# Patient Record
Sex: FEMALE | Race: WHITE | Employment: OTHER | URBAN - METROPOLITAN AREA
[De-identification: names, ages, dates, MRNs, and addresses within clinical notes are randomized per-mention and may not be internally consistent; named-entity substitution may affect disease eponyms.]

---

## 2017-02-27 ENCOUNTER — HOSPITAL ENCOUNTER (OUTPATIENT)
Dept: MAMMOGRAPHY | Age: 77
Discharge: HOME | End: 2017-02-27
Attending: INTERNAL MEDICINE | Admitting: INTERNAL MEDICINE
Payer: MEDICARE

## 2017-02-27 DIAGNOSIS — C50.912 MALIGNANT NEOPLASM OF LEFT FEMALE BREAST, UNSPECIFIED SITE OF BREAST: ICD-10-CM

## 2017-02-27 DIAGNOSIS — Z12.31 ENCOUNTER FOR SCREENING MAMMOGRAM FOR MALIGNANT NEOPLASM OF BREAST: ICD-10-CM

## 2017-02-27 PROCEDURE — 7025F PT INFOSYS ALARM 4 NXT MAMMO: CPT | Performed by: RADIOLOGY

## 2017-02-27 PROCEDURE — G0202 SCR MAMMO BI INCL CAD: HCPCS

## 2017-02-27 PROCEDURE — G0202 SCR MAMMO BI INCL CAD: HCPCS | Performed by: RADIOLOGY

## 2017-03-14 ENCOUNTER — HOSPITAL ENCOUNTER (OUTPATIENT)
Dept: MRI IMAGING | Age: 77
Discharge: HOME | End: 2017-03-14
Payer: MEDICARE

## 2017-03-14 DIAGNOSIS — G89.29 OTHER CHRONIC PAIN: ICD-10-CM

## 2017-03-14 LAB
BUN BLDV-MCNC: 27 MG/DL (ref 8–23)
CREAT SERPL-MCNC: 1.46 MG/DL (ref 0.5–0.9)
GFR AFRICAN AMERICAN: 42 ML/MIN
GFR NON-AFRICAN AMERICAN: 35 ML/MIN
GFR SERPL CREATININE-BSD FRML MDRD: ABNORMAL ML/MIN/{1.73_M2}
GFR SERPL CREATININE-BSD FRML MDRD: ABNORMAL ML/MIN/{1.73_M2}
HEPATITIS C ANTIBODY: NONREACTIVE

## 2017-03-14 PROCEDURE — 82565 ASSAY OF CREATININE: CPT

## 2017-03-14 PROCEDURE — 84520 ASSAY OF UREA NITROGEN: CPT

## 2017-03-14 PROCEDURE — 36415 COLL VENOUS BLD VENIPUNCTURE: CPT

## 2017-03-14 PROCEDURE — 72148 MRI LUMBAR SPINE W/O DYE: CPT

## 2017-03-14 PROCEDURE — 86803 HEPATITIS C AB TEST: CPT

## 2017-03-29 ENCOUNTER — HOSPITAL ENCOUNTER (OUTPATIENT)
Dept: RADIATION ONCOLOGY | Age: 77
Discharge: HOME | End: 2017-03-29
Payer: MEDICARE

## 2017-03-29 PROCEDURE — 99212 OFFICE O/P EST SF 10 MIN: CPT | Performed by: RADIOLOGY

## 2017-06-12 ENCOUNTER — HOSPITAL ENCOUNTER (OUTPATIENT)
Age: 77
Discharge: HOME | End: 2017-06-12
Payer: MEDICARE

## 2017-06-12 ENCOUNTER — OFFICE VISIT (OUTPATIENT)
Dept: ONCOLOGY | Age: 77
End: 2017-06-12
Payer: MEDICARE

## 2017-06-12 VITALS
HEART RATE: 56 BPM | BODY MASS INDEX: 29.15 KG/M2 | DIASTOLIC BLOOD PRESSURE: 73 MMHG | TEMPERATURE: 98.3 F | SYSTOLIC BLOOD PRESSURE: 154 MMHG | WEIGHT: 175.2 LBS

## 2017-06-12 DIAGNOSIS — C50.912 MALIGNANT NEOPLASM OF LEFT FEMALE BREAST, UNSPECIFIED SITE OF BREAST: Primary | ICD-10-CM

## 2017-06-12 DIAGNOSIS — Z12.31 ENCOUNTER FOR SCREENING MAMMOGRAM FOR MALIGNANT NEOPLASM OF BREAST: ICD-10-CM

## 2017-06-12 DIAGNOSIS — M81.0 AGE-RELATED OSTEOPOROSIS WITHOUT CURRENT PATHOLOGICAL FRACTURE: ICD-10-CM

## 2017-06-12 LAB
ABSOLUTE EOS #: 0.7 K/UL (ref 0–0.4)
ABSOLUTE LYMPH #: 1.3 K/UL (ref 1–4.8)
ABSOLUTE MONO #: 0.6 K/UL (ref 0.1–1.2)
ALBUMIN SERPL-MCNC: 3.5 G/DL (ref 3.5–5.2)
ALBUMIN/GLOBULIN RATIO: 1.2 (ref 1–2.5)
ALP BLD-CCNC: 75 U/L (ref 35–104)
ALT SERPL-CCNC: 7 U/L (ref 5–33)
ANION GAP SERPL CALCULATED.3IONS-SCNC: 13 MMOL/L (ref 9–17)
AST SERPL-CCNC: 16 U/L
BASOPHILS # BLD: 1 %
BASOPHILS ABSOLUTE: 0.1 K/UL (ref 0–0.2)
BILIRUB SERPL-MCNC: 0.25 MG/DL (ref 0.3–1.2)
BUN BLDV-MCNC: 25 MG/DL (ref 8–23)
BUN/CREAT BLD: ABNORMAL (ref 9–20)
CALCIUM SERPL-MCNC: 10.2 MG/DL (ref 8.6–10.4)
CHLORIDE BLD-SCNC: 101 MMOL/L (ref 98–107)
CO2: 26 MMOL/L (ref 20–31)
CREAT SERPL-MCNC: 1.61 MG/DL (ref 0.5–0.9)
DIFFERENTIAL TYPE: ABNORMAL
EOSINOPHILS RELATIVE PERCENT: 8 %
GFR AFRICAN AMERICAN: 38 ML/MIN
GFR NON-AFRICAN AMERICAN: 31 ML/MIN
GFR SERPL CREATININE-BSD FRML MDRD: ABNORMAL ML/MIN/{1.73_M2}
GFR SERPL CREATININE-BSD FRML MDRD: ABNORMAL ML/MIN/{1.73_M2}
GLUCOSE BLD-MCNC: 104 MG/DL (ref 70–99)
HCT VFR BLD CALC: 35.2 % (ref 36–46)
HEMOGLOBIN: 11.6 G/DL (ref 12–16)
LYMPHOCYTES # BLD: 15 %
MCH RBC QN AUTO: 29.6 PG (ref 26–34)
MCHC RBC AUTO-ENTMCNC: 33.1 G/DL (ref 31–37)
MCV RBC AUTO: 89.5 FL (ref 80–100)
MONOCYTES # BLD: 7 %
PDW BLD-RTO: 14.5 % (ref 12.5–15.4)
PLATELET # BLD: 221 K/UL (ref 140–450)
PLATELET ESTIMATE: ABNORMAL
PMV BLD AUTO: 8.3 FL (ref 6–12)
POTASSIUM SERPL-SCNC: 4.5 MMOL/L (ref 3.7–5.3)
RBC # BLD: 3.93 M/UL (ref 4–5.2)
RBC # BLD: ABNORMAL 10*6/UL
SEG NEUTROPHILS: 69 %
SEGMENTED NEUTROPHILS ABSOLUTE COUNT: 6.2 K/UL (ref 1.8–7.7)
SODIUM BLD-SCNC: 140 MMOL/L (ref 135–144)
TOTAL PROTEIN: 6.5 G/DL (ref 6.4–8.3)
WBC # BLD: 8.9 K/UL (ref 3.5–11)
WBC # BLD: ABNORMAL 10*3/UL

## 2017-06-12 PROCEDURE — 4040F PNEUMOC VAC/ADMIN/RCVD: CPT | Performed by: INTERNAL MEDICINE

## 2017-06-12 PROCEDURE — G8399 PT W/DXA RESULTS DOCUMENT: HCPCS | Performed by: INTERNAL MEDICINE

## 2017-06-12 PROCEDURE — 4005F PHARM THX FOR OP RXD: CPT | Performed by: INTERNAL MEDICINE

## 2017-06-12 PROCEDURE — 1123F ACP DISCUSS/DSCN MKR DOCD: CPT | Performed by: INTERNAL MEDICINE

## 2017-06-12 PROCEDURE — 85025 COMPLETE CBC W/AUTO DIFF WBC: CPT

## 2017-06-12 PROCEDURE — 1090F PRES/ABSN URINE INCON ASSESS: CPT | Performed by: INTERNAL MEDICINE

## 2017-06-12 PROCEDURE — 80053 COMPREHEN METABOLIC PANEL: CPT

## 2017-06-12 PROCEDURE — 99214 OFFICE O/P EST MOD 30 MIN: CPT | Performed by: INTERNAL MEDICINE

## 2017-06-12 PROCEDURE — 36415 COLL VENOUS BLD VENIPUNCTURE: CPT

## 2017-06-12 PROCEDURE — G8427 DOCREV CUR MEDS BY ELIG CLIN: HCPCS | Performed by: INTERNAL MEDICINE

## 2017-06-12 PROCEDURE — 1036F TOBACCO NON-USER: CPT | Performed by: INTERNAL MEDICINE

## 2017-06-12 PROCEDURE — G8419 CALC BMI OUT NRM PARAM NOF/U: HCPCS | Performed by: INTERNAL MEDICINE

## 2017-06-12 RX ORDER — OMEPRAZOLE 20 MG/1
20 CAPSULE, DELAYED RELEASE ORAL 2 TIMES DAILY
Qty: 60 CAPSULE | Refills: 3 | Status: SHIPPED | OUTPATIENT
Start: 2017-06-12 | End: 2017-10-25 | Stop reason: SDUPTHER

## 2017-10-25 DIAGNOSIS — C50.912 MALIGNANT NEOPLASM OF LEFT FEMALE BREAST, UNSPECIFIED ESTROGEN RECEPTOR STATUS, UNSPECIFIED SITE OF BREAST (HCC): Primary | ICD-10-CM

## 2017-10-25 RX ORDER — OMEPRAZOLE 20 MG/1
20 CAPSULE, DELAYED RELEASE ORAL 2 TIMES DAILY
Qty: 60 CAPSULE | Refills: 3 | Status: SHIPPED | OUTPATIENT
Start: 2017-10-25 | End: 2017-12-11 | Stop reason: SDUPTHER

## 2017-11-07 ENCOUNTER — HOSPITAL ENCOUNTER (OUTPATIENT)
Age: 77
Discharge: HOME | End: 2017-11-07
Payer: MEDICARE

## 2017-11-07 ENCOUNTER — HOSPITAL ENCOUNTER (OUTPATIENT)
Dept: GENERAL RADIOLOGY | Age: 77
Discharge: HOME | End: 2017-11-07
Payer: MEDICARE

## 2017-11-07 DIAGNOSIS — J40 BRONCHITIS: ICD-10-CM

## 2017-11-07 DIAGNOSIS — R06.02 SOB (SHORTNESS OF BREATH): ICD-10-CM

## 2017-11-07 PROCEDURE — 71020 XR CHEST STANDARD TWO VW: CPT

## 2017-11-27 ENCOUNTER — APPOINTMENT (OUTPATIENT)
Dept: CT IMAGING | Age: 77
End: 2017-11-27
Payer: OTHER MISCELLANEOUS

## 2017-11-27 ENCOUNTER — HOSPITAL ENCOUNTER (EMERGENCY)
Age: 77
Discharge: HOME | End: 2017-11-27
Attending: EMERGENCY MEDICINE
Payer: OTHER MISCELLANEOUS

## 2017-11-27 VITALS
SYSTOLIC BLOOD PRESSURE: 179 MMHG | HEIGHT: 65 IN | WEIGHT: 179 LBS | DIASTOLIC BLOOD PRESSURE: 53 MMHG | OXYGEN SATURATION: 95 % | HEART RATE: 70 BPM | BODY MASS INDEX: 29.82 KG/M2 | RESPIRATION RATE: 20 BRPM | TEMPERATURE: 97.5 F

## 2017-11-27 DIAGNOSIS — S16.1XXA ACUTE STRAIN OF NECK MUSCLE, INITIAL ENCOUNTER: ICD-10-CM
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
## 2017-11-27 PROCEDURE — 72125 CT NECK SPINE W/O DYE: CPT

## 2017-11-27 PROCEDURE — 99284 EMERGENCY DEPT VISIT MOD MDM: CPT

## 2017-11-27 PROCEDURE — 70450 CT HEAD/BRAIN W/O DYE: CPT

## 2017-11-27 ASSESSMENT — PAIN SCALES - GENERAL: PAINLEVEL_OUTOF10: 7

## 2017-11-27 ASSESSMENT — PAIN DESCRIPTION - PAIN TYPE: TYPE: ACUTE PAIN

## 2017-11-27 ASSESSMENT — PAIN DESCRIPTION - LOCATION: LOCATION: NECK;BACK
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
## 2017-11-28 NOTE — ED NOTES
Pt ambulated to the exit without assistance or difficulty; gait even and steady. Pt denied the need for a wheelchair. Pt educated on discharge instructions, pt verbalized understanding. Pt is eupneic, A&OX4, and PWD.        Keke Moran RN  11/27/17 5408

## 2017-12-01 ENCOUNTER — TELEPHONE (OUTPATIENT)
Dept: ONCOLOGY | Age: 77
End: 2017-12-01

## 2017-12-01 NOTE — TELEPHONE ENCOUNTER
Ofe Hill from [employer] called office and requested a verbal order for breast prosthesis and mastectomy bra. Ofe Hill stated patient is currently there to pick above items up. Verbal order provided and Ofe Hill stated that she will fax a form to our office for Dr Nora Cornell to sign.  Adri Irizarry

## 2017-12-11 DIAGNOSIS — C50.912 MALIGNANT NEOPLASM OF LEFT FEMALE BREAST, UNSPECIFIED ESTROGEN RECEPTOR STATUS, UNSPECIFIED SITE OF BREAST (HCC): ICD-10-CM

## 2017-12-11 RX ORDER — OMEPRAZOLE 20 MG/1
20 CAPSULE, DELAYED RELEASE ORAL 2 TIMES DAILY
Qty: 60 CAPSULE | Refills: 3 | Status: SHIPPED | OUTPATIENT
Start: 2017-12-11 | End: 2018-02-23 | Stop reason: SDUPTHER

## 2018-02-09 ENCOUNTER — HOSPITAL ENCOUNTER (OUTPATIENT)
Dept: MRI IMAGING | Age: 78
Discharge: HOME | End: 2018-02-11
Payer: MEDICARE

## 2018-02-09 DIAGNOSIS — M54.50 LUMBAR SPINE PAIN: ICD-10-CM

## 2018-02-09 PROCEDURE — 72148 MRI LUMBAR SPINE W/O DYE: CPT

## 2018-02-23 DIAGNOSIS — C50.912 MALIGNANT NEOPLASM OF LEFT FEMALE BREAST, UNSPECIFIED ESTROGEN RECEPTOR STATUS, UNSPECIFIED SITE OF BREAST (HCC): ICD-10-CM

## 2018-02-23 RX ORDER — OMEPRAZOLE 20 MG/1
20 CAPSULE, DELAYED RELEASE ORAL DAILY
Qty: 30 CAPSULE | Refills: 3 | Status: SHIPPED | OUTPATIENT
Start: 2018-02-23 | End: 2018-07-04 | Stop reason: SDUPTHER

## 2018-02-23 NOTE — TELEPHONE ENCOUNTER
Insurance will not cover Omeprazole 20mg PO BID and will only cover 1 PO daily. Dr Katia Trevino stated he does not want a PA performed and to submit rx for Omeprazole 20mg PO daily. I called and notified patient of above. Rx submitted.  Harini Lyons

## 2018-03-08 ENCOUNTER — HOSPITAL ENCOUNTER (OUTPATIENT)
Dept: WOMENS IMAGING | Age: 78
Discharge: HOME | End: 2018-03-10
Payer: MEDICARE

## 2018-03-08 DIAGNOSIS — C50.912 MALIGNANT NEOPLASM OF LEFT FEMALE BREAST (HCC): ICD-10-CM

## 2018-03-08 DIAGNOSIS — Z12.31 ENCOUNTER FOR SCREENING MAMMOGRAM FOR MALIGNANT NEOPLASM OF BREAST: ICD-10-CM

## 2018-03-08 DIAGNOSIS — M81.0 AGE-RELATED OSTEOPOROSIS WITHOUT CURRENT PATHOLOGICAL FRACTURE: ICD-10-CM

## 2018-03-08 PROCEDURE — 77080 DXA BONE DENSITY AXIAL: CPT

## 2018-03-08 PROCEDURE — 77067 SCR MAMMO BI INCL CAD: CPT

## 2018-04-27 ENCOUNTER — HOSPITAL ENCOUNTER (OUTPATIENT)
Age: 78
Setting detail: OBSERVATION
Discharge: HOME | End: 2018-04-28
Attending: EMERGENCY MEDICINE | Admitting: FAMILY MEDICINE
Payer: MEDICARE

## 2018-04-27 ENCOUNTER — APPOINTMENT (OUTPATIENT)
Dept: CT IMAGING | Age: 78
End: 2018-04-27
Payer: MEDICARE

## 2018-04-27 ENCOUNTER — APPOINTMENT (OUTPATIENT)
Dept: MRI IMAGING | Age: 78
End: 2018-04-27
Payer: MEDICARE

## 2018-04-27 DIAGNOSIS — I10 UNCONTROLLED HYPERTENSION: ICD-10-CM

## 2018-04-27 DIAGNOSIS — G45.9 TRANSIENT CEREBRAL ISCHEMIA, UNSPECIFIED TYPE: Primary | ICD-10-CM

## 2018-04-27 LAB
ABSOLUTE EOS #: 0 K/UL (ref 0–0.4)
ABSOLUTE IMMATURE GRANULOCYTE: ABNORMAL K/UL (ref 0–0.3)
ABSOLUTE LYMPH #: 0.7 K/UL (ref 1–4.8)
ABSOLUTE MONO #: 0.5 K/UL (ref 0.1–1.3)
ANION GAP SERPL CALCULATED.3IONS-SCNC: 13 MMOL/L (ref 9–17)
ANION GAP SERPL CALCULATED.3IONS-SCNC: 14 MMOL/L (ref 9–17)
BASOPHILS # BLD: 1 % (ref 0–2)
BASOPHILS ABSOLUTE: 0 K/UL (ref 0–0.2)
BUN BLDV-MCNC: 18 MG/DL (ref 8–23)
BUN BLDV-MCNC: 20 MG/DL (ref 8–23)
BUN/CREAT BLD: ABNORMAL (ref 9–20)
BUN/CREAT BLD: ABNORMAL (ref 9–20)
CALCIUM SERPL-MCNC: 8.9 MG/DL (ref 8.6–10.4)
CALCIUM SERPL-MCNC: 9.1 MG/DL (ref 8.6–10.4)
CHLORIDE BLD-SCNC: 100 MMOL/L (ref 98–107)
CHLORIDE BLD-SCNC: 104 MMOL/L (ref 98–107)
CO2: 23 MMOL/L (ref 20–31)
CO2: 23 MMOL/L (ref 20–31)
CREAT SERPL-MCNC: 1.23 MG/DL (ref 0.5–0.9)
CREAT SERPL-MCNC: 1.33 MG/DL (ref 0.5–0.9)
DIFFERENTIAL TYPE: ABNORMAL
EKG ATRIAL RATE: 78 BPM
EKG P AXIS: 73 DEGREES
EKG P-R INTERVAL: 172 MS
EKG Q-T INTERVAL: 400 MS
EKG QRS DURATION: 90 MS
EKG QTC CALCULATION (BAZETT): 456 MS
EKG R AXIS: -15 DEGREES
EKG T AXIS: 93 DEGREES
EKG VENTRICULAR RATE: 78 BPM
EOSINOPHILS RELATIVE PERCENT: 0 % (ref 0–4)
GFR AFRICAN AMERICAN: 47 ML/MIN
GFR AFRICAN AMERICAN: 51 ML/MIN
GFR NON-AFRICAN AMERICAN: 39 ML/MIN
GFR NON-AFRICAN AMERICAN: 42 ML/MIN
GFR SERPL CREATININE-BSD FRML MDRD: ABNORMAL ML/MIN/{1.73_M2}
GLUCOSE BLD-MCNC: 102 MG/DL (ref 70–99)
GLUCOSE BLD-MCNC: 117 MG/DL (ref 70–99)
HCT VFR BLD CALC: 35.7 % (ref 36–46)
HEMOGLOBIN: 11.7 G/DL (ref 12–16)
IMMATURE GRANULOCYTES: ABNORMAL %
INR BLD: 1
LV EF: 73 %
LVEF MODALITY: NORMAL
LYMPHOCYTES # BLD: 8 % (ref 24–44)
MCH RBC QN AUTO: 29 PG (ref 26–34)
MCHC RBC AUTO-ENTMCNC: 32.8 G/DL (ref 31–37)
MCV RBC AUTO: 88.4 FL (ref 80–100)
MONOCYTES # BLD: 6 % (ref 1–7)
NRBC AUTOMATED: ABNORMAL PER 100 WBC
PARTIAL THROMBOPLASTIN TIME: 35.5 SEC (ref 23–31)
PDW BLD-RTO: 15 % (ref 11.5–14.9)
PLATELET # BLD: 202 K/UL (ref 150–450)
PLATELET ESTIMATE: ABNORMAL
PMV BLD AUTO: 8.6 FL (ref 6–12)
POTASSIUM SERPL-SCNC: 3.5 MMOL/L (ref 3.7–5.3)
POTASSIUM SERPL-SCNC: 4 MMOL/L (ref 3.7–5.3)
PROTHROMBIN TIME: 10.8 SEC (ref 9.7–12)
RBC # BLD: 4.04 M/UL (ref 4–5.2)
RBC # BLD: ABNORMAL 10*6/UL
SEG NEUTROPHILS: 85 % (ref 36–66)
SEGMENTED NEUTROPHILS ABSOLUTE COUNT: 7.6 K/UL (ref 1.3–9.1)
SODIUM BLD-SCNC: 137 MMOL/L (ref 135–144)
SODIUM BLD-SCNC: 140 MMOL/L (ref 135–144)
WBC # BLD: 8.9 K/UL (ref 3.5–11)
WBC # BLD: ABNORMAL 10*3/UL

## 2018-04-27 PROCEDURE — 80048 BASIC METABOLIC PNL TOTAL CA: CPT

## 2018-04-27 PROCEDURE — 93306 TTE W/DOPPLER COMPLETE: CPT

## 2018-04-27 PROCEDURE — 96372 THER/PROPH/DIAG INJ SC/IM: CPT

## 2018-04-27 PROCEDURE — 70547 MR ANGIOGRAPHY NECK W/O DYE: CPT

## 2018-04-27 PROCEDURE — 6370000000 HC RX 637 (ALT 250 FOR IP): Performed by: FAMILY MEDICINE

## 2018-04-27 PROCEDURE — 82947 ASSAY GLUCOSE BLOOD QUANT: CPT

## 2018-04-27 PROCEDURE — 85025 COMPLETE CBC W/AUTO DIFF WBC: CPT

## 2018-04-27 PROCEDURE — 6360000002 HC RX W HCPCS: Performed by: FAMILY MEDICINE

## 2018-04-27 PROCEDURE — 36415 COLL VENOUS BLD VENIPUNCTURE: CPT

## 2018-04-27 PROCEDURE — 85610 PROTHROMBIN TIME: CPT

## 2018-04-27 PROCEDURE — 2580000003 HC RX 258: Performed by: FAMILY MEDICINE

## 2018-04-27 PROCEDURE — 70450 CT HEAD/BRAIN W/O DYE: CPT

## 2018-04-27 PROCEDURE — 85730 THROMBOPLASTIN TIME PARTIAL: CPT

## 2018-04-27 PROCEDURE — 70544 MR ANGIOGRAPHY HEAD W/O DYE: CPT

## 2018-04-27 PROCEDURE — G0378 HOSPITAL OBSERVATION PER HR: HCPCS

## 2018-04-27 PROCEDURE — 70551 MRI BRAIN STEM W/O DYE: CPT

## 2018-04-27 PROCEDURE — 93880 EXTRACRANIAL BILAT STUDY: CPT

## 2018-04-27 PROCEDURE — 93005 ELECTROCARDIOGRAM TRACING: CPT

## 2018-04-27 PROCEDURE — 6370000000 HC RX 637 (ALT 250 FOR IP): Performed by: EMERGENCY MEDICINE

## 2018-04-27 PROCEDURE — 99285 EMERGENCY DEPT VISIT HI MDM: CPT

## 2018-04-27 PROCEDURE — 78428 CARDIAC SHUNT IMAGING: CPT

## 2018-04-27 RX ORDER — ONDANSETRON 2 MG/ML
4 INJECTION INTRAMUSCULAR; INTRAVENOUS EVERY 6 HOURS PRN
Status: DISCONTINUED | OUTPATIENT
Start: 2018-04-27 | End: 2018-04-28 | Stop reason: HOSPADM

## 2018-04-27 RX ORDER — LOSARTAN POTASSIUM 100 MG/1
100 TABLET ORAL DAILY
Status: DISCONTINUED | OUTPATIENT
Start: 2018-04-27 | End: 2018-04-27

## 2018-04-27 RX ORDER — GABAPENTIN 300 MG/1
300 CAPSULE ORAL 2 TIMES DAILY
Status: DISCONTINUED | OUTPATIENT
Start: 2018-04-27 | End: 2018-04-27

## 2018-04-27 RX ORDER — NAPROXEN 500 MG/1
500 TABLET ORAL 2 TIMES DAILY WITH MEALS
Status: DISCONTINUED | OUTPATIENT
Start: 2018-04-27 | End: 2018-04-28 | Stop reason: HOSPADM

## 2018-04-27 RX ORDER — PANTOPRAZOLE SODIUM 40 MG/1
40 TABLET, DELAYED RELEASE ORAL
Status: DISCONTINUED | OUTPATIENT
Start: 2018-04-28 | End: 2018-04-27 | Stop reason: RX

## 2018-04-27 RX ORDER — POTASSIUM CHLORIDE 20 MEQ/1
20 TABLET, EXTENDED RELEASE ORAL ONCE
Status: COMPLETED | OUTPATIENT
Start: 2018-04-27 | End: 2018-04-27

## 2018-04-27 RX ORDER — SIMVASTATIN 40 MG
40 TABLET ORAL NIGHTLY
Status: DISCONTINUED | OUTPATIENT
Start: 2018-04-27 | End: 2018-04-27

## 2018-04-27 RX ORDER — ASPIRIN 81 MG/1
324 TABLET, CHEWABLE ORAL ONCE
Status: COMPLETED | OUTPATIENT
Start: 2018-04-27 | End: 2018-04-27

## 2018-04-27 RX ORDER — METOPROLOL TARTRATE 50 MG/1
25 TABLET, FILM COATED ORAL ONCE
Status: COMPLETED | OUTPATIENT
Start: 2018-04-27 | End: 2018-04-27

## 2018-04-27 RX ORDER — ASPIRIN 81 MG/1
81 TABLET ORAL DAILY
Status: DISCONTINUED | OUTPATIENT
Start: 2018-04-27 | End: 2018-04-28 | Stop reason: HOSPADM

## 2018-04-27 RX ORDER — SODIUM CHLORIDE 0.9 % (FLUSH) 0.9 %
10 SYRINGE (ML) INJECTION EVERY 12 HOURS SCHEDULED
Status: DISCONTINUED | OUTPATIENT
Start: 2018-04-27 | End: 2018-04-28 | Stop reason: HOSPADM

## 2018-04-27 RX ORDER — HYDRALAZINE HYDROCHLORIDE 25 MG/1
25 TABLET, FILM COATED ORAL EVERY 6 HOURS PRN
Status: DISCONTINUED | OUTPATIENT
Start: 2018-04-27 | End: 2018-04-28

## 2018-04-27 RX ORDER — ASPIRIN 81 MG/1
81 TABLET ORAL DAILY
Status: DISCONTINUED | OUTPATIENT
Start: 2018-04-27 | End: 2018-04-27

## 2018-04-27 RX ORDER — ACETAMINOPHEN 325 MG/1
650 TABLET ORAL EVERY 4 HOURS PRN
Status: DISCONTINUED | OUTPATIENT
Start: 2018-04-27 | End: 2018-04-28 | Stop reason: HOSPADM

## 2018-04-27 RX ORDER — NAPROXEN 500 MG/1
500 TABLET ORAL 2 TIMES DAILY WITH MEALS
Status: DISCONTINUED | OUTPATIENT
Start: 2018-04-27 | End: 2018-04-27

## 2018-04-27 RX ORDER — GABAPENTIN 300 MG/1
300 CAPSULE ORAL 2 TIMES DAILY
Status: DISCONTINUED | OUTPATIENT
Start: 2018-04-27 | End: 2018-04-28 | Stop reason: HOSPADM

## 2018-04-27 RX ORDER — HYDROCODONE BITARTRATE AND ACETAMINOPHEN 5; 325 MG/1; MG/1
1 TABLET ORAL EVERY 6 HOURS PRN
Status: DISCONTINUED | OUTPATIENT
Start: 2018-04-27 | End: 2018-04-28 | Stop reason: HOSPADM

## 2018-04-27 RX ORDER — SODIUM CHLORIDE 0.9 % (FLUSH) 0.9 %
10 SYRINGE (ML) INJECTION PRN
Status: DISCONTINUED | OUTPATIENT
Start: 2018-04-27 | End: 2018-04-28 | Stop reason: HOSPADM

## 2018-04-27 RX ORDER — EZETIMIBE AND SIMVASTATIN 10; 80 MG/1; MG/1
1 TABLET ORAL NIGHTLY
Status: DISCONTINUED | OUTPATIENT
Start: 2018-04-27 | End: 2018-04-28 | Stop reason: HOSPADM

## 2018-04-27 RX ORDER — CARVEDILOL 25 MG/1
25 TABLET ORAL 2 TIMES DAILY
Status: DISCONTINUED | OUTPATIENT
Start: 2018-04-27 | End: 2018-04-28 | Stop reason: HOSPADM

## 2018-04-27 RX ORDER — SIMVASTATIN 40 MG
80 TABLET ORAL NIGHTLY
Status: DISCONTINUED | OUTPATIENT
Start: 2018-04-27 | End: 2018-04-27 | Stop reason: SDUPTHER

## 2018-04-27 RX ORDER — OMEPRAZOLE 20 MG/1
20 CAPSULE, DELAYED RELEASE ORAL
Status: DISCONTINUED | OUTPATIENT
Start: 2018-04-27 | End: 2018-04-28 | Stop reason: HOSPADM

## 2018-04-27 RX ORDER — METOPROLOL SUCCINATE 25 MG/1
25 TABLET, EXTENDED RELEASE ORAL DAILY
Status: DISCONTINUED | OUTPATIENT
Start: 2018-04-27 | End: 2018-04-27

## 2018-04-27 RX ORDER — LOSARTAN POTASSIUM 100 MG/1
100 TABLET ORAL DAILY
Status: DISCONTINUED | OUTPATIENT
Start: 2018-04-27 | End: 2018-04-28 | Stop reason: HOSPADM

## 2018-04-27 RX ORDER — ALLOPURINOL 100 MG/1
100 TABLET ORAL 2 TIMES DAILY
Status: DISCONTINUED | OUTPATIENT
Start: 2018-04-27 | End: 2018-04-28 | Stop reason: HOSPADM

## 2018-04-27 RX ADMIN — ASPIRIN 81 MG 324 MG: 81 TABLET ORAL at 11:21

## 2018-04-27 RX ADMIN — NAPROXEN 500 MG: 500 TABLET ORAL at 16:08

## 2018-04-27 RX ADMIN — Medication 10 ML: at 21:19

## 2018-04-27 RX ADMIN — ALLOPURINOL 100 MG: 100 TABLET ORAL at 21:14

## 2018-04-27 RX ADMIN — POTASSIUM CHLORIDE 20 MEQ: 20 TABLET, EXTENDED RELEASE ORAL at 19:59

## 2018-04-27 RX ADMIN — METOPROLOL TARTRATE 25 MG: 50 TABLET ORAL at 11:52

## 2018-04-27 RX ADMIN — OMEPRAZOLE 20 MG: 20 CAPSULE, DELAYED RELEASE ORAL at 16:09

## 2018-04-27 RX ADMIN — ENOXAPARIN SODIUM 40 MG: 40 INJECTION SUBCUTANEOUS at 14:46

## 2018-04-27 RX ADMIN — CARVEDILOL 25 MG: 25 TABLET, FILM COATED ORAL at 21:14

## 2018-04-27 RX ADMIN — HYDRALAZINE HYDROCHLORIDE 25 MG: 25 TABLET, FILM COATED ORAL at 19:59

## 2018-04-27 RX ADMIN — CARVEDILOL 25 MG: 25 TABLET, FILM COATED ORAL at 14:46

## 2018-04-27 RX ADMIN — GABAPENTIN 300 MG: 300 CAPSULE ORAL at 21:14

## 2018-04-27 RX ADMIN — EZETIMIBE AND SIMVASTATIN 1 TABLET: 10; 80 TABLET ORAL at 22:11

## 2018-04-27 ASSESSMENT — ENCOUNTER SYMPTOMS
RHINORRHEA: 0
BACK PAIN: 0
COUGH: 0
EYE REDNESS: 0
EYE PAIN: 0
ABDOMINAL PAIN: 0
SHORTNESS OF BREATH: 0
VOMITING: 0
NAUSEA: 0

## 2018-04-27 ASSESSMENT — PAIN SCALES - GENERAL
PAINLEVEL_OUTOF10: 1
PAINLEVEL_OUTOF10: 0
PAINLEVEL_OUTOF10: 0

## 2018-04-28 VITALS
OXYGEN SATURATION: 97 % | WEIGHT: 173.5 LBS | RESPIRATION RATE: 16 BRPM | TEMPERATURE: 97.2 F | SYSTOLIC BLOOD PRESSURE: 132 MMHG | BODY MASS INDEX: 28.91 KG/M2 | HEIGHT: 65 IN | HEART RATE: 66 BPM | DIASTOLIC BLOOD PRESSURE: 58 MMHG

## 2018-04-28 LAB
ABSOLUTE EOS #: 0 K/UL (ref 0–0.4)
ABSOLUTE IMMATURE GRANULOCYTE: ABNORMAL K/UL (ref 0–0.3)
ABSOLUTE LYMPH #: 1 K/UL (ref 1–4.8)
ABSOLUTE MONO #: 0.4 K/UL (ref 0.1–1.3)
ANION GAP SERPL CALCULATED.3IONS-SCNC: 12 MMOL/L (ref 9–17)
BASOPHILS # BLD: 1 % (ref 0–2)
BASOPHILS ABSOLUTE: 0 K/UL (ref 0–0.2)
BUN BLDV-MCNC: 21 MG/DL (ref 8–23)
BUN/CREAT BLD: ABNORMAL (ref 9–20)
CALCIUM SERPL-MCNC: 9 MG/DL (ref 8.6–10.4)
CHLORIDE BLD-SCNC: 105 MMOL/L (ref 98–107)
CHOLESTEROL/HDL RATIO: 2.6
CHOLESTEROL: 123 MG/DL
CO2: 25 MMOL/L (ref 20–31)
CREAT SERPL-MCNC: 1.23 MG/DL (ref 0.5–0.9)
DIFFERENTIAL TYPE: ABNORMAL
EOSINOPHILS RELATIVE PERCENT: 1 % (ref 0–4)
GFR AFRICAN AMERICAN: 51 ML/MIN
GFR NON-AFRICAN AMERICAN: 42 ML/MIN
GFR SERPL CREATININE-BSD FRML MDRD: ABNORMAL ML/MIN/{1.73_M2}
GFR SERPL CREATININE-BSD FRML MDRD: ABNORMAL ML/MIN/{1.73_M2}
GLUCOSE BLD-MCNC: 109 MG/DL (ref 70–99)
HCT VFR BLD CALC: 33.5 % (ref 36–46)
HDLC SERPL-MCNC: 47 MG/DL
HEMOGLOBIN: 10.8 G/DL (ref 12–16)
IMMATURE GRANULOCYTES: ABNORMAL %
LDL CHOLESTEROL: 53 MG/DL (ref 0–130)
LYMPHOCYTES # BLD: 16 % (ref 24–44)
MCH RBC QN AUTO: 28.7 PG (ref 26–34)
MCHC RBC AUTO-ENTMCNC: 32.2 G/DL (ref 31–37)
MCV RBC AUTO: 89.1 FL (ref 80–100)
MONOCYTES # BLD: 6 % (ref 1–7)
NRBC AUTOMATED: ABNORMAL PER 100 WBC
PDW BLD-RTO: 15 % (ref 11.5–14.9)
PLATELET # BLD: 176 K/UL (ref 150–450)
PLATELET ESTIMATE: ABNORMAL
PMV BLD AUTO: 9 FL (ref 6–12)
POTASSIUM SERPL-SCNC: 4.7 MMOL/L (ref 3.7–5.3)
RBC # BLD: 3.76 M/UL (ref 4–5.2)
RBC # BLD: ABNORMAL 10*6/UL
SEG NEUTROPHILS: 76 % (ref 36–66)
SEGMENTED NEUTROPHILS ABSOLUTE COUNT: 4.9 K/UL (ref 1.3–9.1)
SODIUM BLD-SCNC: 142 MMOL/L (ref 135–144)
TRIGL SERPL-MCNC: 117 MG/DL
VLDLC SERPL CALC-MCNC: NORMAL MG/DL (ref 1–30)
WBC # BLD: 6.4 K/UL (ref 3.5–11)
WBC # BLD: ABNORMAL 10*3/UL

## 2018-04-28 PROCEDURE — 36415 COLL VENOUS BLD VENIPUNCTURE: CPT

## 2018-04-28 PROCEDURE — 6360000002 HC RX W HCPCS: Performed by: FAMILY MEDICINE

## 2018-04-28 PROCEDURE — 90670 PCV13 VACCINE IM: CPT | Performed by: FAMILY MEDICINE

## 2018-04-28 PROCEDURE — G0378 HOSPITAL OBSERVATION PER HR: HCPCS

## 2018-04-28 PROCEDURE — 80061 LIPID PANEL: CPT

## 2018-04-28 PROCEDURE — G0009 ADMIN PNEUMOCOCCAL VACCINE: HCPCS | Performed by: FAMILY MEDICINE

## 2018-04-28 PROCEDURE — 2580000003 HC RX 258: Performed by: FAMILY MEDICINE

## 2018-04-28 PROCEDURE — 6370000000 HC RX 637 (ALT 250 FOR IP): Performed by: FAMILY MEDICINE

## 2018-04-28 PROCEDURE — 85025 COMPLETE CBC W/AUTO DIFF WBC: CPT

## 2018-04-28 PROCEDURE — 80048 BASIC METABOLIC PNL TOTAL CA: CPT

## 2018-04-28 PROCEDURE — 96372 THER/PROPH/DIAG INJ SC/IM: CPT

## 2018-04-28 RX ORDER — HYDRALAZINE HYDROCHLORIDE 25 MG/1
25 TABLET, FILM COATED ORAL 3 TIMES DAILY
Qty: 90 TABLET | Refills: 1
Start: 2018-04-28 | End: 2018-07-11 | Stop reason: ALTCHOICE

## 2018-04-28 RX ORDER — HYDRALAZINE HYDROCHLORIDE 50 MG/1
50 TABLET, FILM COATED ORAL EVERY 6 HOURS PRN
Status: DISCONTINUED | OUTPATIENT
Start: 2018-04-28 | End: 2018-04-28 | Stop reason: HOSPADM

## 2018-04-28 RX ORDER — CLOPIDOGREL BISULFATE 75 MG/1
75 TABLET ORAL DAILY
Qty: 30 TABLET | Refills: 4 | Status: ON HOLD | OUTPATIENT
Start: 2018-04-28 | End: 2020-07-27 | Stop reason: SDUPTHER

## 2018-04-28 RX ADMIN — OMEPRAZOLE 20 MG: 20 CAPSULE, DELAYED RELEASE ORAL at 06:10

## 2018-04-28 RX ADMIN — NAPROXEN 500 MG: 500 TABLET ORAL at 09:25

## 2018-04-28 RX ADMIN — Medication 10 ML: at 09:28

## 2018-04-28 RX ADMIN — ENOXAPARIN SODIUM 40 MG: 40 INJECTION SUBCUTANEOUS at 09:26

## 2018-04-28 RX ADMIN — HYDROCODONE BITARTRATE AND ACETAMINOPHEN 1 TABLET: 5; 325 TABLET ORAL at 09:26

## 2018-04-28 RX ADMIN — GABAPENTIN 300 MG: 300 CAPSULE ORAL at 09:25

## 2018-04-28 RX ADMIN — ASPIRIN 81 MG: 81 TABLET ORAL at 09:24

## 2018-04-28 RX ADMIN — PNEUMOCOCCAL 13-VALENT CONJUGATE VACCINE 0.5 ML: 2.2; 2.2; 2.2; 2.2; 2.2; 4.4; 2.2; 2.2; 2.2; 2.2; 2.2; 2.2; 2.2 INJECTION, SUSPENSION INTRAMUSCULAR at 09:19

## 2018-04-28 RX ADMIN — ALLOPURINOL 100 MG: 100 TABLET ORAL at 09:24

## 2018-04-28 RX ADMIN — HYDRALAZINE HYDROCHLORIDE 50 MG: 50 TABLET, FILM COATED ORAL at 06:16

## 2018-04-28 RX ADMIN — CARVEDILOL 25 MG: 25 TABLET, FILM COATED ORAL at 09:26

## 2018-04-28 RX ADMIN — LOSARTAN POTASSIUM 100 MG: 100 TABLET ORAL at 09:24

## 2018-04-28 ASSESSMENT — PAIN SCALES - GENERAL
PAINLEVEL_OUTOF10: 0
PAINLEVEL_OUTOF10: 6
PAINLEVEL_OUTOF10: 0
PAINLEVEL_OUTOF10: 0
PAINLEVEL_OUTOF10: 4

## 2018-04-28 ASSESSMENT — PAIN DESCRIPTION - DESCRIPTORS: DESCRIPTORS: ACHING

## 2018-04-28 ASSESSMENT — PAIN DESCRIPTION - PAIN TYPE: TYPE: CHRONIC PAIN

## 2018-04-28 ASSESSMENT — PAIN DESCRIPTION - FREQUENCY: FREQUENCY: INTERMITTENT

## 2018-04-28 ASSESSMENT — PAIN DESCRIPTION - ORIENTATION: ORIENTATION: LOWER

## 2018-04-28 ASSESSMENT — PAIN DESCRIPTION - LOCATION: LOCATION: BACK

## 2018-04-29 LAB — GLUCOSE BLD-MCNC: 126 MG/DL (ref 65–105)

## 2018-05-25 ENCOUNTER — TELEPHONE (OUTPATIENT)
Dept: INFUSION THERAPY | Age: 78
End: 2018-05-25

## 2018-06-07 ENCOUNTER — APPOINTMENT (OUTPATIENT)
Dept: CT IMAGING | Age: 78
DRG: 812 | End: 2018-06-07
Payer: MEDICARE

## 2018-06-07 ENCOUNTER — HOSPITAL ENCOUNTER (INPATIENT)
Age: 78
LOS: 1 days | Discharge: HOME | DRG: 812 | End: 2018-06-08
Attending: EMERGENCY MEDICINE | Admitting: FAMILY MEDICINE
Payer: MEDICARE

## 2018-06-07 ENCOUNTER — APPOINTMENT (OUTPATIENT)
Dept: GENERAL RADIOLOGY | Age: 78
DRG: 812 | End: 2018-06-07
Payer: MEDICARE

## 2018-06-07 DIAGNOSIS — N39.0 URINARY TRACT INFECTION WITHOUT HEMATURIA, SITE UNSPECIFIED: ICD-10-CM

## 2018-06-07 DIAGNOSIS — D64.9 ANEMIA, UNSPECIFIED TYPE: Primary | ICD-10-CM

## 2018-06-07 LAB
-: ABNORMAL
ABSOLUTE EOS #: 0.1 K/UL (ref 0–0.4)
ABSOLUTE IMMATURE GRANULOCYTE: ABNORMAL K/UL (ref 0–0.3)
ABSOLUTE LYMPH #: 1.1 K/UL (ref 1–4.8)
ABSOLUTE MONO #: 0.2 K/UL (ref 0.1–1.3)
AMORPHOUS: ABNORMAL
ANION GAP SERPL CALCULATED.3IONS-SCNC: 11 MMOL/L (ref 9–17)
BACTERIA: ABNORMAL
BASOPHILS # BLD: 1 % (ref 0–2)
BASOPHILS ABSOLUTE: 0 K/UL (ref 0–0.2)
BILIRUBIN URINE: NEGATIVE
BUN BLDV-MCNC: 20 MG/DL (ref 8–23)
BUN/CREAT BLD: ABNORMAL (ref 9–20)
CALCIUM SERPL-MCNC: 9.2 MG/DL (ref 8.6–10.4)
CASTS UA: ABNORMAL /LPF
CHLORIDE BLD-SCNC: 104 MMOL/L (ref 98–107)
CO2: 26 MMOL/L (ref 20–31)
COLOR: YELLOW
COMMENT UA: ABNORMAL
CREAT SERPL-MCNC: 1.34 MG/DL (ref 0.5–0.9)
CRYSTALS, UA: ABNORMAL /HPF
DIFFERENTIAL TYPE: ABNORMAL
EKG ATRIAL RATE: 70 BPM
EKG P AXIS: 65 DEGREES
EKG P-R INTERVAL: 160 MS
EKG Q-T INTERVAL: 414 MS
EKG QRS DURATION: 86 MS
EKG QTC CALCULATION (BAZETT): 447 MS
EKG R AXIS: -17 DEGREES
EKG T AXIS: 98 DEGREES
EKG VENTRICULAR RATE: 70 BPM
EOSINOPHILS RELATIVE PERCENT: 1 % (ref 0–4)
EPITHELIAL CELLS UA: ABNORMAL /HPF
GFR AFRICAN AMERICAN: 46 ML/MIN
GFR NON-AFRICAN AMERICAN: 38 ML/MIN
GFR SERPL CREATININE-BSD FRML MDRD: ABNORMAL ML/MIN/{1.73_M2}
GFR SERPL CREATININE-BSD FRML MDRD: ABNORMAL ML/MIN/{1.73_M2}
GLUCOSE BLD-MCNC: 101 MG/DL (ref 70–99)
GLUCOSE URINE: NEGATIVE
HCT VFR BLD CALC: 21.7 % (ref 36–46)
HEMOGLOBIN: 7.3 G/DL (ref 12–16)
IMMATURE GRANULOCYTES: ABNORMAL %
KETONES, URINE: NEGATIVE
LEUKOCYTE ESTERASE, URINE: ABNORMAL
LYMPHOCYTES # BLD: 18 % (ref 24–44)
MAGNESIUM: 1.9 MG/DL (ref 1.6–2.6)
MCH RBC QN AUTO: 30.2 PG (ref 26–34)
MCHC RBC AUTO-ENTMCNC: 33.7 G/DL (ref 31–37)
MCV RBC AUTO: 89.5 FL (ref 80–100)
MONOCYTES # BLD: 4 % (ref 1–7)
MUCUS: ABNORMAL
MYOGLOBIN: 108 NG/ML (ref 25–58)
NITRITE, URINE: POSITIVE
NRBC AUTOMATED: ABNORMAL PER 100 WBC
OTHER OBSERVATIONS UA: ABNORMAL
PDW BLD-RTO: 15.8 % (ref 11.5–14.9)
PH UA: 7 (ref 5–8)
PHOSPHORUS: 4 MG/DL (ref 2.6–4.5)
PLATELET # BLD: 275 K/UL (ref 150–450)
PLATELET ESTIMATE: ABNORMAL
PMV BLD AUTO: 7.5 FL (ref 6–12)
POTASSIUM SERPL-SCNC: 4.2 MMOL/L (ref 3.7–5.3)
PROTEIN UA: NEGATIVE
RBC # BLD: 2.42 M/UL (ref 4–5.2)
RBC # BLD: ABNORMAL 10*6/UL
RBC UA: ABNORMAL /HPF
RENAL EPITHELIAL, UA: ABNORMAL /HPF
SEG NEUTROPHILS: 76 % (ref 36–66)
SEGMENTED NEUTROPHILS ABSOLUTE COUNT: 4.7 K/UL (ref 1.3–9.1)
SODIUM BLD-SCNC: 141 MMOL/L (ref 135–144)
SPECIFIC GRAVITY UA: 1.01 (ref 1–1.03)
TOTAL CK: 60 U/L (ref 26–192)
TRICHOMONAS: ABNORMAL
TROPONIN INTERP: NORMAL
TROPONIN T: <0.03 NG/ML
TURBIDITY: CLEAR
URINE HGB: NEGATIVE
UROBILINOGEN, URINE: NORMAL
WBC # BLD: 6.1 K/UL (ref 3.5–11)
WBC # BLD: ABNORMAL 10*3/UL
WBC UA: ABNORMAL /HPF
YEAST: ABNORMAL

## 2018-06-07 PROCEDURE — 87186 SC STD MICRODIL/AGAR DIL: CPT

## 2018-06-07 PROCEDURE — 86901 BLOOD TYPING SEROLOGIC RH(D): CPT

## 2018-06-07 PROCEDURE — 86850 RBC ANTIBODY SCREEN: CPT

## 2018-06-07 PROCEDURE — 72125 CT NECK SPINE W/O DYE: CPT

## 2018-06-07 PROCEDURE — 70450 CT HEAD/BRAIN W/O DYE: CPT

## 2018-06-07 PROCEDURE — 36415 COLL VENOUS BLD VENIPUNCTURE: CPT

## 2018-06-07 PROCEDURE — 86920 COMPATIBILITY TEST SPIN: CPT

## 2018-06-07 PROCEDURE — P9016 RBC LEUKOCYTES REDUCED: HCPCS

## 2018-06-07 PROCEDURE — 71045 X-RAY EXAM CHEST 1 VIEW: CPT

## 2018-06-07 PROCEDURE — 85025 COMPLETE CBC W/AUTO DIFF WBC: CPT

## 2018-06-07 PROCEDURE — 84484 ASSAY OF TROPONIN QUANT: CPT

## 2018-06-07 PROCEDURE — 84100 ASSAY OF PHOSPHORUS: CPT

## 2018-06-07 PROCEDURE — 96374 THER/PROPH/DIAG INJ IV PUSH: CPT

## 2018-06-07 PROCEDURE — 87086 URINE CULTURE/COLONY COUNT: CPT

## 2018-06-07 PROCEDURE — 2580000003 HC RX 258: Performed by: EMERGENCY MEDICINE

## 2018-06-07 PROCEDURE — 80048 BASIC METABOLIC PNL TOTAL CA: CPT

## 2018-06-07 PROCEDURE — 82550 ASSAY OF CK (CPK): CPT

## 2018-06-07 PROCEDURE — 87088 URINE BACTERIA CULTURE: CPT

## 2018-06-07 PROCEDURE — 2060000000 HC ICU INTERMEDIATE R&B

## 2018-06-07 PROCEDURE — 83874 ASSAY OF MYOGLOBIN: CPT

## 2018-06-07 PROCEDURE — 83735 ASSAY OF MAGNESIUM: CPT

## 2018-06-07 PROCEDURE — 81001 URINALYSIS AUTO W/SCOPE: CPT

## 2018-06-07 PROCEDURE — 72131 CT LUMBAR SPINE W/O DYE: CPT

## 2018-06-07 PROCEDURE — 36430 TRANSFUSION BLD/BLD COMPNT: CPT

## 2018-06-07 PROCEDURE — 99285 EMERGENCY DEPT VISIT HI MDM: CPT

## 2018-06-07 PROCEDURE — 86900 BLOOD TYPING SEROLOGIC ABO: CPT

## 2018-06-07 PROCEDURE — 6370000000 HC RX 637 (ALT 250 FOR IP): Performed by: FAMILY MEDICINE

## 2018-06-07 PROCEDURE — 74176 CT ABD & PELVIS W/O CONTRAST: CPT

## 2018-06-07 PROCEDURE — 93005 ELECTROCARDIOGRAM TRACING: CPT

## 2018-06-07 PROCEDURE — 6360000002 HC RX W HCPCS: Performed by: EMERGENCY MEDICINE

## 2018-06-07 RX ORDER — METOPROLOL SUCCINATE 25 MG/1
25 TABLET, EXTENDED RELEASE ORAL DAILY
Status: DISCONTINUED | OUTPATIENT
Start: 2018-06-07 | End: 2018-06-08 | Stop reason: HOSPADM

## 2018-06-07 RX ORDER — PANTOPRAZOLE SODIUM 40 MG/1
40 TABLET, DELAYED RELEASE ORAL
Status: DISCONTINUED | OUTPATIENT
Start: 2018-06-08 | End: 2018-06-08 | Stop reason: HOSPADM

## 2018-06-07 RX ORDER — SODIUM CHLORIDE 0.9 % (FLUSH) 0.9 %
10 SYRINGE (ML) INJECTION EVERY 12 HOURS SCHEDULED
Status: DISCONTINUED | OUTPATIENT
Start: 2018-06-07 | End: 2018-06-08 | Stop reason: HOSPADM

## 2018-06-07 RX ORDER — ORPHENADRINE CITRATE 30 MG/ML
60 INJECTION INTRAMUSCULAR; INTRAVENOUS ONCE
Status: DISCONTINUED | OUTPATIENT
Start: 2018-06-07 | End: 2018-06-08 | Stop reason: HOSPADM

## 2018-06-07 RX ORDER — CLOPIDOGREL BISULFATE 75 MG/1
75 TABLET ORAL DAILY
Status: DISCONTINUED | OUTPATIENT
Start: 2018-06-07 | End: 2018-06-08 | Stop reason: HOSPADM

## 2018-06-07 RX ORDER — SIMVASTATIN 40 MG
40 TABLET ORAL NIGHTLY
Status: DISCONTINUED | OUTPATIENT
Start: 2018-06-07 | End: 2018-06-08 | Stop reason: HOSPADM

## 2018-06-07 RX ORDER — SODIUM CHLORIDE 0.9 % (FLUSH) 0.9 %
10 SYRINGE (ML) INJECTION PRN
Status: DISCONTINUED | OUTPATIENT
Start: 2018-06-07 | End: 2018-06-08 | Stop reason: HOSPADM

## 2018-06-07 RX ORDER — LOSARTAN POTASSIUM 100 MG/1
100 TABLET ORAL DAILY
Status: DISCONTINUED | OUTPATIENT
Start: 2018-06-07 | End: 2018-06-08 | Stop reason: HOSPADM

## 2018-06-07 RX ORDER — HYDRALAZINE HYDROCHLORIDE 25 MG/1
25 TABLET, FILM COATED ORAL 3 TIMES DAILY
Status: DISCONTINUED | OUTPATIENT
Start: 2018-06-07 | End: 2018-06-08 | Stop reason: HOSPADM

## 2018-06-07 RX ORDER — 0.9 % SODIUM CHLORIDE 0.9 %
1000 INTRAVENOUS SOLUTION INTRAVENOUS ONCE
Status: COMPLETED | OUTPATIENT
Start: 2018-06-07 | End: 2018-06-07

## 2018-06-07 RX ORDER — GABAPENTIN 300 MG/1
300 CAPSULE ORAL 2 TIMES DAILY
Status: DISCONTINUED | OUTPATIENT
Start: 2018-06-07 | End: 2018-06-08 | Stop reason: HOSPADM

## 2018-06-07 RX ORDER — SODIUM CHLORIDE 0.9 % (FLUSH) 0.9 %
10 SYRINGE (ML) INJECTION EVERY 12 HOURS
Status: DISCONTINUED | OUTPATIENT
Start: 2018-06-07 | End: 2018-06-08 | Stop reason: HOSPADM

## 2018-06-07 RX ORDER — ACETAMINOPHEN 325 MG/1
650 TABLET ORAL EVERY 4 HOURS PRN
Status: DISCONTINUED | OUTPATIENT
Start: 2018-06-07 | End: 2018-06-08 | Stop reason: HOSPADM

## 2018-06-07 RX ORDER — ONDANSETRON 2 MG/ML
4 INJECTION INTRAMUSCULAR; INTRAVENOUS EVERY 6 HOURS PRN
Status: DISCONTINUED | OUTPATIENT
Start: 2018-06-07 | End: 2018-06-08 | Stop reason: HOSPADM

## 2018-06-07 RX ORDER — OXYCODONE HYDROCHLORIDE AND ACETAMINOPHEN 5; 325 MG/1; MG/1
1 TABLET ORAL EVERY 6 HOURS PRN
Status: DISCONTINUED | OUTPATIENT
Start: 2018-06-07 | End: 2018-06-08 | Stop reason: HOSPADM

## 2018-06-07 RX ORDER — 0.9 % SODIUM CHLORIDE 0.9 %
250 INTRAVENOUS SOLUTION INTRAVENOUS ONCE
Status: COMPLETED | OUTPATIENT
Start: 2018-06-07 | End: 2018-06-08

## 2018-06-07 RX ORDER — FENTANYL CITRATE 50 UG/ML
50 INJECTION, SOLUTION INTRAMUSCULAR; INTRAVENOUS ONCE
Status: COMPLETED | OUTPATIENT
Start: 2018-06-07 | End: 2018-06-07

## 2018-06-07 RX ORDER — ALLOPURINOL 100 MG/1
100 TABLET ORAL 2 TIMES DAILY
Status: DISCONTINUED | OUTPATIENT
Start: 2018-06-07 | End: 2018-06-08 | Stop reason: HOSPADM

## 2018-06-07 RX ADMIN — SIMVASTATIN 40 MG: 40 TABLET, FILM COATED ORAL at 22:09

## 2018-06-07 RX ADMIN — HYDRALAZINE HYDROCHLORIDE 25 MG: 25 TABLET, FILM COATED ORAL at 22:09

## 2018-06-07 RX ADMIN — ALLOPURINOL 100 MG: 100 TABLET ORAL at 22:08

## 2018-06-07 RX ADMIN — SODIUM CHLORIDE 250 ML: 9 INJECTION, SOLUTION INTRAVENOUS at 22:11

## 2018-06-07 RX ADMIN — SODIUM CHLORIDE 1000 ML: 9 INJECTION, SOLUTION INTRAVENOUS at 16:19

## 2018-06-07 RX ADMIN — FENTANYL CITRATE 50 MCG: 50 INJECTION INTRAMUSCULAR; INTRAVENOUS at 16:26

## 2018-06-07 RX ADMIN — CEFTRIAXONE SODIUM 1 G: 1 INJECTION, POWDER, FOR SOLUTION INTRAMUSCULAR; INTRAVENOUS at 18:39

## 2018-06-07 ASSESSMENT — ENCOUNTER SYMPTOMS
RHINORRHEA: 0
SHORTNESS OF BREATH: 1
VOMITING: 0
CHEST TIGHTNESS: 0
BACK PAIN: 1
BLOOD IN STOOL: 1
CONSTIPATION: 0
SORE THROAT: 0
ABDOMINAL PAIN: 0
NAUSEA: 0
DIARRHEA: 0

## 2018-06-07 ASSESSMENT — PAIN DESCRIPTION - PAIN TYPE
TYPE: CHRONIC PAIN
TYPE: CHRONIC PAIN
TYPE: ACUTE PAIN

## 2018-06-07 ASSESSMENT — PAIN DESCRIPTION - LOCATION
LOCATION: NECK
LOCATION: BACK;NECK
LOCATION: NECK;BACK

## 2018-06-07 ASSESSMENT — PAIN DESCRIPTION - FREQUENCY
FREQUENCY: CONTINUOUS

## 2018-06-07 ASSESSMENT — PAIN SCALES - GENERAL
PAINLEVEL_OUTOF10: 2
PAINLEVEL_OUTOF10: 3
PAINLEVEL_OUTOF10: 6
PAINLEVEL_OUTOF10: 6

## 2018-06-07 ASSESSMENT — PAIN DESCRIPTION - DESCRIPTORS
DESCRIPTORS: CONSTANT
DESCRIPTORS: CONSTANT

## 2018-06-07 ASSESSMENT — PAIN DESCRIPTION - PROGRESSION
CLINICAL_PROGRESSION: GRADUALLY IMPROVING
CLINICAL_PROGRESSION: GRADUALLY IMPROVING

## 2018-06-08 VITALS
TEMPERATURE: 97.3 F | OXYGEN SATURATION: 94 % | RESPIRATION RATE: 17 BRPM | SYSTOLIC BLOOD PRESSURE: 147 MMHG | HEART RATE: 83 BPM | BODY MASS INDEX: 28.35 KG/M2 | DIASTOLIC BLOOD PRESSURE: 68 MMHG | HEIGHT: 63 IN | WEIGHT: 160 LBS

## 2018-06-08 LAB
ABO/RH: NORMAL
ABSOLUTE RETIC #: 0.08 M/UL (ref 0.02–0.1)
ANION GAP SERPL CALCULATED.3IONS-SCNC: 8 MMOL/L (ref 9–17)
ANTIBODY SCREEN: NEGATIVE
ARM BAND NUMBER: NORMAL
BLD PROD TYP BPU: NORMAL
BUN BLDV-MCNC: 19 MG/DL (ref 8–23)
BUN/CREAT BLD: ABNORMAL (ref 9–20)
CALCIUM SERPL-MCNC: 8.9 MG/DL (ref 8.6–10.4)
CHLORIDE BLD-SCNC: 104 MMOL/L (ref 98–107)
CO2: 27 MMOL/L (ref 20–31)
CREAT SERPL-MCNC: 1.2 MG/DL (ref 0.5–0.9)
CROSSMATCH RESULT: NORMAL
DISPENSE STATUS BLOOD BANK: NORMAL
EXPIRATION DATE: NORMAL
FERRITIN: 19 UG/L (ref 13–150)
FOLATE: 7.8 NG/ML
GFR AFRICAN AMERICAN: 53 ML/MIN
GFR NON-AFRICAN AMERICAN: 44 ML/MIN
GFR SERPL CREATININE-BSD FRML MDRD: ABNORMAL ML/MIN/{1.73_M2}
GFR SERPL CREATININE-BSD FRML MDRD: ABNORMAL ML/MIN/{1.73_M2}
GLUCOSE BLD-MCNC: 104 MG/DL (ref 70–99)
HCT VFR BLD CALC: 24.9 % (ref 36–46)
HCT VFR BLD CALC: 26.9 % (ref 36–46)
HEMOGLOBIN: 8.3 G/DL (ref 12–16)
HEMOGLOBIN: 8.8 G/DL (ref 12–16)
IMMATURE RETIC FRACT: ABNORMAL %
IRON SATURATION: 11 % (ref 20–55)
IRON: 32 UG/DL (ref 37–145)
POTASSIUM SERPL-SCNC: 4.4 MMOL/L (ref 3.7–5.3)
RETIC %: 2.8 % (ref 0.5–2)
RETIC HEMOGLOBIN: ABNORMAL PG (ref 28.2–35.7)
SODIUM BLD-SCNC: 139 MMOL/L (ref 135–144)
TOTAL IRON BINDING CAPACITY: 296 UG/DL (ref 250–450)
TRANSFUSION STATUS: NORMAL
UNIT DIVISION: 0
UNIT NUMBER: NORMAL
UNSATURATED IRON BINDING CAPACITY: 264 UG/DL (ref 112–347)
VITAMIN B-12: 383 PG/ML (ref 232–1245)

## 2018-06-08 PROCEDURE — 82607 VITAMIN B-12: CPT

## 2018-06-08 PROCEDURE — 83550 IRON BINDING TEST: CPT

## 2018-06-08 PROCEDURE — 36415 COLL VENOUS BLD VENIPUNCTURE: CPT

## 2018-06-08 PROCEDURE — 82728 ASSAY OF FERRITIN: CPT

## 2018-06-08 PROCEDURE — 36430 TRANSFUSION BLD/BLD COMPNT: CPT

## 2018-06-08 PROCEDURE — 80048 BASIC METABOLIC PNL TOTAL CA: CPT

## 2018-06-08 PROCEDURE — 83540 ASSAY OF IRON: CPT

## 2018-06-08 PROCEDURE — 85018 HEMOGLOBIN: CPT

## 2018-06-08 PROCEDURE — 85045 AUTOMATED RETICULOCYTE COUNT: CPT

## 2018-06-08 PROCEDURE — 82746 ASSAY OF FOLIC ACID SERUM: CPT

## 2018-06-08 PROCEDURE — 85014 HEMATOCRIT: CPT

## 2018-06-09 LAB
CULTURE: ABNORMAL
CULTURE: ABNORMAL
Lab: ABNORMAL
ORGANISM: ABNORMAL
SPECIMEN DESCRIPTION: ABNORMAL
SPECIMEN DESCRIPTION: ABNORMAL
STATUS: ABNORMAL

## 2018-07-04 DIAGNOSIS — C50.912 MALIGNANT NEOPLASM OF LEFT FEMALE BREAST, UNSPECIFIED ESTROGEN RECEPTOR STATUS, UNSPECIFIED SITE OF BREAST (HCC): ICD-10-CM

## 2018-07-05 RX ORDER — OMEPRAZOLE 20 MG/1
CAPSULE, DELAYED RELEASE ORAL
Qty: 30 CAPSULE | Refills: 3 | Status: SHIPPED | OUTPATIENT
Start: 2018-07-05 | End: 2019-01-04 | Stop reason: SDUPTHER

## 2018-07-11 ENCOUNTER — OFFICE VISIT (OUTPATIENT)
Dept: ONCOLOGY | Age: 78
End: 2018-07-11
Payer: MEDICARE

## 2018-07-11 VITALS
HEART RATE: 65 BPM | WEIGHT: 153.5 LBS | DIASTOLIC BLOOD PRESSURE: 66 MMHG | SYSTOLIC BLOOD PRESSURE: 152 MMHG | BODY MASS INDEX: 27.19 KG/M2 | TEMPERATURE: 97.7 F

## 2018-07-11 DIAGNOSIS — C50.912 MALIGNANT NEOPLASM OF LEFT FEMALE BREAST, UNSPECIFIED ESTROGEN RECEPTOR STATUS, UNSPECIFIED SITE OF BREAST (HCC): Primary | ICD-10-CM

## 2018-07-11 PROCEDURE — G8399 PT W/DXA RESULTS DOCUMENT: HCPCS | Performed by: INTERNAL MEDICINE

## 2018-07-11 PROCEDURE — 1123F ACP DISCUSS/DSCN MKR DOCD: CPT | Performed by: INTERNAL MEDICINE

## 2018-07-11 PROCEDURE — 1090F PRES/ABSN URINE INCON ASSESS: CPT | Performed by: INTERNAL MEDICINE

## 2018-07-11 PROCEDURE — 1101F PT FALLS ASSESS-DOCD LE1/YR: CPT | Performed by: INTERNAL MEDICINE

## 2018-07-11 PROCEDURE — 99211 OFF/OP EST MAY X REQ PHY/QHP: CPT | Performed by: INTERNAL MEDICINE

## 2018-07-11 PROCEDURE — 4040F PNEUMOC VAC/ADMIN/RCVD: CPT | Performed by: INTERNAL MEDICINE

## 2018-07-11 PROCEDURE — 99214 OFFICE O/P EST MOD 30 MIN: CPT | Performed by: INTERNAL MEDICINE

## 2018-07-11 PROCEDURE — G8419 CALC BMI OUT NRM PARAM NOF/U: HCPCS | Performed by: INTERNAL MEDICINE

## 2018-07-11 PROCEDURE — G8427 DOCREV CUR MEDS BY ELIG CLIN: HCPCS | Performed by: INTERNAL MEDICINE

## 2018-07-11 PROCEDURE — 4004F PT TOBACCO SCREEN RCVD TLK: CPT | Performed by: INTERNAL MEDICINE

## 2018-07-11 RX ORDER — FERROUS SULFATE 325(65) MG
325 TABLET ORAL 2 TIMES DAILY
COMMUNITY
End: 2019-12-19

## 2018-07-11 NOTE — PROGRESS NOTES
_    Chief Complaint   Patient presents with    Follow-up     review status of disease         DIAGNOSIS:  Stage [id number] left breast cancer. CURRENT THERAPY:    1. Arimidex started July 2010. Was discontinued July 2015.   2. Status post radiation therapy of the left breast finished 7/10/2010.  3. Status post lumpectomy followed by re-excision. INTERIM HISTORY: The patient is Seen for follow-up breast cancer. Patient was hospitalized in early June because of TIA. Workup was negative. Blood work at that time showed anemia. She is maintained on aspirin. She was switched to Plavix. The patient is complaining of increasing weakness and fatigue. She feels tired. She also describes dark blackish stool. She has shortness of breath on exertion. No hematemesis. Patient denies feeling any breast lumps. No other complaints. REVIEW OF SYSTEMS:   General: She had occasional hot flashes. Eyes: No blurred vision, eye pain or double vision. Ears: No hearing problems or drainage. No tinnitus. Throat: No sore throat, problems with swallowing or dysphagia. Respiratory: No cough, sputum or hemoptysis. No shortness of breath. Cardiovascular: No chest pain, orthopnea or PND. No lower extremity edema. No palpitation. Gastrointestinal: No problems with swallowing. No abdominal pain or bloating. No nausea or vomiting. No diarrhea or constipation. No GI bleeding. Genitourinary: No dysuria, hematuria, frequency or urgency. Musculoskeletal: She has generalized body aches. Dermatologic: No skin rashes or pruritus. No skin lesions or discolorations. Psychiatric: No depression, anxiety, or stress or signs of schizophrenia. Hematologic: No history of bleeding tendency. No bruises or ecchymosis. No history of clotting problems. Infectious disease: No fever, chills or frequent infections. Endocrine: No problems with opacity. No polydipsia or polyuria. Neurologic: No headaches or dizziness.  No weakness or numbness of the will see her again in one year  for breast cancer monitoring and will continue annual mammogram.    I am concerned about the development of severe anemia with the patient's symptoms of stool. Likely she is having GI blood loss. I recommended repeating blood tests today and to interfere as needed. Patient may need a transfusion or iron infusion and she will need GI evaluation soon. She will be seen by her PCP today at 11:00. I am sure this will be done there and appropriate action would be made. Dexa scan we will be repeated every 2 years  Patient's questions were answered to the best of her satisfaction and she verbalized full understanding and agreement. cc: Kaitlynn Agrawal D.O. Jasvir Flores M.D. Kirsten Garcia M.D. Likely she is having GI blood loss.

## 2018-07-17 ENCOUNTER — HOSPITAL ENCOUNTER (OUTPATIENT)
Age: 78
Setting detail: OUTPATIENT SURGERY
Discharge: HOME | End: 2018-07-17
Attending: SURGERY | Admitting: SURGERY
Payer: MEDICARE

## 2018-07-17 ENCOUNTER — ANESTHESIA (OUTPATIENT)
Dept: OPERATING ROOM | Age: 78
End: 2018-07-17
Payer: MEDICARE

## 2018-07-17 ENCOUNTER — ANESTHESIA EVENT (OUTPATIENT)
Dept: OPERATING ROOM | Age: 78
End: 2018-07-17
Payer: MEDICARE

## 2018-07-17 VITALS
RESPIRATION RATE: 2 BRPM | SYSTOLIC BLOOD PRESSURE: 87 MMHG | OXYGEN SATURATION: 98 % | DIASTOLIC BLOOD PRESSURE: 39 MMHG

## 2018-07-17 VITALS
OXYGEN SATURATION: 94 % | SYSTOLIC BLOOD PRESSURE: 180 MMHG | TEMPERATURE: 97.9 F | WEIGHT: 152 LBS | BODY MASS INDEX: 26.93 KG/M2 | RESPIRATION RATE: 16 BRPM | HEART RATE: 72 BPM | DIASTOLIC BLOOD PRESSURE: 86 MMHG | HEIGHT: 63 IN

## 2018-07-17 PROCEDURE — 7100000001 HC PACU RECOVERY - ADDTL 15 MIN: Performed by: SURGERY

## 2018-07-17 PROCEDURE — 6360000002 HC RX W HCPCS: Performed by: NURSE ANESTHETIST, CERTIFIED REGISTERED

## 2018-07-17 PROCEDURE — 2580000003 HC RX 258: Performed by: ANESTHESIOLOGY

## 2018-07-17 PROCEDURE — 7100000011 HC PHASE II RECOVERY - ADDTL 15 MIN: Performed by: SURGERY

## 2018-07-17 PROCEDURE — 7100000030 HC ASPR PHASE II RECOVERY - FIRST 15 MIN: Performed by: SURGERY

## 2018-07-17 PROCEDURE — 6370000000 HC RX 637 (ALT 250 FOR IP): Performed by: SURGERY

## 2018-07-17 PROCEDURE — 2500000003 HC RX 250 WO HCPCS: Performed by: ANESTHESIOLOGY

## 2018-07-17 PROCEDURE — 7100000000 HC PACU RECOVERY - FIRST 15 MIN: Performed by: SURGERY

## 2018-07-17 PROCEDURE — 3700000001 HC ADD 15 MINUTES (ANESTHESIA): Performed by: SURGERY

## 2018-07-17 PROCEDURE — 3700000000 HC ANESTHESIA ATTENDED CARE: Performed by: SURGERY

## 2018-07-17 PROCEDURE — 88305 TISSUE EXAM BY PATHOLOGIST: CPT

## 2018-07-17 PROCEDURE — 2709999900 HC NON-CHARGEABLE SUPPLY: Performed by: SURGERY

## 2018-07-17 PROCEDURE — 3609010600 HC COLONOSCOPY POLYPECTOMY SNARE/COLD BIOPSY: Performed by: SURGERY

## 2018-07-17 PROCEDURE — 3609012400 HC EGD TRANSORAL BIOPSY SINGLE/MULTIPLE: Performed by: SURGERY

## 2018-07-17 PROCEDURE — 2500000003 HC RX 250 WO HCPCS: Performed by: NURSE ANESTHETIST, CERTIFIED REGISTERED

## 2018-07-17 PROCEDURE — 7100000031 HC ASPR PHASE II RECOVERY - ADDTL 15 MIN: Performed by: SURGERY

## 2018-07-17 PROCEDURE — 7100000010 HC PHASE II RECOVERY - FIRST 15 MIN: Performed by: SURGERY

## 2018-07-17 RX ORDER — METOPROLOL TARTRATE 5 MG/5ML
INJECTION INTRAVENOUS PRN
Status: DISCONTINUED | OUTPATIENT
Start: 2018-07-17 | End: 2018-07-17 | Stop reason: SDUPTHER

## 2018-07-17 RX ORDER — PROPOFOL 10 MG/ML
INJECTION, EMULSION INTRAVENOUS CONTINUOUS PRN
Status: DISCONTINUED | OUTPATIENT
Start: 2018-07-17 | End: 2018-07-17 | Stop reason: SDUPTHER

## 2018-07-17 RX ORDER — LABETALOL HYDROCHLORIDE 5 MG/ML
5 INJECTION, SOLUTION INTRAVENOUS EVERY 10 MIN PRN
Status: DISCONTINUED | OUTPATIENT
Start: 2018-07-17 | End: 2018-07-17 | Stop reason: HOSPADM

## 2018-07-17 RX ORDER — MEPERIDINE HYDROCHLORIDE 50 MG/ML
12.5 INJECTION INTRAMUSCULAR; INTRAVENOUS; SUBCUTANEOUS EVERY 5 MIN PRN
Status: DISCONTINUED | OUTPATIENT
Start: 2018-07-17 | End: 2018-07-17 | Stop reason: HOSPADM

## 2018-07-17 RX ORDER — PROPOFOL 10 MG/ML
INJECTION, EMULSION INTRAVENOUS PRN
Status: DISCONTINUED | OUTPATIENT
Start: 2018-07-17 | End: 2018-07-17 | Stop reason: SDUPTHER

## 2018-07-17 RX ORDER — MORPHINE SULFATE 2 MG/ML
2 INJECTION, SOLUTION INTRAMUSCULAR; INTRAVENOUS EVERY 5 MIN PRN
Status: DISCONTINUED | OUTPATIENT
Start: 2018-07-17 | End: 2018-07-17 | Stop reason: HOSPADM

## 2018-07-17 RX ORDER — DIPHENHYDRAMINE HYDROCHLORIDE 50 MG/ML
12.5 INJECTION INTRAMUSCULAR; INTRAVENOUS
Status: DISCONTINUED | OUTPATIENT
Start: 2018-07-17 | End: 2018-07-17 | Stop reason: HOSPADM

## 2018-07-17 RX ORDER — SODIUM CHLORIDE, SODIUM LACTATE, POTASSIUM CHLORIDE, CALCIUM CHLORIDE 600; 310; 30; 20 MG/100ML; MG/100ML; MG/100ML; MG/100ML
INJECTION, SOLUTION INTRAVENOUS CONTINUOUS
Status: DISCONTINUED | OUTPATIENT
Start: 2018-07-17 | End: 2018-07-17 | Stop reason: HOSPADM

## 2018-07-17 RX ORDER — MIDAZOLAM HYDROCHLORIDE 1 MG/ML
INJECTION INTRAMUSCULAR; INTRAVENOUS PRN
Status: DISCONTINUED | OUTPATIENT
Start: 2018-07-17 | End: 2018-07-17 | Stop reason: SDUPTHER

## 2018-07-17 RX ORDER — ONDANSETRON 2 MG/ML
4 INJECTION INTRAMUSCULAR; INTRAVENOUS
Status: DISCONTINUED | OUTPATIENT
Start: 2018-07-17 | End: 2018-07-17 | Stop reason: HOSPADM

## 2018-07-17 RX ORDER — KETAMINE HYDROCHLORIDE 50 MG/ML
INJECTION, SOLUTION, CONCENTRATE INTRAMUSCULAR; INTRAVENOUS PRN
Status: DISCONTINUED | OUTPATIENT
Start: 2018-07-17 | End: 2018-07-17 | Stop reason: SDUPTHER

## 2018-07-17 RX ADMIN — SODIUM CHLORIDE, POTASSIUM CHLORIDE, SODIUM LACTATE AND CALCIUM CHLORIDE: 600; 310; 30; 20 INJECTION, SOLUTION INTRAVENOUS at 09:41

## 2018-07-17 RX ADMIN — PROPOFOL 1000 MG: 10 INJECTION, EMULSION INTRAVENOUS at 09:58

## 2018-07-17 RX ADMIN — SODIUM CHLORIDE, POTASSIUM CHLORIDE, SODIUM LACTATE AND CALCIUM CHLORIDE: 600; 310; 30; 20 INJECTION, SOLUTION INTRAVENOUS at 07:42

## 2018-07-17 RX ADMIN — PROPOFOL 50 MCG/KG/MIN: 10 INJECTION, EMULSION INTRAVENOUS at 09:43

## 2018-07-17 RX ADMIN — LABETALOL HYDROCHLORIDE 5 MG: 5 INJECTION, SOLUTION INTRAVENOUS at 11:22

## 2018-07-17 RX ADMIN — MIDAZOLAM 1 MG: 1 INJECTION INTRAMUSCULAR; INTRAVENOUS at 09:41

## 2018-07-17 RX ADMIN — SODIUM CHLORIDE, POTASSIUM CHLORIDE, SODIUM LACTATE AND CALCIUM CHLORIDE: 600; 310; 30; 20 INJECTION, SOLUTION INTRAVENOUS at 10:50

## 2018-07-17 RX ADMIN — METOROPROLOL TARTRATE 2.5 MG: 5 INJECTION, SOLUTION INTRAVENOUS at 09:52

## 2018-07-17 RX ADMIN — LIDOCAINE HYDROCHLORIDE 15 ML: 20 SOLUTION ORAL; TOPICAL at 08:12

## 2018-07-17 RX ADMIN — LABETALOL HYDROCHLORIDE 5 MG: 5 INJECTION, SOLUTION INTRAVENOUS at 11:33

## 2018-07-17 RX ADMIN — KETAMINE HYDROCHLORIDE 20 MG: 50 INJECTION, SOLUTION INTRAMUSCULAR; INTRAVENOUS at 09:43

## 2018-07-17 ASSESSMENT — PULMONARY FUNCTION TESTS
PIF_VALUE: 2
PIF_VALUE: 10
PIF_VALUE: 0
PIF_VALUE: 3
PIF_VALUE: 1
PIF_VALUE: 1
PIF_VALUE: 0
PIF_VALUE: 1
PIF_VALUE: 10
PIF_VALUE: 2
PIF_VALUE: 1
PIF_VALUE: 2
PIF_VALUE: 2
PIF_VALUE: 10
PIF_VALUE: 1
PIF_VALUE: 2
PIF_VALUE: 1
PIF_VALUE: 2
PIF_VALUE: 1
PIF_VALUE: 1
PIF_VALUE: 10
PIF_VALUE: 0
PIF_VALUE: 2
PIF_VALUE: 10
PIF_VALUE: 10
PIF_VALUE: 1
PIF_VALUE: 0
PIF_VALUE: 1
PIF_VALUE: 2
PIF_VALUE: 3
PIF_VALUE: 2
PIF_VALUE: 10
PIF_VALUE: 1
PIF_VALUE: 2
PIF_VALUE: 1
PIF_VALUE: 10
PIF_VALUE: 10
PIF_VALUE: 1
PIF_VALUE: 0
PIF_VALUE: 10
PIF_VALUE: 0
PIF_VALUE: 1

## 2018-07-17 ASSESSMENT — ENCOUNTER SYMPTOMS
STRIDOR: 0
SHORTNESS OF BREATH: 0

## 2018-07-17 ASSESSMENT — PAIN SCALES - GENERAL
PAINLEVEL_OUTOF10: 0
PAINLEVEL_OUTOF10: 0

## 2018-07-17 ASSESSMENT — COPD QUESTIONNAIRES: CAT_SEVERITY: NO INTERVAL CHANGE

## 2018-07-17 ASSESSMENT — LIFESTYLE VARIABLES: SMOKING_STATUS: 0

## 2018-07-17 ASSESSMENT — PAIN DESCRIPTION - DESCRIPTORS: DESCRIPTORS: ACHING

## 2018-07-17 ASSESSMENT — PAIN - FUNCTIONAL ASSESSMENT: PAIN_FUNCTIONAL_ASSESSMENT: 0-10

## 2018-07-17 NOTE — FLOWSHEET NOTE
07/17/18 0754   Encounter Summary   Services provided to: Patient and family together   Referral/Consult From: [address] [place] [address] Drive; Family members   Continue Visiting (7/17/18)   Complexity of Encounter Moderate   Length of Encounter 15 minutes   Spiritual Assessment Completed Yes   Routine   Type Pre-procedure   Spiritual/Yazidi   Type Spiritual support   Assessment Coping; Anxious; Hopeful; Approachable   Intervention Sustaining presence/ Ministry of presence;Nurtured hope;Prayer   Outcome Engaged in conversation; Shared reminiscences; Expressed feelings/needs/concerns;Expressed gratitude;Comfort

## 2018-07-17 NOTE — OP NOTE
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 SPECIMENS:  Was Obtained: GE junction biopsies and antral biopsy    HISTORY: The patient is a 69y.o. year old female with history of above preop diagnosis. I recommended esophagogastroduodenoscopy with possible biopsy and I explained the risk, benefits, expected outcome, and alternatives to the procedure. Risks included but are not limited to bleeding, infection, respiratory distress, hypotension, and perforation of the esophagus, stomach, or duodenum. Patient understands and is in agreement. PROCEDURE: The patient was given IV conscious sedation. The patient's SPO2 remained above 90% throughout the procedure. Cetacaine spray given. Patient placed in left lateral position. Olympus  videogastroscope was inserted orally under vision into the esophagus without difficulty and advanced into the stomach then through the pylorus up to the second part of duodenum. Findings:    Retropharyngeal area was grossly normal appearing    Esophagus: Small sliding hiatal hernia possible Gray's esophagus biopsies obtained from GE junction    Stomach:    Fundus and Cardia Examined in Retroflexed View: normal    Body: normal    Antrum: abnormal: Antral gastritis biopsies obtained    Duodenum:     Descending: normal    Bulb: normal    While withdrawing the scope the above findings were verified and the scope was removed. The patient tolerated the procedure and conscious sedation without unusual events. In the recovery room patient was examined and remains hemodynamically stable.

## 2018-07-17 NOTE — ANESTHESIA PRE PROCEDURE
Department of Anesthesiology  Preprocedure Note       Name:  Adrián Dobbins   Age:  69 y.o.  :  1940                                          MRN:  [id number]         Date:  2018      Surgeon: Angely Bettencourt):  Sugey Callejas MD    Procedure: Procedure(s):  EGD ESOPHAGOGASTRODUODENOSCOPY  COLONOSCOPY    Medications prior to admission:   Prior to Admission medications    Medication Sig Start Date End Date Taking? Authorizing Provider   ferrous sulfate 325 (65 Fe) MG tablet Take 325 mg by mouth daily (with breakfast)   Yes Historical Provider, MD   omeprazole (PRILOSEC) 20 MG delayed release capsule TAKE 1 CAPSULE DAILY 18  Yes Donnell Castellanos MD   losartan (COZAAR) 100 MG tablet  5/21/15  Yes Historical Provider, MD   metoprolol (TOPROL-XL) 25 MG XL tablet  3/30/15  Yes Historical Provider, MD   ezetimibe-simvastatin (VYTORIN) 10-80 MG per tablet Take 1 tablet by mouth nightly   Yes Historical Provider, MD   gabapentin (NEURONTIN) 300 MG capsule Take 300 mg by mouth 2 times daily   Yes Historical Provider, MD   calcium-vitamin D (OSCAL-500) 500-200 MG-UNIT per tablet Take 1 tablet by mouth daily. Yes Historical Provider, MD   allopurinol (ZYLOPRIM) 100 MG tablet Take 100 mg by mouth 2 times daily. Yes Historical Provider, MD   aspirin 81 MG tablet Take 81 mg by mouth daily    Historical Provider, MD   clopidogrel (PLAVIX) 75 MG tablet Take 1 tablet by mouth daily 18   [facility]       Current medications:    Current Facility-Administered Medications   Medication Dose Route Frequency Provider Last Rate Last Dose    lactated ringers infusion   Intravenous Continuous Pierre Healy  mL/hr at 18 0742      lidocaine viscous (XYLOCAINE) 2 % solution 15 mL  15 mL Mouth/Throat PRN Sugey Callejas MD           Allergies:     Allergies   Allergen Reactions    Diclofenac-Misoprostol      celebrex       Problem List:    Patient Active Problem List   Diagnosis Code    Breast cancer good (>4 METS),   (+) hypertension: no interval change,     (-) pacemaker, valvular problems/murmurs, past MI, CAD, CABG/stent, dysrhythmias,  angina,  CHF, orthopnea, PND,  SANDOVAL, murmur, weak pulses,  friction rub, systolic click, carotid bruit,  JVD and peripheral edema    ECG reviewed  Rhythm: regular  Rate: normal  Echocardiogram reviewed         Beta Blocker:  Dose within 24 Hrs         Neuro/Psych:   (+) CVA: no interval change, TIA,    (-) seizures, neuromuscular disease, headaches, psychiatric history and depression/anxiety            GI/Hepatic/Renal: Neg GI/Hepatic/Renal ROS       (-) hiatal hernia, GERD, PUD, hepatitis, liver disease, no renal disease, bowel prep and no morbid obesity       Endo/Other:    (+) : arthritis: OA., no malignancy/cancer. (-) diabetes mellitus, hypothyroidism, hyperthyroidism, blood dyscrasia, no electrolyte abnormalities, no malignancy/cancer               Abdominal:           Vascular: negative vascular ROS. - PVD and DVT. Anesthesia Plan      MAC and general     ASA 3       Induction: intravenous. MIPS: Postoperative opioids intended and Prophylactic antiemetics administered. Anesthetic plan and risks discussed with patient. Plan discussed with CRNA.                   Benny Bailey MD   7/17/2018

## 2018-07-17 NOTE — ANESTHESIA POSTPROCEDURE EVALUATION
POST- ANESTHESIA EVALUATION       Pt Name: Lou Dupree  MRN: [id number]  [place]: 1940  Date of evaluation: 7/17/2018  Time:  2:38 PM      BP (!) 180/86 Comment: Manual reading Jarred President RN  Pulse 72   Temp 97.9 °F (36.6 °C) (Temporal)   Resp 16   Ht 5' 3\" (1.6 m)   Wt 152 lb (68.9 kg)   SpO2 94%   BMI 26.93 kg/m²      Consciousness Level  Awake  Cardiopulmonary Status  Stable  Pain Adequately Treated YES  Nausea / Vomiting  NO  Adequate Hydration  YES  Anesthesia Related Complications NONE      Electronically signed by Miranda Rose MD on 7/17/2018 at 2:38 PM       Department of Anesthesiology  Postprocedure Note    Patient: Lou Dupree  MRN: [id number]  [place]: 1940  Date of evaluation: 7/17/2018  Time:  2:37 PM     Procedure Summary     Date:  07/17/18 Room / Location:  [address] [place] [address] Marcus Collazo 09 / [address]    Anesthesia Start:  9052 Anesthesia Stop:  1031    Procedures:       EGD BIOPSY (N/A Esophagus)      COLONOSCOPY POLYPECTOMY (N/A ) Diagnosis:  (BLACK STOOLS  (PAT ON ADMIT OFFICE TO Erlin Mora))    Surgeon:  Teresa Wheatley MD Responsible Provider:  Miranda Rose MD    Anesthesia Type:  MAC, general ASA Status:  3          Anesthesia Type: MAC, general    Bakari Phase I: Bakari Score: 9    Bakari Phase II: Bakari Score: 10    Last vitals: Reviewed and per EMR flowsheets.        Anesthesia Post Evaluation

## 2018-07-18 LAB — SURGICAL PATHOLOGY REPORT: NORMAL

## 2018-08-28 ENCOUNTER — HOSPITAL ENCOUNTER (OUTPATIENT)
Age: 78
Setting detail: SPECIMEN
Discharge: HOME | End: 2018-08-28
Payer: MEDICARE

## 2018-08-28 LAB
HCT VFR BLD CALC: 36.8 % (ref 36.3–47.1)
HEMOGLOBIN: 11.5 G/DL (ref 11.9–15.1)

## 2019-01-04 DIAGNOSIS — C50.912 MALIGNANT NEOPLASM OF LEFT FEMALE BREAST, UNSPECIFIED ESTROGEN RECEPTOR STATUS, UNSPECIFIED SITE OF BREAST (HCC): ICD-10-CM

## 2019-01-07 RX ORDER — OMEPRAZOLE 20 MG/1
CAPSULE, DELAYED RELEASE ORAL
Qty: 30 CAPSULE | Refills: 3 | Status: SHIPPED | OUTPATIENT
Start: 2019-01-07 | End: 2019-07-17 | Stop reason: SDUPTHER

## 2019-02-25 ENCOUNTER — TELEPHONE (OUTPATIENT)
Dept: ONCOLOGY | Age: 79
End: 2019-02-25

## 2019-03-16 ENCOUNTER — APPOINTMENT (OUTPATIENT)
Dept: GENERAL RADIOLOGY | Age: 79
DRG: 291 | End: 2019-03-16
Payer: MEDICARE

## 2019-03-16 ENCOUNTER — HOSPITAL ENCOUNTER (INPATIENT)
Age: 79
LOS: 2 days | Discharge: HOME | DRG: 291 | End: 2019-03-18
Attending: EMERGENCY MEDICINE | Admitting: FAMILY MEDICINE
Payer: MEDICARE

## 2019-03-16 DIAGNOSIS — J81.0 ACUTE PULMONARY EDEMA (HCC): Primary | ICD-10-CM

## 2019-03-16 DIAGNOSIS — J44.1 COPD WITH ACUTE EXACERBATION (HCC): ICD-10-CM

## 2019-03-16 DIAGNOSIS — J44.1 COPD EXACERBATION (HCC): ICD-10-CM

## 2019-03-16 PROBLEM — R06.00 DYSPNEA: Status: ACTIVE | Noted: 2019-03-16

## 2019-03-16 LAB
ABSOLUTE EOS #: 0.2 K/UL (ref 0–0.4)
ABSOLUTE IMMATURE GRANULOCYTE: ABNORMAL K/UL (ref 0–0.3)
ABSOLUTE LYMPH #: 0.8 K/UL (ref 1–4.8)
ABSOLUTE MONO #: 0.5 K/UL (ref 0.1–1.3)
ALBUMIN SERPL-MCNC: 3.8 G/DL (ref 3.5–5.2)
ALBUMIN/GLOBULIN RATIO: ABNORMAL (ref 1–2.5)
ALP BLD-CCNC: 89 U/L (ref 35–104)
ALT SERPL-CCNC: 12 U/L (ref 5–33)
ANION GAP SERPL CALCULATED.3IONS-SCNC: 10 MMOL/L (ref 9–17)
AST SERPL-CCNC: 19 U/L
BASOPHILS # BLD: 1 % (ref 0–2)
BASOPHILS ABSOLUTE: 0.1 K/UL (ref 0–0.2)
BILIRUB SERPL-MCNC: 0.21 MG/DL (ref 0.3–1.2)
BNP INTERPRETATION: ABNORMAL
BUN BLDV-MCNC: 21 MG/DL (ref 8–23)
BUN/CREAT BLD: ABNORMAL (ref 9–20)
CALCIUM SERPL-MCNC: 9 MG/DL (ref 8.6–10.4)
CHLORIDE BLD-SCNC: 105 MMOL/L (ref 98–107)
CO2: 25 MMOL/L (ref 20–31)
CREAT SERPL-MCNC: 1.52 MG/DL (ref 0.5–0.9)
DIFFERENTIAL TYPE: ABNORMAL
DIRECT EXAM: NORMAL
EOSINOPHILS RELATIVE PERCENT: 3 % (ref 0–4)
GFR AFRICAN AMERICAN: 40 ML/MIN
GFR NON-AFRICAN AMERICAN: 33 ML/MIN
GFR SERPL CREATININE-BSD FRML MDRD: ABNORMAL ML/MIN/{1.73_M2}
GFR SERPL CREATININE-BSD FRML MDRD: ABNORMAL ML/MIN/{1.73_M2}
GLUCOSE BLD-MCNC: 98 MG/DL (ref 70–99)
HCT VFR BLD CALC: 34.6 % (ref 36–46)
HEMOGLOBIN: 11.9 G/DL (ref 12–16)
IMMATURE GRANULOCYTES: ABNORMAL %
LV EF: 65 %
LVEF MODALITY: NORMAL
LYMPHOCYTES # BLD: 11 % (ref 24–44)
Lab: NORMAL
MCH RBC QN AUTO: 31.2 PG (ref 26–34)
MCHC RBC AUTO-ENTMCNC: 34.4 G/DL (ref 31–37)
MCV RBC AUTO: 90.9 FL (ref 80–100)
MONOCYTES # BLD: 7 % (ref 1–7)
NRBC AUTOMATED: ABNORMAL PER 100 WBC
PDW BLD-RTO: 14.1 % (ref 11.5–14.9)
PLATELET # BLD: 127 K/UL (ref 150–450)
PLATELET ESTIMATE: ABNORMAL
PMV BLD AUTO: 8.8 FL (ref 6–12)
POTASSIUM SERPL-SCNC: 4.6 MMOL/L (ref 3.7–5.3)
PRO-BNP: 6468 PG/ML
RBC # BLD: 3.8 M/UL (ref 4–5.2)
RBC # BLD: ABNORMAL 10*6/UL
SEG NEUTROPHILS: 78 % (ref 36–66)
SEGMENTED NEUTROPHILS ABSOLUTE COUNT: 5.7 K/UL (ref 1.3–9.1)
SODIUM BLD-SCNC: 140 MMOL/L (ref 135–144)
SPECIMEN DESCRIPTION: NORMAL
TOTAL PROTEIN: 6.5 G/DL (ref 6.4–8.3)
TROPONIN INTERP: ABNORMAL
TROPONIN INTERP: ABNORMAL
TROPONIN T: ABNORMAL NG/ML
TROPONIN T: ABNORMAL NG/ML
TROPONIN, HIGH SENSITIVITY: 37 NG/L (ref 0–14)
TROPONIN, HIGH SENSITIVITY: 40 NG/L (ref 0–14)
TSH SERPL DL<=0.05 MIU/L-ACNC: 2.65 MIU/L (ref 0.3–5)
WBC # BLD: 7.3 K/UL (ref 3.5–11)
WBC # BLD: ABNORMAL 10*3/UL

## 2019-03-16 PROCEDURE — 80053 COMPREHEN METABOLIC PANEL: CPT

## 2019-03-16 PROCEDURE — 6360000002 HC RX W HCPCS: Performed by: EMERGENCY MEDICINE

## 2019-03-16 PROCEDURE — 6360000002 HC RX W HCPCS: Performed by: FAMILY MEDICINE

## 2019-03-16 PROCEDURE — 6370000000 HC RX 637 (ALT 250 FOR IP): Performed by: EMERGENCY MEDICINE

## 2019-03-16 PROCEDURE — 96374 THER/PROPH/DIAG INJ IV PUSH: CPT

## 2019-03-16 PROCEDURE — 6360000002 HC RX W HCPCS: Performed by: INTERNAL MEDICINE

## 2019-03-16 PROCEDURE — 85025 COMPLETE CBC W/AUTO DIFF WBC: CPT

## 2019-03-16 PROCEDURE — 6370000000 HC RX 637 (ALT 250 FOR IP): Performed by: FAMILY MEDICINE

## 2019-03-16 PROCEDURE — 94640 AIRWAY INHALATION TREATMENT: CPT

## 2019-03-16 PROCEDURE — 96375 TX/PRO/DX INJ NEW DRUG ADDON: CPT

## 2019-03-16 PROCEDURE — 87804 INFLUENZA ASSAY W/OPTIC: CPT

## 2019-03-16 PROCEDURE — 83880 ASSAY OF NATRIURETIC PEPTIDE: CPT

## 2019-03-16 PROCEDURE — 84484 ASSAY OF TROPONIN QUANT: CPT

## 2019-03-16 PROCEDURE — 93005 ELECTROCARDIOGRAM TRACING: CPT

## 2019-03-16 PROCEDURE — 36415 COLL VENOUS BLD VENIPUNCTURE: CPT

## 2019-03-16 PROCEDURE — 2580000003 HC RX 258: Performed by: FAMILY MEDICINE

## 2019-03-16 PROCEDURE — 84443 ASSAY THYROID STIM HORMONE: CPT

## 2019-03-16 PROCEDURE — 99285 EMERGENCY DEPT VISIT HI MDM: CPT

## 2019-03-16 PROCEDURE — 2060000000 HC ICU INTERMEDIATE R&B

## 2019-03-16 PROCEDURE — 71045 X-RAY EXAM CHEST 1 VIEW: CPT

## 2019-03-16 PROCEDURE — 6370000000 HC RX 637 (ALT 250 FOR IP): Performed by: INTERNAL MEDICINE

## 2019-03-16 PROCEDURE — 93306 TTE W/DOPPLER COMPLETE: CPT

## 2019-03-16 RX ORDER — ASPIRIN 81 MG/1
324 TABLET, CHEWABLE ORAL ONCE
Status: COMPLETED | OUTPATIENT
Start: 2019-03-16 | End: 2019-03-16

## 2019-03-16 RX ORDER — ACETAMINOPHEN 325 MG/1
650 TABLET ORAL EVERY 4 HOURS PRN
Status: DISCONTINUED | OUTPATIENT
Start: 2019-03-16 | End: 2019-03-18 | Stop reason: HOSPADM

## 2019-03-16 RX ORDER — POTASSIUM CHLORIDE 20 MEQ/1
10 TABLET, EXTENDED RELEASE ORAL DAILY
Status: DISCONTINUED | OUTPATIENT
Start: 2019-03-16 | End: 2019-03-17

## 2019-03-16 RX ORDER — FUROSEMIDE 10 MG/ML
40 INJECTION INTRAMUSCULAR; INTRAVENOUS DAILY
Status: DISCONTINUED | OUTPATIENT
Start: 2019-03-16 | End: 2019-03-18

## 2019-03-16 RX ORDER — ALBUTEROL SULFATE 90 UG/1
2 AEROSOL, METERED RESPIRATORY (INHALATION)
Status: DISCONTINUED | OUTPATIENT
Start: 2019-03-16 | End: 2019-03-16

## 2019-03-16 RX ORDER — ALBUTEROL SULFATE 2.5 MG/3ML
5 SOLUTION RESPIRATORY (INHALATION)
Status: DISCONTINUED | OUTPATIENT
Start: 2019-03-16 | End: 2019-03-16

## 2019-03-16 RX ORDER — SODIUM CHLORIDE 0.9 % (FLUSH) 0.9 %
10 SYRINGE (ML) INJECTION EVERY 12 HOURS SCHEDULED
Status: DISCONTINUED | OUTPATIENT
Start: 2019-03-16 | End: 2019-03-18 | Stop reason: HOSPADM

## 2019-03-16 RX ORDER — METOPROLOL SUCCINATE 25 MG/1
25 TABLET, EXTENDED RELEASE ORAL 2 TIMES DAILY
Status: DISCONTINUED | OUTPATIENT
Start: 2019-03-16 | End: 2019-03-18 | Stop reason: HOSPADM

## 2019-03-16 RX ORDER — ALPRAZOLAM 0.5 MG/1
0.5 TABLET ORAL 2 TIMES DAILY PRN
Status: DISCONTINUED | OUTPATIENT
Start: 2019-03-16 | End: 2019-03-18 | Stop reason: HOSPADM

## 2019-03-16 RX ORDER — ALLOPURINOL 100 MG/1
100 TABLET ORAL 2 TIMES DAILY
Status: DISCONTINUED | OUTPATIENT
Start: 2019-03-16 | End: 2019-03-18 | Stop reason: HOSPADM

## 2019-03-16 RX ORDER — NAPROXEN 500 MG/1
500 TABLET ORAL EVERY 12 HOURS PRN
Status: ON HOLD | COMMUNITY
End: 2019-04-21 | Stop reason: HOSPADM

## 2019-03-16 RX ORDER — IPRATROPIUM BROMIDE AND ALBUTEROL SULFATE 2.5; .5 MG/3ML; MG/3ML
1 SOLUTION RESPIRATORY (INHALATION) EVERY 4 HOURS PRN
Status: DISCONTINUED | OUTPATIENT
Start: 2019-03-16 | End: 2019-03-18 | Stop reason: HOSPADM

## 2019-03-16 RX ORDER — GABAPENTIN 300 MG/1
300 CAPSULE ORAL 2 TIMES DAILY
Status: DISCONTINUED | OUTPATIENT
Start: 2019-03-16 | End: 2019-03-17

## 2019-03-16 RX ORDER — METHYLPREDNISOLONE SODIUM SUCCINATE 125 MG/2ML
125 INJECTION, POWDER, LYOPHILIZED, FOR SOLUTION INTRAMUSCULAR; INTRAVENOUS ONCE
Status: COMPLETED | OUTPATIENT
Start: 2019-03-16 | End: 2019-03-16

## 2019-03-16 RX ORDER — SODIUM CHLORIDE 0.9 % (FLUSH) 0.9 %
10 SYRINGE (ML) INJECTION PRN
Status: DISCONTINUED | OUTPATIENT
Start: 2019-03-16 | End: 2019-03-18 | Stop reason: HOSPADM

## 2019-03-16 RX ORDER — PHENOL 1.4 %
1 AEROSOL, SPRAY (ML) MUCOUS MEMBRANE 2 TIMES DAILY
COMMUNITY
End: 2019-04-19

## 2019-03-16 RX ORDER — METHYLPREDNISOLONE SODIUM SUCCINATE 125 MG/2ML
80 INJECTION, POWDER, LYOPHILIZED, FOR SOLUTION INTRAMUSCULAR; INTRAVENOUS EVERY 8 HOURS
Status: DISCONTINUED | OUTPATIENT
Start: 2019-03-16 | End: 2019-03-18 | Stop reason: HOSPADM

## 2019-03-16 RX ORDER — CLONIDINE HYDROCHLORIDE 0.1 MG/1
0.2 TABLET ORAL ONCE
Status: COMPLETED | OUTPATIENT
Start: 2019-03-16 | End: 2019-03-16

## 2019-03-16 RX ORDER — SODIUM CHLORIDE 450 MG/100ML
INJECTION, SOLUTION INTRAVENOUS CONTINUOUS
Status: DISCONTINUED | OUTPATIENT
Start: 2019-03-16 | End: 2019-03-16

## 2019-03-16 RX ORDER — ASPIRIN 81 MG/1
81 TABLET ORAL DAILY
Status: DISCONTINUED | OUTPATIENT
Start: 2019-03-16 | End: 2019-03-18 | Stop reason: HOSPADM

## 2019-03-16 RX ORDER — VARENICLINE TARTRATE 0.5 MG/1
0.5 TABLET, FILM COATED ORAL 2 TIMES DAILY
Status: DISCONTINUED | OUTPATIENT
Start: 2019-03-16 | End: 2019-03-18 | Stop reason: HOSPADM

## 2019-03-16 RX ORDER — ONDANSETRON 2 MG/ML
4 INJECTION INTRAMUSCULAR; INTRAVENOUS EVERY 6 HOURS PRN
Status: DISCONTINUED | OUTPATIENT
Start: 2019-03-16 | End: 2019-03-18 | Stop reason: HOSPADM

## 2019-03-16 RX ORDER — IPRATROPIUM BROMIDE AND ALBUTEROL SULFATE 2.5; .5 MG/3ML; MG/3ML
1 SOLUTION RESPIRATORY (INHALATION)
Status: DISCONTINUED | OUTPATIENT
Start: 2019-03-16 | End: 2019-03-16

## 2019-03-16 RX ORDER — ATORVASTATIN CALCIUM 40 MG/1
40 TABLET, FILM COATED ORAL NIGHTLY
Status: DISCONTINUED | OUTPATIENT
Start: 2019-03-16 | End: 2019-03-18 | Stop reason: HOSPADM

## 2019-03-16 RX ORDER — PANTOPRAZOLE SODIUM 40 MG/1
40 TABLET, DELAYED RELEASE ORAL
Status: DISCONTINUED | OUTPATIENT
Start: 2019-03-16 | End: 2019-03-18 | Stop reason: HOSPADM

## 2019-03-16 RX ORDER — FUROSEMIDE 10 MG/ML
40 INJECTION INTRAMUSCULAR; INTRAVENOUS ONCE
Status: COMPLETED | OUTPATIENT
Start: 2019-03-16 | End: 2019-03-16

## 2019-03-16 RX ORDER — ALPRAZOLAM 0.5 MG/1
0.5 TABLET ORAL DAILY
Status: ON HOLD | COMMUNITY
End: 2019-04-19

## 2019-03-16 RX ORDER — CLOPIDOGREL BISULFATE 75 MG/1
75 TABLET ORAL DAILY
Status: DISCONTINUED | OUTPATIENT
Start: 2019-03-16 | End: 2019-03-18 | Stop reason: HOSPADM

## 2019-03-16 RX ORDER — LOSARTAN POTASSIUM 100 MG/1
100 TABLET ORAL DAILY
Status: DISCONTINUED | OUTPATIENT
Start: 2019-03-16 | End: 2019-03-18 | Stop reason: HOSPADM

## 2019-03-16 RX ADMIN — METOPROLOL SUCCINATE 25 MG: 25 TABLET, EXTENDED RELEASE ORAL at 20:27

## 2019-03-16 RX ADMIN — GABAPENTIN 300 MG: 300 CAPSULE ORAL at 20:28

## 2019-03-16 RX ADMIN — METHYLPREDNISOLONE SODIUM SUCCINATE 125 MG: 125 INJECTION, POWDER, FOR SOLUTION INTRAMUSCULAR; INTRAVENOUS at 08:11

## 2019-03-16 RX ADMIN — SODIUM CHLORIDE: 4.5 INJECTION, SOLUTION INTRAVENOUS at 13:11

## 2019-03-16 RX ADMIN — CEFTRIAXONE SODIUM 1 G: 1 INJECTION, POWDER, FOR SOLUTION INTRAMUSCULAR; INTRAVENOUS at 14:39

## 2019-03-16 RX ADMIN — CLONIDINE HYDROCHLORIDE 0.2 MG: 0.1 TABLET ORAL at 08:09

## 2019-03-16 RX ADMIN — FUROSEMIDE 40 MG: 10 INJECTION, SOLUTION INTRAMUSCULAR; INTRAVENOUS at 14:20

## 2019-03-16 RX ADMIN — ALLOPURINOL 100 MG: 100 TABLET ORAL at 14:20

## 2019-03-16 RX ADMIN — LOSARTAN POTASSIUM 100 MG: 100 TABLET ORAL at 14:21

## 2019-03-16 RX ADMIN — ALLOPURINOL 100 MG: 100 TABLET ORAL at 20:27

## 2019-03-16 RX ADMIN — METHYLPREDNISOLONE SODIUM SUCCINATE 80 MG: 125 INJECTION, POWDER, FOR SOLUTION INTRAMUSCULAR; INTRAVENOUS at 16:16

## 2019-03-16 RX ADMIN — ENOXAPARIN SODIUM 40 MG: 40 INJECTION SUBCUTANEOUS at 13:10

## 2019-03-16 RX ADMIN — CLOPIDOGREL BISULFATE 75 MG: 75 TABLET ORAL at 14:19

## 2019-03-16 RX ADMIN — VARENICLINE TARTRATE 0.5 MG: 0.5 TABLET, FILM COATED ORAL at 13:10

## 2019-03-16 RX ADMIN — GABAPENTIN 300 MG: 300 CAPSULE ORAL at 14:19

## 2019-03-16 RX ADMIN — IPRATROPIUM BROMIDE AND ALBUTEROL SULFATE 1 AMPULE: .5; 3 SOLUTION RESPIRATORY (INHALATION) at 07:40

## 2019-03-16 RX ADMIN — ASPIRIN 81 MG 324 MG: 81 TABLET ORAL at 09:04

## 2019-03-16 RX ADMIN — METHYLPREDNISOLONE SODIUM SUCCINATE 80 MG: 125 INJECTION, POWDER, FOR SOLUTION INTRAMUSCULAR; INTRAVENOUS at 23:47

## 2019-03-16 RX ADMIN — FUROSEMIDE 40 MG: 10 INJECTION, SOLUTION INTRAMUSCULAR; INTRAVENOUS at 08:03

## 2019-03-16 RX ADMIN — ATORVASTATIN CALCIUM 40 MG: 40 TABLET, FILM COATED ORAL at 20:28

## 2019-03-16 RX ADMIN — VARENICLINE TARTRATE 0.5 MG: 0.5 TABLET, FILM COATED ORAL at 20:27

## 2019-03-16 RX ADMIN — Medication 10 ML: at 20:28

## 2019-03-16 RX ADMIN — ASPIRIN 81 MG: 81 TABLET, COATED ORAL at 14:20

## 2019-03-16 RX ADMIN — PANTOPRAZOLE SODIUM 40 MG: 40 TABLET, DELAYED RELEASE ORAL at 14:20

## 2019-03-16 RX ADMIN — METOPROLOL SUCCINATE 25 MG: 25 TABLET, EXTENDED RELEASE ORAL at 14:19

## 2019-03-16 RX ADMIN — POTASSIUM CHLORIDE 10 MEQ: 20 TABLET, EXTENDED RELEASE ORAL at 14:20

## 2019-03-16 RX ADMIN — AZITHROMYCIN MONOHYDRATE 500 MG: 500 INJECTION, POWDER, LYOPHILIZED, FOR SOLUTION INTRAVENOUS at 16:15

## 2019-03-16 ASSESSMENT — PAIN DESCRIPTION - DESCRIPTORS: DESCRIPTORS: ACHING

## 2019-03-16 ASSESSMENT — ENCOUNTER SYMPTOMS
ABDOMINAL PAIN: 0
WHEEZING: 1
SHORTNESS OF BREATH: 1
EYES NEGATIVE: 1
BACK PAIN: 0
GASTROINTESTINAL NEGATIVE: 1
COUGH: 1

## 2019-03-16 ASSESSMENT — PAIN SCALES - GENERAL
PAINLEVEL_OUTOF10: 7
PAINLEVEL_OUTOF10: 0

## 2019-03-16 ASSESSMENT — PAIN DESCRIPTION - FREQUENCY: FREQUENCY: CONTINUOUS

## 2019-03-16 ASSESSMENT — PAIN DESCRIPTION - LOCATION: LOCATION: BACK;HIP

## 2019-03-16 ASSESSMENT — PAIN DESCRIPTION - ORIENTATION: ORIENTATION: RIGHT

## 2019-03-16 NOTE — ED NOTES
Report given to Erick Bunn RN from U. Report method by phone   The following was reviewed with receiving RN:   Current vital signs:  BP (!) 143/52   Pulse 71   Temp 98.2 °F (36.8 °C) (Oral)   Resp 22   Ht 5' 3\" (1.6 m)   Wt 170 lb (77.1 kg)   SpO2 99%   BMI 30.11 kg/m²                MEWS Score: 2     Any medication or safety alerts were reviewed. Any pending diagnostics and notifications were also reviewed, as well as any safety concerns or issues, abnormal labs, abnormal imaging, and abnormal assessment findings. Questions were answered.             Blanche Schumacher RN  03/16/19 3299

## 2019-03-16 NOTE — PROGRESS NOTES
(Oral)   Resp 18   Ht 5' 5\" (1.651 m)   Wt 175 lb 0.7 oz (79.4 kg)   SpO2 95%   BMI 29.13 kg/m²   Height and weight:    Wt Readings from Last 3 Encounters:   03/16/19 175 lb 0.7 oz (79.4 kg)   07/17/18 152 lb (68.9 kg)   07/11/18 153 lb 8 oz (69.6 kg)      [unfilled]    Physical Examination:   General appearance - alert, well appearing, and in no distress  Mental status - alert, oriented to person, place, and time  Chest - wheezing noted , decreased air entry noted , rhonchi noted   Heart - normal rate, regular rhythm, normal S1, S2, no murmurs, rubs, clicks or gallops  Abdomen - soft, nontender, nondistended, no masses or organomegaly  Neurological - alert, oriented, normal speech, no focal findings or movement disorder noted}  Extremities - peripheral pulses normal, no pedal edema, no clubbing or cyanosis  Skin - normal coloration and turgor, no rashes, no suspicious skin lesions noted       Labs:-    CBC:   Recent Labs     03/16/19  0725   WBC 7.3   HGB 11.9*   *     BMP:    Recent Labs     03/16/19  0725      K 4.6      CO2 25   BUN 21   CREATININE 1.52*   GLUCOSE 98     Glucose:No results for input(s): POCGLU in the last 72 hours. HgbA1C: No results for input(s): LABA1C in the last 72 hours. INR: No results for input(s): INR in the last 72 hours. CARDIAC ENZYMES:No results for input(s): CKTOTAL, CKMB, CKMBINDEX, TROPONINI in the last 72 hours. BNP: No results for input(s): BNP in the last 72 hours. Lipids: No results for input(s): CHOL, TRIG, HDL, LDLCALC in the last 72 hours.     Invalid input(s): LDL  ABGs: No results found for: PH, PCO2, PO2, HCO3, O2SAT  Thyroid: No results found for: TSH   Urinalysis:   Color, UA   Date Value Ref Range Status   06/07/2018 YELLOW YEL Final     pH, UA   Date Value Ref Range Status   06/07/2018 7.0 5.0 - 8.0 Final     Specific Gravity, UA   Date Value Ref Range Status   06/07/2018 1.009 1.000 - 1.030 Final     Protein, UA   Date Value Ref Range Status   06/07/2018 NEGATIVE NEG Final     RBC, UA   Date Value Ref Range Status   06/07/2018 0 TO 2 /HPF Final     Bacteria, UA   Date Value Ref Range Status   06/07/2018 MANY (A) NONE Final     Comment:     Performed at [address] [place] [address] [address] [place] [address] [address], [address] [facility]   [phone]       Nitrite, Urine   Date Value Ref Range Status   06/07/2018 POSITIVE (A) NEG Final     WBC, UA   Date Value Ref Range Status   06/07/2018 10 TO 20 /HPF Final     Leukocyte Esterase, Urine   Date Value Ref Range Status   06/07/2018 TRACE (A) NEG Final     Yeast, UA   Date Value Ref Range Status   06/07/2018 NOT REPORTED NONE Final     Glucose, Ur   Date Value Ref Range Status   06/07/2018 NEGATIVE NEG Final     Bilirubin Urine   Date Value Ref Range Status   06/07/2018 NEGATIVE NEG Final       CULTURES:    Current Rehabilitation Assessments:  PHYSICAL THERAPY:     OCCUPATIONAL THERAPY:     SPEECH:      Assessment:  Active Problems:    Dyspnea    COPD with acute exacerbation (HCC)  Resolved Problems:    * No resolved hospital problems. *      Plan:  1. ER eval, inc trops and BNP, cardiol consulted  2. Very dry orally, IV  0.45 NS at 75  3. home meds,   4. pulm toilet, solumed, roceph and zith, O2  5. Home aerosol unit recc  6.  Pt will start bill Stevens MD             3/16/2019, 11:04 AM

## 2019-03-16 NOTE — CONSULTS
[facility] PHYSICIANS CARDIOLOGY CONSULT NOTE      Date of Admission:  3/16/2019    Date of Consultation:  3/16/2019    PCP:  Gini Hurtado DO    REASON FOR CONSULT:  Elevated troponin. History of Present Illness:  Branden Meza is a 69 y.o. female with history of COPD, prior TIA, carotid artery disease, status post bilateral carotid endarterectomy, who presents with shortness of breath, cough, dizziness, forgetfulness, balance problems and afraid of falling and cannot walk for the past 1 year. Still smokes 1 pack per day. Patient was found to have mild pulmonary edema on chest x-ray with elevated proBNP and elevated troponin and we are asked to see in consultation. Patient denies any anginal chest pain or palpitation or lightheadedness or dizziness or pedal edema or syncope. She has history of ESBL  In 2016 and in contact isolation. PMH:   has a past medical history of Arthritis, Cancer ([facility] [address] COPD (chronic obstructive pulmonary disease) ([facility] [address] CVA (cerebral vascular accident) ([facility] [address] Gout, Hyperlipidemia, Hypertension, and Other abnormality of urination(788.69). PSH:   has a past surgical history that includes Tonsillectomy; Varicose vein surgery; Hammer toe surgery; Hysterectomy; Carotid endarterectomy; Appendectomy; joint replacement; bladder suspension; joint replacement; Breast surgery (Left); Colonoscopy; back surgery; Upper gastrointestinal endoscopy (N/A, 7/17/2018); and Colonoscopy (N/A, 7/17/2018). Allergies: Allergies   Allergen Reactions    Diclofenac-Misoprostol      celebrex        Home Meds:    Prior to Admission medications    Medication Sig Start Date End Date Taking? Authorizing Provider   ALPRAZobrooklynn Wong) 0.5 MG tablet Take 0.5 mg by mouth daily.    Yes Historical Provider, MD   calcium carbonate 600 MG TABS tablet Take 1 tablet by mouth 2 times daily   Yes Historical Provider, MD   naproxen (NAPROSYN) 500 MG tablet Take 500 mg by mouth every 12 hours as needed for Pain   Yes Historical Provider, MD   omeprazole (PRILOSEC) 20 MG delayed release capsule TAKE 1 CAPSULE DAILY 1/7/19  Yes Laura Singer MD   ferrous sulfate 325 (65 Fe) MG tablet Take 325 mg by mouth daily (with breakfast)   Yes Historical Provider, MD   clopidogrel (PLAVIX) 75 MG tablet Take 1 tablet by mouth daily 4/28/18  Yes Toño Lopez   losartan (COZAAR) 100 MG tablet  5/21/15  Yes Historical Provider, MD   ezetimibe-simvastatin (VYTORIN) 10-80 MG per tablet Take 1 tablet by mouth nightly   Yes Historical Provider, MD   gabapentin (NEURONTIN) 300 MG capsule Take 300 mg by mouth 2 times daily   Yes Historical Provider, MD   calcium-vitamin D (OSCAL-500) 500-200 MG-UNIT per tablet Take 1 tablet by mouth daily. Yes Historical Provider, MD   allopurinol (ZYLOPRIM) 100 MG tablet Take 100 mg by mouth 2 times daily.      Yes Historical Provider, MD   aspirin 81 MG tablet Take 81 mg by mouth daily    Historical Provider, MD   metoprolol (TOPROL-XL) 25 MG XL tablet 25 mg 2 times daily  3/30/15   Historical Provider, MD        [facility] Meds:    Current Facility-Administered Medications   Medication Dose Route Frequency Provider Last Rate Last Dose    sodium chloride flush 0.9 % injection 10 mL  10 mL Intravenous 2 times per day Jona Kati, DO        sodium chloride flush 0.9 % injection 10 mL  10 mL Intravenous PRN Jona Kati, DO        acetaminophen (TYLENOL) tablet 650 mg  650 mg Oral Q4H PRN Cyril C Lore, DO        enoxaparin (LOVENOX) injection 40 mg  40 mg Subcutaneous Daily Cyril C Lore, DO   40 mg at 03/16/19 1310    allopurinol (ZYLOPRIM) tablet 100 mg  100 mg Oral BID Cyril C Agnesaud, DO        ALPRAZolam Pansy Hover) tablet 0.5 mg  0.5 mg Oral BID PRN Jona Parikh, DO        aspirin EC tablet 81 mg  81 mg Oral Daily Cyril C Nadbeau, DO        clopidogrel (PLAVIX) tablet 75 mg  75 mg Oral Daily Cyril C Nadaud, DO        atorvastatin (LIPITOR) tablet 40 mg  40 mg Oral Worry: None     Inability: None    Transportation needs:     Medical: None     Non-medical: None   Tobacco Use    Smoking status: Current Every Day Smoker     Packs/day: 1.00    Smokeless tobacco: Never Used   Substance and Sexual Activity    Alcohol use: No    Drug use: No    Sexual activity: None   Lifestyle    Physical activity:     Days per week: None     Minutes per session: None    Stress: None   Relationships    Social connections:     Talks on phone: None     Gets together: None     Attends [employer] service: None     Active member of club or organization: None     Attends meetings of clubs or organizations: None     Relationship status: None    Intimate partner violence:     Fear of current or ex partner: None     Emotionally abused: None     Physically abused: None     Forced sexual activity: None   Other Topics Concern    None   Social History Narrative    None       Family History:    Family History   Problem Relation Age of Onset    Heart Disease Brother     Other Mother         polycythemia    Heart Disease Father     Heart Disease Sister         enlarged heart    Cancer Son         prostate       Review of Systems:      · Constitutional: no weight loss or gain, no fever or chills. · Eyes: No new visual changes. · ENT: No new hearing loss. · Cardiovascular: see HPI. · Respiratory: + cough. No hemoptysis. · Gastrointestinal: No abdominal pain, no blood in stools. · Genitourinary: No hematuria or increased frequency. · Musculoskeletal:  No new gait disturbance. · Integumentary: No rash or pruritis. · Neurological: No headache. No seizures or recent CVA/TIA. · Psychiatric: No anxiety or depression. · Hematologic/Lymphatic: No abnormal bruising or bleeding. · Allergic/Immunologic: No new allergies.        Physical Exam   Vital Signs: BP (!) 142/51   Pulse 60   Temp 97.9 °F (36.6 °C) (Oral)   Resp 18   Ht 5' 5\" (1.651 m)   Wt 175 lb 0.7 oz (79.4 kg)   SpO2 95%   BMI 29.13 kg/m²  O2 Flow Rate (L/min): 2 L/min     Admission Weight: 170 lb (77.1 kg)     General appearance: Awake, Alert, WD, WN, pleasant & Cooperative. Head: Normocephalic, atraumatic. Eyes: EOM intact. Neck:  + carotid bruits, no thyromegaly. Chest:   Scattered rhonchi bilaterally. No chest wall tenderness. Cardiac: Regular rate and rhythm, no S3 or S4 gallop, no significant audible murmur or rubs or clicks. Abdomen: Soft, non-tender. Extremities: no cyanosis or clubbing or leg edema. No calf tenderness. Pulses:  Bilaterally symmetrical and intact radial pulses. Neurologic:  Able to move all 4 extremities. Skin:  No rashes. Warm and dry. EKG:  Normal sinus rhythm, left axis deviation, left atrial enlargement, old inferior MI, old anterior MI, T-wave inversion in leads 1 and aVL. Abnormal EKG.     Labs:      CBC:   Recent Labs     03/16/19  0725   WBC 7.3   HGB 11.9*   HCT 34.6*   MCV 90.9   *     BMP:   Recent Labs     03/16/19  0725      K 4.6      CO2 25   BUN 21   CREATININE 1.52*       Recent Results (from the past 24 hour(s))   Comprehensive Metabolic Panel    Collection Time: 03/16/19  7:25 AM   Result Value Ref Range    Glucose 98 70 - 99 mg/dL    BUN 21 8 - 23 mg/dL    CREATININE 1.52 (H) 0.50 - 0.90 mg/dL    Bun/Cre Ratio NOT REPORTED 9 - 20    Calcium 9.0 8.6 - 10.4 mg/dL    Sodium 140 135 - 144 mmol/L    Potassium 4.6 3.7 - 5.3 mmol/L    Chloride 105 98 - 107 mmol/L    CO2 25 20 - 31 mmol/L    Anion Gap 10 9 - 17 mmol/L    Alkaline Phosphatase 89 35 - 104 U/L    ALT 12 5 - 33 U/L    AST 19 <32 U/L    Total Bilirubin 0.21 (L) 0.3 - 1.2 mg/dL    Total Protein 6.5 6.4 - 8.3 g/dL    Alb 3.8 3.5 - 5.2 g/dL    Albumin/Globulin Ratio NOT REPORTED 1.0 - 2.5    GFR Non- 33 (L) >60 mL/min    GFR African American 40 (L) >60 mL/min    GFR Comment          GFR Staging NOT REPORTED    CBC Auto Differential    Collection Time: 03/16/19  7:25 AM Result Value Ref Range    WBC 7.3 3.5 - 11.0 k/uL    RBC 3.80 (L) 4.0 - 5.2 m/uL    Hemoglobin 11.9 (L) 12.0 - 16.0 g/dL    Hematocrit 34.6 (L) 36 - 46 %    MCV 90.9 80 - 100 fL    MCH 31.2 26 - 34 pg    MCHC 34.4 31 - 37 g/dL    RDW 14.1 11.5 - 14.9 %    Platelets 813 (L) 968 - 450 k/uL    MPV 8.8 6.0 - 12.0 fL    NRBC Automated NOT REPORTED per 100 WBC    Differential Type NOT REPORTED     Seg Neutrophils 78 (H) 36 - 66 %    Lymphocytes 11 (L) 24 - 44 %    Monocytes 7 1 - 7 %    Eosinophils % 3 0 - 4 %    Basophils 1 0 - 2 %    Immature Granulocytes NOT REPORTED 0 %    Segs Absolute 5.70 1.3 - 9.1 k/uL    Absolute Lymph # 0.80 (L) 1.0 - 4.8 k/uL    Absolute Mono # 0.50 0.1 - 1.3 k/uL    Absolute Eos # 0.20 0.0 - 0.4 k/uL    Basophils # 0.10 0.0 - 0.2 k/uL    Absolute Immature Granulocyte NOT REPORTED 0.00 - 0.30 k/uL    WBC Morphology NOT REPORTED     RBC Morphology NOT REPORTED     Platelet Estimate NOT REPORTED    Troponin    Collection Time: 03/16/19  7:25 AM   Result Value Ref Range    Troponin, High Sensitivity 40 (H) 0 - 14 ng/L    Troponin T NOT REPORTED <0.03 ng/mL    Troponin Interp NOT REPORTED    Brain Natriuretic Peptide    Collection Time: 03/16/19  7:25 AM   Result Value Ref Range    Pro-BNP 6,468 (H) <300 pg/mL    BNP Interpretation         EKG 12 Lead    Collection Time: 03/16/19  7:48 AM   Result Value Ref Range    Ventricular Rate 71 BPM    Atrial Rate 71 BPM    P-R Interval 172 ms    QRS Duration 78 ms    Q-T Interval 396 ms    QTc Calculation (Bazett) 430 ms    P Axis 67 degrees    R Axis -31 degrees    T Axis 92 degrees   Troponin    Collection Time: 03/16/19  9:45 AM   Result Value Ref Range    Troponin, High Sensitivity 37 (H) 0 - 14 ng/L    Troponin T NOT REPORTED <0.03 ng/mL    Troponin Interp NOT REPORTED        2D echocardiogram April 2018:    Summary  Small LV cavity. Hyperdynamic left ventricular function. Estimated LV EF  70-75 %. Normal wall motions.   Moderate left ventricular hypertrophy. Grade I (mild) left ventricular diastolic dysfunction. Normal right ventricular size and function. Bi-atrial enlargement, mild. Negative bubble study, no inter-atrial shunt  noted. Aortic valve is trileaflet. Aortic valve sclerosis without stenosis. Thickened mitral valve leaflets. Mild mitral regurgitation. Moderate tricuspid regurgitation. Mildly elevated right ventricular systolic  pressure. Estimated right ventricular systolic pressure is 39 mmHg. IVC normal diameter & inspiratory collapse indicating normal RA filling  pressure . No significant pericardial effusion is seen. IMPRESSION:    Elevated high sensitive T troponin of 40 initially but with subsequent level being 37. Does not represent any acute coronary syndrome or acute myocardial infarction per guidelines. Abnormal EKG. Suspect underlying atherosclerotic coronary artery disease. No active angina pectoris. Mild pulmonary edema on chest x-ray. Elevated proBNP. Suspect acute on chronic diastolic heart failure given hyperdynamic LV systolic function 80-47% with moderate LVH, grade 1 LV diastolic dysfunction, moderate tricuspid regurgitation, mild pulmonary hypertension on 2D echocardiogram in April 2018. Repeat 2D echocardiogram is pending. Severe COPD with ongoing tobacco abuse 1 pack per day. Severe carotid artery disease. Bilateral carotid endarterectomy. Bilateral carotid bruits on examination today. Chronic kidney disease   Stage III with creatinine 1.5 range. Other problems as charted. REC/PLAN:      As ordered. Will continue aspirin 81 mg daily, Plavix 75 mg daily, IV Lasix 40 mg daily, Toprol-XL 25 mg b.i.d., potassium supplement, atorvastatin, losartan, subcutaneous Lovenox at current doses from cardiac standpoint. A repeat 2D echocardiogram was ordered. Advised to stop cigarette smoking.     No plans for any stress test or invasive cardiac catheterization and suggest

## 2019-03-16 NOTE — ED NOTES
Patient placed on O2 per triage RN. Dr. Dailey notified.  Verbal order received for nasal cannula     Martin Mancuso RN  03/16/19 3415

## 2019-03-16 NOTE — ED NOTES

## 2019-03-16 NOTE — PROGRESS NOTES
Patient arrived to the unit via stretcher. Patient ambulated to the bed. Patient alert and oriented. Vital signs obtained and heart monitor applied. Patient denies any pain.

## 2019-03-16 NOTE — ED NOTES
Pt arrives to ED c/o shortness of breath. Patient states that she began feeling short of breath yesterday and it has progressively gotten worse. Patient states she has felt like before but that it has never been this pain. Patient does not wear O2 at home. Patient denies history of COPD. Patient resting on stretcher, labored breathing. Pt is A&Ox4, PWD. GCS=15. Call light in reach.       Moiz Palmer RN  03/16/19 2011

## 2019-03-16 NOTE — H&P
Dr. Jurado Distance                                                                       Admission Note/H&P           Patient:  Samuel Hewitt  YOB: 1940     MRN: [id number]                            Acct: [de-identified]      Admit date: 3/16/2019     Pt seen and Chart reviewed. Consultant notes reviewed and outpatient/ ED care evaluated.     Subjective:  resently inc sob and cough, congestion  Cont to smoke, pk daily  COPD with hx CAD  Chronic pian, DJD spine          Patient Active Problem List   Diagnosis Code    Breast cancer ([facility] [address] C50.919    Renal cyst N28.1    Lumbar degenerative disc disease M51.36    Arthropathy of lumbar facet joint M47.816    Failed back syndrome of lumbar spine M96.1    Lumbar spondylosis M47.816    Sacroiliitis ([facility] [address] M46.1    Status post lumbar spinal fusion Z98.1    TIA (transient ischemic attack) G45.9    Anemia D64.9    Dyspnea R06.00    COPD with acute exacerbation ([facility] [address] J44. 1         Diet:  DIET GENERAL;        Medications:Current Inpatient     Scheduled Meds:  Scheduled Medications    sodium chloride flush  10 mL Intravenous 2 times per day    enoxaparin  40 mg Subcutaneous Daily    allopurinol  100 mg Oral BID    aspirin  81 mg Oral Daily    clopidogrel  75 mg Oral Daily    atorvastatin  40 mg Oral Nightly    gabapentin  300 mg Oral BID    losartan  100 mg Oral Daily    metoprolol succinate  25 mg Oral BID    pantoprazole  40 mg Oral QAM AC    sodium chloride flush  10 mL Intravenous 2 times per day    cefTRIAXone (ROCEPHIN) IV  1 g Intravenous Q24H    azithromycin  500 mg Intravenous Q24H    varenicline  0.5 mg Oral BID    methylPREDNISolone  80 mg Intravenous Q8H         Continuous Infusions:  Infusions Meds    sodium chloride           PRN Meds:  PRN Medications   sodium chloride flush, acetaminophen, ALPRAZolam, ipratropium-albuterol, sodium chloride flush, magnesium hydroxide, ondansetron        Objective:     Physical Exam:  Vitals: BP (!) 142/51   Pulse 60   Temp 97.9 °F (36.6 °C) (Oral)   Resp 18   Ht 5' 5\" (1.651 m)   Wt 175 lb 0.7 oz (79.4 kg)   SpO2 95%   BMI 29.13 kg/m²   Height and weight:        Wt Readings from Last 3 Encounters:   03/16/19 175 lb 0.7 oz (79.4 kg)   07/17/18 152 lb (68.9 kg)   07/11/18 153 lb 8 oz (69.6 kg)      [unfilled]     Physical Examination:   General appearance - alert, well appearing, and in no distress  Mental status - alert, oriented to person, place, and time  Chest - wheezing noted , decreased air entry noted , rhonchi noted   Heart - normal rate, regular rhythm, normal S1, S2, no murmurs, rubs, clicks or gallops  Abdomen - soft, nontender, nondistended, no masses or organomegaly  Neurological - alert, oriented, normal speech, no focal findings or movement disorder noted}  Extremities - peripheral pulses normal, no pedal edema, no clubbing or cyanosis  Skin - normal coloration and turgor, no rashes, no suspicious skin lesions noted         Labs:-     CBC:       Recent Labs     03/16/19  0725   WBC 7.3   HGB 11.9*   *      BMP:        Recent Labs     03/16/19  0725      K 4.6      CO2 25   BUN 21   CREATININE 1.52*   GLUCOSE 98      Glucose:No results for input(s): POCGLU in the last 72 hours. HgbA1C: No results for input(s): LABA1C in the last 72 hours. INR: No results for input(s): INR in the last 72 hours. CARDIAC ENZYMES:No results for input(s): CKTOTAL, CKMB, CKMBINDEX, TROPONINI in the last 72 hours. BNP: No results for input(s): BNP in the last 72 hours.   Lipids: No results for input(s): CHOL, TRIG, HDL, LDLCALC in the last 72 hours.     Invalid input(s): LDL  ABGs: No results found for: PH, PCO2, PO2, HCO3, O2SAT  Thyroid: No results found for: TSH   Urinalysis:         Color, UA   Date Value Ref Range Status   06/07/2018 YELLOW YEL Final            pH, UA   Date Value Ref Range Status   06/07/2018 7.0 5.0 - 8.0 Final            Specific Gravity, UA   Date Value Ref Range

## 2019-03-16 NOTE — PLAN OF CARE
Problem: Falls - Risk of:  Goal: Will remain free from falls  Description  Will remain free from falls  Outcome: Ongoing   No falls this shift. Patient alert and oriented. Call light in reach. Problem: Cardiac Output - Decreased:  Goal: Hemodynamic stability will improve  Description  Hemodynamic stability will improve  Outcome: Ongoing     Patient vital signs within normal limits.

## 2019-03-16 NOTE — ED PROVIDER NOTES
eMERGENCY dEPARTMENT eNCOUnter    Pt Name: Christo Jacobs  MRN: [id number]  [place] 1940  Date of evaluation: 3/16/19  CHIEF COMPLAINT       Chief Complaint   Patient presents with    Shortness of Breath     HISTORY OF PRESENT ILLNESS   The pt presents for evaluation of shortness of breath, gradual onset, gradually worsening. Pt is a smoker. History of copd, pt does not use any inhalers or resp treatments regularly. No fever. Pt denies any pain. The history is provided by the patient. Shortness of Breath   Severity:  Moderate  Onset quality:  Gradual  Duration: few days. Timing:  Constant  Progression:  Worsening  Chronicity:  New  Relieved by:  Nothing  Worsened by:  Nothing  Ineffective treatments:  None tried  Associated symptoms: cough and wheezing    Associated symptoms: no abdominal pain, no chest pain, no fever, no headaches and no rash        REVIEW OF SYSTEMS     Review of Systems   Constitutional: Negative. Negative for chills and fever. HENT: Negative. Negative for congestion. Eyes: Negative. Respiratory: Positive for cough, shortness of breath and wheezing. Cardiovascular: Negative. Negative for chest pain. Gastrointestinal: Negative. Negative for abdominal pain. Genitourinary: Negative. Musculoskeletal: Negative. Negative for back pain. Skin: Negative. Negative for rash. Neurological: Negative. Negative for headaches. All other systems reviewed and are negative.     PASTMEDICAL HISTORY     Past Medical History:   Diagnosis Date    Arthritis     Cancer [facility])     breast cancer, left    COPD (chronic obstructive pulmonary disease) (HCC)     CVA (cerebral vascular accident) ([facility] [address]     mini    Gout     Hyperlipidemia     Hypertension     Other abnormality of urination(788.69)      blood disorder needs platelets prior to procedures     SURGICAL HISTORY       Past Surgical History:   Procedure Laterality Date    APPENDECTOMY      BACK SURGERY      fusion with rods and screws    BLADDER SUSPENSION      BREAST SURGERY Left     lumpectomy x2    CAROTID ENDARTERECTOMY      COLONOSCOPY      COLONOSCOPY N/A 7/17/2018    COLONOSCOPY POLYPECTOMY performed by Kayli Bunn MD at [address] [place] [address]      JOINT REPLACEMENT      knee    JOINT REPLACEMENT      right hip    TONSILLECTOMY      UPPER GASTROINTESTINAL ENDOSCOPY N/A 7/17/2018    EGD BIOPSY performed by Kayli Bunn MD at [facility]       Current Discharge Medication List      CONTINUE these medications which have NOT CHANGED    Details   ALPRAZolam (XANAX) 0.5 MG tablet Take 0.5 mg by mouth daily. calcium carbonate 600 MG TABS tablet Take 1 tablet by mouth 2 times daily      naproxen (NAPROSYN) 500 MG tablet Take 500 mg by mouth every 12 hours as needed for Pain      omeprazole (PRILOSEC) 20 MG delayed release capsule TAKE 1 CAPSULE DAILY  Qty: 30 capsule, Refills: 3    Associated Diagnoses: Malignant neoplasm of left female breast, unspecified estrogen receptor status, unspecified site of breast (HCC)      ferrous sulfate 325 (65 Fe) MG tablet Take 325 mg by mouth daily (with breakfast)      clopidogrel (PLAVIX) 75 MG tablet Take 1 tablet by mouth daily  Qty: 30 tablet, Refills: 4      losartan (COZAAR) 100 MG tablet   Refills: 0      ezetimibe-simvastatin (VYTORIN) 10-80 MG per tablet Take 1 tablet by mouth nightly      gabapentin (NEURONTIN) 300 MG capsule Take 300 mg by mouth 2 times daily      calcium-vitamin D (OSCAL-500) 500-200 MG-UNIT per tablet Take 1 tablet by mouth daily. allopurinol (ZYLOPRIM) 100 MG tablet Take 100 mg by mouth 2 times daily. aspirin 81 MG tablet Take 81 mg by mouth daily      metoprolol (TOPROL-XL) 25 MG XL tablet 25 mg 2 times daily            ALLERGIES     is allergic to diclofenac-misoprostol. FAMILY HISTORY     indicated that the status of her mother is unknown.  She indicated that the status of her father is unknown. She indicated that the status of her sister is unknown. She indicated that her brother is . She indicated that the status of her son is unknown. SOCIAL HISTORY       Social History     Tobacco Use    Smoking status: Current Every Day Smoker     Packs/day: 1.00    Smokeless tobacco: Never Used   Substance Use Topics    Alcohol use: No    Drug use: No     PHYSICAL EXAM     INITIAL VITALS: BP (!) 185/64   Pulse 72   Temp 97.9 °F (36.6 °C) (Oral)   Resp 18   Ht 5' 5\" (1.651 m)   Wt 175 lb 0.7 oz (79.4 kg)   SpO2 95%   BMI 29.13 kg/m²    Physical Exam   Constitutional: She is oriented to person, place, and time. No distress. HENT:   Head: Normocephalic and atraumatic. Eyes: Conjunctivae are normal.   Neck: Normal range of motion. Neck supple. Cardiovascular: Normal rate, regular rhythm and normal heart sounds. No murmur heard. Pulmonary/Chest: She has decreased breath sounds. She has wheezes. Abdominal: Soft. There is no tenderness. Musculoskeletal: She exhibits no edema or deformity. Neurological: She is alert and oriented to person, place, and time. Skin: Skin is warm and dry. Capillary refill takes less than 2 seconds. No rash noted. She is not diaphoretic. Nursing note and vitals reviewed. MEDICAL DECISION MAKING:   Initially, wheezing, short of breath. Started on rest tx and steroids. Reviewed results. Mild pulmonary edema. Elevated bnp and trop. Overall, chf.  Started on lasix. Discussed with medicine, will admit for further therapy and evaluation. On reeval, exam unchanged. Pt in no distress. Pt is ready for admission at this time.        CRITICAL CARE:       PROCEDURES:    Procedures    DIAGNOSTIC RESULTS   EKG:All EKG's are interpreted by the Emergency Department Physician who either signs or Co-signs this chart in the absence of a cardiologist.  Ekg, rate of 71, nsr, nonspecific st seg changes, normal qrs, no acute ischemic changes. RADIOLOGY:All plain film, CT, MRI, and formal ultrasound images (except ED bedside ultrasound) are read by the radiologist, see reports below, unless otherwisenoted in MDM or here. XR CHEST PORTABLE   Final Result   Mild pulmonary edema. LABS: All lab results were reviewed by myself, and all abnormals are listed below.   Labs Reviewed   COMPREHENSIVE METABOLIC PANEL - Abnormal; Notable for the following components:       Result Value    CREATININE 1.52 (*)     Total Bilirubin 0.21 (*)     GFR Non- 33 (*)     GFR  40 (*)     All other components within normal limits   CBC WITH AUTO DIFFERENTIAL - Abnormal; Notable for the following components:    RBC 3.80 (*)     Hemoglobin 11.9 (*)     Hematocrit 34.6 (*)     Platelets 742 (*)     Seg Neutrophils 78 (*)     Lymphocytes 11 (*)     Absolute Lymph # 0.80 (*)     All other components within normal limits   TROPONIN - Abnormal; Notable for the following components:    Troponin, High Sensitivity 40 (*)     All other components within normal limits   TROPONIN - Abnormal; Notable for the following components:    Troponin, High Sensitivity 37 (*)     All other components within normal limits   BRAIN NATRIURETIC PEPTIDE - Abnormal; Notable for the following components:    Pro-BNP 6,468 (*)     All other components within normal limits   RAPID INFLUENZA A/B ANTIGENS   TSH WITH REFLEX       EMERGENCY DEPARTMENTCOURSE:         Vitals:    Vitals:    03/16/19 0900 03/16/19 0915 03/16/19 1013 03/16/19 1400   BP: (!) 164/55 (!) 143/52 (!) 142/51 (!) 185/64   Pulse: 64 71 60 72   Resp: 19 22 18 18   Temp:  98.2 °F (36.8 °C) 97.9 °F (36.6 °C) 97.9 °F (36.6 °C)   TempSrc:  Oral Oral Oral   SpO2: 98% 99% 95%    Weight:   175 lb 0.7 oz (79.4 kg)    Height:   5' 5\" (1.651 m)        The patient was given the following medications while in the emergency department:  Orders Placed This Encounter   Medications    methylPREDNISolone sodium (SOLU-MEDROL) injection 125 mg    DISCONTD: albuterol (PROVENTIL) nebulizer solution 5 mg    DISCONTD: ipratropium-albuterol (DUONEB) nebulizer solution 1 ampule    DISCONTD: albuterol (PROVENTIL) nebulizer solution 5 mg    DISCONTD: albuterol sulfate  (90 Base) MCG/ACT inhaler 2 puff    DISCONTD: albuterol sulfate  (90 Base) MCG/ACT inhaler 2 puff    DISCONTD: ipratropium (ATROVENT HFA) 17 MCG/ACT inhaler 2 puff    DISCONTD: ipratropium (ATROVENT) 0.02 % nebulizer solution 0.5 mg    cloNIDine (CATAPRES) tablet 0.2 mg    furosemide (LASIX) injection 40 mg    sodium chloride flush 0.9 % injection 10 mL    sodium chloride flush 0.9 % injection 10 mL    acetaminophen (TYLENOL) tablet 650 mg    enoxaparin (LOVENOX) injection 40 mg    aspirin chewable tablet 324 mg    allopurinol (ZYLOPRIM) tablet 100 mg    ALPRAZolam (XANAX) tablet 0.5 mg    aspirin EC tablet 81 mg    clopidogrel (PLAVIX) tablet 75 mg    atorvastatin (LIPITOR) tablet 40 mg    gabapentin (NEURONTIN) capsule 300 mg    losartan (COZAAR) tablet 100 mg    metoprolol succinate (TOPROL XL) extended release tablet 25 mg    pantoprazole (PROTONIX) tablet 40 mg    ipratropium-albuterol (DUONEB) nebulizer solution 1 ampule    0.45 % sodium chloride infusion    sodium chloride flush 0.9 % injection 10 mL    sodium chloride flush 0.9 % injection 10 mL    magnesium hydroxide (MILK OF MAGNESIA) 400 MG/5ML suspension 30 mL    ondansetron (ZOFRAN) injection 4 mg    cefTRIAXone (ROCEPHIN) 1 g IVPB in 50 mL D5W minibag    azithromycin (ZITHROMAX) 500 mg in D5W 250ml addavial    varenicline (CHANTIX) tablet 0.5 mg    methylPREDNISolone sodium (SOLU-MEDROL) injection 80 mg    guaiFENesin (ROBITUSSIN) 100 MG/5ML liquid 400 mg    perflutren lipid microspheres (DEFINITY) injection 2.2 mg    furosemide (LASIX) injection 40 mg    potassium chloride (KLOR-CON M) extended release tablet 10 mEq CONSULTS:  IP CONSULT TO INTERNAL MEDICINE  IP CONSULT TO CARDIOLOGY  IP CONSULT TO CARDIOLOGY  IP CONSULT TO CASE MANAGEMENT    FINAL IMPRESSION      1. Acute pulmonary edema (HCC)    2.  COPD exacerbation [facility])          DISPOSITION/PLAN   DISPOSITION Admitted 03/16/2019 09:01:48 AM      PATIENT REFERRED TO:  Ankush Chatterjee DO  [address] [place] [id number]  [phone]          DISCHARGE MEDICATIONS:  Current Discharge Medication List        Dale Teresa MD  Attending Emergency Physician                    Samy Bates MD  03/16/19 8379

## 2019-03-17 LAB
ABSOLUTE EOS #: 0 K/UL (ref 0–0.4)
ABSOLUTE IMMATURE GRANULOCYTE: ABNORMAL K/UL (ref 0–0.3)
ABSOLUTE LYMPH #: 0.7 K/UL (ref 1–4.8)
ABSOLUTE MONO #: 0.2 K/UL (ref 0.1–1.3)
ANION GAP SERPL CALCULATED.3IONS-SCNC: 13 MMOL/L (ref 9–17)
BASOPHILS # BLD: 0 % (ref 0–2)
BASOPHILS ABSOLUTE: 0 K/UL (ref 0–0.2)
BUN BLDV-MCNC: 32 MG/DL (ref 8–23)
BUN/CREAT BLD: ABNORMAL (ref 9–20)
CALCIUM SERPL-MCNC: 9.2 MG/DL (ref 8.6–10.4)
CHLORIDE BLD-SCNC: 99 MMOL/L (ref 98–107)
CHOLESTEROL/HDL RATIO: 2.4
CHOLESTEROL: 106 MG/DL
CO2: 27 MMOL/L (ref 20–31)
CREAT SERPL-MCNC: 1.85 MG/DL (ref 0.5–0.9)
DIFFERENTIAL TYPE: ABNORMAL
EOSINOPHILS RELATIVE PERCENT: 0 % (ref 0–4)
GFR AFRICAN AMERICAN: 32 ML/MIN
GFR NON-AFRICAN AMERICAN: 26 ML/MIN
GFR SERPL CREATININE-BSD FRML MDRD: ABNORMAL ML/MIN/{1.73_M2}
GFR SERPL CREATININE-BSD FRML MDRD: ABNORMAL ML/MIN/{1.73_M2}
GLUCOSE BLD-MCNC: 153 MG/DL (ref 70–99)
GLUCOSE BLD-MCNC: 169 MG/DL (ref 65–105)
HCT VFR BLD CALC: 37.6 % (ref 36–46)
HDLC SERPL-MCNC: 45 MG/DL
HEMOGLOBIN: 12.5 G/DL (ref 12–16)
IMMATURE GRANULOCYTES: ABNORMAL %
LDL CHOLESTEROL: 50 MG/DL (ref 0–130)
LYMPHOCYTES # BLD: 10 % (ref 24–44)
MCH RBC QN AUTO: 30.8 PG (ref 26–34)
MCHC RBC AUTO-ENTMCNC: 33.2 G/DL (ref 31–37)
MCV RBC AUTO: 92.7 FL (ref 80–100)
MONOCYTES # BLD: 2 % (ref 1–7)
NRBC AUTOMATED: ABNORMAL PER 100 WBC
PDW BLD-RTO: 14 % (ref 11.5–14.9)
PLATELET # BLD: 167 K/UL (ref 150–450)
PLATELET ESTIMATE: ABNORMAL
PMV BLD AUTO: 9 FL (ref 6–12)
POTASSIUM SERPL-SCNC: 5 MMOL/L (ref 3.7–5.3)
RBC # BLD: 4.06 M/UL (ref 4–5.2)
RBC # BLD: ABNORMAL 10*6/UL
SEG NEUTROPHILS: 88 % (ref 36–66)
SEGMENTED NEUTROPHILS ABSOLUTE COUNT: 6.2 K/UL (ref 1.3–9.1)
SODIUM BLD-SCNC: 139 MMOL/L (ref 135–144)
TRIGL SERPL-MCNC: 55 MG/DL
VLDLC SERPL CALC-MCNC: NORMAL MG/DL (ref 1–30)
WBC # BLD: 7.1 K/UL (ref 3.5–11)
WBC # BLD: ABNORMAL 10*3/UL

## 2019-03-17 PROCEDURE — 6360000002 HC RX W HCPCS: Performed by: FAMILY MEDICINE

## 2019-03-17 PROCEDURE — 6360000002 HC RX W HCPCS: Performed by: INTERNAL MEDICINE

## 2019-03-17 PROCEDURE — 82947 ASSAY GLUCOSE BLOOD QUANT: CPT

## 2019-03-17 PROCEDURE — 2580000003 HC RX 258: Performed by: FAMILY MEDICINE

## 2019-03-17 PROCEDURE — 6370000000 HC RX 637 (ALT 250 FOR IP): Performed by: INTERNAL MEDICINE

## 2019-03-17 PROCEDURE — 6370000000 HC RX 637 (ALT 250 FOR IP): Performed by: FAMILY MEDICINE

## 2019-03-17 PROCEDURE — 80048 BASIC METABOLIC PNL TOTAL CA: CPT

## 2019-03-17 PROCEDURE — 94760 N-INVAS EAR/PLS OXIMETRY 1: CPT

## 2019-03-17 PROCEDURE — 80061 LIPID PANEL: CPT

## 2019-03-17 PROCEDURE — 2700000000 HC OXYGEN THERAPY PER DAY

## 2019-03-17 PROCEDURE — 2060000000 HC ICU INTERMEDIATE R&B

## 2019-03-17 PROCEDURE — 36415 COLL VENOUS BLD VENIPUNCTURE: CPT

## 2019-03-17 PROCEDURE — 94640 AIRWAY INHALATION TREATMENT: CPT

## 2019-03-17 PROCEDURE — 85025 COMPLETE CBC W/AUTO DIFF WBC: CPT

## 2019-03-17 RX ORDER — MEMANTINE HYDROCHLORIDE 5 MG/1
5 TABLET ORAL 2 TIMES DAILY
Status: DISCONTINUED | OUTPATIENT
Start: 2019-03-17 | End: 2019-03-18 | Stop reason: HOSPADM

## 2019-03-17 RX ORDER — ISOSORBIDE MONONITRATE 30 MG/1
30 TABLET, EXTENDED RELEASE ORAL DAILY
Status: DISCONTINUED | OUTPATIENT
Start: 2019-03-17 | End: 2019-03-18 | Stop reason: HOSPADM

## 2019-03-17 RX ORDER — NITROGLYCERIN 0.4 MG/1
0.4 TABLET SUBLINGUAL EVERY 5 MIN PRN
Status: DISCONTINUED | OUTPATIENT
Start: 2019-03-17 | End: 2019-03-18 | Stop reason: HOSPADM

## 2019-03-17 RX ORDER — GABAPENTIN 100 MG/1
100 CAPSULE ORAL 2 TIMES DAILY
Status: DISCONTINUED | OUTPATIENT
Start: 2019-03-17 | End: 2019-03-18 | Stop reason: HOSPADM

## 2019-03-17 RX ADMIN — IPRATROPIUM BROMIDE AND ALBUTEROL SULFATE 1 AMPULE: .5; 3 SOLUTION RESPIRATORY (INHALATION) at 10:21

## 2019-03-17 RX ADMIN — METHYLPREDNISOLONE SODIUM SUCCINATE 80 MG: 125 INJECTION, POWDER, FOR SOLUTION INTRAMUSCULAR; INTRAVENOUS at 23:37

## 2019-03-17 RX ADMIN — LOSARTAN POTASSIUM 100 MG: 100 TABLET ORAL at 10:04

## 2019-03-17 RX ADMIN — GABAPENTIN 300 MG: 300 CAPSULE ORAL at 10:03

## 2019-03-17 RX ADMIN — ISOSORBIDE MONONITRATE 30 MG: 30 TABLET, EXTENDED RELEASE ORAL at 13:32

## 2019-03-17 RX ADMIN — MEMANTINE HYDROCHLORIDE 5 MG: 5 TABLET ORAL at 23:37

## 2019-03-17 RX ADMIN — CEFTRIAXONE SODIUM 1 G: 1 INJECTION, POWDER, FOR SOLUTION INTRAMUSCULAR; INTRAVENOUS at 13:32

## 2019-03-17 RX ADMIN — VARENICLINE TARTRATE 0.5 MG: 0.5 TABLET, FILM COATED ORAL at 23:36

## 2019-03-17 RX ADMIN — PANTOPRAZOLE SODIUM 40 MG: 40 TABLET, DELAYED RELEASE ORAL at 07:17

## 2019-03-17 RX ADMIN — METHYLPREDNISOLONE SODIUM SUCCINATE 80 MG: 125 INJECTION, POWDER, FOR SOLUTION INTRAMUSCULAR; INTRAVENOUS at 10:04

## 2019-03-17 RX ADMIN — Medication 10 ML: at 23:40

## 2019-03-17 RX ADMIN — CLOPIDOGREL BISULFATE 75 MG: 75 TABLET ORAL at 10:03

## 2019-03-17 RX ADMIN — Medication 10 ML: at 10:05

## 2019-03-17 RX ADMIN — GABAPENTIN 100 MG: 100 CAPSULE ORAL at 23:37

## 2019-03-17 RX ADMIN — METOPROLOL SUCCINATE 25 MG: 25 TABLET, EXTENDED RELEASE ORAL at 23:37

## 2019-03-17 RX ADMIN — FUROSEMIDE 40 MG: 10 INJECTION, SOLUTION INTRAMUSCULAR; INTRAVENOUS at 10:04

## 2019-03-17 RX ADMIN — ATORVASTATIN CALCIUM 40 MG: 40 TABLET, FILM COATED ORAL at 23:36

## 2019-03-17 RX ADMIN — IPRATROPIUM BROMIDE AND ALBUTEROL SULFATE 1 AMPULE: .5; 3 SOLUTION RESPIRATORY (INHALATION) at 19:59

## 2019-03-17 RX ADMIN — AZITHROMYCIN MONOHYDRATE 500 MG: 500 INJECTION, POWDER, LYOPHILIZED, FOR SOLUTION INTRAVENOUS at 14:46

## 2019-03-17 RX ADMIN — ALLOPURINOL 100 MG: 100 TABLET ORAL at 23:36

## 2019-03-17 RX ADMIN — ASPIRIN 81 MG: 81 TABLET, COATED ORAL at 10:04

## 2019-03-17 RX ADMIN — ENOXAPARIN SODIUM 40 MG: 40 INJECTION SUBCUTANEOUS at 10:05

## 2019-03-17 RX ADMIN — METHYLPREDNISOLONE SODIUM SUCCINATE 80 MG: 125 INJECTION, POWDER, FOR SOLUTION INTRAMUSCULAR; INTRAVENOUS at 17:20

## 2019-03-17 RX ADMIN — IPRATROPIUM BROMIDE AND ALBUTEROL SULFATE 1 AMPULE: .5; 3 SOLUTION RESPIRATORY (INHALATION) at 14:54

## 2019-03-17 RX ADMIN — METOPROLOL SUCCINATE 25 MG: 25 TABLET, EXTENDED RELEASE ORAL at 10:04

## 2019-03-17 RX ADMIN — ALLOPURINOL 100 MG: 100 TABLET ORAL at 10:04

## 2019-03-17 RX ADMIN — MEMANTINE HYDROCHLORIDE 5 MG: 5 TABLET ORAL at 13:32

## 2019-03-17 RX ADMIN — VARENICLINE TARTRATE 0.5 MG: 0.5 TABLET, FILM COATED ORAL at 10:03

## 2019-03-17 NOTE — CARE COORDINATION
CASE MANAGEMENT NOTE:    Admission Date:  3/16/2019 Annabelle Bauer is a 69 y.o.  female    Admitted for : Dyspnea [R06.00]  COPD with acute exacerbation ([facility] [address] [J44.1]    Met with:  Patient    PCP:  Dr. Guzman Simpler:  Medicare       Current Residence/ Living Arrangements:  independently at home w/             Current Services PTA:  No    Is patient agreeable to VNS: Has Had in past, unsure of company, doesn't feel she needs again    Freedom of choice provided: Yes    List of [address] provided: No, declines    VNS chosen:  No    DME:  straight cane, walker and shower chair, Raised toilet w/ Handles, Electric Scooter  Home Oxygen: No    Nebulizer: No    CPAP/BIPAP: No    Supplier: N/A    Potential Assistance Needed: Would like Nebulizer    SNF needed: No    [facility]  [facility] [place] term, [facility]       Is the Patient an TrueView with Readmission Risk Score greater than 14%? No  If yes, pt needs a follow up appointment made within 7 days. Does Patient want to use MEDS to BEDS? No    Family Members/Caregivers that pt would like involved in their care:    Yes    If yes, list name here:   Chani Fernandes    Transportation Provider:  Patient , drives short distances,  does the rest            Is patient in Isolation/One on One/Altered Mental Status? yes  If yes, skip next question. If no, would they like an I-Pad to  use? NA  If yes, call [phone]. Discharge Plan:  3/17/19 Medicare Pt. Lives in 2 story home w/ ,Has Liveable 1st floor. DME cane, walker, SC, raised toilet w/ Handles. Electric scooter, Denies, VNS at this time, has had in past, doesn't remember who. IV Zithromax/Rocephin, Steroids 80 Q8, Follow for possible Home O2, Pt. Would like nebulizer, will need DME/Face to Face.  Denies other needs, will follow//KB                 Electronically signed by: Maria R Dubon RN on 3/17/2019 at 11:06 AM

## 2019-03-17 NOTE — PROGRESS NOTES
Dr. Emanuel Staton        Patient:  Annabelle Bauer  YOB: 1940    MRN: [id number]     Acct: [de-identified]     Admit date: 3/16/2019    Pt seen and Chart reviewed. Consultant notes reviewed and care evaluated.     Problem List:  Patient Active Problem List   Diagnosis Code    Breast cancer ([address] C50.919    Renal cyst N28.1    Lumbar degenerative disc disease M51.36    Arthropathy of lumbar facet joint M47.816    Failed back syndrome of lumbar spine M96.1    Lumbar spondylosis M47.816    Sacroiliitis (HCC) M46.1    Status post lumbar spinal fusion Z98.1    TIA (transient ischemic attack) G45.9    Anemia D64.9    Dyspnea R06.00    COPD with acute exacerbation (HCC) J44.1         Subjective: agree with diuresis, though pt tends to dehydrate and was on adm despite CHF  Echo pending  imporving    Diet:  DIET GENERAL;      Medications:Current Inpatient    Scheduled Meds:   sodium chloride flush  10 mL Intravenous 2 times per day    enoxaparin  40 mg Subcutaneous Daily    allopurinol  100 mg Oral BID    aspirin  81 mg Oral Daily    clopidogrel  75 mg Oral Daily    atorvastatin  40 mg Oral Nightly    gabapentin  300 mg Oral BID    losartan  100 mg Oral Daily    metoprolol succinate  25 mg Oral BID    pantoprazole  40 mg Oral QAM AC    sodium chloride flush  10 mL Intravenous 2 times per day    cefTRIAXone (ROCEPHIN) IV  1 g Intravenous Q24H    azithromycin  500 mg Intravenous Q24H    varenicline  0.5 mg Oral BID    methylPREDNISolone  80 mg Intravenous Q8H    furosemide  40 mg Intravenous Daily    potassium chloride  10 mEq Oral Daily     Continuous Infusions:  PRN Meds:sodium chloride flush, acetaminophen, ALPRAZolam, ipratropium-albuterol, sodium chloride flush, magnesium hydroxide, ondansetron, guaiFENesin, perflutren lipid Date    TSH 2.65 03/16/2019      Urinalysis:   Color, UA   Date Value Ref Range Status   06/07/2018 YELLOW YEL Final     pH, UA   Date Value Ref Range Status   06/07/2018 7.0 5.0 - 8.0 Final     Specific Gravity, UA   Date Value Ref Range Status   06/07/2018 1.009 1.000 - 1.030 Final     Protein, UA   Date Value Ref Range Status   06/07/2018 NEGATIVE NEG Final     RBC, UA   Date Value Ref Range Status   06/07/2018 0 TO 2 /HPF Final     Bacteria, UA   Date Value Ref Range Status   06/07/2018 MANY (A) NONE Final     Comment:     Performed at [address]   [phone]       Nitrite, Urine   Date Value Ref Range Status   06/07/2018 POSITIVE (A) NEG Final     WBC, UA   Date Value Ref Range Status   06/07/2018 10 TO 20 /HPF Final     Leukocyte Esterase, Urine   Date Value Ref Range Status   06/07/2018 TRACE (A) NEG Final     Yeast, UA   Date Value Ref Range Status   06/07/2018 NOT REPORTED NONE Final     Glucose, Ur   Date Value Ref Range Status   06/07/2018 NEGATIVE NEG Final     Bilirubin Urine   Date Value Ref Range Status   06/07/2018 NEGATIVE NEG Final       CULTURES:    Current Rehabilitation Assessments:  PHYSICAL THERAPY:     OCCUPATIONAL THERAPY:     SPEECH:      Assessment:  Active Problems:    Dyspnea    COPD with acute exacerbation (HCC)  Resolved Problems:    * No resolved hospital problems. *      Plan:  1.  Trial namenda for memory loss    Milly Clarke MD             3/17/2019, 10:12 AM

## 2019-03-17 NOTE — PROGRESS NOTES
[facility] CARDIOLOGY Progress Note    3/17/2019 10:55 AM      Subjective:  Ms. Yanira Daley c/o chest pain,  No worsening shortness of breath or palpitations or lightheadedness or dizziness. LABS:     Recent Results (from the past 24 hour(s))   RAPID INFLUENZA A/B ANTIGENS    Collection Time: 03/16/19  5:09 PM   Result Value Ref Range    Specimen Description . NASOPHARYNGEAL SWAB     Special Requests NOT REPORTED     Direct Exam       PRESUMPTIVE NEGATIVE for Influenza A + B antigens. PCR testing to confirm this result is available upon request.  Specimen will be saved in the laboratory for 7 days. Please call [phone] if PCR testing is indicated.    Basic Metabolic Panel    Collection Time: 03/17/19  5:25 AM   Result Value Ref Range    Glucose 153 (H) 70 - 99 mg/dL    BUN 32 (H) 8 - 23 mg/dL    CREATININE 1.85 (H) 0.50 - 0.90 mg/dL    Bun/Cre Ratio NOT REPORTED 9 - 20    Calcium 9.2 8.6 - 10.4 mg/dL    Sodium 139 135 - 144 mmol/L    Potassium 5.0 3.7 - 5.3 mmol/L    Chloride 99 98 - 107 mmol/L    CO2 27 20 - 31 mmol/L    Anion Gap 13 9 - 17 mmol/L    GFR Non-African American 26 (L) >60 mL/min    GFR  32 (L) >60 mL/min    GFR Comment          GFR Staging NOT REPORTED    CBC Auto Differential    Collection Time: 03/17/19  5:25 AM   Result Value Ref Range    WBC 7.1 3.5 - 11.0 k/uL    RBC 4.06 4.0 - 5.2 m/uL    Hemoglobin 12.5 12.0 - 16.0 g/dL    Hematocrit 37.6 36 - 46 %    MCV 92.7 80 - 100 fL    MCH 30.8 26 - 34 pg    MCHC 33.2 31 - 37 g/dL    RDW 14.0 11.5 - 14.9 %    Platelets 076 015 - 337 k/uL    MPV 9.0 6.0 - 12.0 fL    NRBC Automated NOT REPORTED per 100 WBC    Differential Type NOT REPORTED     Immature Granulocytes NOT REPORTED 0 %    Absolute Immature Granulocyte NOT REPORTED 0.00 - 0.30 k/uL    WBC Morphology NOT REPORTED     RBC Morphology NOT REPORTED     Platelet Estimate NOT REPORTED     Seg Neutrophils 88 (H) 36 - 66 %    Lymphocytes 10 (L) 24 - 44 %    Monocytes 2 1 - 7 %    Eosinophils % 0 0 - 4 %    Basophils 0 0 - 2 %    Segs Absolute 6.20 1.3 - 9.1 k/uL    Absolute Lymph # 0.70 (L) 1.0 - 4.8 k/uL    Absolute Mono # 0.20 0.1 - 1.3 k/uL    Absolute Eos # 0.00 0.0 - 0.4 k/uL    Basophils # 0.00 0.0 - 0.2 k/uL   Lipid Panel    Collection Time: 19  5:25 AM   Result Value Ref Range    Cholesterol 106 <200 mg/dL    HDL 45 >40 mg/dL    LDL Cholesterol 50 0 - 130 mg/dL    Chol/HDL Ratio 2.4 <5    Triglycerides 55 <150 mg/dL    VLDL NOT REPORTED 1 - 30 mg/dL       Pulse Ox: SpO2  Av.8 %  Min: 84 %  Max: 92 %    Supplemental O2: O2 Flow Rate (L/min): 2 L/min     Current Meds:    gabapentin  100 mg Oral BID    memantine  5 mg Oral BID    [START ON 3/18/2019] enoxaparin  30 mg Subcutaneous Daily    sodium chloride flush  10 mL Intravenous 2 times per day    allopurinol  100 mg Oral BID    aspirin  81 mg Oral Daily    clopidogrel  75 mg Oral Daily    atorvastatin  40 mg Oral Nightly    losartan  100 mg Oral Daily    metoprolol succinate  25 mg Oral BID    pantoprazole  40 mg Oral QAM AC    sodium chloride flush  10 mL Intravenous 2 times per day    cefTRIAXone (ROCEPHIN) IV  1 g Intravenous Q24H    azithromycin  500 mg Intravenous Q24H    varenicline  0.5 mg Oral BID    methylPREDNISolone  80 mg Intravenous Q8H    furosemide  40 mg Intravenous Daily    potassium chloride  10 mEq Oral Daily              VITAL SIGNS:    BP (!) 159/68   Pulse 75   Temp 97.9 °F (36.6 °C)   Resp 19   Ht 5' 5\" (1.651 m)   Wt 175 lb 0.7 oz (79.4 kg)   SpO2 (!) 84% Comment: room air  BMI 29.13 kg/m²  2 L/min      Admit Weight:  170 lb (77.1 kg)    Last 3 weights: Wt Readings from Last 3 Encounters:   19 175 lb 0.7 oz (79.4 kg)   18 152 lb (68.9 kg)   18 153 lb 8 oz (69.6 kg)       BMI: Body mass index is 29.13 kg/m².      INPUT/OUTPUT:          Intake/Output Summary (Last 24 hours) at 3/17/2019 9017  Last data filed at 3/16/2019 2016  Gross per 24 hour   Intake 500 ml   Output 800 ml   Net -300 ml       Telemetry shows sinus rhythm. EXAM:     General appearance: awake, alert. Diminished air entry bilaterally. Regular rate and rhythm. Abdomen: soft, non-tender. Extremities: no cyanosis, no clubbing, no calf tenderness, no leg edema. 2D echocardiogram 03/16/2019:    Summary  Left ventricle is normal in size Global left ventricular systolic function  is normal Estimated ejection fraction is 65 % . Moderate left ventricular hypertrophy. Right atrium is mildly dilated . Normal right ventricular size and function. Normal aortic valve structure and function without stenosis or  regurgitation. Mitral annular calcification is seen. Mild mitral regurgitation. Mild to moderate tricuspid regurgitation. Estimated right ventricular systolic pressure is 31 mmHg. Technically difficult visualization of the pulmonic valve, no abnormality  seen. Normal aortic root dimension. ASSESSMENT/PLAN:    Chest pain. Ruled out for MI. Cannot exclude any underlying CAD. Abnormal EKG. Acute on chronic diastolic heart failure with preserved LVEF 65%. Moderate LVH. Mild-to-moderate TR. Mild MR. Severe COPD. Severe carotid artery disease. Other problems as charted. REC/PLAN:    Will add Imdur 30 mg daily, nitroglycerin 0.4 mg sublingual p.r.n. Chest pain. Continue on Toprol-XL, aspirin, Lipitor, losartan from cardiac standpoint along with IV Lasix 40 mg daily. Will follow. Electronically signed by Susie Mario MD, [facility]        PLEASE NOTE:  This progress note was completed using a voice transcription system. Every effort was made to ensure accuracy. However, inadvertent computerized transcription errors may be present.

## 2019-03-18 VITALS
BODY MASS INDEX: 29.16 KG/M2 | SYSTOLIC BLOOD PRESSURE: 189 MMHG | OXYGEN SATURATION: 90 % | HEIGHT: 65 IN | TEMPERATURE: 98.1 F | DIASTOLIC BLOOD PRESSURE: 71 MMHG | HEART RATE: 62 BPM | RESPIRATION RATE: 17 BRPM | WEIGHT: 175.04 LBS

## 2019-03-18 LAB
ABSOLUTE EOS #: 0 K/UL (ref 0–0.4)
ABSOLUTE IMMATURE GRANULOCYTE: ABNORMAL K/UL (ref 0–0.3)
ABSOLUTE LYMPH #: 0.6 K/UL (ref 1–4.8)
ABSOLUTE MONO #: 0.2 K/UL (ref 0.1–1.3)
ANION GAP SERPL CALCULATED.3IONS-SCNC: 12 MMOL/L (ref 9–17)
BASOPHILS # BLD: 0 % (ref 0–2)
BASOPHILS ABSOLUTE: 0 K/UL (ref 0–0.2)
BUN BLDV-MCNC: 42 MG/DL (ref 8–23)
BUN/CREAT BLD: ABNORMAL (ref 9–20)
CALCIUM SERPL-MCNC: 8.8 MG/DL (ref 8.6–10.4)
CHLORIDE BLD-SCNC: 99 MMOL/L (ref 98–107)
CO2: 29 MMOL/L (ref 20–31)
CREAT SERPL-MCNC: 1.93 MG/DL (ref 0.5–0.9)
DIFFERENTIAL TYPE: ABNORMAL
EKG ATRIAL RATE: 71 BPM
EKG P AXIS: 67 DEGREES
EKG P-R INTERVAL: 172 MS
EKG Q-T INTERVAL: 396 MS
EKG QRS DURATION: 78 MS
EKG QTC CALCULATION (BAZETT): 430 MS
EKG R AXIS: -31 DEGREES
EKG T AXIS: 92 DEGREES
EKG VENTRICULAR RATE: 71 BPM
EOSINOPHILS RELATIVE PERCENT: 0 % (ref 0–4)
GFR AFRICAN AMERICAN: 31 ML/MIN
GFR NON-AFRICAN AMERICAN: 25 ML/MIN
GFR SERPL CREATININE-BSD FRML MDRD: ABNORMAL ML/MIN/{1.73_M2}
GFR SERPL CREATININE-BSD FRML MDRD: ABNORMAL ML/MIN/{1.73_M2}
GLUCOSE BLD-MCNC: 156 MG/DL (ref 70–99)
HCT VFR BLD CALC: 33.9 % (ref 36–46)
HEMOGLOBIN: 11.2 G/DL (ref 12–16)
IMMATURE GRANULOCYTES: ABNORMAL %
LYMPHOCYTES # BLD: 7 % (ref 24–44)
MCH RBC QN AUTO: 30 PG (ref 26–34)
MCHC RBC AUTO-ENTMCNC: 32.9 G/DL (ref 31–37)
MCV RBC AUTO: 91.3 FL (ref 80–100)
MONOCYTES # BLD: 3 % (ref 1–7)
NRBC AUTOMATED: ABNORMAL PER 100 WBC
PDW BLD-RTO: 13.9 % (ref 11.5–14.9)
PLATELET # BLD: 163 K/UL (ref 150–450)
PLATELET ESTIMATE: ABNORMAL
PMV BLD AUTO: 8.5 FL (ref 6–12)
POTASSIUM SERPL-SCNC: 4 MMOL/L (ref 3.7–5.3)
RBC # BLD: 3.71 M/UL (ref 4–5.2)
RBC # BLD: ABNORMAL 10*6/UL
SEG NEUTROPHILS: 90 % (ref 36–66)
SEGMENTED NEUTROPHILS ABSOLUTE COUNT: 7.9 K/UL (ref 1.3–9.1)
SODIUM BLD-SCNC: 140 MMOL/L (ref 135–144)
WBC # BLD: 8.8 K/UL (ref 3.5–11)
WBC # BLD: ABNORMAL 10*3/UL

## 2019-03-18 PROCEDURE — 6360000002 HC RX W HCPCS: Performed by: FAMILY MEDICINE

## 2019-03-18 PROCEDURE — 85025 COMPLETE CBC W/AUTO DIFF WBC: CPT

## 2019-03-18 PROCEDURE — 6360000002 HC RX W HCPCS: Performed by: INTERNAL MEDICINE

## 2019-03-18 PROCEDURE — 2580000003 HC RX 258: Performed by: FAMILY MEDICINE

## 2019-03-18 PROCEDURE — 6370000000 HC RX 637 (ALT 250 FOR IP): Performed by: FAMILY MEDICINE

## 2019-03-18 PROCEDURE — 80048 BASIC METABOLIC PNL TOTAL CA: CPT

## 2019-03-18 PROCEDURE — 36415 COLL VENOUS BLD VENIPUNCTURE: CPT

## 2019-03-18 PROCEDURE — 6370000000 HC RX 637 (ALT 250 FOR IP): Performed by: INTERNAL MEDICINE

## 2019-03-18 RX ORDER — FUROSEMIDE 40 MG/1
40 TABLET ORAL DAILY
Qty: 30 TABLET | Refills: 0 | Status: ON HOLD | OUTPATIENT
Start: 2019-03-18 | End: 2019-04-21 | Stop reason: HOSPADM

## 2019-03-18 RX ORDER — VARENICLINE TARTRATE 0.5 MG/1
0.5 TABLET, FILM COATED ORAL 2 TIMES DAILY
Qty: 60 TABLET | Refills: 3 | Status: SHIPPED | OUTPATIENT
Start: 2019-03-18 | End: 2019-04-19

## 2019-03-18 RX ORDER — ISOSORBIDE MONONITRATE 30 MG/1
30 TABLET, EXTENDED RELEASE ORAL DAILY
Qty: 30 TABLET | Refills: 3 | Status: ON HOLD | OUTPATIENT
Start: 2019-03-18 | End: 2019-04-19

## 2019-03-18 RX ORDER — MEMANTINE HYDROCHLORIDE 5 MG/1
5 TABLET ORAL 2 TIMES DAILY
Qty: 60 TABLET | Refills: 3 | Status: SHIPPED | OUTPATIENT
Start: 2019-03-18 | End: 2019-03-18

## 2019-03-18 RX ORDER — MEMANTINE HYDROCHLORIDE 5 MG/1
10 TABLET ORAL 2 TIMES DAILY
Qty: 60 TABLET | Refills: 3
Start: 2019-03-18 | End: 2019-12-19

## 2019-03-18 RX ORDER — IPRATROPIUM BROMIDE AND ALBUTEROL SULFATE 2.5; .5 MG/3ML; MG/3ML
3 SOLUTION RESPIRATORY (INHALATION) 2 TIMES DAILY PRN
Qty: 360 ML | Refills: 5
Start: 2019-03-18 | End: 2019-04-19

## 2019-03-18 RX ORDER — GABAPENTIN 100 MG/1
100 CAPSULE ORAL 2 TIMES DAILY
Qty: 90 CAPSULE | Refills: 3
Start: 2019-03-18 | End: 2019-04-19 | Stop reason: DRUGHIGH

## 2019-03-18 RX ORDER — AZITHROMYCIN 500 MG/1
500 TABLET, FILM COATED ORAL DAILY
Qty: 5 TABLET | Refills: 0
Start: 2019-03-18 | End: 2019-03-23

## 2019-03-18 RX ORDER — FUROSEMIDE 40 MG/1
40 TABLET ORAL DAILY
Status: DISCONTINUED | OUTPATIENT
Start: 2019-03-19 | End: 2019-03-18 | Stop reason: HOSPADM

## 2019-03-18 RX ADMIN — LOSARTAN POTASSIUM 100 MG: 100 TABLET ORAL at 09:27

## 2019-03-18 RX ADMIN — ASPIRIN 81 MG: 81 TABLET, COATED ORAL at 09:27

## 2019-03-18 RX ADMIN — CLOPIDOGREL BISULFATE 75 MG: 75 TABLET ORAL at 09:27

## 2019-03-18 RX ADMIN — ALLOPURINOL 100 MG: 100 TABLET ORAL at 09:27

## 2019-03-18 RX ADMIN — ENOXAPARIN SODIUM 30 MG: 30 INJECTION SUBCUTANEOUS at 09:27

## 2019-03-18 RX ADMIN — MEMANTINE HYDROCHLORIDE 5 MG: 5 TABLET ORAL at 09:27

## 2019-03-18 RX ADMIN — GABAPENTIN 100 MG: 100 CAPSULE ORAL at 09:27

## 2019-03-18 RX ADMIN — PANTOPRAZOLE SODIUM 40 MG: 40 TABLET, DELAYED RELEASE ORAL at 06:37

## 2019-03-18 RX ADMIN — METHYLPREDNISOLONE SODIUM SUCCINATE 80 MG: 125 INJECTION, POWDER, FOR SOLUTION INTRAMUSCULAR; INTRAVENOUS at 09:26

## 2019-03-18 RX ADMIN — ISOSORBIDE MONONITRATE 30 MG: 30 TABLET, EXTENDED RELEASE ORAL at 09:27

## 2019-03-18 RX ADMIN — Medication 10 ML: at 09:27

## 2019-03-18 RX ADMIN — METOPROLOL SUCCINATE 25 MG: 25 TABLET, EXTENDED RELEASE ORAL at 09:27

## 2019-03-18 RX ADMIN — FUROSEMIDE 40 MG: 10 INJECTION, SOLUTION INTRAMUSCULAR; INTRAVENOUS at 09:27

## 2019-03-18 RX ADMIN — VARENICLINE TARTRATE 0.5 MG: 0.5 TABLET, FILM COATED ORAL at 09:27

## 2019-03-18 ASSESSMENT — PAIN SCALES - GENERAL: PAINLEVEL_OUTOF10: 0

## 2019-03-18 NOTE — FLOWSHEET NOTE
03/18/19 1713   Provider Notification   Reason for Communication Review case   Provider Name Dr. Mera Westfall   Provider Notification Physician   Method of Communication Other (Comment)  Ronald Flores     RN notified Dr. Mera Westfall that Cardiology has approved pt for d/c.

## 2019-03-18 NOTE — DISCHARGE INSTR - COC
Continuity of Care Form    Patient Name: Erendira Barbosa   :  1940  MRN:  [id number]    [facility] date:  3/16/2019  Discharge date:  ***    Code Status Order: Full Code   Advance Directives:   Advance Care Flowsheet Documentation     Date/Time Healthcare Directive Type of Healthcare Directive Copy in [address] Agent's Name Healthcare Agent's Phone Number    19 [phone]  Yes, patient has an advance directive for healthcare treatment  --  --  --  --  --          Admitting Physician:  Kristy Seen, DO  PCP: Kristy Seen, DO    Discharging Nurse: [facility] Unit/Room#: 2113/2113-01  Discharging Unit Phone Number: ***    Emergency Contact:   Extended Emergency Contact Information  Primary Emergency Contact: Mellissa Bowens  Address: [address] [place] [address] Phone: [phone]  Work Phone: [phone]  Mobile Phone: [phone]  Relation: Spouse  Hearing or visual needs: None  Other needs: None  Preferred language: English   needed?  No    Past Surgical History:  Past Surgical History:   Procedure Laterality Date    APPENDECTOMY      BACK SURGERY      fusion with rods and screws    BLADDER SUSPENSION      BREAST SURGERY Left     lumpectomy x2    CAROTID ENDARTERECTOMY      COLONOSCOPY      COLONOSCOPY N/A 2018    COLONOSCOPY POLYPECTOMY performed by Niru Tomlin MD at [facility]      JOINT REPLACEMENT      knee    JOINT REPLACEMENT      right hip    TONSILLECTOMY      UPPER GASTROINTESTINAL ENDOSCOPY N/A 2018    EGD BIOPSY performed by Niru Tomlin MD at [address] [place] [address]         Immunization History:   Immunization History   Administered Date(s) Administered    Pneumococcal 13-valent Conjugate (Sggmggs85) 2018    Pneumococcal Polysaccharide (Ubfkkitwt65) 2014       Active Problems:  Patient Active Problem List   Diagnosis Code    [facility] C50.919    Renal cyst N28.1    Lumbar degenerative disc disease M51.36    Arthropathy of lumbar facet joint M47.816    Failed back syndrome of lumbar spine M96.1    Lumbar spondylosis M47.816    Sacroiliitis (HCC) M46.1    Status post lumbar spinal fusion Z98.1    TIA (transient ischemic attack) G45.9    Anemia D64.9    Dyspnea R06.00    COPD with acute exacerbation (HCC) J44.1       Isolation/Infection:   Isolation          Contact        Patient Infection Status     Infection Encounter Level? Onset Date Added Added By Resolved Resolved By Review Date    ESBL (Extended Spectrum Beta Lactamase) No  12/12/16 Nathalie Jay RN       ecoli esbl urine 12/2016          Nurse Assessment:  Last Vital Signs: BP (!) 181/72   Pulse 61   Temp 98 °F (36.7 °C) (Oral)   Resp 17   Ht 5' 5\" (1.651 m)   Wt 175 lb 0.7 oz (79.4 kg)   SpO2 90%   BMI 29.13 kg/m²     Last documented pain score (0-10 scale): Pain Level: 0  Last Weight:   Wt Readings from Last 1 Encounters:   03/16/19 175 lb 0.7 oz (79.4 kg)     Mental Status:  {IP PT MENTAL STATUS:88846}    IV Access:  { ILIA IV ACCESS:082404956}    Nursing Mobility/ADLs:  Walking   {P DME GIPM:885360454}  Transfer  {P DME KRWT:552748282}  Bathing  {P DME MYZL:362463677}  Dressing  {P DME BPCS:891315069}  Toileting  {P DME OWKS:057489722}  Feeding  {P DME CNIL:432894525}  Med Admin  {P DME YBWP:148950107}  Med Delivery   { ILIA MED Delivery:870965554}    Wound Care Documentation and Therapy:        Elimination:  Continence:   · Bowel: {YES / BD:84975}  · Bladder: {YES / YL:16537}  Urinary Catheter: {Urinary Catheter:113809939}   Colostomy/Ileostomy/Ileal Conduit: {YES / MM:57803}       Date of Last BM: ***    Intake/Output Summary (Last 24 hours) at 3/18/2019 1434  Last data filed at 3/18/2019 1025  Gross per 24 hour   Intake --   Output 700 ml   Net -700 ml     No intake/output data recorded.     Safety Concerns:     Vanessa[address] Kayley Hoep ILIA Safety Concerns:356722324}    Impairments/Disabilities:      { ILIA Impairments/Disabilities:790458599}    Nutrition Therapy:  Current Nutrition Therapy:   [address] ILIA Diet List:542439594}    Routes of Feeding: {[facility] DME Other Feedings:349518035}  Liquids: {Slp liquid thickness:89583}  Daily Fluid Restriction: {CHP DME Yes amt example:848165450}  Last Modified Barium Swallow with Video (Video Swallowing Test): {Done Not Done XPLZ:796633517}    Treatments at the Time of Hospital Discharge:   Respiratory Treatments: ***  Oxygen Therapy:  {Therapy; copd oxygen:64855}  Ventilator:    {[facility] Vent HPRE:286655515}    Rehab Therapies: {THERAPEUTIC INTERVENTION:8933960796}  Weight Bearing Status/Restrictions: [address]  Weight Bearin}  Other Medical Equipment (for information only, NOT a DME order):  {EQUIPMENT:586360152}  Other Treatments: ***    Patient's personal belongings (please select all that are sent with patient):  {[facility] DME Belongings:809111782}    RN SIGNATURE:  {Esignature:664756814}    CASE MANAGEMENT/SOCIAL WORK SECTION    Inpatient Status Date: ***    Readmission Risk Assessment Score:  Readmission Risk              Risk of Unplanned Readmission:        19           Discharging to Facility/ Agency   · Name:   · Address:  · Phone:  · Fax:    Dialysis Facility (if applicable)   · Name:  · Address:  · Dialysis Schedule:  · Phone:  · Fax:    / signature: {Esignature:799385299}    PHYSICIAN SECTION    Prognosis: {Prognosis:9011218987}    Condition at Discharge: [address] Patient Condition:024468667}    Rehab Potential (if transferring to Rehab): {Prognosis:9918076641}    Recommended Labs or Other Treatments After Discharge: ***    Physician Certification: I certify the above information and transfer of Lauren Pickett  is necessary for the continuing treatment of the diagnosis listed and that she requires {Admit to Appropriate Level of Care:90746} for {GREATER/LESS:911444744} 30 days.      Update

## 2019-03-18 NOTE — PROGRESS NOTES
Discharge teaching and instructions for diagnosis/procedure of Dypsnea completed with patient using teachback method. AVS reviewed. Printed prescriptions given to patient. Patient voiced understanding regarding prescriptions, follow up appointments, and care of self at home. Discharged in a wheelchair to  independent living per self.

## 2019-03-18 NOTE — DISCHARGE SUMMARY
daily      naproxen (NAPROSYN) 500 MG tablet Take 500 mg by mouth every 12 hours as needed for Pain      omeprazole (PRILOSEC) 20 MG delayed release capsule TAKE 1 CAPSULE DAILY  Qty: 30 capsule, Refills: 3    Associated Diagnoses: Malignant neoplasm of left female breast, unspecified estrogen receptor status, unspecified site of breast (HCC)      ferrous sulfate 325 (65 Fe) MG tablet Take 325 mg by mouth daily (with breakfast)      clopidogrel (PLAVIX) 75 MG tablet Take 1 tablet by mouth daily  Qty: 30 tablet, Refills: 4      losartan (COZAAR) 100 MG tablet   Refills: 0      ezetimibe-simvastatin (VYTORIN) 10-80 MG per tablet Take 1 tablet by mouth nightly      calcium-vitamin D (OSCAL-500) 500-200 MG-UNIT per tablet Take 1 tablet by mouth daily. allopurinol (ZYLOPRIM) 100 MG tablet Take 100 mg by mouth 2 times daily. aspirin 81 MG tablet Take 81 mg by mouth daily      metoprolol (TOPROL-XL) 25 MG XL tablet 25 mg 2 times daily              Activity: activity as tolerated, home aerosols  Diet: cardiac diet    Follow-up with Hilario Torrez DO in 1 to 2 weeks, patient to call  for an appointment and if has any problems.       Electronically signed by Werner Stevens DO  on 3/18/2019 at 8:11 AM

## 2019-03-18 NOTE — PROGRESS NOTES
[facility] CARDIOLOGY Progress Note    3/18/2019 5:13 PM      Subjective:  Ms. Angel Peñaloza  denies any chest pain or shortness of breath or palpitations or lightheadedness or dizziness. All dressed up to go!              LABS:     Recent Results (from the past 24 hour(s))   POC Glucose Fingerstick    Collection Time: 03/17/19 10:18 PM   Result Value Ref Range    POC Glucose 169 (H) 65 - 105 mg/dL   Basic Metabolic Panel    Collection Time: 03/18/19  5:16 AM   Result Value Ref Range    Glucose 156 (H) 70 - 99 mg/dL    BUN 42 (H) 8 - 23 mg/dL    CREATININE 1.93 (H) 0.50 - 0.90 mg/dL    Bun/Cre Ratio NOT REPORTED 9 - 20    Calcium 8.8 8.6 - 10.4 mg/dL    Sodium 140 135 - 144 mmol/L    Potassium 4.0 3.7 - 5.3 mmol/L    Chloride 99 98 - 107 mmol/L    CO2 29 20 - 31 mmol/L    Anion Gap 12 9 - 17 mmol/L    GFR Non-African American 25 (L) >60 mL/min    GFR  31 (L) >60 mL/min    GFR Comment          GFR Staging NOT REPORTED    CBC Auto Differential    Collection Time: 03/18/19  5:16 AM   Result Value Ref Range    WBC 8.8 3.5 - 11.0 k/uL    RBC 3.71 (L) 4.0 - 5.2 m/uL    Hemoglobin 11.2 (L) 12.0 - 16.0 g/dL    Hematocrit 33.9 (L) 36 - 46 %    MCV 91.3 80 - 100 fL    MCH 30.0 26 - 34 pg    MCHC 32.9 31 - 37 g/dL    RDW 13.9 11.5 - 14.9 %    Platelets 379 934 - 505 k/uL    MPV 8.5 6.0 - 12.0 fL    NRBC Automated NOT REPORTED per 100 WBC    Differential Type NOT REPORTED     Seg Neutrophils 90 (H) 36 - 66 %    Lymphocytes 7 (L) 24 - 44 %    Monocytes 3 1 - 7 %    Eosinophils % 0 0 - 4 %    Basophils 0 0 - 2 %    Immature Granulocytes NOT REPORTED 0 %    Segs Absolute 7.90 1.3 - 9.1 k/uL    Absolute Lymph # 0.60 (L) 1.0 - 4.8 k/uL    Absolute Mono # 0.20 0.1 - 1.3 k/uL    Absolute Eos # 0.00 0.0 - 0.4 k/uL    Basophils # 0.00 0.0 - 0.2 k/uL    Absolute Immature Granulocyte NOT REPORTED 0.00 - 0.30 k/uL    WBC Morphology NOT REPORTED     RBC Morphology NOT REPORTED     Platelet Estimate NOT REPORTED charted.       REC/PLAN:    Improved clinically. See orders. Change IV to oral Lasix 40 mg daily. OK to discharge to home today on all current cardiac meds including ASA, 81, Plavix 75, Imdur 30, Toprol XL 25, Losartan 100. Follow up as advised. D/W  at bed side. Signing off. Thanks. Electronically signed by Pierre Simons MD, [facility]        PLEASE NOTE:  This progress note was completed using a voice transcription system. Every effort was made to ensure accuracy. However, inadvertent computerized transcription errors may be present.

## 2019-03-18 NOTE — FLOWSHEET NOTE
03/18/19 1712   Provider Notification   Reason for Communication Review case   Provider Name Dr. Darlene Carney   Provider Notification Physician   Method of Communication Face to face   Response No new orders   Notification Time [phone]     RN spoke with Dr. Darlene Carney and pt ok for discharge. Pt to follow up in 4-6wks with cardiology.

## 2019-03-26 ENCOUNTER — HOSPITAL ENCOUNTER (OUTPATIENT)
Dept: WOMENS IMAGING | Age: 79
Discharge: HOME | End: 2019-03-28
Payer: MEDICARE

## 2019-03-26 DIAGNOSIS — Z12.31 ENCOUNTER FOR SCREENING MAMMOGRAM FOR BREAST CANCER: ICD-10-CM

## 2019-03-26 PROCEDURE — 77063 BREAST TOMOSYNTHESIS BI: CPT

## 2019-04-19 ENCOUNTER — APPOINTMENT (OUTPATIENT)
Dept: CT IMAGING | Age: 79
DRG: 683 | End: 2019-04-19
Payer: MEDICARE

## 2019-04-19 ENCOUNTER — APPOINTMENT (OUTPATIENT)
Dept: GENERAL RADIOLOGY | Age: 79
DRG: 683 | End: 2019-04-19
Payer: MEDICARE

## 2019-04-19 ENCOUNTER — APPOINTMENT (OUTPATIENT)
Dept: ULTRASOUND IMAGING | Age: 79
DRG: 683 | End: 2019-04-19
Payer: MEDICARE

## 2019-04-19 ENCOUNTER — HOSPITAL ENCOUNTER (INPATIENT)
Age: 79
LOS: 2 days | Discharge: HOME HEALTH | DRG: 683 | End: 2019-04-21
Attending: EMERGENCY MEDICINE | Admitting: FAMILY MEDICINE
Payer: MEDICARE

## 2019-04-19 DIAGNOSIS — M51.36 LUMBAR DEGENERATIVE DISC DISEASE: ICD-10-CM

## 2019-04-19 DIAGNOSIS — N17.9 ACUTE RENAL FAILURE, UNSPECIFIED ACUTE RENAL FAILURE TYPE (HCC): Primary | ICD-10-CM

## 2019-04-19 LAB
-: ABNORMAL
ABSOLUTE EOS #: 0.4 K/UL (ref 0–0.4)
ABSOLUTE IMMATURE GRANULOCYTE: ABNORMAL K/UL (ref 0–0.3)
ABSOLUTE LYMPH #: 1.6 K/UL (ref 1–4.8)
ABSOLUTE MONO #: 0.6 K/UL (ref 0.1–1.3)
ALBUMIN SERPL-MCNC: 3.7 G/DL (ref 3.5–5.2)
ALBUMIN/GLOBULIN RATIO: ABNORMAL (ref 1–2.5)
ALP BLD-CCNC: 81 U/L (ref 35–104)
ALT SERPL-CCNC: 11 U/L (ref 5–33)
AMORPHOUS: ABNORMAL
ANION GAP SERPL CALCULATED.3IONS-SCNC: 15 MMOL/L (ref 9–17)
AST SERPL-CCNC: 12 U/L
BACTERIA: ABNORMAL
BASOPHILS # BLD: 1 % (ref 0–2)
BASOPHILS ABSOLUTE: 0.1 K/UL (ref 0–0.2)
BILIRUB SERPL-MCNC: 0.19 MG/DL (ref 0.3–1.2)
BILIRUBIN URINE: ABNORMAL
BUN BLDV-MCNC: 80 MG/DL (ref 8–23)
BUN/CREAT BLD: ABNORMAL (ref 9–20)
CALCIUM SERPL-MCNC: 9.8 MG/DL (ref 8.6–10.4)
CASTS UA: ABNORMAL /LPF
CHLORIDE BLD-SCNC: 96 MMOL/L (ref 98–107)
CO2: 24 MMOL/L (ref 20–31)
COLOR: YELLOW
COMMENT UA: ABNORMAL
CREAT SERPL-MCNC: 4.32 MG/DL (ref 0.5–0.9)
CREATININE URINE: 174.8 MG/DL (ref 28–217)
CRYSTALS, UA: ABNORMAL /HPF
DIFFERENTIAL TYPE: ABNORMAL
EOSINOPHILS RELATIVE PERCENT: 4 % (ref 0–4)
EPITHELIAL CELLS UA: ABNORMAL /HPF
GFR AFRICAN AMERICAN: 12 ML/MIN
GFR NON-AFRICAN AMERICAN: 10 ML/MIN
GFR SERPL CREATININE-BSD FRML MDRD: ABNORMAL ML/MIN/{1.73_M2}
GFR SERPL CREATININE-BSD FRML MDRD: ABNORMAL ML/MIN/{1.73_M2}
GLUCOSE BLD-MCNC: 93 MG/DL (ref 70–99)
GLUCOSE URINE: NEGATIVE
HCT VFR BLD CALC: 34.9 % (ref 36–46)
HEMOGLOBIN: 11.3 G/DL (ref 12–16)
IMMATURE GRANULOCYTES: ABNORMAL %
KETONES, URINE: ABNORMAL
LEUKOCYTE ESTERASE, URINE: ABNORMAL
LYMPHOCYTES # BLD: 17 % (ref 24–44)
MAGNESIUM: 2.2 MG/DL (ref 1.6–2.6)
MCH RBC QN AUTO: 30 PG (ref 26–34)
MCHC RBC AUTO-ENTMCNC: 32.5 G/DL (ref 31–37)
MCV RBC AUTO: 92.3 FL (ref 80–100)
MONOCYTES # BLD: 7 % (ref 1–7)
MUCUS: ABNORMAL
NITRITE, URINE: POSITIVE
NRBC AUTOMATED: ABNORMAL PER 100 WBC
OTHER OBSERVATIONS UA: ABNORMAL
PDW BLD-RTO: 14.9 % (ref 11.5–14.9)
PH UA: 5 (ref 5–8)
PLATELET # BLD: 199 K/UL (ref 150–450)
PLATELET ESTIMATE: ABNORMAL
PMV BLD AUTO: 8.3 FL (ref 6–12)
POTASSIUM SERPL-SCNC: 4.5 MMOL/L (ref 3.7–5.3)
PROTEIN UA: NEGATIVE
RBC # BLD: 3.78 M/UL (ref 4–5.2)
RBC # BLD: ABNORMAL 10*6/UL
RBC UA: ABNORMAL /HPF
RENAL EPITHELIAL, UA: ABNORMAL /HPF
SEG NEUTROPHILS: 71 % (ref 36–66)
SEGMENTED NEUTROPHILS ABSOLUTE COUNT: 6.3 K/UL (ref 1.3–9.1)
SODIUM BLD-SCNC: 135 MMOL/L (ref 135–144)
SODIUM,UR: 42 MMOL/L
SPECIFIC GRAVITY UA: 1.02 (ref 1–1.03)
TOTAL CK: 51 U/L (ref 26–192)
TOTAL PROTEIN, URINE: 20 MG/DL
TOTAL PROTEIN: 6.2 G/DL (ref 6.4–8.3)
TRICHOMONAS: ABNORMAL
TROPONIN INTERP: ABNORMAL
TROPONIN INTERP: ABNORMAL
TROPONIN T: ABNORMAL NG/ML
TROPONIN T: ABNORMAL NG/ML
TROPONIN, HIGH SENSITIVITY: 72 NG/L (ref 0–14)
TROPONIN, HIGH SENSITIVITY: 77 NG/L (ref 0–14)
TURBIDITY: ABNORMAL
UREA NITROGEN, UR: 679 MG/DL
URINE HGB: ABNORMAL
UROBILINOGEN, URINE: NORMAL
WBC # BLD: 9 K/UL (ref 3.5–11)
WBC # BLD: ABNORMAL 10*3/UL
WBC UA: ABNORMAL /HPF
YEAST: ABNORMAL

## 2019-04-19 PROCEDURE — 82570 ASSAY OF URINE CREATININE: CPT

## 2019-04-19 PROCEDURE — 6360000002 HC RX W HCPCS: Performed by: FAMILY MEDICINE

## 2019-04-19 PROCEDURE — 72125 CT NECK SPINE W/O DYE: CPT

## 2019-04-19 PROCEDURE — 6370000000 HC RX 637 (ALT 250 FOR IP): Performed by: FAMILY MEDICINE

## 2019-04-19 PROCEDURE — 84300 ASSAY OF URINE SODIUM: CPT

## 2019-04-19 PROCEDURE — 81001 URINALYSIS AUTO W/SCOPE: CPT

## 2019-04-19 PROCEDURE — 2060000000 HC ICU INTERMEDIATE R&B

## 2019-04-19 PROCEDURE — 82550 ASSAY OF CK (CPK): CPT

## 2019-04-19 PROCEDURE — 87205 SMEAR GRAM STAIN: CPT

## 2019-04-19 PROCEDURE — 84540 ASSAY OF URINE/UREA-N: CPT

## 2019-04-19 PROCEDURE — 70450 CT HEAD/BRAIN W/O DYE: CPT

## 2019-04-19 PROCEDURE — 94761 N-INVAS EAR/PLS OXIMETRY MLT: CPT

## 2019-04-19 PROCEDURE — 2580000003 HC RX 258: Performed by: EMERGENCY MEDICINE

## 2019-04-19 PROCEDURE — 93005 ELECTROCARDIOGRAM TRACING: CPT

## 2019-04-19 PROCEDURE — 94640 AIRWAY INHALATION TREATMENT: CPT

## 2019-04-19 PROCEDURE — 87086 URINE CULTURE/COLONY COUNT: CPT

## 2019-04-19 PROCEDURE — 84156 ASSAY OF PROTEIN URINE: CPT

## 2019-04-19 PROCEDURE — 87088 URINE BACTERIA CULTURE: CPT

## 2019-04-19 PROCEDURE — 76770 US EXAM ABDO BACK WALL COMP: CPT

## 2019-04-19 PROCEDURE — 99285 EMERGENCY DEPT VISIT HI MDM: CPT

## 2019-04-19 PROCEDURE — 71046 X-RAY EXAM CHEST 2 VIEWS: CPT

## 2019-04-19 PROCEDURE — 80053 COMPREHEN METABOLIC PANEL: CPT

## 2019-04-19 PROCEDURE — 6360000002 HC RX W HCPCS: Performed by: EMERGENCY MEDICINE

## 2019-04-19 PROCEDURE — 84484 ASSAY OF TROPONIN QUANT: CPT

## 2019-04-19 PROCEDURE — 85025 COMPLETE CBC W/AUTO DIFF WBC: CPT

## 2019-04-19 PROCEDURE — 36415 COLL VENOUS BLD VENIPUNCTURE: CPT

## 2019-04-19 PROCEDURE — 83735 ASSAY OF MAGNESIUM: CPT

## 2019-04-19 PROCEDURE — 87186 SC STD MICRODIL/AGAR DIL: CPT

## 2019-04-19 RX ORDER — ACETAMINOPHEN 325 MG/1
650 TABLET ORAL EVERY 4 HOURS PRN
Status: DISCONTINUED | OUTPATIENT
Start: 2019-04-19 | End: 2019-04-20 | Stop reason: SDUPTHER

## 2019-04-19 RX ORDER — SODIUM CHLORIDE 9 MG/ML
INJECTION, SOLUTION INTRAVENOUS CONTINUOUS
Status: DISCONTINUED | OUTPATIENT
Start: 2019-04-19 | End: 2019-04-21

## 2019-04-19 RX ORDER — METOPROLOL SUCCINATE 50 MG/1
50 TABLET, EXTENDED RELEASE ORAL 2 TIMES DAILY
Status: DISCONTINUED | OUTPATIENT
Start: 2019-04-19 | End: 2019-04-21

## 2019-04-19 RX ORDER — MEMANTINE HYDROCHLORIDE 10 MG/1
10 TABLET ORAL 2 TIMES DAILY
Status: DISCONTINUED | OUTPATIENT
Start: 2019-04-19 | End: 2019-04-21 | Stop reason: HOSPADM

## 2019-04-19 RX ORDER — BENZONATATE 200 MG/1
200 CAPSULE ORAL DAILY
Status: ON HOLD | COMMUNITY
End: 2020-07-27 | Stop reason: HOSPADM

## 2019-04-19 RX ORDER — CLONIDINE 0.1 MG/24H
1 PATCH, EXTENDED RELEASE TRANSDERMAL WEEKLY
Status: ON HOLD | COMMUNITY
End: 2020-07-27 | Stop reason: HOSPADM

## 2019-04-19 RX ORDER — VARENICLINE TARTRATE 0.5 MG/1
0.5 TABLET, FILM COATED ORAL 2 TIMES DAILY
Status: DISCONTINUED | OUTPATIENT
Start: 2019-04-19 | End: 2019-04-21 | Stop reason: HOSPADM

## 2019-04-19 RX ORDER — SIMVASTATIN 40 MG
80 TABLET ORAL NIGHTLY
Status: DISCONTINUED | OUTPATIENT
Start: 2019-04-19 | End: 2019-04-21 | Stop reason: HOSPADM

## 2019-04-19 RX ORDER — ISOSORBIDE MONONITRATE 30 MG/1
30 TABLET, EXTENDED RELEASE ORAL DAILY
Status: DISCONTINUED | OUTPATIENT
Start: 2019-04-19 | End: 2019-04-21 | Stop reason: HOSPADM

## 2019-04-19 RX ORDER — 0.9 % SODIUM CHLORIDE 0.9 %
1000 INTRAVENOUS SOLUTION INTRAVENOUS ONCE
Status: COMPLETED | OUTPATIENT
Start: 2019-04-19 | End: 2019-04-19

## 2019-04-19 RX ORDER — GABAPENTIN 100 MG/1
300 CAPSULE ORAL NIGHTLY
Status: ON HOLD | COMMUNITY
End: 2020-07-27 | Stop reason: SDUPTHER

## 2019-04-19 RX ORDER — EZETIMIBE 10 MG/1
10 TABLET ORAL DAILY
Status: ON HOLD | COMMUNITY
End: 2020-07-27 | Stop reason: HOSPADM

## 2019-04-19 RX ORDER — GABAPENTIN 100 MG/1
100 CAPSULE ORAL 2 TIMES DAILY
Status: DISCONTINUED | OUTPATIENT
Start: 2019-04-19 | End: 2019-04-21 | Stop reason: HOSPADM

## 2019-04-19 RX ORDER — SODIUM CHLORIDE 0.9 % (FLUSH) 0.9 %
10 SYRINGE (ML) INJECTION EVERY 12 HOURS SCHEDULED
Status: DISCONTINUED | OUTPATIENT
Start: 2019-04-19 | End: 2019-04-21

## 2019-04-19 RX ORDER — SODIUM CHLORIDE 0.9 % (FLUSH) 0.9 %
10 SYRINGE (ML) INJECTION EVERY 12 HOURS SCHEDULED
Status: DISCONTINUED | OUTPATIENT
Start: 2019-04-19 | End: 2019-04-21 | Stop reason: HOSPADM

## 2019-04-19 RX ORDER — SODIUM CHLORIDE 0.9 % (FLUSH) 0.9 %
10 SYRINGE (ML) INJECTION PRN
Status: DISCONTINUED | OUTPATIENT
Start: 2019-04-19 | End: 2019-04-21 | Stop reason: HOSPADM

## 2019-04-19 RX ORDER — CLOPIDOGREL BISULFATE 75 MG/1
75 TABLET ORAL DAILY
Status: DISCONTINUED | OUTPATIENT
Start: 2019-04-19 | End: 2019-04-21 | Stop reason: HOSPADM

## 2019-04-19 RX ORDER — ONDANSETRON 2 MG/ML
4 INJECTION INTRAMUSCULAR; INTRAVENOUS EVERY 6 HOURS PRN
Status: DISCONTINUED | OUTPATIENT
Start: 2019-04-19 | End: 2019-04-21 | Stop reason: HOSPADM

## 2019-04-19 RX ORDER — TRAMADOL HYDROCHLORIDE 50 MG/1
50 TABLET ORAL EVERY 6 HOURS PRN
Status: DISCONTINUED | OUTPATIENT
Start: 2019-04-19 | End: 2019-04-21 | Stop reason: HOSPADM

## 2019-04-19 RX ORDER — ASPIRIN 81 MG/1
81 TABLET ORAL DAILY
Status: DISCONTINUED | OUTPATIENT
Start: 2019-04-19 | End: 2019-04-21 | Stop reason: HOSPADM

## 2019-04-19 RX ORDER — FERROUS SULFATE 325(65) MG
325 TABLET ORAL
Status: DISCONTINUED | OUTPATIENT
Start: 2019-04-20 | End: 2019-04-21 | Stop reason: HOSPADM

## 2019-04-19 RX ORDER — BUPROPION HYDROCHLORIDE 150 MG/1
150 TABLET, EXTENDED RELEASE ORAL 2 TIMES DAILY
COMMUNITY
End: 2019-12-19

## 2019-04-19 RX ORDER — EZETIMIBE AND SIMVASTATIN 10; 80 MG/1; MG/1
1 TABLET ORAL NIGHTLY
Status: DISCONTINUED | OUTPATIENT
Start: 2019-04-19 | End: 2019-04-19

## 2019-04-19 RX ORDER — SODIUM CHLORIDE 0.9 % (FLUSH) 0.9 %
10 SYRINGE (ML) INJECTION PRN
Status: DISCONTINUED | OUTPATIENT
Start: 2019-04-19 | End: 2019-04-21 | Stop reason: SDUPTHER

## 2019-04-19 RX ORDER — ALPRAZOLAM 0.25 MG/1
0.5 TABLET ORAL DAILY
Status: DISCONTINUED | OUTPATIENT
Start: 2019-04-19 | End: 2019-04-19

## 2019-04-19 RX ORDER — ALPRAZOLAM 0.25 MG/1
0.5 TABLET ORAL EVERY 8 HOURS PRN
Status: DISCONTINUED | OUTPATIENT
Start: 2019-04-19 | End: 2019-04-21 | Stop reason: HOSPADM

## 2019-04-19 RX ORDER — SIMVASTATIN 80 MG
80 TABLET ORAL NIGHTLY
COMMUNITY
End: 2019-06-19 | Stop reason: ALTCHOICE

## 2019-04-19 RX ORDER — IPRATROPIUM BROMIDE AND ALBUTEROL SULFATE 2.5; .5 MG/3ML; MG/3ML
1 SOLUTION RESPIRATORY (INHALATION) 3 TIMES DAILY
Status: DISCONTINUED | OUTPATIENT
Start: 2019-04-19 | End: 2019-04-21 | Stop reason: HOSPADM

## 2019-04-19 RX ORDER — ACETAMINOPHEN 325 MG/1
650 TABLET ORAL EVERY 4 HOURS PRN
Status: DISCONTINUED | OUTPATIENT
Start: 2019-04-19 | End: 2019-04-21 | Stop reason: HOSPADM

## 2019-04-19 RX ORDER — PANTOPRAZOLE SODIUM 40 MG/1
40 TABLET, DELAYED RELEASE ORAL
Status: DISCONTINUED | OUTPATIENT
Start: 2019-04-20 | End: 2019-04-21 | Stop reason: HOSPADM

## 2019-04-19 RX ADMIN — IPRATROPIUM BROMIDE AND ALBUTEROL SULFATE 1 AMPULE: .5; 3 SOLUTION RESPIRATORY (INHALATION) at 14:29

## 2019-04-19 RX ADMIN — GABAPENTIN 100 MG: 100 CAPSULE ORAL at 21:13

## 2019-04-19 RX ADMIN — SODIUM CHLORIDE 1000 ML: 9 INJECTION, SOLUTION INTRAVENOUS at 12:37

## 2019-04-19 RX ADMIN — METOPROLOL SUCCINATE 50 MG: 50 TABLET, EXTENDED RELEASE ORAL at 21:13

## 2019-04-19 RX ADMIN — MEMANTINE 10 MG: 10 TABLET ORAL at 21:13

## 2019-04-19 RX ADMIN — IPRATROPIUM BROMIDE AND ALBUTEROL SULFATE 1 AMPULE: .5; 3 SOLUTION RESPIRATORY (INHALATION) at 19:20

## 2019-04-19 RX ADMIN — CEFTRIAXONE SODIUM 1 G: 1 INJECTION, POWDER, FOR SOLUTION INTRAMUSCULAR; INTRAVENOUS at 18:41

## 2019-04-19 RX ADMIN — ENOXAPARIN SODIUM 30 MG: 30 INJECTION SUBCUTANEOUS at 16:19

## 2019-04-19 RX ADMIN — SIMVASTATIN 80 MG: 40 TABLET, FILM COATED ORAL at 21:13

## 2019-04-19 RX ADMIN — SODIUM CHLORIDE: 9 INJECTION, SOLUTION INTRAVENOUS at 16:18

## 2019-04-19 ASSESSMENT — ENCOUNTER SYMPTOMS
ABDOMINAL PAIN: 0
COUGH: 0
VOMITING: 0
NAUSEA: 0
SHORTNESS OF BREATH: 0
DIARRHEA: 0

## 2019-04-19 ASSESSMENT — PAIN DESCRIPTION - ONSET: ONSET: ON-GOING

## 2019-04-19 ASSESSMENT — PAIN DESCRIPTION - DESCRIPTORS: DESCRIPTORS: ACHING;NUMBNESS

## 2019-04-19 ASSESSMENT — PAIN DESCRIPTION - LOCATION: LOCATION: ARM;WRIST;ELBOW

## 2019-04-19 ASSESSMENT — PAIN SCALES - GENERAL
PAINLEVEL_OUTOF10: 0
PAINLEVEL_OUTOF10: 7

## 2019-04-19 ASSESSMENT — PAIN DESCRIPTION - ORIENTATION: ORIENTATION: LEFT

## 2019-04-19 ASSESSMENT — PAIN DESCRIPTION - FREQUENCY: FREQUENCY: CONTINUOUS

## 2019-04-19 NOTE — CONSULTS
NEPHROLOGY CONSULT     Patient :  Elie Paulino; 69 y.o. MRN# [id number]  Location:  12/12  Attending:  Lucia Valentine MD  Admit Date:  4/19/2019   Hospital Day: 0      Reason for Consult:  Acute kidney injury and see daily      Chief Complaint:  Dizziness lightheadedness, weakness  History Obtained From:  Patient    History of Present Illness: This is a 69 y.o. female with past medical history of essential hypertension, CHF with preserved EF moderate LVH diastolic dysfunction, chronic kidney disease stage III with baseline creatinine of about 1.5 mg/dL blurred to the hospital complains of fatigue and weakness poor oral intake for about 1 week, patient feels fatigue decrease in appetite. Has been feeling dizzy and unsteady on her feet. She denies any diarrhea chest pain shortness of breath. On initial investigation in the ER patient labs showed elevated creatinine 4.3 mg/dL and BUN of 80  Blood pressure systolic blood pressure wasn't 100. Patient medication list shows that patient was on diuretics at home, NSAIDs, ARBs. And did notice decrease in urine output, no prior history of kidney stones. Patient denies dysuria, gross hematuria, flank pain, nocturia, urgency, passing frothy urine or urinary incontinence. There has been no recent exposure to IV contrast  There is no history  of paraprotein disease. Pt denies any history of recurrent UTI or kidney stones.         Past Medical History:        Diagnosis Date    Arthritis     Cancer [facility])     breast cancer, left    COPD (chronic obstructive pulmonary disease) (HCC)     CVA (cerebral vascular accident) ([facility]     mini    Gout     Hyperlipidemia     Hypertension     Other abnormality of urination(788.69)      blood disorder needs platelets prior to procedures       Past Surgical History:        Procedure Laterality Date    APPENDECTOMY      BACK SURGERY      fusion with rods and screws    BLADDER SUSPENSION      BREAST SURGERY pain.  Extremities:  No swelling or joint pains. Objective:  CURRENT TEMPERATURE:  Temp: 98.2 °F (36.8 °C)  MAXIMUM TEMPERATURE OVER 24HRS:  Temp (24hrs), Av.2 °F (36.8 °C), Min:98.2 °F (36.8 °C), Max:98.2 °F (36.8 °C)    CURRENT RESPIRATORY RATE:  Resp: 14  CURRENT PULSE:  Pulse: 59  CURRENT BLOOD PRESSURE:  BP: 134/86  24HR BLOOD PRESSURE RANGE:  Systolic (43SQW), QLY:779 , Min:105 , AQX:689   ; Diastolic (74FFA), EDC:71, Min:38, Max:86    24HR INTAKE/OUTPUT:  No intake or output data in the 24 hours ending 19 1347  Patient Vitals for the past 96 hrs (Last 3 readings):   Weight   19 1025 170 lb (77.1 kg)       Physical Exam:  GENERAL APPEARANCE: Alert and cooperative, and appears to be in no acute distress. HEAD: normocephalic  EYES: PERRL, EOMI. Not pale, anicteric   NOSE:  No nasal discharge. THROAT:  Oral cavity and pharynx normal. Moist  CARDIAC: Normal S1 and S2. No S3, S4 or murmurs. Rhythm is regular. LUNGS: Clear to auscultation and percussion without rales, rhonchi, wheezing or diminished breath sounds. NECK: Neck supple, non-tender without lymphadenopathy, masses or thyromegaly. JVD-neg  BACK: Examination of the spine reveals normal gait and posture, no spinal deformity, symmetry of spinal muscles, without tenderness, decreased range of motion or muscular spasm  MUSKULOSKELETAL: Adequately aligned spine. No joint erythema or tenderness. EXTREMITIES: No edema. Peripheral pulses intact. NEURO: nonfocal      Labs:   CBC:  Recent Labs     19  1136   WBC 9.0   RBC 3.78*   HGB 11.3*   HCT 34.9*   MCV 92.3   MCH 30.0   MCHC 32.5   RDW 14.9      MPV 8.3      BMP: Recent Labs     19  1136      K 4.5   CL 96*   CO2 24   BUN 80*   CREATININE 4.32*   GLUCOSE 93   CALCIUM 9.8      Phosphorus:  No results for input(s): PHOS in the last 72 hours.   Magnesium:   Recent Labs     19  1136   MG 2.2     Albumin:   Recent Labs     19  1136   LABALBU 3.7 IRON:  No results for input(s): IRON in the last 72 hours. Iron Saturation:  Invalid input(s): PERCENTFE  TIBC:  No results for input(s): TIBC in the last 72 hours. FERRITIN:  No results for input(s): FERRITIN in the last 72 hours. SPEP: No results for input(s): SPEP in the last 72 hours. Recent Labs     04/19/19  1136   PROT 6.2*         Radiology:  Reviewed as available. Assessment:  1. Acute kidney injury on chronic kidney disease stage III oliguric most likely secondary to prerenal azotemia vs ATN due to poor oral intake, use of diuretics, ARB, NSAIDs. Rule out obstructive uropathy check a kidney ultrasound. 2.    CHF with preserved EF will compensated not in volume overload we'll hold diuretics     3. History of hypertension blood pressure systolic blood pressure was in 100s will hold the BP meds for now. Plan:  Continue IV fluids normal saline at 125 MLS per hour  Strict I's and O's  Hold diuretics, hold losartan, hold NSAIDs  CPK  1. Comprehensive urine testing including Urinalysis, Urine sodium, potassium, chloride, Urine protein and creatinine to quantify the proteinuria if present. Will check urinary eosinophils. 2.   3.  Check Post void bladder scan and Renal Ultrasound to assess for urinary obstruction and to assess the kidney size, echogenicity. 4. Will Order serum and urine protein electrophoresis to r/o element of occult dysproteinemia. Thank you for the consultation.       Electronically signed by Joni Tinsley MD on 4/19/2019 at 1:47 PM

## 2019-04-19 NOTE — ED PROVIDER NOTES
[address] [facility]  eMERGENCY dEPARTMENT eNCOUnter      Pt Name: Layvonne Apgar  MRN: [id number]  [place] 1940  Date of evaluation: 19      CHIEF COMPLAINT       Chief Complaint   Patient presents with    Arm Pain    Fatigue     HISTORY OF PRESENT ILLNESS   HPI 69 y.o. female resents with complaints of generalized weakness and fatigue. Symptoms of been progressively worsening over the last week and a half. Symptoms associated with decreased appetite. She reports that since this morning she's had a bifrontal headache. She states that she's been taking all of her medications ( diuretics, blood pressure medications, NSAIDs) and not eating very much. She doesn't she has a history of carpal tunnel and that for the last day she's had tablet left arm numbness in the same distribution. Pain is rated as moderate to severe, constant, tingling in character, no clear provocative or palliative factors. REVIEW OF SYSTEMS       Review of Systems   Constitutional: Positive for activity change, appetite change and fatigue. Negative for chills and fever. HENT: Negative for congestion. Eyes: Negative for visual disturbance. Respiratory: Negative for cough and shortness of breath. Cardiovascular: Negative for chest pain. Gastrointestinal: Negative for abdominal pain, diarrhea, nausea and vomiting. Genitourinary: Negative for difficulty urinating and dysuria. Musculoskeletal: Positive for arthralgias. Skin: Negative for rash. Neurological: Positive for numbness and headaches. Hematological: Negative for adenopathy. Psychiatric/Behavioral: Negative for confusion.        PAST MEDICAL HISTORY     Past Medical History:   Diagnosis Date    Arthritis     Cancer [facility])     breast cancer, left    COPD (chronic obstructive pulmonary disease) [facility]     CVA (cerebral vascular accident) ([facility]     mini    Gout     Hyperlipidemia     Hypertension     Other abnormality of urination(788.69) blood disorder needs platelets prior to procedures       SURGICAL HISTORY       Past Surgical History:   Procedure Laterality Date    APPENDECTOMY      BACK SURGERY      fusion with rods and screws    BLADDER SUSPENSION      BREAST SURGERY Left     lumpectomy x2    CAROTID ENDARTERECTOMY      COLONOSCOPY      COLONOSCOPY N/A 7/17/2018    COLONOSCOPY POLYPECTOMY performed by Andressa Mitchell MD at [facility]      JOINT REPLACEMENT      knee    JOINT REPLACEMENT      right hip    TONSILLECTOMY      UPPER GASTROINTESTINAL ENDOSCOPY N/A 7/17/2018    EGD BIOPSY performed by Andressa Mitchell MD at [address] [place] [address] Dr Ramsey       Current Discharge Medication List      CONTINUE these medications which have NOT CHANGED    Details   gabapentin (NEURONTIN) 100 MG capsule Take 100 mg by mouth daily. buPROPion (WELLBUTRIN SR) 150 MG extended release tablet Take 150 mg by mouth 2 times daily      cloNIDine (CATAPRES) 0.1 MG/24HR PTWK Place 1 patch onto the skin once a week      memantine (NAMENDA) 5 MG tablet Take 2 tablets by mouth 2 times daily  Qty: 60 tablet, Refills: 3      furosemide (LASIX) 40 MG tablet Take 1 tablet by mouth daily  Qty: 30 tablet, Refills: 0      ALPRAZolam (XANAX) 0.5 MG tablet Take 0.5 mg by mouth daily.       naproxen (NAPROSYN) 500 MG tablet Take 500 mg by mouth every 12 hours as needed for Pain      omeprazole (PRILOSEC) 20 MG delayed release capsule TAKE 1 CAPSULE DAILY  Qty: 30 capsule, Refills: 3    Associated Diagnoses: Malignant neoplasm of left female breast, unspecified estrogen receptor status, unspecified site of breast (HCC)      ferrous sulfate 325 (65 Fe) MG tablet Take 325 mg by mouth 2 times daily       clopidogrel (PLAVIX) 75 MG tablet Take 1 tablet by mouth daily  Qty: 30 tablet, Refills: 4      losartan (COZAAR) 100 MG tablet Take 100 mg by mouth daily   Refills: 0      calcium-vitamin D (OSCAL-500) 500-200 MG-UNIT per tablet Take 1 tablet by mouth 2 times daily       allopurinol (ZYLOPRIM) 100 MG tablet Take 100 mg by mouth 2 times daily. isosorbide mononitrate (IMDUR) 30 MG extended release tablet Take 1 tablet by mouth daily  Qty: 30 tablet, Refills: 3      metoprolol (TOPROL-XL) 25 MG XL tablet Take 25 mg by mouth 2 times daily       ezetimibe-simvastatin (VYTORIN) 10-80 MG per tablet Take 1 tablet by mouth nightly             ALLERGIES     is allergic to diclofenac-misoprostol. FAMILY HISTORY     indicated that the status of her mother is unknown. She indicated that the status of her father is unknown. She indicated that the status of her sister is unknown. She indicated that her brother is . She indicated that the status of her son is unknown. SOCIAL HISTORY      reports that she quit smoking about 6 months ago. She smoked 1.00 pack per day. She has never used smokeless tobacco. She reports that she does not drink alcohol or use drugs. PHYSICAL EXAM     INITIAL VITALS: BP (!) 164/49   Pulse 61   Temp 97.9 °F (36.6 °C) (Oral)   Resp 16   Ht 5' 5\" (1.651 m)   Wt 170 lb (77.1 kg)   SpO2 96%   BMI 28.29 kg/m²   Gen: NAD  Head: Normocephalic, atraumatic  Eye: Pupils equal round reactive to light, no conjunctivitis  ENT: Dry oral mucosa  Neck: No midline ttp  Heart: Regular rate and rhythm no murmurs  Lungs: Clear to auscultation bilaterally, no respiratory distress  Chest wall: No crepitus, no tenderness palpation  Abdomen: Soft, nontender, nondistended, with no peritoneal signs  Neurologic: Patient is alert and oriented x3, motor and sensation is intact in all 4 extremities, fluent speech. Apporpriate sensation over the median, radial and ulnar dermatomes. Extremities:arthritic changes without focal point tenderness to palpation.      MEDICAL DECISION MAKING:     MDM  Elderly female presenting with generalized weakness, also having left arm numbness    Will rule out any ACS. We'll check renal function and electrolytes. We'll check her hemoglobin level. Given her history of cancer this headache the left arm pain and numbness, will get CT scans of the head and neck. Get a chest x-ray given her hypotension. .  IV fluids and we will reassess. Hospital course:    Laboratory studies are significant for acute renal failure, baseline creatinine around 1.5-1.9 and is currently 4.3  BUN is 80    Troponin elevation at 77, think that this is likely from her hypotension and renal failure. She is denying any chest pain. EKG shows no acute ischemic changes. There is no hyperkalemia, there is no metabolic acidosis, there is no anemia. A bladder scans performed and the patient is not retaining. Urinalysis does show some signs of a urinary tract infection we will give IV fluids. On reassessment, blood pressures improved to 160/50. Patient's been admitted to the hospital.  I discussed with her PCP Dr. Tania Nichols and nephrology Dr. Soumya Hoyt.  I updated the patient and  of the results and treatment plan, they are in agreement. DIAGNOSTIC RESULTS     EKG: All EKG's are interpreted by the Emergency Department Physician who either signs or Co-signs this chart in the absence of a cardiologist.  EKG shows a sinus bradycardia, heart rate of 49, NC of 166, QRS of 80, QTC of 437, there is no ST segment elevations or depressions, there is no T-wave inversions, there is a left axis deviation. RADIOLOGY:All plain film, CT, MRI, and formal ultrasound images (except ED bedside ultrasound) are read by the radiologist and the images and interpretations are directly viewed by the emergency physician. XR CHEST STANDARD (2 VW)   Final Result   Chronic findings in the chest without acute airspace disease identified. CT CERVICAL SPINE WO CONTRAST   Final Result   Chronic findings in the cervical spine as above without fracture identified. .   Status post discectomy and anterior fusion from C4-C7. CT Head WO Contrast   Final Result   Stable CT brain with no acute intracranial abnormality. US RETROPERITONEAL COMPLETE    (Results Pending)       LABS: All lab results were reviewed by myself, and all abnormals are listed below. Labs Reviewed   CBC WITH AUTO DIFFERENTIAL - Abnormal; Notable for the following components:       Result Value    RBC 3.78 (*)     Hemoglobin 11.3 (*)     Hematocrit 34.9 (*)     Seg Neutrophils 71 (*)     Lymphocytes 17 (*)     All other components within normal limits   COMPREHENSIVE METABOLIC PANEL - Abnormal; Notable for the following components:    BUN 80 (*)     CREATININE 4.32 (*)     Chloride 96 (*)     Total Bilirubin 0.19 (*)     Total Protein 6.2 (*)     GFR Non- 10 (*)     GFR  12 (*)     All other components within normal limits   TROPONIN - Abnormal; Notable for the following components:    Troponin, High Sensitivity 77 (*)     All other components within normal limits   URINE RT REFLEX TO CULTURE - Abnormal; Notable for the following components:    Turbidity UA CLOUDY (*)     Bilirubin Urine   (*)     Value: Presumptive positive. Unable to confirm due to unavailability of reagent.     Ketones, Urine TRACE (*)     Urine Hgb SMALL (*)     Nitrite, Urine POSITIVE (*)     Leukocyte Esterase, Urine MOD (*)     All other components within normal limits   MICROSCOPIC URINALYSIS - Abnormal; Notable for the following components:    Bacteria, UA MANY (*)     Amorphous, UA 1+ (*)     All other components within normal limits   URINE CULTURE CLEAN CATCH   MAGNESIUM   TROPONIN   FERRITIN   IRON AND TIBC   VITAMIN B12 & FOLATE   RETICULOCYTES   SODIUM, URINE, RANDOM   CREATININE, RANDOM URINE   UREA NITROGEN, URINE   PROTEIN, URINE, RANDOM   EOSINOPHILS, URINE   ICTOTEST, URINE   CK       EMERGENCY DEPARTMENT COURSE:   Vitals:    Vitals:    04/19/19 1145 04/19/19 1214 04/19/19 1300 04/19/19 1351 MEDICATIONS:  Current Discharge Medication List            Edith Ca MD  Attending Emergency Physician                      Edith Ca MD  04/19/19 9096

## 2019-04-20 LAB
ABSOLUTE EOS #: 0.3 K/UL (ref 0–0.4)
ABSOLUTE IMMATURE GRANULOCYTE: ABNORMAL K/UL (ref 0–0.3)
ABSOLUTE LYMPH #: 1.6 K/UL (ref 1–4.8)
ABSOLUTE MONO #: 0.4 K/UL (ref 0.1–1.3)
ABSOLUTE RETIC #: 0.03 M/UL (ref 0.02–0.1)
ANION GAP SERPL CALCULATED.3IONS-SCNC: 13 MMOL/L (ref 9–17)
BASOPHILS # BLD: 1 % (ref 0–2)
BASOPHILS ABSOLUTE: 0.1 K/UL (ref 0–0.2)
BUN BLDV-MCNC: 69 MG/DL (ref 8–23)
BUN/CREAT BLD: ABNORMAL (ref 9–20)
CALCIUM SERPL-MCNC: 8.3 MG/DL (ref 8.6–10.4)
CHLORIDE BLD-SCNC: 104 MMOL/L (ref 98–107)
CO2: 20 MMOL/L (ref 20–31)
CREAT SERPL-MCNC: 3.26 MG/DL (ref 0.5–0.9)
DIFFERENTIAL TYPE: ABNORMAL
EOSINOPHIL,URINE: NORMAL
EOSINOPHILS RELATIVE PERCENT: 4 % (ref 0–4)
GFR AFRICAN AMERICAN: 17 ML/MIN
GFR NON-AFRICAN AMERICAN: 14 ML/MIN
GFR SERPL CREATININE-BSD FRML MDRD: ABNORMAL ML/MIN/{1.73_M2}
GFR SERPL CREATININE-BSD FRML MDRD: ABNORMAL ML/MIN/{1.73_M2}
GLUCOSE BLD-MCNC: 84 MG/DL (ref 70–99)
HCT VFR BLD CALC: 30.6 % (ref 36–46)
HEMOGLOBIN: 10.2 G/DL (ref 12–16)
IMMATURE GRANULOCYTES: ABNORMAL %
IMMATURE RETIC FRACT: NORMAL %
LYMPHOCYTES # BLD: 22 % (ref 24–44)
MCH RBC QN AUTO: 30.5 PG (ref 26–34)
MCHC RBC AUTO-ENTMCNC: 33.3 G/DL (ref 31–37)
MCV RBC AUTO: 91.6 FL (ref 80–100)
MONOCYTES # BLD: 6 % (ref 1–7)
NRBC AUTOMATED: ABNORMAL PER 100 WBC
PDW BLD-RTO: 14.9 % (ref 11.5–14.9)
PLATELET # BLD: 163 K/UL (ref 150–450)
PLATELET ESTIMATE: ABNORMAL
PMV BLD AUTO: 8.7 FL (ref 6–12)
POTASSIUM SERPL-SCNC: 4.4 MMOL/L (ref 3.7–5.3)
RBC # BLD: 3.34 M/UL (ref 4–5.2)
RBC # BLD: ABNORMAL 10*6/UL
RETIC %: 0.9 % (ref 0.5–2)
RETIC HEMOGLOBIN: NORMAL PG (ref 28.2–35.7)
SEG NEUTROPHILS: 67 % (ref 36–66)
SEGMENTED NEUTROPHILS ABSOLUTE COUNT: 4.9 K/UL (ref 1.3–9.1)
SODIUM BLD-SCNC: 137 MMOL/L (ref 135–144)
WBC # BLD: 7.2 K/UL (ref 3.5–11)
WBC # BLD: ABNORMAL 10*3/UL

## 2019-04-20 PROCEDURE — 6370000000 HC RX 637 (ALT 250 FOR IP): Performed by: FAMILY MEDICINE

## 2019-04-20 PROCEDURE — 6360000002 HC RX W HCPCS: Performed by: INTERNAL MEDICINE

## 2019-04-20 PROCEDURE — 83540 ASSAY OF IRON: CPT

## 2019-04-20 PROCEDURE — 85045 AUTOMATED RETICULOCYTE COUNT: CPT

## 2019-04-20 PROCEDURE — 6360000002 HC RX W HCPCS: Performed by: FAMILY MEDICINE

## 2019-04-20 PROCEDURE — 2060000000 HC ICU INTERMEDIATE R&B

## 2019-04-20 PROCEDURE — 80048 BASIC METABOLIC PNL TOTAL CA: CPT

## 2019-04-20 PROCEDURE — 94640 AIRWAY INHALATION TREATMENT: CPT

## 2019-04-20 PROCEDURE — 82728 ASSAY OF FERRITIN: CPT

## 2019-04-20 PROCEDURE — 83550 IRON BINDING TEST: CPT

## 2019-04-20 PROCEDURE — 85025 COMPLETE CBC W/AUTO DIFF WBC: CPT

## 2019-04-20 PROCEDURE — 36415 COLL VENOUS BLD VENIPUNCTURE: CPT

## 2019-04-20 PROCEDURE — 51798 US URINE CAPACITY MEASURE: CPT

## 2019-04-20 PROCEDURE — 94761 N-INVAS EAR/PLS OXIMETRY MLT: CPT

## 2019-04-20 PROCEDURE — 2580000003 HC RX 258: Performed by: FAMILY MEDICINE

## 2019-04-20 PROCEDURE — 2580000003 HC RX 258: Performed by: EMERGENCY MEDICINE

## 2019-04-20 RX ORDER — HEPARIN SODIUM 5000 [USP'U]/ML
5000 INJECTION, SOLUTION INTRAVENOUS; SUBCUTANEOUS EVERY 12 HOURS
Status: DISCONTINUED | OUTPATIENT
Start: 2019-04-20 | End: 2019-04-21 | Stop reason: HOSPADM

## 2019-04-20 RX ADMIN — CEFTRIAXONE SODIUM 1 G: 1 INJECTION, POWDER, FOR SOLUTION INTRAMUSCULAR; INTRAVENOUS at 18:38

## 2019-04-20 RX ADMIN — SIMVASTATIN 80 MG: 40 TABLET, FILM COATED ORAL at 20:33

## 2019-04-20 RX ADMIN — ASPIRIN 81 MG: 81 TABLET, COATED ORAL at 09:34

## 2019-04-20 RX ADMIN — FERROUS SULFATE TAB 325 MG (65 MG ELEMENTAL FE) 325 MG: 325 (65 FE) TAB at 09:34

## 2019-04-20 RX ADMIN — HEPARIN SODIUM 5000 UNITS: 5000 INJECTION, SOLUTION INTRAVENOUS; SUBCUTANEOUS at 13:16

## 2019-04-20 RX ADMIN — MEMANTINE 10 MG: 10 TABLET ORAL at 14:58

## 2019-04-20 RX ADMIN — GABAPENTIN 100 MG: 100 CAPSULE ORAL at 09:34

## 2019-04-20 RX ADMIN — HEPARIN SODIUM 5000 UNITS: 5000 INJECTION, SOLUTION INTRAVENOUS; SUBCUTANEOUS at 20:43

## 2019-04-20 RX ADMIN — IPRATROPIUM BROMIDE AND ALBUTEROL SULFATE 1 AMPULE: .5; 3 SOLUTION RESPIRATORY (INHALATION) at 08:44

## 2019-04-20 RX ADMIN — PANTOPRAZOLE SODIUM 40 MG: 40 TABLET, DELAYED RELEASE ORAL at 06:05

## 2019-04-20 RX ADMIN — CLOPIDOGREL BISULFATE 75 MG: 75 TABLET ORAL at 09:34

## 2019-04-20 RX ADMIN — SODIUM CHLORIDE: 9 INJECTION, SOLUTION INTRAVENOUS at 11:22

## 2019-04-20 RX ADMIN — GABAPENTIN 100 MG: 100 CAPSULE ORAL at 20:33

## 2019-04-20 RX ADMIN — MEMANTINE 10 MG: 10 TABLET ORAL at 23:15

## 2019-04-20 RX ADMIN — IPRATROPIUM BROMIDE AND ALBUTEROL SULFATE 1 AMPULE: .5; 3 SOLUTION RESPIRATORY (INHALATION) at 14:05

## 2019-04-20 RX ADMIN — SODIUM CHLORIDE: 9 INJECTION, SOLUTION INTRAVENOUS at 18:38

## 2019-04-20 RX ADMIN — METOPROLOL SUCCINATE 50 MG: 50 TABLET, EXTENDED RELEASE ORAL at 09:34

## 2019-04-20 RX ADMIN — VARENICLINE TARTRATE 0.5 MG: 0.5 TABLET, FILM COATED ORAL at 14:58

## 2019-04-20 RX ADMIN — ISOSORBIDE MONONITRATE 30 MG: 30 TABLET, EXTENDED RELEASE ORAL at 09:34

## 2019-04-20 RX ADMIN — IPRATROPIUM BROMIDE AND ALBUTEROL SULFATE 1 AMPULE: .5; 3 SOLUTION RESPIRATORY (INHALATION) at 19:59

## 2019-04-20 RX ADMIN — METOPROLOL SUCCINATE 50 MG: 50 TABLET, EXTENDED RELEASE ORAL at 20:33

## 2019-04-20 ASSESSMENT — PAIN SCALES - GENERAL
PAINLEVEL_OUTOF10: 0
PAINLEVEL_OUTOF10: 0

## 2019-04-20 NOTE — PLAN OF CARE
Problem: Falls - Risk of:  Goal: Will remain free from falls  Description  Will remain free from falls  Outcome: Ongoing  Note:   Pt. Free of falls and injuries this shift. Problem: Risk for Impaired Skin Integrity  Goal: Tissue integrity - skin and mucous membranes  Description  Structural intactness and normal physiological function of skin and  mucous membranes. Outcome: Ongoing  Note:   Skin integrity improved/maintained this shift. See head to toe assessment.

## 2019-04-20 NOTE — CARE COORDINATION
CASE MANAGEMENT NOTE:    Admission Date:  4/19/2019 Obey Varghese is a 69 y.o.  female    Admitted for : Acute renal failure ([facility] [address] [N17.9]    Met with:  Patient    PCP:  Dr Walter Louie:  MEDICARE      Current Residence/ Living Arrangements:  independently at home             Current Services PTA:  No    Is patient agreeable to VNS: No    Freedom of choice provided: Yes    List of [address] provided: NA    VNS chosen:  NA    DME:  cane, walker, shower chair, electric scooter, life alert, ramp into home and raised toilet    Home Oxygen: No    Nebulizer: No    CPAP/BIPAP: No    Supplier: N/A    Potential Assistance Needed: Will follow    SNF needed: No    [facility] [address]       Is the Patient an [facility] with Readmission Risk Score greater than 14%? No  If yes, pt needs a follow up appointment made within 7 days. Does Patient want to use MEDS to BEDS? No    Family Members/Caregivers that pt would like involved in their care:    Yes    If yes, list name here:  Alicja Gutierrez    Transportation Provider:  Family             Is patient in Isolation/One on One/Altered Mental Status? Yes  If yes, skip next question. If no, would they like an I-Pad to  use? NA  If yes, call [phone]. Discharge Plan:  4/20/2019 MEDICARE/BCBS;From 2 story home with livable first with spouse- Independent with DME and family drives; DME-  cane, walker, shower chair, electric scooter, life alert, ramp into home and raised toilet;  Was to have Nebulizer delivered to home from [facility] [place] when discharged last month and patient stated that she was told by [facility] [place] they did not have required Dr garcia- Maame Berg called to follow up with [facility] and Nebulizer to be delivered to hospital today-writer printed off face sheet and DME order for  [address] FAXED TO [facility] prior to discharge (Does not appear to have appropriate face to face documentation for last

## 2019-04-20 NOTE — H&P
Dr. Adam Laughter                                                                       Admission Note/H&P           Patient:  Minor Giang  YOB: 1940     MRN: [id number]                            Acct: [de-identified]      Admit date: 4/19/2019     Pt seen and Chart reviewed.   Consultant notes reviewed and outpatient/ ED care evaluated.     Subjective: adm weakness, ataxic gait, ER eval MARY  Diff to control BP, sees me and cardiol  No hx abn renal labs past on home meds, holding losartan,bid lasix, inc fluids          Patient Active Problem List   Diagnosis Code    Breast cancer ([facility] [address] C50.919    Renal cyst N28.1    Lumbar degenerative disc disease M51.36    Arthropathy of lumbar facet joint M47.816    Failed back syndrome of lumbar spine M96.1    Lumbar spondylosis M47.816    Sacroiliitis ([facility] [address] M46.1    Status post lumbar spinal fusion Z98.1    TIA (transient ischemic attack) G45.9    Anemia D64.9    Dyspnea R06.00    COPD with acute exacerbation ([facility] J44.1    Acute renal failure ([facility] [address] N17.9         Diet:  DIET CARDIAC;        Medications:Current Inpatient     Scheduled Meds:  Scheduled Medications    heparin (porcine)  5,000 Units Subcutaneous Q12H    sodium chloride flush  10 mL Intravenous 2 times per day    aspirin  81 mg Oral Daily    clopidogrel  75 mg Oral Daily    ferrous sulfate  325 mg Oral Daily with breakfast    gabapentin  100 mg Oral BID    isosorbide mononitrate  30 mg Oral Daily    memantine  10 mg Oral BID    metoprolol succinate  50 mg Oral BID    pantoprazole  40 mg Oral QAM AC    varenicline  0.5 mg Oral BID    ipratropium-albuterol  1 ampule Inhalation TID    sodium chloride flush  10 mL Intravenous 2 times per day    simvastatin  80 mg Oral Nightly    cefTRIAXone (ROCEPHIN) IV  1 g Intravenous Q24H         Continuous Infusions:  Infusions Meds    sodium chloride 125 mL/hr at 04/19/19 1618         PRN Meds:  PRN Medications   sodium chloride flush, acetaminophen, sodium chloride flush, magnesium hydroxide, ondansetron, acetaminophen, ALPRAZolam, traMADol        Objective:     Physical Exam:  Vitals: BP (!) 137/48   Pulse 63   Temp 98 °F (36.7 °C) (Oral)   Resp 16   Ht 5' 5\" (1.651 m)   Wt 170 lb (77.1 kg)   SpO2 97%   BMI 28.29 kg/m²   Height and weight:        Wt Readings from Last 3 Encounters:   04/19/19 170 lb (77.1 kg)   03/16/19 175 lb 0.7 oz (79.4 kg)   07/17/18 152 lb (68.9 kg)      [unfilled]     Physical Examination:   General appearance - alert, well appearing, and in no distress  Mental status - alert, oriented to person, place, and time  Chest - clear to auscultation, no wheezes, rales or rhonchi, symmetric air entry  Heart - normal rate, regular rhythm, normal S1, S2, no murmurs, rubs, clicks or gallops  Abdomen - soft, nontender, nondistended, no masses or organomegaly  Neurological - alert, oriented, normal speech, no focal findings or movement disorder noted}  Extremities - peripheral pulses normal, no pedal edema, no clubbing or cyanosis  Skin - normal coloration and turgor, no rashes, no suspicious skin lesions noted         Labs:-     CBC:        Recent Labs     04/19/19  1136 04/20/19  0519   WBC 9.0 7.2   HGB 11.3* 10.2*    163      BMP:         Recent Labs     04/19/19  1136 04/20/19  0519    137   K 4.5 4.4   CL 96* 104   CO2 24 20   BUN 80* 69*   CREATININE 4.32* 3.26*   GLUCOSE 93 84      Glucose:No results for input(s): POCGLU in the last 72 hours. HgbA1C: No results for input(s): LABA1C in the last 72 hours. INR: No results for input(s): INR in the last 72 hours. CARDIAC ENZYMES:      Recent Labs     04/19/19  1356   CKTOTAL 51      BNP: No results for input(s): BNP in the last 72 hours.   Lipids: No results for input(s): CHOL, TRIG, HDL, LDLCALC in the last 72 hours.     Invalid input(s): LDL  ABGs: No results found for: PH, PCO2, PO2, HCO3, O2SAT  Thyroid:         Lab Results   Component Value Date     TSH 2.65 03/16/2019      Urinalysis:         Color, UA   Date Value Ref Range Status   04/19/2019 YELLOW YELLOW Final            pH, UA   Date Value Ref Range Status   04/19/2019 5.0 5.0 - 8.0 Final            Specific Gravity, UA   Date Value Ref Range Status   04/19/2019 1.020 1.000 - 1.030 Final            Protein, UA   Date Value Ref Range Status   04/19/2019 NEGATIVE NEGATIVE Final            RBC, UA   Date Value Ref Range Status   04/19/2019 2 TO 5 /HPF Final            Bacteria, UA   Date Value Ref Range Status   04/19/2019 MANY (A) None Final            Nitrite, Urine   Date Value Ref Range Status   04/19/2019 POSITIVE (A) NEGATIVE Final            WBC, UA   Date Value Ref Range Status   04/19/2019 10 TO 20 /HPF Final            Leukocyte Esterase, Urine   Date Value Ref Range Status   04/19/2019 MOD (A) NEGATIVE Final      Yeast, UA   Date Value Ref Range Status   04/19/2019 NOT REPORTED None Final            Glucose, Ur   Date Value Ref Range Status   04/19/2019 NEGATIVE NEGATIVE Final             Bilirubin Urine   Date Value Ref Range Status   04/19/2019 (A) NEGATIVE Final     Presumptive positive. Unable to confirm due to unavailability of reagent.         CULTURES:     Current Rehabilitation Assessments:  PHYSICAL THERAPY:     OCCUPATIONAL THERAPY:     SPEECH:       Assessment:  Active Problems:    Acute renal failure (HCC)  Resolved Problems:    * No resolved hospital problems. *        Plan:  1. Creat 4.3-3.2 this am  2. Noted UTI, on roceph  3.  Adj meds, renal mgt as well  Shelia Estes MD             4/20/2019, 10:00 AM

## 2019-04-20 NOTE — PROGRESS NOTES
Dr. Adam Laughter                                                                       Admission Note/H&P        Patient:  Minor Giang  YOB: 1940    MRN: [id number]     Acct: [de-identified]     Admit date: 4/19/2019    Pt seen and Chart reviewed. Consultant notes reviewed and outpatient/ ED care evaluated.     Subjective: adm weakness, ataxic gait, ER eval MARY  Diff to control BP, sees me and cardiol  No hx abn renal labs past on home meds, holding losartan,bid lasix, inc fluids    Patient Active Problem List   Diagnosis Code    Breast cancer ([facility] [address] C50.919    Renal cyst N28.1    Lumbar degenerative disc disease M51.36    Arthropathy of lumbar facet joint M47.816    Failed back syndrome of lumbar spine M96.1    Lumbar spondylosis M47.816    Sacroiliitis (HCC) M46.1    Status post lumbar spinal fusion Z98.1    TIA (transient ischemic attack) G45.9    Anemia D64.9    Dyspnea R06.00    COPD with acute exacerbation (HCC) J44.1    Acute renal failure (HCC) N17.9       Diet:  DIET CARDIAC;      Medications:Current Inpatient    Scheduled Meds:   heparin (porcine)  5,000 Units Subcutaneous Q12H    sodium chloride flush  10 mL Intravenous 2 times per day    aspirin  81 mg Oral Daily    clopidogrel  75 mg Oral Daily    ferrous sulfate  325 mg Oral Daily with breakfast    gabapentin  100 mg Oral BID    isosorbide mononitrate  30 mg Oral Daily    memantine  10 mg Oral BID    metoprolol succinate  50 mg Oral BID    pantoprazole  40 mg Oral QAM AC    varenicline  0.5 mg Oral BID    ipratropium-albuterol  1 ampule Inhalation TID    sodium chloride flush  10 mL Intravenous 2 times per day    simvastatin  80 mg Oral Nightly    cefTRIAXone (ROCEPHIN) IV  1 g Intravenous Q24H     Continuous Infusions:   sodium chloride 125 mL/hr at 04/19/19 1618     PRN Meds:sodium chloride flush, acetaminophen, sodium chloride flush, magnesium hydroxide, ondansetron, acetaminophen, ALPRAZolam, traMADol    Objective:    Physical Exam:  Vitals: BP (!) 137/48   Pulse 63   Temp 98 °F (36.7 °C) (Oral)   Resp 16   Ht 5' 5\" (1.651 m)   Wt 170 lb (77.1 kg)   SpO2 97%   BMI 28.29 kg/m²   Height and weight:    Wt Readings from Last 3 Encounters:   04/19/19 170 lb (77.1 kg)   03/16/19 175 lb 0.7 oz (79.4 kg)   07/17/18 152 lb (68.9 kg)      [unfilled]    Physical Examination:   General appearance - alert, well appearing, and in no distress  Mental status - alert, oriented to person, place, and time  Chest - clear to auscultation, no wheezes, rales or rhonchi, symmetric air entry  Heart - normal rate, regular rhythm, normal S1, S2, no murmurs, rubs, clicks or gallops  Abdomen - soft, nontender, nondistended, no masses or organomegaly  Neurological - alert, oriented, normal speech, no focal findings or movement disorder noted}  Extremities - peripheral pulses normal, no pedal edema, no clubbing or cyanosis  Skin - normal coloration and turgor, no rashes, no suspicious skin lesions noted       Labs:-    CBC:   Recent Labs     04/19/19  1136 04/20/19  0519   WBC 9.0 7.2   HGB 11.3* 10.2*    163     BMP:    Recent Labs     04/19/19  1136 04/20/19  0519    137   K 4.5 4.4   CL 96* 104   CO2 24 20   BUN 80* 69*   CREATININE 4.32* 3.26*   GLUCOSE 93 84     Glucose:No results for input(s): POCGLU in the last 72 hours. HgbA1C: No results for input(s): LABA1C in the last 72 hours. INR: No results for input(s): INR in the last 72 hours. CARDIAC ENZYMES:  Recent Labs     04/19/19  1356   CKTOTAL 51     BNP: No results for input(s): BNP in the last 72 hours. Lipids: No results for input(s): CHOL, TRIG, HDL, LDLCALC in the last 72 hours.     Invalid input(s): LDL  ABGs: No results found for: PH, PCO2, PO2, HCO3, O2SAT  Thyroid:   Lab Results   Component Value Date    TSH 2.65 03/16/2019      Urinalysis:   Color, UA   Date Value Ref Range Status   04/19/2019 YELLOW YELLOW Final     pH, UA   Date Value Ref Range Status   04/19/2019 5.0 5.0 - 8.0 Final     Specific Gravity, UA   Date Value Ref Range Status   04/19/2019 1.020 1.000 - 1.030 Final     Protein, UA   Date Value Ref Range Status   04/19/2019 NEGATIVE NEGATIVE Final     RBC, UA   Date Value Ref Range Status   04/19/2019 2 TO 5 /HPF Final     Bacteria, UA   Date Value Ref Range Status   04/19/2019 MANY (A) None Final     Nitrite, Urine   Date Value Ref Range Status   04/19/2019 POSITIVE (A) NEGATIVE Final     WBC, UA   Date Value Ref Range Status   04/19/2019 10 TO 20 /HPF Final     Leukocyte Esterase, Urine   Date Value Ref Range Status   04/19/2019 MOD (A) NEGATIVE Final     Yeast, UA   Date Value Ref Range Status   04/19/2019 NOT REPORTED None Final     Glucose, Ur   Date Value Ref Range Status   04/19/2019 NEGATIVE NEGATIVE Final     Bilirubin Urine   Date Value Ref Range Status   04/19/2019 (A) NEGATIVE Final    Presumptive positive. Unable to confirm due to unavailability of reagent. CULTURES:    Current Rehabilitation Assessments:  PHYSICAL THERAPY:     OCCUPATIONAL THERAPY:     SPEECH:      Assessment:  Active Problems:    Acute renal failure (HCC)  Resolved Problems:    * No resolved hospital problems. *      Plan:  1. Creat 4.3-3.2 this am  2. Noted UTI, on roceph  3.  Adj meds, renal mgt as well    Rhonda Pruett MD             4/20/2019, 10:00 AM

## 2019-04-20 NOTE — PROGRESS NOTES
Department of Internal Medicine  Nephrology Figueroa Clifton MD    Progress Note      Interval history: Patient was seen and examined today and is feeling much better. She is nonoliguric and is hemodynamically stable. She is receiving IV fluid 0.9 normal saline at 125 mL/h and does not have dizziness or shortness of breath. SUBJECTIVE:  This is a 69 y.o. female with past medical history of essential hypertension, CHF with preserved EF moderate LVH diastolic dysfunction, chronic kidney disease stage III with baseline creatinine of about 1.5 mg/dL blurred to the hospital complains of fatigue and weakness poor oral intake for about 1 week, patient feels fatigue decrease in appetite. Has been feeling dizzy and unsteady on her feet. Blood pressure was normal but laboratory studies at presentation were remarkable for elevated BUN/creatinine 80/4.3 mg/dL. She had been having poor appetite and poor fluid intake for 3-4 weeks prior to this presentation. Home medications included angiotensin receptor blocker, diuretic as well as naproxen. Physical Exam:    VITALS:  BP (!) 137/48   Pulse 63   Temp 98 °F (36.7 °C) (Oral)   Resp 16   Ht 5' 5\" (1.651 m)   Wt 170 lb (77.1 kg)   SpO2 97%   BMI 28.29 kg/m²   24HR INTAKE/OUTPUT:    Intake/Output Summary (Last 24 hours) at 4/20/2019 1122  Last data filed at 4/20/2019 1007  Gross per 24 hour   Intake 350 ml   Output 1574 ml   Net -1224 ml       Constitutional:  Awake and alert; not in acute distress. Cardiovascular/Edema:  S1, S2 with no pericardial rub or gallop. Respiratory:  Clinically clear. Gastrointestinal: Soft with normal bowel sounds.       CBC:   Recent Labs     04/19/19  1136 04/20/19  0519   WBC 9.0 7.2   HGB 11.3* 10.2*    163     BMP:    Recent Labs     04/19/19  1136 04/20/19  0519    137   K 4.5 4.4   CL 96* 104   CO2 24 20   BUN 80* 69*   CREATININE 4.32* 3.26*   GLUCOSE 93 84       Lab Results   Component Value Date    NITRU

## 2019-04-21 VITALS
HEIGHT: 65 IN | SYSTOLIC BLOOD PRESSURE: 173 MMHG | DIASTOLIC BLOOD PRESSURE: 69 MMHG | WEIGHT: 177.47 LBS | RESPIRATION RATE: 18 BRPM | TEMPERATURE: 98.3 F | HEART RATE: 72 BPM | OXYGEN SATURATION: 99 % | BODY MASS INDEX: 29.57 KG/M2

## 2019-04-21 PROBLEM — N17.9 ACUTE RENAL FAILURE (ARF) (HCC): Status: ACTIVE | Noted: 2019-04-21

## 2019-04-21 LAB
ANION GAP SERPL CALCULATED.3IONS-SCNC: 10 MMOL/L (ref 9–17)
ANION GAP SERPL CALCULATED.3IONS-SCNC: 11 MMOL/L (ref 9–17)
BUN BLDV-MCNC: 40 MG/DL (ref 8–23)
BUN BLDV-MCNC: 50 MG/DL (ref 8–23)
BUN/CREAT BLD: ABNORMAL (ref 9–20)
BUN/CREAT BLD: ABNORMAL (ref 9–20)
CALCIUM SERPL-MCNC: 8.2 MG/DL (ref 8.6–10.4)
CALCIUM SERPL-MCNC: 8.8 MG/DL (ref 8.6–10.4)
CHLORIDE BLD-SCNC: 110 MMOL/L (ref 98–107)
CHLORIDE BLD-SCNC: 111 MMOL/L (ref 98–107)
CO2: 17 MMOL/L (ref 20–31)
CO2: 20 MMOL/L (ref 20–31)
CREAT SERPL-MCNC: 1.84 MG/DL (ref 0.5–0.9)
CREAT SERPL-MCNC: 2.11 MG/DL (ref 0.5–0.9)
CULTURE: ABNORMAL
FERRITIN: 201 UG/L (ref 13–150)
FOLATE: 7.4 NG/ML
GFR AFRICAN AMERICAN: 28 ML/MIN
GFR AFRICAN AMERICAN: 32 ML/MIN
GFR NON-AFRICAN AMERICAN: 23 ML/MIN
GFR NON-AFRICAN AMERICAN: 27 ML/MIN
GFR SERPL CREATININE-BSD FRML MDRD: ABNORMAL ML/MIN/{1.73_M2}
GLUCOSE BLD-MCNC: 116 MG/DL (ref 70–99)
GLUCOSE BLD-MCNC: 89 MG/DL (ref 70–99)
IRON SATURATION: 24 % (ref 20–55)
IRON: 49 UG/DL (ref 37–145)
Lab: ABNORMAL
POTASSIUM SERPL-SCNC: 4.1 MMOL/L (ref 3.7–5.3)
POTASSIUM SERPL-SCNC: 4.4 MMOL/L (ref 3.7–5.3)
SODIUM BLD-SCNC: 138 MMOL/L (ref 135–144)
SODIUM BLD-SCNC: 141 MMOL/L (ref 135–144)
SPECIMEN DESCRIPTION: ABNORMAL
TOTAL IRON BINDING CAPACITY: 208 UG/DL (ref 250–450)
UNSATURATED IRON BINDING CAPACITY: 159 UG/DL (ref 112–347)
VITAMIN B-12: 1710 PG/ML (ref 232–1245)

## 2019-04-21 PROCEDURE — 80048 BASIC METABOLIC PNL TOTAL CA: CPT

## 2019-04-21 PROCEDURE — 94761 N-INVAS EAR/PLS OXIMETRY MLT: CPT

## 2019-04-21 PROCEDURE — 6370000000 HC RX 637 (ALT 250 FOR IP): Performed by: FAMILY MEDICINE

## 2019-04-21 PROCEDURE — 2580000003 HC RX 258: Performed by: EMERGENCY MEDICINE

## 2019-04-21 PROCEDURE — 6360000002 HC RX W HCPCS: Performed by: INTERNAL MEDICINE

## 2019-04-21 PROCEDURE — 94640 AIRWAY INHALATION TREATMENT: CPT

## 2019-04-21 PROCEDURE — 2580000003 HC RX 258: Performed by: INTERNAL MEDICINE

## 2019-04-21 PROCEDURE — 82607 VITAMIN B-12: CPT

## 2019-04-21 PROCEDURE — 6370000000 HC RX 637 (ALT 250 FOR IP): Performed by: INTERNAL MEDICINE

## 2019-04-21 PROCEDURE — 82746 ASSAY OF FOLIC ACID SERUM: CPT

## 2019-04-21 PROCEDURE — 36415 COLL VENOUS BLD VENIPUNCTURE: CPT

## 2019-04-21 PROCEDURE — 2500000003 HC RX 250 WO HCPCS: Performed by: INTERNAL MEDICINE

## 2019-04-21 RX ORDER — METOPROLOL SUCCINATE 100 MG/1
100 TABLET, EXTENDED RELEASE ORAL 2 TIMES DAILY
Status: DISCONTINUED | OUTPATIENT
Start: 2019-04-21 | End: 2019-04-21 | Stop reason: HOSPADM

## 2019-04-21 RX ORDER — SODIUM BICARBONATE 650 MG/1
650 TABLET ORAL 2 TIMES DAILY
Status: DISCONTINUED | OUTPATIENT
Start: 2019-04-21 | End: 2019-04-21 | Stop reason: HOSPADM

## 2019-04-21 RX ORDER — METOPROLOL SUCCINATE 100 MG/1
100 TABLET, EXTENDED RELEASE ORAL ONCE
Status: COMPLETED | OUTPATIENT
Start: 2019-04-21 | End: 2019-04-21

## 2019-04-21 RX ORDER — NITROFURANTOIN 25; 75 MG/1; MG/1
100 CAPSULE ORAL 2 TIMES DAILY
Qty: 21 CAPSULE | Refills: 0 | Status: ON HOLD
Start: 2019-04-21 | End: 2019-04-26 | Stop reason: HOSPADM

## 2019-04-21 RX ORDER — METOPROLOL SUCCINATE 100 MG/1
100 TABLET, EXTENDED RELEASE ORAL 2 TIMES DAILY
Qty: 30 TABLET | Refills: 3 | Status: SHIPPED | OUTPATIENT
Start: 2019-04-21 | End: 2019-12-19 | Stop reason: DRUGHIGH

## 2019-04-21 RX ADMIN — ACETAMINOPHEN 650 MG: 325 TABLET, FILM COATED ORAL at 15:16

## 2019-04-21 RX ADMIN — FERROUS SULFATE TAB 325 MG (65 MG ELEMENTAL FE) 325 MG: 325 (65 FE) TAB at 10:56

## 2019-04-21 RX ADMIN — MEROPENEM 500 MG: 500 INJECTION, POWDER, FOR SOLUTION INTRAVENOUS at 12:00

## 2019-04-21 RX ADMIN — MAGNESIUM HYDROXIDE 30 ML: 400 SUSPENSION ORAL at 15:14

## 2019-04-21 RX ADMIN — Medication: at 10:51

## 2019-04-21 RX ADMIN — MEMANTINE 10 MG: 10 TABLET ORAL at 08:17

## 2019-04-21 RX ADMIN — METOPROLOL SUCCINATE 50 MG: 50 TABLET, EXTENDED RELEASE ORAL at 10:55

## 2019-04-21 RX ADMIN — ISOSORBIDE MONONITRATE 30 MG: 30 TABLET, EXTENDED RELEASE ORAL at 10:56

## 2019-04-21 RX ADMIN — SODIUM CHLORIDE: 9 INJECTION, SOLUTION INTRAVENOUS at 04:06

## 2019-04-21 RX ADMIN — HEPARIN SODIUM 5000 UNITS: 5000 INJECTION, SOLUTION INTRAVENOUS; SUBCUTANEOUS at 10:55

## 2019-04-21 RX ADMIN — GABAPENTIN 100 MG: 100 CAPSULE ORAL at 10:56

## 2019-04-21 RX ADMIN — PANTOPRAZOLE SODIUM 40 MG: 40 TABLET, DELAYED RELEASE ORAL at 05:55

## 2019-04-21 RX ADMIN — IPRATROPIUM BROMIDE AND ALBUTEROL SULFATE 1 AMPULE: .5; 3 SOLUTION RESPIRATORY (INHALATION) at 12:05

## 2019-04-21 RX ADMIN — ASPIRIN 81 MG: 81 TABLET, COATED ORAL at 10:56

## 2019-04-21 RX ADMIN — SODIUM BICARBONATE 650 MG: 650 TABLET ORAL at 15:07

## 2019-04-21 RX ADMIN — IPRATROPIUM BROMIDE AND ALBUTEROL SULFATE 1 AMPULE: .5; 3 SOLUTION RESPIRATORY (INHALATION) at 07:01

## 2019-04-21 RX ADMIN — CLOPIDOGREL BISULFATE 75 MG: 75 TABLET ORAL at 10:55

## 2019-04-21 RX ADMIN — METOPROLOL SUCCINATE 100 MG: 100 TABLET, EXTENDED RELEASE ORAL at 16:10

## 2019-04-21 ASSESSMENT — PAIN SCALES - GENERAL
PAINLEVEL_OUTOF10: 7
PAINLEVEL_OUTOF10: 3

## 2019-04-21 NOTE — DISCHARGE SUMMARY
Physician Discharge Summary     Patient ID:  Jaron Simental  [id number]  12 y.o.  1940    Admit date: 4/19/2019    Discharge date and time:      Admitting Physician: Milly Clarke DO     Discharge Physician: Milly Clarke DO      Admission Diagnoses:   Acute renal failure ([address] [N17.9]  Acute renal failure (ARF) ([address] [N17.9]    Discharge Diagnoses:   Patient Active Problem List   Diagnosis Code    Breast cancer ([address] C50.919    Renal cyst N28.1    Lumbar degenerative disc disease M51.36    Arthropathy of lumbar facet joint M47.816    Failed back syndrome of lumbar spine M96.1    Lumbar spondylosis M47.816    Sacroiliitis ([address] M46.1    Status post lumbar spinal fusion Z98.1    TIA (transient ischemic attack) G45.9    Anemia D64.9    Dyspnea R06.00    COPD with acute exacerbation ([address] J44.1    Acute renal failure ([address] N17.9    Acute renal failure (ARF) ([address] N17.9     Active Problems:    Arthropathy of lumbar facet joint    Anemia    Dyspnea    COPD with acute exacerbation ([address]    Acute renal failure ([address]    Acute renal failure (ARF) ([address]  Resolved Problems:    * No resolved hospital problems. *      Admission Condition: fair    Discharged Condition: good    Hospital Course: IV NS and adj meds    Significant Diagnostic Studies: labs: MARY,     Treatments: IV hydration, rocephin    Disposition: home, DC lasix,losartan    Patient Instructions:     Discharge Medications:  Current Discharge Medication List           Details   gabapentin (NEURONTIN) 100 MG capsule Take 100 mg by mouth daily.       buPROPion (WELLBUTRIN SR) 150 MG extended release tablet Take 150 mg by mouth 2 times daily      cloNIDine (CATAPRES) 0.1 MG/24HR PTWK Place 1 patch onto the skin once a week      benzonatate (TESSALON) 200 MG capsule Take 200 mg by mouth daily as needed for Cough      ezetimibe (ZETIA) 10 MG tablet Take 10 mg by mouth daily      simvastatin (ZOCOR) 80 MG tablet Take 80 mg by mouth nightly      memantine (NAMENDA) 5 MG tablet Take 2 tablets by mouth 2 times daily  Qty: 60 tablet, Refills: 3      omeprazole (PRILOSEC) 20 MG delayed release capsule TAKE 1 CAPSULE DAILY  Qty: 30 capsule, Refills: 3    Associated Diagnoses: Malignant neoplasm of left female breast, unspecified estrogen receptor status, unspecified site of breast (HCC)      ferrous sulfate 325 (65 Fe) MG tablet Take 325 mg by mouth 2 times daily       clopidogrel (PLAVIX) 75 MG tablet Take 1 tablet by mouth daily  Qty: 30 tablet, Refills: 4      metoprolol succinate (TOPROL XL) 50 MG extended release tablet Take 25 mg by mouth daily 1/2 tablet      calcium-vitamin D (OSCAL-500) 500-200 MG-UNIT per tablet Take 1 tablet by mouth 2 times daily       allopurinol (ZYLOPRIM) 100 MG tablet Take 100 mg by mouth 2 times daily. Activity: activity as tolerated    Diet: renal diet    Follow-up with Maribell Walls DO  Friday, labs tues, patient to call  for an appointment and if has any problems.       Electronically signed by Maribell Walls DO  on 4/21/2019 at 9:27 AM

## 2019-04-21 NOTE — PROGRESS NOTES
CREATININE 4.32* 3.26* 2.11*   GLUCOSE 93 84 89       Lab Results   Component Value Date    NITRU POSITIVE 04/19/2019    COLORU YELLOW 04/19/2019    PHUR 5.0 04/19/2019    WBCUA 10 TO 20 04/19/2019    RBCUA 2 TO 5 04/19/2019    MUCUS NOT REPORTED 04/19/2019    TRICHOMONAS NOT REPORTED 04/19/2019    YEAST NOT REPORTED 04/19/2019    BACTERIA MANY 04/19/2019    SPECGRAV 1.020 04/19/2019    LEUKOCYTESUR MOD 04/19/2019    UROBILINOGEN Normal 04/19/2019    BILIRUBINUR  04/19/2019     Presumptive positive. Unable to confirm due to unavailability of reagent. GLUCOSEU NEGATIVE 04/19/2019    KETUA TRACE 04/19/2019    AMORPHOUS 1+ 04/19/2019     Urine Sodium:     Lab Results   Component Value Date    TAO 42 04/19/2019     Urine Creatinine:     Lab Results   Component Value Date    LABCREA 174.8 04/19/2019     Urine Eosinophils:  No components found for: UEOS      IMPRESSION/RECOMMENDATIONS:      1. Acute kidney injury - most consistent with prerenal azotemia. Renal function is improving with hydration and patient is nonoliguric. Renal ultrasound showed asymmetrical atrophic left kidney with mild cortical thinning. There was no hydronephrosis. Plan: Continue current IV fluid. Strict input and output documentation. Avoid nephrotoxic agents. Basic metabolic profile daily. 2.  ESBL E. coli urinary tract infection - Discontinue IV ceftriaxone and start IV meropenem. 3.  Non-anion gap metabolic acidosis - Change to bicarbonate drip and start oral sodium bicarbonate. 4.  Systemic hypertension - blood pressure control is suboptimal.  Increase metoprolol to 100 mg p.o. twice daily.     LISA NicholsonCP  Attending Nephrologist  4/21/2019 11:14 AM

## 2019-04-21 NOTE — FLOWSHEET NOTE
Patient expressed no needs at this time. Chaplains will remain available to offer spiritual and emotional support as needed.      04/21/19 1316   Encounter Summary   Services provided to: Patient   Referral/Consult From: [facility]   Support System Spouse   Continue Visiting   (4/21/19)   Complexity of Encounter Low   Length of Encounter 15 minutes   Spiritual Assessment Completed Yes   Routine   Type Initial   Assessment Approachable;Calm   Intervention Active listening;Explored feelings, thoughts, concerns;Prayer;Sustaining presence/ Ministry of presence   Outcome Expressed gratitude

## 2019-04-21 NOTE — CARE COORDINATION
ONGOING DISCHARGE PLAN:    Spoke with patient regarding discharge plan and patient confirms that plan is still to discharge to home with no needs   Per Nephro  IMPRESSION/RECOMMENDATIONS:       1. Acute kidney injury - most consistent with prerenal azotemia. Renal function is improving with hydration and patient is nonoliguric. Renal ultrasound showed asymmetrical atrophic left kidney with mild cortical thinning. There was no hydronephrosis. Plan: Continue current IV fluid. Strict input and output documentation. Avoid nephrotoxic agents. Basic metabolic profile daily.      2. ESBL E. coli urinary tract infection - Discontinue IV ceftriaxone and start IV meropenem.     3.  Non-anion gap metabolic acidosis - Change to bicarbonate drip and start oral sodium bicarbonate.     4. Systemic hypertension - blood pressure control is suboptimal.  Increase metoprolol to 100 mg p.o. twice daily. Will continue to follow for additional discharge needs.     Electronically signed by Rosemary Huang RN on 4/21/2019 at 1:06 PM

## 2019-04-21 NOTE — DISCHARGE INSTR - DIET

## 2019-04-22 NOTE — PROGRESS NOTES
mcrobid and lopressor prescriptions called to [facility]; pt's spouse  informed may  at 1700

## 2019-04-24 ENCOUNTER — HOSPITAL ENCOUNTER (INPATIENT)
Age: 79
LOS: 7 days | Discharge: HOME HEALTH | DRG: 193 | End: 2019-05-01
Attending: EMERGENCY MEDICINE | Admitting: FAMILY MEDICINE
Payer: MEDICARE

## 2019-04-24 ENCOUNTER — APPOINTMENT (OUTPATIENT)
Dept: GENERAL RADIOLOGY | Age: 79
DRG: 193 | End: 2019-04-24
Payer: MEDICARE

## 2019-04-24 ENCOUNTER — APPOINTMENT (OUTPATIENT)
Dept: CT IMAGING | Age: 79
DRG: 193 | End: 2019-04-24
Payer: MEDICARE

## 2019-04-24 DIAGNOSIS — R59.0 MEDIASTINAL LYMPHADENOPATHY: ICD-10-CM

## 2019-04-24 DIAGNOSIS — J18.9 PNEUMONIA DUE TO ORGANISM: Primary | ICD-10-CM

## 2019-04-24 DIAGNOSIS — R91.8 LUNG MASS: ICD-10-CM

## 2019-04-24 LAB
-: NORMAL
ABSOLUTE EOS #: 0.4 K/UL (ref 0–0.4)
ABSOLUTE IMMATURE GRANULOCYTE: ABNORMAL K/UL (ref 0–0.3)
ABSOLUTE LYMPH #: 0.5 K/UL (ref 1–4.8)
ABSOLUTE MONO #: 0.5 K/UL (ref 0.1–1.3)
ALBUMIN SERPL-MCNC: 3.1 G/DL (ref 3.5–5.2)
ALBUMIN/GLOBULIN RATIO: ABNORMAL (ref 1–2.5)
ALP BLD-CCNC: 76 U/L (ref 35–104)
ALT SERPL-CCNC: 10 U/L (ref 5–33)
AMORPHOUS: NORMAL
ANION GAP SERPL CALCULATED.3IONS-SCNC: 14 MMOL/L (ref 9–17)
AST SERPL-CCNC: 13 U/L
BACTERIA: NORMAL
BASOPHILS # BLD: 1 % (ref 0–2)
BASOPHILS ABSOLUTE: 0 K/UL (ref 0–0.2)
BILIRUB SERPL-MCNC: 0.23 MG/DL (ref 0.3–1.2)
BILIRUBIN URINE: NEGATIVE
BUN BLDV-MCNC: 16 MG/DL (ref 8–23)
BUN/CREAT BLD: ABNORMAL (ref 9–20)
CALCIUM SERPL-MCNC: 8.8 MG/DL (ref 8.6–10.4)
CASTS UA: NORMAL /LPF
CHLORIDE BLD-SCNC: 104 MMOL/L (ref 98–107)
CO2: 20 MMOL/L (ref 20–31)
COLOR: YELLOW
COMMENT UA: ABNORMAL
CREAT SERPL-MCNC: 1.31 MG/DL (ref 0.5–0.9)
CRYSTALS, UA: NORMAL /HPF
DIFFERENTIAL TYPE: ABNORMAL
DIRECT EXAM: NORMAL
DIRECT EXAM: NORMAL
EOSINOPHILS RELATIVE PERCENT: 6 % (ref 0–4)
EPITHELIAL CELLS UA: NORMAL /HPF
GFR AFRICAN AMERICAN: 48 ML/MIN
GFR NON-AFRICAN AMERICAN: 39 ML/MIN
GFR SERPL CREATININE-BSD FRML MDRD: ABNORMAL ML/MIN/{1.73_M2}
GFR SERPL CREATININE-BSD FRML MDRD: ABNORMAL ML/MIN/{1.73_M2}
GLUCOSE BLD-MCNC: 105 MG/DL (ref 70–99)
GLUCOSE URINE: NEGATIVE
HCT VFR BLD CALC: 33.1 % (ref 36–46)
HEMOGLOBIN: 11 G/DL (ref 12–16)
IMMATURE GRANULOCYTES: ABNORMAL %
KETONES, URINE: NEGATIVE
LACTIC ACID: 1.1 MMOL/L (ref 0.5–2.2)
LEUKOCYTE ESTERASE, URINE: NEGATIVE
LYMPHOCYTES # BLD: 8 % (ref 24–44)
Lab: NORMAL
Lab: NORMAL
MCH RBC QN AUTO: 30.4 PG (ref 26–34)
MCHC RBC AUTO-ENTMCNC: 33.2 G/DL (ref 31–37)
MCV RBC AUTO: 91.5 FL (ref 80–100)
MONOCYTES # BLD: 8 % (ref 1–7)
MUCUS: NORMAL
NITRITE, URINE: NEGATIVE
NRBC AUTOMATED: ABNORMAL PER 100 WBC
OTHER OBSERVATIONS UA: NORMAL
PDW BLD-RTO: 14.9 % (ref 11.5–14.9)
PH UA: 5.5 (ref 5–8)
PLATELET # BLD: 148 K/UL (ref 150–450)
PLATELET ESTIMATE: ABNORMAL
PMV BLD AUTO: 7.7 FL (ref 6–12)
POTASSIUM SERPL-SCNC: 3.9 MMOL/L (ref 3.7–5.3)
PROTEIN UA: ABNORMAL
RBC # BLD: 3.62 M/UL (ref 4–5.2)
RBC # BLD: ABNORMAL 10*6/UL
RBC UA: NORMAL /HPF
RENAL EPITHELIAL, UA: NORMAL /HPF
SEG NEUTROPHILS: 77 % (ref 36–66)
SEGMENTED NEUTROPHILS ABSOLUTE COUNT: 4.8 K/UL (ref 1.3–9.1)
SODIUM BLD-SCNC: 138 MMOL/L (ref 135–144)
SPECIFIC GRAVITY UA: 1.02 (ref 1–1.03)
SPECIMEN DESCRIPTION: NORMAL
SPECIMEN DESCRIPTION: NORMAL
TOTAL PROTEIN: 5.9 G/DL (ref 6.4–8.3)
TRICHOMONAS: NORMAL
TROPONIN INTERP: ABNORMAL
TROPONIN INTERP: ABNORMAL
TROPONIN T: ABNORMAL NG/ML
TROPONIN T: ABNORMAL NG/ML
TROPONIN, HIGH SENSITIVITY: 40 NG/L (ref 0–14)
TROPONIN, HIGH SENSITIVITY: 45 NG/L (ref 0–14)
TURBIDITY: CLEAR
URINE HGB: NEGATIVE
UROBILINOGEN, URINE: NORMAL
WBC # BLD: 6.2 K/UL (ref 3.5–11)
WBC # BLD: ABNORMAL 10*3/UL
WBC UA: NORMAL /HPF
YEAST: NORMAL

## 2019-04-24 PROCEDURE — 87880 STREP A ASSAY W/OPTIC: CPT

## 2019-04-24 PROCEDURE — 1200000000 HC SEMI PRIVATE

## 2019-04-24 PROCEDURE — 2580000003 HC RX 258: Performed by: FAMILY MEDICINE

## 2019-04-24 PROCEDURE — 85025 COMPLETE CBC W/AUTO DIFF WBC: CPT

## 2019-04-24 PROCEDURE — 36415 COLL VENOUS BLD VENIPUNCTURE: CPT

## 2019-04-24 PROCEDURE — 99285 EMERGENCY DEPT VISIT HI MDM: CPT

## 2019-04-24 PROCEDURE — 71046 X-RAY EXAM CHEST 2 VIEWS: CPT

## 2019-04-24 PROCEDURE — 87804 INFLUENZA ASSAY W/OPTIC: CPT

## 2019-04-24 PROCEDURE — 6370000000 HC RX 637 (ALT 250 FOR IP): Performed by: FAMILY MEDICINE

## 2019-04-24 PROCEDURE — 71250 CT THORAX DX C-: CPT

## 2019-04-24 PROCEDURE — 83605 ASSAY OF LACTIC ACID: CPT

## 2019-04-24 PROCEDURE — 6360000002 HC RX W HCPCS: Performed by: FAMILY MEDICINE

## 2019-04-24 PROCEDURE — 93005 ELECTROCARDIOGRAM TRACING: CPT

## 2019-04-24 PROCEDURE — 6360000002 HC RX W HCPCS: Performed by: EMERGENCY MEDICINE

## 2019-04-24 PROCEDURE — 80053 COMPREHEN METABOLIC PANEL: CPT

## 2019-04-24 PROCEDURE — 81001 URINALYSIS AUTO W/SCOPE: CPT

## 2019-04-24 PROCEDURE — 84484 ASSAY OF TROPONIN QUANT: CPT

## 2019-04-24 PROCEDURE — 2580000003 HC RX 258: Performed by: EMERGENCY MEDICINE

## 2019-04-24 RX ORDER — SIMVASTATIN 40 MG
80 TABLET ORAL NIGHTLY
Status: DISCONTINUED | OUTPATIENT
Start: 2019-04-24 | End: 2019-05-01 | Stop reason: HOSPADM

## 2019-04-24 RX ORDER — SODIUM CHLORIDE 9 MG/ML
INJECTION, SOLUTION INTRAVENOUS CONTINUOUS
Status: DISCONTINUED | OUTPATIENT
Start: 2019-04-24 | End: 2019-04-27

## 2019-04-24 RX ORDER — CLOPIDOGREL BISULFATE 75 MG/1
75 TABLET ORAL DAILY
Status: DISCONTINUED | OUTPATIENT
Start: 2019-04-24 | End: 2019-05-01 | Stop reason: HOSPADM

## 2019-04-24 RX ORDER — METOPROLOL SUCCINATE 100 MG/1
100 TABLET, EXTENDED RELEASE ORAL 2 TIMES DAILY
Status: DISCONTINUED | OUTPATIENT
Start: 2019-04-24 | End: 2019-05-01 | Stop reason: HOSPADM

## 2019-04-24 RX ORDER — PANTOPRAZOLE SODIUM 40 MG/1
40 TABLET, DELAYED RELEASE ORAL
Status: DISCONTINUED | OUTPATIENT
Start: 2019-04-25 | End: 2019-05-01 | Stop reason: HOSPADM

## 2019-04-24 RX ORDER — SODIUM CHLORIDE 0.9 % (FLUSH) 0.9 %
10 SYRINGE (ML) INJECTION PRN
Status: DISCONTINUED | OUTPATIENT
Start: 2019-04-24 | End: 2019-05-01 | Stop reason: HOSPADM

## 2019-04-24 RX ORDER — ONDANSETRON 2 MG/ML
4 INJECTION INTRAMUSCULAR; INTRAVENOUS EVERY 6 HOURS PRN
Status: DISCONTINUED | OUTPATIENT
Start: 2019-04-24 | End: 2019-05-01 | Stop reason: HOSPADM

## 2019-04-24 RX ORDER — CLONIDINE HYDROCHLORIDE 0.1 MG/1
0.05 TABLET ORAL 2 TIMES DAILY
Status: DISCONTINUED | OUTPATIENT
Start: 2019-04-24 | End: 2019-04-26

## 2019-04-24 RX ORDER — ALLOPURINOL 100 MG/1
100 TABLET ORAL 2 TIMES DAILY
Status: DISCONTINUED | OUTPATIENT
Start: 2019-04-24 | End: 2019-05-01 | Stop reason: HOSPADM

## 2019-04-24 RX ORDER — SODIUM CHLORIDE 0.9 % (FLUSH) 0.9 %
10 SYRINGE (ML) INJECTION EVERY 12 HOURS SCHEDULED
Status: DISCONTINUED | OUTPATIENT
Start: 2019-04-24 | End: 2019-04-25 | Stop reason: SDUPTHER

## 2019-04-24 RX ORDER — EZETIMIBE 10 MG/1
10 TABLET ORAL DAILY
Status: DISCONTINUED | OUTPATIENT
Start: 2019-04-24 | End: 2019-04-24

## 2019-04-24 RX ORDER — ACETAMINOPHEN 325 MG/1
650 TABLET ORAL EVERY 4 HOURS PRN
Status: DISCONTINUED | OUTPATIENT
Start: 2019-04-24 | End: 2019-05-01 | Stop reason: HOSPADM

## 2019-04-24 RX ORDER — BUPROPION HYDROCHLORIDE 150 MG/1
150 TABLET, EXTENDED RELEASE ORAL 2 TIMES DAILY
Status: DISCONTINUED | OUTPATIENT
Start: 2019-04-24 | End: 2019-05-01 | Stop reason: HOSPADM

## 2019-04-24 RX ORDER — BENZONATATE 100 MG/1
200 CAPSULE ORAL DAILY PRN
Status: DISCONTINUED | OUTPATIENT
Start: 2019-04-24 | End: 2019-05-01 | Stop reason: HOSPADM

## 2019-04-24 RX ORDER — FERROUS SULFATE 325(65) MG
325 TABLET ORAL 2 TIMES DAILY
Status: DISCONTINUED | OUTPATIENT
Start: 2019-04-24 | End: 2019-05-01 | Stop reason: HOSPADM

## 2019-04-24 RX ORDER — 0.9 % SODIUM CHLORIDE 0.9 %
1000 INTRAVENOUS SOLUTION INTRAVENOUS ONCE
Status: COMPLETED | OUTPATIENT
Start: 2019-04-24 | End: 2019-04-24

## 2019-04-24 RX ORDER — ONDANSETRON 2 MG/ML
4 INJECTION INTRAMUSCULAR; INTRAVENOUS EVERY 8 HOURS PRN
Status: DISCONTINUED | OUTPATIENT
Start: 2019-04-24 | End: 2019-04-24 | Stop reason: SDUPTHER

## 2019-04-24 RX ORDER — SODIUM CHLORIDE 0.9 % (FLUSH) 0.9 %
10 SYRINGE (ML) INJECTION PRN
Status: DISCONTINUED | OUTPATIENT
Start: 2019-04-24 | End: 2019-04-25 | Stop reason: SDUPTHER

## 2019-04-24 RX ORDER — GABAPENTIN 100 MG/1
100 CAPSULE ORAL DAILY
Status: DISCONTINUED | OUTPATIENT
Start: 2019-04-24 | End: 2019-05-01 | Stop reason: HOSPADM

## 2019-04-24 RX ORDER — SODIUM CHLORIDE 450 MG/100ML
INJECTION, SOLUTION INTRAVENOUS CONTINUOUS
Status: DISCONTINUED | OUTPATIENT
Start: 2019-04-24 | End: 2019-05-01 | Stop reason: HOSPADM

## 2019-04-24 RX ORDER — SODIUM CHLORIDE 0.9 % (FLUSH) 0.9 %
10 SYRINGE (ML) INJECTION EVERY 12 HOURS SCHEDULED
Status: DISCONTINUED | OUTPATIENT
Start: 2019-04-24 | End: 2019-05-01 | Stop reason: HOSPADM

## 2019-04-24 RX ORDER — MEMANTINE HYDROCHLORIDE 10 MG/1
10 TABLET ORAL 2 TIMES DAILY
Status: DISCONTINUED | OUTPATIENT
Start: 2019-04-24 | End: 2019-05-01 | Stop reason: HOSPADM

## 2019-04-24 RX ADMIN — MEROPENEM 1 G: 1 INJECTION, POWDER, FOR SOLUTION INTRAVENOUS at 22:07

## 2019-04-24 RX ADMIN — FERROUS SULFATE TAB 325 MG (65 MG ELEMENTAL FE) 325 MG: 325 (65 FE) TAB at 22:08

## 2019-04-24 RX ADMIN — BUPROPION HYDROCHLORIDE 150 MG: 150 TABLET, FILM COATED, EXTENDED RELEASE ORAL at 22:08

## 2019-04-24 RX ADMIN — ONDANSETRON 4 MG: 2 INJECTION INTRAMUSCULAR; INTRAVENOUS at 22:08

## 2019-04-24 RX ADMIN — ALLOPURINOL 100 MG: 100 TABLET ORAL at 22:08

## 2019-04-24 RX ADMIN — MEMANTINE 10 MG: 10 TABLET ORAL at 22:10

## 2019-04-24 RX ADMIN — CEFEPIME HYDROCHLORIDE 2 G: 2 INJECTION, POWDER, FOR SOLUTION INTRAVENOUS at 17:51

## 2019-04-24 RX ADMIN — METOPROLOL SUCCINATE 100 MG: 100 TABLET, EXTENDED RELEASE ORAL at 22:08

## 2019-04-24 RX ADMIN — SODIUM CHLORIDE 1000 ML: 9 INJECTION, SOLUTION INTRAVENOUS at 14:09

## 2019-04-24 RX ADMIN — CLONIDINE HYDROCHLORIDE 0.05 MG: 0.1 TABLET ORAL at 22:08

## 2019-04-24 RX ADMIN — BENZONATATE 200 MG: 100 CAPSULE ORAL at 22:08

## 2019-04-24 RX ADMIN — SODIUM CHLORIDE: 9 INJECTION, SOLUTION INTRAVENOUS at 18:30

## 2019-04-24 RX ADMIN — VANCOMYCIN HYDROCHLORIDE 1000 MG: 1 INJECTION, POWDER, LYOPHILIZED, FOR SOLUTION INTRAVENOUS at 18:30

## 2019-04-24 RX ADMIN — SIMVASTATIN 80 MG: 40 TABLET, FILM COATED ORAL at 22:10

## 2019-04-24 ASSESSMENT — ENCOUNTER SYMPTOMS
CONSTIPATION: 0
DIARRHEA: 0
ABDOMINAL PAIN: 0
RHINORRHEA: 1
NAUSEA: 1
SHORTNESS OF BREATH: 0
VOMITING: 1
COUGH: 1
BACK PAIN: 0

## 2019-04-24 ASSESSMENT — PAIN DESCRIPTION - LOCATION: LOCATION: ABDOMEN

## 2019-04-24 ASSESSMENT — PAIN DESCRIPTION - PAIN TYPE: TYPE: ACUTE PAIN

## 2019-04-24 ASSESSMENT — PAIN DESCRIPTION - DESCRIPTORS: DESCRIPTORS: BURNING

## 2019-04-24 ASSESSMENT — PAIN SCALES - GENERAL: PAINLEVEL_OUTOF10: 5

## 2019-04-24 NOTE — ED PROVIDER NOTES
[address]  eMERGENCY dEPARTMENT eNCOUnter      Pt Name: Alin Forrest  MRN: [id number]  [place] 1940  Date of evaluation: 4/24/19      CHIEF COMPLAINT       Chief Complaint   Patient presents with    Emesis    Nausea     HISTORY OF PRESENT ILLNESS   HPI 69 y.o. female presents with complaints of generalized weakness, nausea, congestion, cough. The patient was recently just admitted to the hospital, where she was treated for acute renal failure and urinary tract infection. She was just charged on Macrobid 3 days ago. She says that since she's gotten home she's not generally worsening weakness every day. She started to develop a runny nose cough and congestion. She also noted some fevers today checked her temperature at home and it was 100.5. She had a home visiting nurse who came and saw her today, said that she was very dehydrated and wanted her to come back to the hospital for an evaluation. Patient denies any chest pain. Denies any abdominal pain, though states that she has been vomiting about 5 times over the last day, and she's been having difficulty swallowing additionally. No painful urination. No back pain. REVIEW OF SYSTEMS     Review of Systems   Constitutional: Positive for appetite change, fatigue and fever. HENT: Positive for congestion and rhinorrhea. Eyes: Negative for visual disturbance. Respiratory: Positive for cough. Negative for shortness of breath. Cardiovascular: Negative for chest pain. Gastrointestinal: Positive for nausea and vomiting. Negative for abdominal pain, constipation and diarrhea. Genitourinary: Negative for dysuria. Musculoskeletal: Negative for back pain. Skin: Negative for rash. Neurological: Positive for weakness (Generalized). Negative for headaches. Psychiatric/Behavioral: Negative for confusion.          PAST MEDICAL HISTORY     Past Medical History:   Diagnosis Date    Arthritis     Cancer [facility])     breast cancer, left    COPD (chronic obstructive pulmonary disease) [facility]     CVA (cerebral vascular accident) ([facility]     mini    Gout     Hyperlipidemia     Hypertension     Other abnormality of urination(788.69)      blood disorder needs platelets prior to procedures       SURGICAL HISTORY       Past Surgical History:   Procedure Laterality Date    APPENDECTOMY      BACK SURGERY      fusion with rods and screws    BLADDER SUSPENSION      BREAST SURGERY Left     lumpectomy x2    CAROTID ENDARTERECTOMY      COLONOSCOPY      COLONOSCOPY N/A 7/17/2018    COLONOSCOPY POLYPECTOMY performed by Pierre Leigh MD at [facility]      JOINT REPLACEMENT      knee    JOINT REPLACEMENT      right hip    TONSILLECTOMY      UPPER GASTROINTESTINAL ENDOSCOPY N/A 7/17/2018    EGD BIOPSY performed by Pierre Leigh MD at [address] [place] [address] Dr Ramsey       Current Discharge Medication List      CONTINUE these medications which have NOT CHANGED    Details   gabapentin (NEURONTIN) 100 MG capsule Take 100 mg by mouth daily.       metoprolol succinate (TOPROL XL) 100 MG extended release tablet Take 1 tablet by mouth 2 times daily  Qty: 30 tablet, Refills: 3      nitrofurantoin, macrocrystal-monohydrate, (MACROBID) 100 MG capsule Take 1 capsule by mouth 2 times daily for 10 days  Qty: 21 capsule, Refills: 0      buPROPion (WELLBUTRIN SR) 150 MG extended release tablet Take 150 mg by mouth 2 times daily      cloNIDine (CATAPRES) 0.1 MG/24HR PTWK Place 1 patch onto the skin once a week      benzonatate (TESSALON) 200 MG capsule Take 200 mg by mouth daily as needed for Cough      ezetimibe (ZETIA) 10 MG tablet Take 10 mg by mouth daily      simvastatin (ZOCOR) 80 MG tablet Take 80 mg by mouth nightly      memantine (NAMENDA) 5 MG tablet Take 2 tablets by mouth 2 times daily  Qty: 60 tablet, Refills: 3      omeprazole (PRILOSEC) 20 MG delayed release capsule TAKE 1 CAPSULE admitted to the hospital with acute renal failure, presenting with fevers and URI symptoms and cough. We'll rule out any developing pneumonia. We'll make sure that there is no recurrent renal failure, we'll rule out any atypical ACS. We'll start treating her with IV fluids and reassess. ED Course as of Apr 25 0004 Wed Apr 24, 2019   1517 Laboratory studies reviewed    Her renal function is actually improving quite significantly  No lactic acid elevation  Influenza negative  Strep Negative  Wbc count 6.2  Urinalysis doesn't appear infected. X-ray does show some possible infiltrates, she is also having this dysphagia, we'll get a CT scan of the chest.    She Is reassessed, she is feeling much better after some more fluid. [KW]   1518 CO2: 20 [KW]   1518 Total Protein(!): 5.9 [KW]   1822 D/w Dr. Tanesha Ordonez, would like the patient admitted to the hospital for treatment of pneumonia. Patient updated and is in agreement with treatment plan. I did discuss with patient the differential diagnosis of a malignancy her and her  verbalize understanding. Anabiotic to cover for healthcare associated pneumonia ordered. [KW]      ED Course User Index  [KW] Anderson Cody MD         DIAGNOSTIC RESULTS     EKG: All EKG's are interpreted by the Emergency Department Physician who either signs or Co-signs this chart in the absence of a cardiologist.    EKG shows a sinus rhythm, heart rate is 67, , Solo of 88, QTC of 448 there is no ST segment elevation or depression, poor R-wave progression, no T-wave inversions, the axis is leftward    RADIOLOGY:All plain film, CT, MRI, and formal ultrasound images (except ED bedside ultrasound) are read by the radiologist and the images and interpretations are directly viewed by the emergency physician. CT CHEST WO CONTRAST   Final Result   1. Tiny nonspecific pleural effusions, more prominent on the left.       2.  Scattered interstitial densities in the periphery of the lungs are most   compatible with scarring. No definite findings to indicate pneumonia. Edema   is considered less likely. 3.  Clustered nodularity in the superior segment of the left lower lobe,   measuring up to 1.3 cm. This is indeterminate and could be infectious or   inflammatory versus neoplastic. Attention on short-term follow-up imaging. 4.  Mediastinal lymphadenopathy, which is also indeterminate, possibly   reactive or could be metastatic in this patient with history of breast   cancer. Follow-up CT chest is recommended in 3 months. Alternatively,   PET/CT may be of use. 5.  Acute/subacute nondisplaced fractures at the anterior left 2nd and 3rd   ribs. XR CHEST STANDARD (2 VW)   Final Result   Interstitial opacities in the mid and lower lung fields, left greater than   right, are more prominent in the interval.  This may represent developing   infiltrate. Edema is considered less likely. LABS: All lab results were reviewed by myself, and all abnormals are listed below.   Labs Reviewed   CBC WITH AUTO DIFFERENTIAL - Abnormal; Notable for the following components:       Result Value    RBC 3.62 (*)     Hemoglobin 11.0 (*)     Hematocrit 33.1 (*)     Platelets 574 (*)     Seg Neutrophils 77 (*)     Lymphocytes 8 (*)     Monocytes 8 (*)     Eosinophils % 6 (*)     Absolute Lymph # 0.50 (*)     All other components within normal limits   COMPREHENSIVE METABOLIC PANEL - Abnormal; Notable for the following components:    Glucose 105 (*)     CREATININE 1.31 (*)     Total Bilirubin 0.23 (*)     Total Protein 5.9 (*)     Alb 3.1 (*)     GFR Non- 39 (*)     GFR  48 (*)     All other components within normal limits   TROPONIN - Abnormal; Notable for the following components:    Troponin, High Sensitivity 45 (*)     All other components within normal limits   TROPONIN - Abnormal; Notable for the following components: Troponin, High Sensitivity 40 (*)     All other components within normal limits   URINE RT REFLEX TO CULTURE - Abnormal; Notable for the following components:    Protein, UA 1+ (*)     All other components within normal limits   RAPID INFLUENZA A/B ANTIGENS   STREP SCREEN GROUP A THROAT   LACTIC ACID   MICROSCOPIC URINALYSIS   BASIC METABOLIC PANEL   CBC WITH AUTO DIFFERENTIAL       EMERGENCY DEPARTMENT COURSE:   Vitals:    Vitals:    04/24/19 1335 04/24/19 1823 04/24/19 1908   BP: (!) 193/62 (!) 199/73 (!) 174/72   Pulse: 70 74 77   Resp: 14 15 14   Temp: 98.2 °F (36.8 °C) 98.1 °F (36.7 °C) 97.6 °F (36.4 °C)   TempSrc: Oral  Oral   SpO2: 94% 96% 94%   Weight: 170 lb (77.1 kg)     Height: 5' 5\" (1.651 m)         The patient was given the following medications while in the emergency department:  Orders Placed This Encounter   Medications    0.9 % sodium chloride bolus    vancomycin 1000 mg IVPB in 250 mL D5W addavial    cefepime (MAXIPIME) 2 g IVPB minibag    0.9 % sodium chloride infusion    sodium chloride flush 0.9 % injection 10 mL    sodium chloride flush 0.9 % injection 10 mL    acetaminophen (TYLENOL) tablet 650 mg    DISCONTD: enoxaparin (LOVENOX) injection 40 mg    DISCONTD: ondansetron (ZOFRAN) injection 4 mg    allopurinol (ZYLOPRIM) tablet 100 mg    benzonatate (TESSALON) capsule 200 mg    buPROPion [facility]) extended release tablet 150 mg    cloNIDine (CATAPRES) tablet 0.05 mg    clopidogrel (PLAVIX) tablet 75 mg    DISCONTD: ezetimibe (ZETIA) tablet 10 mg     Order Specific Question:   Please select a reason the therapeutic interchange was not accepted:      Answer:   Maria Eugenia Meraz for Pharmacy to Substitute    ferrous sulfate tablet 325 mg    gabapentin (NEURONTIN) capsule 100 mg    memantine (NAMENDA) tablet 10 mg    metoprolol succinate (TOPROL XL) extended release tablet 100 mg    pantoprazole (PROTONIX) tablet 40 mg    simvastatin (ZOCOR) tablet 80 mg    DISCONTD: cefepime (MAXIPIME) 2 g IVPB minibag    DISCONTD: vancomycin 1000 mg IVPB in 250 mL D5W addavial     Pharmacy to dose    0.45 % sodium chloride infusion    sodium chloride flush 0.9 % injection 10 mL    sodium chloride flush 0.9 % injection 10 mL    magnesium hydroxide (MILK OF MAGNESIA) 400 MG/5ML suspension 30 mL    ondansetron (ZOFRAN) injection 4 mg    enoxaparin (LOVENOX) injection 30 mg    DISCONTD: vancomycin (VANCOCIN) 1,250 mg in dextrose 5 % 250 mL IVPB    DISCONTD: vancomycin (VANCOCIN) intermittent dosing (placeholder)    meropenem (MERREM) 1 g in sodium chloride 0.9 % 100 mL IVPB (mini-bag)     -------------------------  CRITICAL CARE:   CONSULTS: IP CONSULT TO PRIMARY CARE PROVIDER  IP CONSULT TO PHARMACY  PROCEDURES: Procedures     FINAL IMPRESSION      1. Pneumonia due to organism    2. Lung mass    3.  Mediastinal lymphadenopathy          DISPOSITION/PLAN   DISPOSITION Admitted 04/24/2019 07:45:40 PM      PATIENT REFERRED TO:  DO Ryan Gates [facility].            DISCHARGE MEDICATIONS:  Current Discharge Medication List            Baljinder Kennedy MD  Attending Emergency Physician                      Baljinder Kennedy MD  04/25/19 3350

## 2019-04-24 NOTE — LETTER
Beneficiary Notification Letter     This Ramón Berry Provider is Participating in an Innovative Payment and [address] [place] [address] [place] from Medicare     Greetings:   Mirela Casarez Dr is participating in a Medicare initiative called the [facility] for [address] [facility]. You are receiving this letter because your health care provider has identified you as a patient who is receiving care through this initiative. Health care providers participating in the [facility], including Mirela Casarez Dr, will work with Medicare to improve care for patients. Your Medicare rights have not been changed. You still have all the same Medicare rights and protections, including the right to choose which hospital, doctor, or other health care provider you see. However, because Mirela Casarez Dr chose to participate in the [facility], all Medicare beneficiaries who meet the eligibility criteria of this initiative will receive care under the initiative. If you do not wish to receive care under the Bundled Payments [facility] [address], you must choose a health care provider that does not participate in this initiative for your care. Regardless of which health care provider you see, Medicare will continue to cover all of your medically necessary services. Bundled Payments for Care Improvement Advanced aims to help improve your care     The Bundled Payments [facility] [address] [facility] is an innovative Medicare initiative that encourages your doctors, hospitals, and other health care providers to work more closely together so you get better care during and following certain hospital stays.  In this initiative, doctors and hospitals may work closely with certain health care providers and suppliers that help patients recover after discharge from the hospital, including skilled nursing facilities, home health agencies, inpatient rehabilitation facilities, and long term care hospitals. [address] [place] [address]  is working closely with the doctors and other health care providers that care for you during and following your hospital stay and for a period of time after you leave the hospital. By working together, the health care providers are trying to more efficiently provide well-managed, high quality, patient-centered care as you undergo treatment. Hospitals, doctors, and other health care providers that care for you following a hospital stay may receive an additional payment for providing better, more coordinated health care. Medicare will monitor your care to make sure you and others get high quality care. Your feedback is important     Medicare may also ask you to answer a survey about the services and care you received from [facility] will be mailed to you. Your feedback will improve care for all people with Medicare who receive care from [address] [place] [address] . Completion of this survey is optional.     Get more information     For more information about the Bundled Payments for [address] [place] [address], you can:    · Visit the CMS BPCI Advanced Website at http://domínguez-corral.net/ initiatives/bpci-advanced   · Call the [facility] BPCI-A team at (600) 951-4174. · Call 1-800-MEDICARE (7-367.712.8250). TTY users can call 1-988.732.5829     If you have concerns or complaints about your care, talk to your health care provider, or contact your Beneficiary and Family Centered Quality Improvement Organization [facility]). To get your CC-QIO's phone number, visit Medicare.gov/contacts or call 1-800-MEDICARE. · To find a different hospital, visit www. hospitalcompare.[facility].gov or call 1-800- MEDICARE (6-513.545.7612). TTY users should call 9-273.862.2057. · To find a different doctor, visit Medicare's Physician Compare website, [employer], or call 1-800-MEDICARE (175 2219). TTY users should call 6-301.382.4002. · To find a different skilled nursing facility, visit [employer] website, https://www.payever/, or call 1-800-MEDICARE (1- 970.368.3534). TTY users should call 3-296.368.3936. · To find a different long term care hospital, visit [facility] [address] Compare website, [employer], or call 1-800- MEDICARE (928 8567). TTY users should call 1-907.237.3918. · To find a different inpatient rehabilitation facility, visit [facility] [address] Compare website, www.medicare.gov/ inpatientrehabilitation facilitycompare, or call 1-800-MEDICARE (3-199.103.6330). TTY users should call 0- 218.566.8070. · To find a different home health agency, visit [address] website, www.medicare.gov/homehealthcompare, or call 1-800-MEDICARE (3-875- 504-0389). TTY users should call 2-336.474.5685.

## 2019-04-25 LAB
ABSOLUTE EOS #: 0.6 K/UL (ref 0–0.4)
ABSOLUTE IMMATURE GRANULOCYTE: ABNORMAL K/UL (ref 0–0.3)
ABSOLUTE LYMPH #: 0.7 K/UL (ref 1–4.8)
ABSOLUTE MONO #: 0.7 K/UL (ref 0.1–1.3)
ANION GAP SERPL CALCULATED.3IONS-SCNC: 11 MMOL/L (ref 9–17)
BASOPHILS # BLD: 1 % (ref 0–2)
BASOPHILS ABSOLUTE: 0 K/UL (ref 0–0.2)
BUN BLDV-MCNC: 14 MG/DL (ref 8–23)
BUN/CREAT BLD: ABNORMAL (ref 9–20)
CALCIUM SERPL-MCNC: 8.3 MG/DL (ref 8.6–10.4)
CHLORIDE BLD-SCNC: 106 MMOL/L (ref 98–107)
CO2: 22 MMOL/L (ref 20–31)
CREAT SERPL-MCNC: 1.41 MG/DL (ref 0.5–0.9)
DIFFERENTIAL TYPE: ABNORMAL
EOSINOPHILS RELATIVE PERCENT: 11 % (ref 0–4)
GFR AFRICAN AMERICAN: 44 ML/MIN
GFR NON-AFRICAN AMERICAN: 36 ML/MIN
GFR SERPL CREATININE-BSD FRML MDRD: ABNORMAL ML/MIN/{1.73_M2}
GFR SERPL CREATININE-BSD FRML MDRD: ABNORMAL ML/MIN/{1.73_M2}
GLUCOSE BLD-MCNC: 99 MG/DL (ref 70–99)
HCT VFR BLD CALC: 29.5 % (ref 36–46)
HEMOGLOBIN: 9.9 G/DL (ref 12–16)
IMMATURE GRANULOCYTES: ABNORMAL %
LYMPHOCYTES # BLD: 13 % (ref 24–44)
MCH RBC QN AUTO: 30.9 PG (ref 26–34)
MCHC RBC AUTO-ENTMCNC: 33.5 G/DL (ref 31–37)
MCV RBC AUTO: 92.2 FL (ref 80–100)
MONOCYTES # BLD: 12 % (ref 1–7)
NRBC AUTOMATED: ABNORMAL PER 100 WBC
PDW BLD-RTO: 14.7 % (ref 11.5–14.9)
PLATELET # BLD: 144 K/UL (ref 150–450)
PLATELET ESTIMATE: ABNORMAL
PMV BLD AUTO: 7.8 FL (ref 6–12)
POTASSIUM SERPL-SCNC: 3.8 MMOL/L (ref 3.7–5.3)
RBC # BLD: 3.2 M/UL (ref 4–5.2)
RBC # BLD: ABNORMAL 10*6/UL
SEG NEUTROPHILS: 63 % (ref 36–66)
SEGMENTED NEUTROPHILS ABSOLUTE COUNT: 3.4 K/UL (ref 1.3–9.1)
SODIUM BLD-SCNC: 139 MMOL/L (ref 135–144)
WBC # BLD: 5.3 K/UL (ref 3.5–11)
WBC # BLD: ABNORMAL 10*3/UL

## 2019-04-25 PROCEDURE — 6360000002 HC RX W HCPCS: Performed by: FAMILY MEDICINE

## 2019-04-25 PROCEDURE — 6370000000 HC RX 637 (ALT 250 FOR IP): Performed by: FAMILY MEDICINE

## 2019-04-25 PROCEDURE — 94640 AIRWAY INHALATION TREATMENT: CPT

## 2019-04-25 PROCEDURE — 80048 BASIC METABOLIC PNL TOTAL CA: CPT

## 2019-04-25 PROCEDURE — 1200000000 HC SEMI PRIVATE

## 2019-04-25 PROCEDURE — 36415 COLL VENOUS BLD VENIPUNCTURE: CPT

## 2019-04-25 PROCEDURE — 2580000003 HC RX 258: Performed by: FAMILY MEDICINE

## 2019-04-25 PROCEDURE — 85025 COMPLETE CBC W/AUTO DIFF WBC: CPT

## 2019-04-25 RX ORDER — IPRATROPIUM BROMIDE AND ALBUTEROL SULFATE 2.5; .5 MG/3ML; MG/3ML
1 SOLUTION RESPIRATORY (INHALATION) 3 TIMES DAILY
Status: DISCONTINUED | OUTPATIENT
Start: 2019-04-25 | End: 2019-05-01 | Stop reason: HOSPADM

## 2019-04-25 RX ADMIN — IPRATROPIUM BROMIDE AND ALBUTEROL SULFATE 1 AMPULE: .5; 3 SOLUTION RESPIRATORY (INHALATION) at 08:19

## 2019-04-25 RX ADMIN — SIMVASTATIN 80 MG: 40 TABLET, FILM COATED ORAL at 20:21

## 2019-04-25 RX ADMIN — FERROUS SULFATE TAB 325 MG (65 MG ELEMENTAL FE) 325 MG: 325 (65 FE) TAB at 20:21

## 2019-04-25 RX ADMIN — MEMANTINE 10 MG: 10 TABLET ORAL at 20:21

## 2019-04-25 RX ADMIN — ENOXAPARIN SODIUM 30 MG: 30 INJECTION SUBCUTANEOUS at 08:16

## 2019-04-25 RX ADMIN — METOPROLOL SUCCINATE 100 MG: 100 TABLET, EXTENDED RELEASE ORAL at 20:21

## 2019-04-25 RX ADMIN — PANTOPRAZOLE SODIUM 40 MG: 40 TABLET, DELAYED RELEASE ORAL at 05:46

## 2019-04-25 RX ADMIN — BUPROPION HYDROCHLORIDE 150 MG: 150 TABLET, FILM COATED, EXTENDED RELEASE ORAL at 08:16

## 2019-04-25 RX ADMIN — BUPROPION HYDROCHLORIDE 150 MG: 150 TABLET, FILM COATED, EXTENDED RELEASE ORAL at 20:21

## 2019-04-25 RX ADMIN — IPRATROPIUM BROMIDE AND ALBUTEROL SULFATE 1 AMPULE: .5; 3 SOLUTION RESPIRATORY (INHALATION) at 20:58

## 2019-04-25 RX ADMIN — FERROUS SULFATE TAB 325 MG (65 MG ELEMENTAL FE) 325 MG: 325 (65 FE) TAB at 08:15

## 2019-04-25 RX ADMIN — CLONIDINE HYDROCHLORIDE 0.05 MG: 0.1 TABLET ORAL at 08:15

## 2019-04-25 RX ADMIN — CLOPIDOGREL BISULFATE 75 MG: 75 TABLET ORAL at 08:15

## 2019-04-25 RX ADMIN — METOPROLOL SUCCINATE 100 MG: 100 TABLET, EXTENDED RELEASE ORAL at 08:16

## 2019-04-25 RX ADMIN — MEMANTINE 10 MG: 10 TABLET ORAL at 08:16

## 2019-04-25 RX ADMIN — MEROPENEM 1 G: 1 INJECTION, POWDER, FOR SOLUTION INTRAVENOUS at 21:36

## 2019-04-25 RX ADMIN — MEROPENEM 1 G: 1 INJECTION, POWDER, FOR SOLUTION INTRAVENOUS at 11:38

## 2019-04-25 RX ADMIN — ALLOPURINOL 100 MG: 100 TABLET ORAL at 20:21

## 2019-04-25 RX ADMIN — SODIUM CHLORIDE: 4.5 INJECTION, SOLUTION INTRAVENOUS at 15:28

## 2019-04-25 RX ADMIN — CLONIDINE HYDROCHLORIDE 0.05 MG: 0.1 TABLET ORAL at 20:21

## 2019-04-25 RX ADMIN — IPRATROPIUM BROMIDE AND ALBUTEROL SULFATE 1 AMPULE: .5; 3 SOLUTION RESPIRATORY (INHALATION) at 15:31

## 2019-04-25 RX ADMIN — GABAPENTIN 100 MG: 100 CAPSULE ORAL at 08:16

## 2019-04-25 RX ADMIN — ALLOPURINOL 100 MG: 100 TABLET ORAL at 08:15

## 2019-04-25 RX ADMIN — Medication 10 ML: at 21:35

## 2019-04-25 ASSESSMENT — PAIN SCALES - GENERAL
PAINLEVEL_OUTOF10: 0
PAINLEVEL_OUTOF10: 0

## 2019-04-25 NOTE — H&P
Dr. Watts Fraction                                                                       Admission Note/H&P           Patient:  Melany Field  YOB: 1940     MRN: [id number]                            Acct: [de-identified]      Admit date: 4/24/2019     Pt seen and Chart reviewed.   Consultant notes reviewed and outpatient/ ED care evaluated.     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    Lumbar spondylosis M47.816    Sacroiliitis ([facility] M46.1    Status post lumbar spinal fusion Z98.1    TIA (transient ischemic attack) G45.9    Anemia D64.9    Dyspnea R06.00    COPD with acute exacerbation ([facility] J44.1    Acute renal failure ([facility] N17.9    Acute renal failure (ARF) ([facility] N17.9    Pneumonia J18.9         Diet:  DIET CARDIAC;        Medications:Current Inpatient     Scheduled Meds:  Scheduled Medications    ipratropium-albuterol  1 ampule Inhalation TID    sodium chloride flush  10 mL Intravenous 2 times per day    allopurinol  100 mg Oral BID    buPROPion  150 mg Oral BID    cloNIDine  0.05 mg Oral BID    clopidogrel  75 mg Oral Daily    ferrous sulfate  325 mg Oral BID    gabapentin  100 mg Oral Daily    memantine  10 mg Oral BID    metoprolol succinate  100 mg Oral BID    pantoprazole  40 mg Oral QAM AC    simvastatin  80 mg Oral Nightly    sodium chloride flush  10 mL Intravenous 2 times per day    enoxaparin  30 mg Subcutaneous Daily    meropenem  1 g Intravenous Q12H         Continuous Infusions:  Infusions Meds    sodium chloride 125 mL/hr at 04/24/19 1830    sodium chloride 75 mL/hr at 04/24/19 2153         PRN Meds:  PRN Medications   sodium chloride flush, acetaminophen, benzonatate, sodium chloride flush, magnesium hydroxide, ondansetron        Objective:     Physical Exam:  Vitals: BP (!) 154/64   Pulse 64   Temp 98.2 °F (36.8 °C) (Oral)   Resp 16   Ht 5' 5\" (1.651 m)   Wt 169 lb 12.1 oz (77 kg)   SpO2 96%   BMI 28.25 kg/m²   Height and weight:        Wt Readings from Last 3 Encounters:   04/25/19 169 lb 12.1 oz (77 kg)   04/21/19 177 lb 7.5 oz (80.5 kg)   03/16/19 175 lb 0.7 oz (79.4 kg)      [unfilled]     Physical Examination:   General appearance - alert, well appearing, and in no distress  Mental status - alert, oriented to person, place, and time  Chest - decreased air entry noted   Heart - normal rate, regular rhythm, normal S1, S2, no murmurs, rubs, clicks or gallops  Abdomen - soft, nontender, nondistended, no masses or organomegaly  Neurological - alert, oriented, normal speech, no focal findings or movement disorder noted}  Extremities - peripheral pulses normal, no pedal edema, no clubbing or cyanosis  Skin - normal coloration and turgor, no rashes, no suspicious skin lesions noted         Labs:-     CBC:       Recent Labs     04/24/19  1404   WBC 6.2   HGB 11.0*   *      BMP:        Recent Labs     04/24/19  1404      K 3.9      CO2 20   BUN 16   CREATININE 1.31*   GLUCOSE 105*      Glucose:No results for input(s): POCGLU in the last 72 hours. HgbA1C: No results for input(s): LABA1C in the last 72 hours. INR: No results for input(s): INR in the last 72 hours. CARDIAC ENZYMES:No results for input(s): CKTOTAL, CKMB, CKMBINDEX, TROPONINI in the last 72 hours. BNP: No results for input(s): BNP in the last 72 hours.   Lipids: No results for input(s): CHOL, TRIG, HDL, LDLCALC in the last 72 hours.     Invalid input(s): LDL  ABGs: No results found for: PH, PCO2, PO2, HCO3, O2SAT  Thyroid:         Lab Results   Component Value Date     TSH 2.65 03/16/2019      Urinalysis:         Color, UA   Date Value Ref Range Status   04/24/2019 YELLOW YELLOW Final            pH, UA   Date Value Ref Range Status   04/24/2019 5.5 5.0 - 8.0 Final            Specific Gravity, UA   Date Value Ref Range Status   04/24/2019 1.016 1.000 - 1.030 Final            Protein, UA   Date Value Ref Range Status   04/24/2019 1+ (A) NEGATIVE Final            RBC, UA   Date Value Ref Range Status   04/24/2019 0 TO 2 /HPF Final            Bacteria, UA   Date Value Ref Range Status   04/24/2019 NOT REPORTED None Final            Nitrite, Urine   Date Value Ref Range Status   04/24/2019 NEGATIVE NEGATIVE Final      WBC, UA   Date Value Ref Range Status   04/24/2019 2 TO 5 /HPF Final            Leukocyte Esterase, Urine   Date Value Ref Range Status   04/24/2019 NEGATIVE NEGATIVE Final            Yeast, UA   Date Value Ref Range Status   04/24/2019 NOT REPORTED None Final            Glucose, Ur   Date Value Ref Range Status   04/24/2019 NEGATIVE NEGATIVE Final            Bilirubin Urine   Date Value Ref Range Status   04/24/2019 NEGATIVE NEGATIVE Final         CULTURES:     Current Rehabilitation Assessments:  PHYSICAL THERAPY:     OCCUPATIONAL THERAPY:     SPEECH:       Assessment:  Active Problems:    Pneumonia  Resolved Problems:    * No resolved hospital problems. *        Plan:  1.  See orders     Zaire Winchester MD             4/25/2019, 7:37 AM

## 2019-04-25 NOTE — PROGRESS NOTES
Dr. Yaron Smart                                                                       Admission Note/H&P        Patient:  Shaggy Kaur  YOB: 1940    MRN: [id number]     Acct: [de-identified]     Admit date: 4/24/2019    Pt seen and Chart reviewed. Consultant notes reviewed and outpatient/ ED care evaluated.     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.1    Lumbar spondylosis M47.816    Sacroiliitis (HCC) M46.1    Status post lumbar spinal fusion Z98.1    TIA (transient ischemic attack) G45.9    Anemia D64.9    Dyspnea R06.00    COPD with acute exacerbation (HCC) J44.1    Acute renal failure (HCC) N17.9    Acute renal failure (ARF) ([facility] N17.9    Pneumonia J18.9       Diet:  DIET CARDIAC;      Medications:Current Inpatient    Scheduled Meds:   ipratropium-albuterol  1 ampule Inhalation TID    sodium chloride flush  10 mL Intravenous 2 times per day    allopurinol  100 mg Oral BID    buPROPion  150 mg Oral BID    cloNIDine  0.05 mg Oral BID    clopidogrel  75 mg Oral Daily    ferrous sulfate  325 mg Oral BID    gabapentin  100 mg Oral Daily    memantine  10 mg Oral BID    metoprolol succinate  100 mg Oral BID    pantoprazole  40 mg Oral QAM AC    simvastatin  80 mg Oral Nightly    sodium chloride flush  10 mL Intravenous 2 times per day    enoxaparin  30 mg Subcutaneous Daily    meropenem  1 g Intravenous Q12H     Continuous Infusions:   sodium chloride 125 mL/hr at 04/24/19 1830    sodium chloride 75 mL/hr at 04/24/19 2153     PRN Meds:sodium chloride flush, acetaminophen, benzonatate, sodium chloride flush, Final     Specific Gravity, UA   Date Value Ref Range Status   04/24/2019 1.016 1.000 - 1.030 Final     Protein, UA   Date Value Ref Range Status   04/24/2019 1+ (A) NEGATIVE Final     RBC, UA   Date Value Ref Range Status   04/24/2019 0 TO 2 /HPF Final     Bacteria, UA   Date Value Ref Range Status   04/24/2019 NOT REPORTED None Final     Nitrite, Urine   Date Value Ref Range Status   04/24/2019 NEGATIVE NEGATIVE Final     WBC, UA   Date Value Ref Range Status   04/24/2019 2 TO 5 /HPF Final     Leukocyte Esterase, Urine   Date Value Ref Range Status   04/24/2019 NEGATIVE NEGATIVE Final     Yeast, UA   Date Value Ref Range Status   04/24/2019 NOT REPORTED None Final     Glucose, Ur   Date Value Ref Range Status   04/24/2019 NEGATIVE NEGATIVE Final     Bilirubin Urine   Date Value Ref Range Status   04/24/2019 NEGATIVE NEGATIVE Final       CULTURES:    Current Rehabilitation Assessments:  PHYSICAL THERAPY:     OCCUPATIONAL THERAPY:     SPEECH:      Assessment:  Active Problems:    Pneumonia  Resolved Problems:    * No resolved hospital problems. *      Plan:  1.  See orders    Ankush Chatterjee MD             4/25/2019, 7:37 AM

## 2019-04-25 NOTE — DISCHARGE INSTR - COC
Continuity of Care Form    Patient Name: Rossy Barney   :  [id number]  MRN:  [id number]    [facility] date:  2019  Discharge date:  19    Code Status Order: Full Code   Advance Directives:   [facility] Documentation     Date/Time Healthcare Directive Type of Healthcare Directive Copy in [address] Agent's Name Healthcare Agent's Phone Number    19 0017  Yes, patient has an advance directive for healthcare treatment -- --  -- -- --          Admitting Physician:  Vinod Coates DO  PCP: Vinod Coates DO    Discharging Nurse: Moon Argueta Unit/Room#: 2039/2039-01  Discharging Unit Phone Number: [phone]    Emergency Contact:   Extended Emergency Contact Information  Primary Emergency Contact: Roselyn Faulkner  Address: [address] [place] [address] Phone: [phone]  Work Phone: [phone]  Mobile Phone: [phone]  Relation: Spouse  Hearing or visual needs: None  Other needs: None  Preferred language: English   needed?  No    Past Surgical History:  Past Surgical History:   Procedure Laterality Date    APPENDECTOMY      BACK SURGERY      fusion with rods and screws    BLADDER SUSPENSION      BREAST SURGERY Left     lumpectomy x2    CAROTID ENDARTERECTOMY      COLONOSCOPY      COLONOSCOPY N/A 2018    COLONOSCOPY POLYPECTOMY performed by Kayli Bunn MD at [facility]      JOINT REPLACEMENT      knee    JOINT REPLACEMENT      right hip    TONSILLECTOMY      UPPER GASTROINTESTINAL ENDOSCOPY N/A 2018    EGD BIOPSY performed by Kayli Bunn MD at [address] [place] [address]         Immunization History:   Immunization History   Administered Date(s) Administered    Pneumococcal 13-valent Conjugate ([id number]) 2018    Pneumococcal Polysaccharide ([id number]) 2014       Active Problems:  Patient Active Problem List Diagnosis Code    Breast cancer ([facility] C50.919    Renal cyst N28.1    Lumbar degenerative disc disease M51.36    Arthropathy of lumbar facet joint M47.816    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 Onset Date Added Added By Resolved Resolved By Review Date    ESBL (Extended Spectrum Beta Lactamase) No  12/12/16 Noam Myers, RN       ecoli esbl urine 4/2019          Nurse Assessment:  Last Vital Signs: BP (!) 154/64   Pulse 64   Temp 98.2 °F (36.8 °C) (Oral)   Resp 16   Ht 5' 5\" (1.651 m)   Wt 169 lb 12.1 oz (77 kg)   SpO2 100%   BMI 28.25 kg/m²     Last documented pain score (0-10 scale): Pain Level: 0  Last Weight:   Wt Readings from Last 1 Encounters:   04/25/19 169 lb 12.1 oz (77 kg)     Mental Status:  oriented and alert    IV Access:  - PICC - site  R Basilic, insertion date: 4/26/19    Nursing Mobility/ADLs:  Walking   Assisted  Transfer  Independent  Bathing  Assisted  Dressing  Assisted  Toileting  Assisted  Feeding  Independent  Med Admin  Assisted  Med Delivery   whole      Safety Concerns: At Risk for Falls    Impairments/Disabilities:      None    Nutrition Therapy:  Current Nutrition Therapy:   - Oral Diet:  Cardiac    Routes of Feeding: Oral  Liquids: No Restrictions  Daily Fluid Restriction: no  Last Modified Barium Swallow with Video (Video Swallowing Test): not done    Treatments at the Time of Hospital Discharge:   Respiratory Treatments: see MAR  Oxygen Therapy:  is not on home oxygen therapy.   Ventilator:    - No ventilator support    Rehab Therapies: PT/OT  Weight Bearing Status/Restrictions: No weight bearing restirctions  Other Medical Equipment (for information only, NOT a DME order):  cane, walker, shower chair, electric scooter, life alert, ramp into home, raised toilet, neb  Other Treatments: Skilled Nursing Assessment; Medication Education and Monitor  PICC care and flushes per protocol  IV Merrem 1gm BID for 7 days (through 5/3/19) 10 am/ 10 PM.  Resume previous orders    Home health care agency's  to evaluate patient two weeks prior to discharge from home health to determine post-discharge services. Patient's personal belongings (please select all that are sent with patient):  None    RN SIGNATURE:  Erin Martin    CASE MANAGEMENT/SOCIAL WORK SECTION    Inpatient Status Date: 4/24/2019    Readmission Risk Assessment Score:  Readmission Risk              Risk of Unplanned Readmission:        27           Discharging to Facility/ [address]  Phone: [phone]  Fax: [phone] [facility]  [address]  P:  [phone]  F:  [phone]        / signature: Electronically signed by Scotty Connor RN on 4/25/19 at 9:06 AM    PHYSICIAN SECTION    Prognosis: Fair    Condition at Discharge: Stable    Rehab Potential (if transferring to Rehab): Good    Recommended Labs or Other Treatments After Discharge:, PT/OT rehab strength, aerosols and assess homecare needs, CXR 5/6, 1/2 of her xanax as needed for anxiety and/or sleep, Meripenem, 1 gm BID thru 5/3 then DC and DC IV, BMP, UA in 1 week, see Dr Sumeet Stephens in 10-14 days    Physician Certification: I certify the above information and transfer of Cherelle Clarke  is necessary for the continuing treatment of the diagnosis listed and that she requires Home Care for less 30 days.      Update Admission H&P: No change in H&P    PHYSICIAN SIGNATURE:  Electronically signed by Georgia Olszewski,  on 4/26/19 at 7:38 AM

## 2019-04-25 NOTE — FLOWSHEET NOTE
SC visit with patient and her  Gene;     04/25/19 1040   Encounter Summary   Services provided to: Patient and family together   Referral/Consult From: [facility]   (4/25/19)   Complexity of Encounter Low   Length of Encounter 15 minutes   Spiritual Assessment Completed Yes   Routine   Type Initial   Spiritual/Lutheran   Type Spiritual support   Assessment Calm; Approachable; Hopeful;Coping   Intervention Active listening;Nurtured hope;Prayer;Sustaining presence/ Ministry of presence; Discussed illness/injury and it's impact   Outcome Comfort;Expressed gratitude;Engaged in conversation;Expressed feelings/needs/concerns;Coping; Hopeful;Receptive

## 2019-04-26 ENCOUNTER — APPOINTMENT (OUTPATIENT)
Dept: CT IMAGING | Age: 79
DRG: 193 | End: 2019-04-26
Payer: MEDICARE

## 2019-04-26 ENCOUNTER — APPOINTMENT (OUTPATIENT)
Dept: GENERAL RADIOLOGY | Age: 79
DRG: 193 | End: 2019-04-26
Payer: MEDICARE

## 2019-04-26 ENCOUNTER — APPOINTMENT (OUTPATIENT)
Dept: INTERVENTIONAL RADIOLOGY/VASCULAR | Age: 79
DRG: 193 | End: 2019-04-26
Payer: MEDICARE

## 2019-04-26 LAB
ANION GAP SERPL CALCULATED.3IONS-SCNC: 11 MMOL/L (ref 9–17)
BUN BLDV-MCNC: 12 MG/DL (ref 8–23)
BUN/CREAT BLD: ABNORMAL (ref 9–20)
CALCIUM SERPL-MCNC: 8.4 MG/DL (ref 8.6–10.4)
CHLORIDE BLD-SCNC: 105 MMOL/L (ref 98–107)
CO2: 20 MMOL/L (ref 20–31)
CREAT SERPL-MCNC: 1.27 MG/DL (ref 0.5–0.9)
GFR AFRICAN AMERICAN: 49 ML/MIN
GFR NON-AFRICAN AMERICAN: 41 ML/MIN
GFR SERPL CREATININE-BSD FRML MDRD: ABNORMAL ML/MIN/{1.73_M2}
GFR SERPL CREATININE-BSD FRML MDRD: ABNORMAL ML/MIN/{1.73_M2}
GLUCOSE BLD-MCNC: 93 MG/DL (ref 70–99)
POTASSIUM SERPL-SCNC: 3.8 MMOL/L (ref 3.7–5.3)
SODIUM BLD-SCNC: 136 MMOL/L (ref 135–144)

## 2019-04-26 PROCEDURE — 80048 BASIC METABOLIC PNL TOTAL CA: CPT

## 2019-04-26 PROCEDURE — 36415 COLL VENOUS BLD VENIPUNCTURE: CPT

## 2019-04-26 PROCEDURE — 2580000003 HC RX 258: Performed by: FAMILY MEDICINE

## 2019-04-26 PROCEDURE — 2709999900 IR FLUORO GUIDED CVA DEVICE PLMT/REPLACE/REMOVAL

## 2019-04-26 PROCEDURE — 76000 FLUOROSCOPY <1 HR PHYS/QHP: CPT | Performed by: RADIOLOGY

## 2019-04-26 PROCEDURE — 76937 US GUIDE VASCULAR ACCESS: CPT | Performed by: RADIOLOGY

## 2019-04-26 PROCEDURE — 71046 X-RAY EXAM CHEST 2 VIEWS: CPT

## 2019-04-26 PROCEDURE — 94761 N-INVAS EAR/PLS OXIMETRY MLT: CPT

## 2019-04-26 PROCEDURE — 94640 AIRWAY INHALATION TREATMENT: CPT

## 2019-04-26 PROCEDURE — 6370000000 HC RX 637 (ALT 250 FOR IP): Performed by: FAMILY MEDICINE

## 2019-04-26 PROCEDURE — 36410 VNPNXR 3YR/> PHY/QHP DX/THER: CPT | Performed by: RADIOLOGY

## 2019-04-26 PROCEDURE — 6360000004 HC RX CONTRAST MEDICATION: Performed by: FAMILY MEDICINE

## 2019-04-26 PROCEDURE — 1200000000 HC SEMI PRIVATE

## 2019-04-26 PROCEDURE — 71260 CT THORAX DX C+: CPT

## 2019-04-26 PROCEDURE — 6360000002 HC RX W HCPCS: Performed by: FAMILY MEDICINE

## 2019-04-26 RX ORDER — CLONIDINE HYDROCHLORIDE 0.2 MG/1
0.2 TABLET ORAL 2 TIMES DAILY
Status: DISCONTINUED | OUTPATIENT
Start: 2019-04-26 | End: 2019-04-28

## 2019-04-26 RX ORDER — 0.9 % SODIUM CHLORIDE 0.9 %
80 INTRAVENOUS SOLUTION INTRAVENOUS ONCE
Status: COMPLETED | OUTPATIENT
Start: 2019-04-26 | End: 2019-04-26

## 2019-04-26 RX ORDER — SODIUM CHLORIDE 0.9 % (FLUSH) 0.9 %
10 SYRINGE (ML) INJECTION PRN
Status: DISCONTINUED | OUTPATIENT
Start: 2019-04-26 | End: 2019-04-29 | Stop reason: SDUPTHER

## 2019-04-26 RX ADMIN — SODIUM CHLORIDE: 4.5 INJECTION, SOLUTION INTRAVENOUS at 06:14

## 2019-04-26 RX ADMIN — SODIUM CHLORIDE 80 ML: 9 INJECTION, SOLUTION INTRAVENOUS at 11:14

## 2019-04-26 RX ADMIN — MEMANTINE 10 MG: 10 TABLET ORAL at 20:26

## 2019-04-26 RX ADMIN — BUPROPION HYDROCHLORIDE 150 MG: 150 TABLET, FILM COATED, EXTENDED RELEASE ORAL at 20:22

## 2019-04-26 RX ADMIN — METOPROLOL SUCCINATE 100 MG: 100 TABLET, EXTENDED RELEASE ORAL at 08:39

## 2019-04-26 RX ADMIN — CLOPIDOGREL BISULFATE 75 MG: 75 TABLET ORAL at 08:40

## 2019-04-26 RX ADMIN — IPRATROPIUM BROMIDE AND ALBUTEROL SULFATE 1 AMPULE: .5; 3 SOLUTION RESPIRATORY (INHALATION) at 13:12

## 2019-04-26 RX ADMIN — SIMVASTATIN 80 MG: 40 TABLET, FILM COATED ORAL at 20:22

## 2019-04-26 RX ADMIN — MEROPENEM 1 G: 1 INJECTION, POWDER, FOR SOLUTION INTRAVENOUS at 10:47

## 2019-04-26 RX ADMIN — PANTOPRAZOLE SODIUM 40 MG: 40 TABLET, DELAYED RELEASE ORAL at 06:14

## 2019-04-26 RX ADMIN — Medication 10 ML: at 11:14

## 2019-04-26 RX ADMIN — CLONIDINE HYDROCHLORIDE 0.05 MG: 0.1 TABLET ORAL at 08:39

## 2019-04-26 RX ADMIN — MEMANTINE 10 MG: 10 TABLET ORAL at 08:40

## 2019-04-26 RX ADMIN — SODIUM CHLORIDE: 9 INJECTION, SOLUTION INTRAVENOUS at 16:07

## 2019-04-26 RX ADMIN — ENOXAPARIN SODIUM 30 MG: 30 INJECTION SUBCUTANEOUS at 08:40

## 2019-04-26 RX ADMIN — SODIUM CHLORIDE: 4.5 INJECTION, SOLUTION INTRAVENOUS at 22:15

## 2019-04-26 RX ADMIN — GABAPENTIN 100 MG: 100 CAPSULE ORAL at 08:39

## 2019-04-26 RX ADMIN — IOVERSOL 75 ML: 741 INJECTION INTRA-ARTERIAL; INTRAVENOUS at 11:14

## 2019-04-26 RX ADMIN — FERROUS SULFATE TAB 325 MG (65 MG ELEMENTAL FE) 325 MG: 325 (65 FE) TAB at 20:22

## 2019-04-26 RX ADMIN — BUPROPION HYDROCHLORIDE 150 MG: 150 TABLET, FILM COATED, EXTENDED RELEASE ORAL at 08:40

## 2019-04-26 RX ADMIN — CLONIDINE HYDROCHLORIDE 0.2 MG: 0.2 TABLET ORAL at 20:22

## 2019-04-26 RX ADMIN — Medication 10 ML: at 20:22

## 2019-04-26 RX ADMIN — IPRATROPIUM BROMIDE AND ALBUTEROL SULFATE 1 AMPULE: .5; 3 SOLUTION RESPIRATORY (INHALATION) at 19:58

## 2019-04-26 RX ADMIN — IPRATROPIUM BROMIDE AND ALBUTEROL SULFATE 1 AMPULE: .5; 3 SOLUTION RESPIRATORY (INHALATION) at 08:20

## 2019-04-26 RX ADMIN — ALLOPURINOL 100 MG: 100 TABLET ORAL at 08:40

## 2019-04-26 RX ADMIN — METOPROLOL SUCCINATE 100 MG: 100 TABLET, EXTENDED RELEASE ORAL at 20:22

## 2019-04-26 RX ADMIN — ALLOPURINOL 100 MG: 100 TABLET ORAL at 20:22

## 2019-04-26 RX ADMIN — MEROPENEM 1 G: 1 INJECTION, POWDER, FOR SOLUTION INTRAVENOUS at 22:14

## 2019-04-26 RX ADMIN — FERROUS SULFATE TAB 325 MG (65 MG ELEMENTAL FE) 325 MG: 325 (65 FE) TAB at 08:39

## 2019-04-26 ASSESSMENT — PAIN SCALES - GENERAL
PAINLEVEL_OUTOF10: 0

## 2019-04-26 NOTE — PROGRESS NOTES
Notified Dr. Walter Scott of CXR results. Order given to cancel discharge and consult Dr. Linda Juarez.  Pt notified

## 2019-04-26 NOTE — PLAN OF CARE
Problem: Falls - Risk of:  Goal: Will remain free from falls  Description  Will remain free from falls  Outcome: Ongoing  Note:   Pt remained absent from falls. Call light within reach. Bed locked and in lowest position.

## 2019-04-26 NOTE — PLAN OF CARE
Problem: Falls - Risk of:  Goal: Will remain free from falls  Description  Will remain free from falls  4/26/2019 0218 by Lamount Babinski, RN  Outcome: Ongoing   Pt. Free of falls and injuries this shift.

## 2019-04-26 NOTE — PROGRESS NOTES
Dr germain here to see patient. Patient requests nurse to \"remind\" him that CXR may be needed today before discharge. Doctor updated on patient's wishes.

## 2019-04-26 NOTE — PROGRESS NOTES
I sent a perfect serve message to Dr. Ekaterina Pederson at 11:50 am on 4/26/19. [address] [place] [address]

## 2019-04-26 NOTE — DISCHARGE SUMMARY
Physician Discharge Summary     Patient ID:  Christo Jacobs  [id number]  89 y.o.  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.1    Lumbar spondylosis M47.816    Sacroiliitis ([facility] M46.1    Status post lumbar spinal fusion Z98.1    TIA (transient ischemic attack) G45.9    Anemia D64.9    Dyspnea R06.00    COPD with acute exacerbation ([facility] J44.1    Acute renal failure (HCC) N17.9    Acute renal failure (ARF) ([facility] N17.9    Pneumonia J18.9    CKD (chronic kidney disease) stage 4, GFR 15-29 ml/min (HCC) N18.4    Left renal atrophy N26.1     Active Problems:    Pneumonia  Resolved Problems:    * No resolved hospital problems. *      Admission Condition: fair    Discharged Condition: good    Hospital Course: IV fluids, med ajd    Significant Diagnostic Studies: labs: MARY improved,  and radiology: CXR: atelectasis bilaterally and CT scan: chest infiltrate vs neoplasm, ordered ct with contrast and may need to repeat pending results    Treatments: IV menopenem with mult drug resistance and HAP    Disposition: home    Patient Instructions:     Discharge Medications:  Current Discharge Medication List           Details   gabapentin (NEURONTIN) 100 MG capsule Take 100 mg by mouth daily.       metoprolol succinate (TOPROL XL) 100 MG extended release tablet Take 1 tablet by mouth 2 times daily  Qty: 30 tablet, Refills: 3      buPROPion (WELLBUTRIN SR) 150 MG extended release tablet Take 150 mg by mouth 2 times daily      cloNIDine (CATAPRES) 0.1 MG/24HR PTWK Place 1 patch onto the skin once a week benzonatate (TESSALON) 200 MG capsule Take 200 mg by mouth daily as needed for Cough      ezetimibe (ZETIA) 10 MG tablet Take 10 mg by mouth daily      simvastatin (ZOCOR) 80 MG tablet Take 80 mg by mouth nightly      memantine (NAMENDA) 5 MG tablet Take 2 tablets by mouth 2 times daily  Qty: 60 tablet, Refills: 3      omeprazole (PRILOSEC) 20 MG delayed release capsule TAKE 1 CAPSULE DAILY  Qty: 30 capsule, Refills: 3    Associated Diagnoses: Malignant neoplasm of left female breast, unspecified estrogen receptor status, unspecified site of breast (HCC)      ferrous sulfate 325 (65 Fe) MG tablet Take 325 mg by mouth 2 times daily       clopidogrel (PLAVIX) 75 MG tablet Take 1 tablet by mouth daily  Qty: 30 tablet, Refills: 4      calcium-vitamin D (OSCAL-500) 500-200 MG-UNIT per tablet Take 1 tablet by mouth 2 times daily       allopurinol (ZYLOPRIM) 100 MG tablet Take 100 mg by mouth 2 times daily. Activity: activity as tolerated, PT/OT    Diet: regular diet    Follow-up with Kerri Torrez DO in 1 to 2 weeks, patient to call  for an appointment and if has any problems.       Electronically signed by Milly Clarke DO  on 4/26/2019 at 7:38 AM

## 2019-04-26 NOTE — CARE COORDINATION
Patient/family seen: Yes    Informed patient/family of BPCI-A Medical Bundle Program with potential outreach by either Care Transitions Team or naviHealth Team based on hospital admission and location.     BPCI-A Notification Letter given: Yes    Current discharge plan: Patient currently has VNS with [facility] [place], family would like them again

## 2019-04-26 NOTE — CARE COORDINATION
Called Adwoa from [place] to notify of new referral.  Faxed face sheet and order for IV Merrem to [facility] [address] [facility] and notified them that pt will d/c home today and will need dose of IV Merrem tonight at 10pm.  They can accommodate this.     Electronically signed by Cortes Overton RN on 4/26/2019 at 9:59 AM

## 2019-04-26 NOTE — PROGRESS NOTES
MEWS of 3 noted /70, pt complains of feeling dizzy. Dr. Igor Fallon notified via perfect serve. Clin lead notified.  Will continue to monitor

## 2019-04-26 NOTE — PROGRESS NOTES
Spoke with shelly Granados for CT with contrast and PICC placement.  IVF to be increased to 125ml/hr now and 2 hours after contrast given

## 2019-04-26 NOTE — CARE COORDINATION
BPCI-A Medical Bundle Patient. Working DR-pneumonia  Admitting Location: [facility]    90-day post-acute tracking and outreach with qualifying DRG from date of discharge.

## 2019-04-26 NOTE — CARE COORDINATION
ONGOING DISCHARGE PLAN:    Spoke with patient regarding discharge plan and patient confirms that plan is still home with [facility] [address] for IV Merrem 1gm BID for 1 week. Pt to get PICC and be d/c'ed home today. Will continue to follow for additional discharge needs. Electronically signed by Kadi Clemente RN on 4/26/2019 at 10:43 AM    Received call from Adwoa at [facility] stating that the total cost of IV Merrem per week is $119. Notified pt of this, and cost is affordable. Electronically signed by Kadi Clemente RN on 4/26/2019 at 10:44 AM    Notified by primary care RN, Nakia, that pt will NOT be d/c'ed home. Left message for Adwoa from [place] to notify of this. Also called [facility] and notified of this as well. If d/c'ed home over the weekend, must call [facility] at [phone] and ask for the nurse on call.     Electronically signed by Kadi Clemente RN on 4/26/2019 at 11:43 AM

## 2019-04-27 LAB
ANION GAP SERPL CALCULATED.3IONS-SCNC: 10 MMOL/L (ref 9–17)
BUN BLDV-MCNC: 11 MG/DL (ref 8–23)
BUN/CREAT BLD: ABNORMAL (ref 9–20)
CALCIUM SERPL-MCNC: 8.7 MG/DL (ref 8.6–10.4)
CHLORIDE BLD-SCNC: 107 MMOL/L (ref 98–107)
CO2: 22 MMOL/L (ref 20–31)
CREAT SERPL-MCNC: 1.15 MG/DL (ref 0.5–0.9)
GFR AFRICAN AMERICAN: 55 ML/MIN
GFR NON-AFRICAN AMERICAN: 46 ML/MIN
GFR SERPL CREATININE-BSD FRML MDRD: ABNORMAL ML/MIN/{1.73_M2}
GFR SERPL CREATININE-BSD FRML MDRD: ABNORMAL ML/MIN/{1.73_M2}
GLUCOSE BLD-MCNC: 96 MG/DL (ref 70–99)
POTASSIUM SERPL-SCNC: 4.1 MMOL/L (ref 3.7–5.3)
PROCALCITONIN: 0.14 NG/ML
SODIUM BLD-SCNC: 139 MMOL/L (ref 135–144)

## 2019-04-27 PROCEDURE — 6370000000 HC RX 637 (ALT 250 FOR IP): Performed by: FAMILY MEDICINE

## 2019-04-27 PROCEDURE — 94640 AIRWAY INHALATION TREATMENT: CPT

## 2019-04-27 PROCEDURE — 1200000000 HC SEMI PRIVATE

## 2019-04-27 PROCEDURE — 84145 PROCALCITONIN (PCT): CPT

## 2019-04-27 PROCEDURE — 2580000003 HC RX 258: Performed by: FAMILY MEDICINE

## 2019-04-27 PROCEDURE — 36415 COLL VENOUS BLD VENIPUNCTURE: CPT

## 2019-04-27 PROCEDURE — 80048 BASIC METABOLIC PNL TOTAL CA: CPT

## 2019-04-27 PROCEDURE — 94761 N-INVAS EAR/PLS OXIMETRY MLT: CPT

## 2019-04-27 PROCEDURE — 94667 MNPJ CHEST WALL 1ST: CPT

## 2019-04-27 PROCEDURE — 6360000002 HC RX W HCPCS: Performed by: FAMILY MEDICINE

## 2019-04-27 PROCEDURE — 94760 N-INVAS EAR/PLS OXIMETRY 1: CPT

## 2019-04-27 RX ORDER — FUROSEMIDE 10 MG/ML
20 INJECTION INTRAMUSCULAR; INTRAVENOUS ONCE
Status: COMPLETED | OUTPATIENT
Start: 2019-04-27 | End: 2019-04-27

## 2019-04-27 RX ORDER — GUAIFENESIN 600 MG/1
600 TABLET, EXTENDED RELEASE ORAL 2 TIMES DAILY
Status: DISCONTINUED | OUTPATIENT
Start: 2019-04-27 | End: 2019-05-01 | Stop reason: HOSPADM

## 2019-04-27 RX ADMIN — ENOXAPARIN SODIUM 40 MG: 100 INJECTION SUBCUTANEOUS at 15:54

## 2019-04-27 RX ADMIN — GUAIFENESIN 600 MG: 600 TABLET, EXTENDED RELEASE ORAL at 11:46

## 2019-04-27 RX ADMIN — FUROSEMIDE 20 MG: 10 INJECTION, SOLUTION INTRAMUSCULAR; INTRAVENOUS at 12:00

## 2019-04-27 RX ADMIN — MEMANTINE 10 MG: 10 TABLET ORAL at 09:31

## 2019-04-27 RX ADMIN — MEMANTINE 10 MG: 10 TABLET ORAL at 20:34

## 2019-04-27 RX ADMIN — MEROPENEM 1 G: 1 INJECTION, POWDER, FOR SOLUTION INTRAVENOUS at 09:34

## 2019-04-27 RX ADMIN — IPRATROPIUM BROMIDE AND ALBUTEROL SULFATE 1 AMPULE: .5; 3 SOLUTION RESPIRATORY (INHALATION) at 19:39

## 2019-04-27 RX ADMIN — BUPROPION HYDROCHLORIDE 150 MG: 150 TABLET, FILM COATED, EXTENDED RELEASE ORAL at 09:28

## 2019-04-27 RX ADMIN — CLONIDINE HYDROCHLORIDE 0.2 MG: 0.2 TABLET ORAL at 20:35

## 2019-04-27 RX ADMIN — ALLOPURINOL 100 MG: 100 TABLET ORAL at 09:28

## 2019-04-27 RX ADMIN — CLONIDINE HYDROCHLORIDE 0.2 MG: 0.2 TABLET ORAL at 15:07

## 2019-04-27 RX ADMIN — MAGNESIUM HYDROXIDE 30 ML: 400 SUSPENSION ORAL at 20:34

## 2019-04-27 RX ADMIN — GUAIFENESIN 600 MG: 600 TABLET, EXTENDED RELEASE ORAL at 20:35

## 2019-04-27 RX ADMIN — SODIUM CHLORIDE: 4.5 INJECTION, SOLUTION INTRAVENOUS at 17:47

## 2019-04-27 RX ADMIN — METOPROLOL SUCCINATE 100 MG: 100 TABLET, EXTENDED RELEASE ORAL at 09:28

## 2019-04-27 RX ADMIN — MEROPENEM 1 G: 1 INJECTION, POWDER, FOR SOLUTION INTRAVENOUS at 22:28

## 2019-04-27 RX ADMIN — SIMVASTATIN 80 MG: 40 TABLET, FILM COATED ORAL at 20:35

## 2019-04-27 RX ADMIN — BUPROPION HYDROCHLORIDE 150 MG: 150 TABLET, FILM COATED, EXTENDED RELEASE ORAL at 20:34

## 2019-04-27 RX ADMIN — IPRATROPIUM BROMIDE AND ALBUTEROL SULFATE 1 AMPULE: .5; 3 SOLUTION RESPIRATORY (INHALATION) at 13:01

## 2019-04-27 RX ADMIN — PANTOPRAZOLE SODIUM 40 MG: 40 TABLET, DELAYED RELEASE ORAL at 06:15

## 2019-04-27 RX ADMIN — FERROUS SULFATE TAB 325 MG (65 MG ELEMENTAL FE) 325 MG: 325 (65 FE) TAB at 09:28

## 2019-04-27 RX ADMIN — METOPROLOL SUCCINATE 100 MG: 100 TABLET, EXTENDED RELEASE ORAL at 20:34

## 2019-04-27 RX ADMIN — ALLOPURINOL 100 MG: 100 TABLET ORAL at 20:34

## 2019-04-27 RX ADMIN — IPRATROPIUM BROMIDE AND ALBUTEROL SULFATE 1 AMPULE: .5; 3 SOLUTION RESPIRATORY (INHALATION) at 06:44

## 2019-04-27 RX ADMIN — FERROUS SULFATE TAB 325 MG (65 MG ELEMENTAL FE) 325 MG: 325 (65 FE) TAB at 20:34

## 2019-04-27 RX ADMIN — CLOPIDOGREL BISULFATE 75 MG: 75 TABLET ORAL at 09:28

## 2019-04-27 RX ADMIN — GABAPENTIN 100 MG: 100 CAPSULE ORAL at 09:28

## 2019-04-27 ASSESSMENT — PAIN SCALES - GENERAL
PAINLEVEL_OUTOF10: 0
PAINLEVEL_OUTOF10: 0

## 2019-04-27 NOTE — PLAN OF CARE
Problem: Falls - Risk of:  Goal: Will remain free from falls  Description  Will remain free from falls  4/27/2019 0409 by Castillo Santana RN  Outcome: Ongoing  Note:   No falls this shift. Call light within reach and siderails x2. Bed in lowest position. Patient safety maintained. Problem: Falls - Risk of:  Goal: Absence of physical injury  Description  Absence of physical injury  Outcome: Ongoing  Note:   No falls this shift. Call light within reach and siderails x2. Bed in lowest position. Patient safety maintained.

## 2019-04-27 NOTE — PROGRESS NOTES
PULMONARY PROGRESS NOTE:    REASON FOR VISIT:  Interval History:    Shortness of Breath: no  Cough: no  Sputum: no          Hemoptysis: no  Chest Pain: no  Fever: no                   Swelling Feet: no  Headache: no                                           Nausea, Emesis, Abdominal Pain: no  Diarrhea: no         Constipation: no    Events since last visit: none    PAST MEDICAL HISTORY:      Scheduled Meds:   furosemide  20 mg Intravenous Once    guaiFENesin  600 mg Oral BID    cloNIDine  0.2 mg Oral BID    enoxaparin  40 mg Subcutaneous Daily    ipratropium-albuterol  1 ampule Inhalation TID    allopurinol  100 mg Oral BID    buPROPion  150 mg Oral BID    clopidogrel  75 mg Oral Daily    ferrous sulfate  325 mg Oral BID    gabapentin  100 mg Oral Daily    memantine  10 mg Oral BID    metoprolol succinate  100 mg Oral BID    pantoprazole  40 mg Oral QAM AC    simvastatin  80 mg Oral Nightly    sodium chloride flush  10 mL Intravenous 2 times per day    meropenem  1 g Intravenous Q12H     Continuous Infusions:   sodium chloride 50 mL/hr at 04/27/19 1146     PRN Meds:sodium chloride flush, acetaminophen, benzonatate, sodium chloride flush, magnesium hydroxide, ondansetron        PHYSICAL EXAMINATION:  BP (!) 152/84   Pulse 72   Temp 97.9 °F (36.6 °C) (Oral)   Resp 14   Ht 5' 5\" (1.651 m)   Wt 177 lb 4 oz (80.4 kg)   SpO2 95%   BMI 29.50 kg/m²     General : Awake, alert, oriented to time, place, and person  Neck - supple, no lymphadenopathy, JVD not raised  Heart - regular rhythm, S1 and S2 normal; no additional sounds heard  Lungs - Air Entry- fair bilaterally; breath sounds : vesicular;   rales/crackles - absent  Abdomen - soft, no tenderness  Upper Extremities  - no cyanosis, mottling; edema : absent  Lower Extremities: no cyanosis, mottling; edema : absent    Current Laboratory, Radiologic, Microbiologic, and Diagnostic studies reviewed  Data ReviewCBC:   Recent Labs     04/25/19  7532

## 2019-04-27 NOTE — PROGRESS NOTES
Patient on catapres patch and catapres oral pills. Perfect served Dr. Isak Lee. He was unaware she had the patch on still. He ordered the patch taken off. He was made aware of her complaints of dizziness and slurred speech ( by family's report.)  No other orders received. Will continue to monitor patient. Neuro checks are normal.   She states that dizziness has subsided at this time.

## 2019-04-27 NOTE — PROGRESS NOTES
Dr. Kennedy Chu        Patient:  Garrett Sánchez  YOB: 1940    MRN: [id number]     Acct: [de-identified]     Admit date: 4/24/2019    Pt seen and Chart reviewed. Consultant notes reviewed and care evaluated.     Problem List:  Patient Active Problem List   Diagnosis Code    Breast cancer ([facility] C50.919    Renal cyst N28.1    Lumbar degenerative disc disease M51.36    Arthropathy of lumbar facet joint M47.816    Failed back syndrome of lumbar spine M96.1    Lumbar spondylosis M47.816    Sacroiliitis (HCC) M46.1    Status post lumbar spinal fusion Z98.1    TIA (transient ischemic attack) G45.9    Anemia D64.9    Dyspnea R06.00    [facility] J44.1    Acute renal failure (HCC) N17.9    [facility]F) ([facility] N17.9    Pneumonia J18.9    CKD (chronic kidney disease) stage 4, GFR 15-29 ml/min ([facility]) N18.4    Left renal atrophy N26.1         Subjective: pulm noted, disch cancelled with inc lfet pl eff  Some sob with exertion, copd, long hx smoking  Renal improved, give one dose lasix and decrease IV rate  Meropenum originally for  UTI mult drug resistance   ??if pulm wants diff , poss HAP from last week    Diet:  DIET CARDIAC;      Medications:Current Inpatient    Scheduled Meds:   furosemide  20 mg Intravenous Once    cloNIDine  0.2 mg Oral BID    enoxaparin  40 mg Subcutaneous Daily    ipratropium-albuterol  1 ampule Inhalation TID    allopurinol  100 mg Oral BID    buPROPion  150 mg Oral BID    clopidogrel  75 mg Oral Daily    ferrous sulfate  325 mg Oral BID    gabapentin  100 mg Oral Daily    memantine  10 mg Oral BID    metoprolol succinate  100 mg Oral BID    pantoprazole  40 mg Oral QAM AC    simvastatin  80 mg Oral Nightly    sodium chloride flush  10 mL Intravenous 2 times per day    meropenem  1 g Intravenous Q12H     Continuous Infusions:   sodium chloride 75 mL/hr at 04/26/19 2215     PRN Meds:sodium chloride flush, acetaminophen, benzonatate, sodium chloride flush, magnesium hydroxide, ondansetron    Objective:    Physical Exam:  Vitals: BP (!) 150/84   Pulse 71   Temp 97.7 °F (36.5 °C) (Oral)   Resp 18   Ht 5' 5\" (1.651 m)   Wt 177 lb 4 oz (80.4 kg)   SpO2 95%   BMI 29.50 kg/m²   24 hour intake/output:    Intake/Output Summary (Last 24 hours) at 4/27/2019 1029  Last data filed at 4/27/2019 0634  Gross per 24 hour   Intake 2646 ml   Output 1800 ml   Net 846 ml     Last 3 weights: Wt Readings from Last 3 Encounters:   04/27/19 177 lb 4 oz (80.4 kg)   04/21/19 177 lb 7.5 oz (80.5 kg)   03/16/19 175 lb 0.7 oz (79.4 kg)       Physical Examination:   General appearance - alert, well appearing, and in no distress  Mental status - alert, oriented to person, place, and time  Chest - decreased air entry noted , rhonchi noted   Heart - normal rate, regular rhythm, normal S1, S2, no murmurs, rubs, clicks or gallops  Abdomen - soft, nontender, nondistended, no masses or organomegaly  Neurological - alert, oriented, normal speech, no focal findings or movement disorder noted}  Extremities - peripheral pulses normal, no pedal edema, no clubbing or cyanosis  Skin - normal coloration and turgor, no rashes, no suspicious skin lesions noted     Labs:-    CBC:   Recent Labs     04/24/19  1404 04/25/19  0711   WBC 6.2 5.3   HGB 11.0* 9.9*   * 144*     BMP:    Recent Labs     04/25/19  0711 04/26/19  0659 04/27/19  0628    136 139   K 3.8 3.8 4.1    105 107   CO2 22 20 22   BUN 14 12 11   CREATININE 1.41* 1.27* 1.15*   GLUCOSE 99 93 96     Glucose:No results for input(s): POCGLU in the last 72 hours. HgbA1C: No results for input(s): LABA1C in the last 72 hours. INR: No results for input(s): INR in the last 72 hours.   CARDIAC ENZYMES:No results for input(s): CKTOTAL, CKMB, CKMBINDEX, TROPONINI

## 2019-04-27 NOTE — CONSULTS
Current Facility-Administered Medications   Medication Dose Route Frequency Provider Last Rate Last Dose    sodium chloride flush 0.9 % injection 10 mL  10 mL Intravenous PRN Cyril C Nadaud, DO   10 mL at 04/26/19 1114    cloNIDine (CATAPRES) tablet 0.2 mg  0.2 mg Oral BID Cyril C Nadaud, DO   0.2 mg at 04/26/19 2022    [START ON 4/27/2019] enoxaparin (LOVENOX) injection 40 mg  40 mg Subcutaneous Daily Cyril C Nadaud, DO        ipratropium-albuterol (DUONEB) nebulizer solution 1 ampule  1 ampule Inhalation TID Benjiman Chariton Nadaud, DO   1 ampule at 04/26/19 1958    0.9 % sodium chloride infusion   Intravenous Continuous Cyril C Nadaud,  mL/hr at 04/26/19 1607      acetaminophen (TYLENOL) tablet 650 mg  650 mg Oral Q4H PRN Cyril C Nadaud, DO        allopurinol (ZYLOPRIM) tablet 100 mg  100 mg Oral BID Cyril C Nadaud, DO   100 mg at 04/26/19 2022    benzonatate (TESSALON) capsule 200 mg  200 mg Oral Daily PRN Benjiman Chariton Nadaud, DO   200 mg at 04/24/19 2208    buPROPion [facility]) extended release tablet 150 mg  150 mg Oral BID Cyril C Nadaud, DO   150 mg at 04/26/19 2022    clopidogrel (PLAVIX) tablet 75 mg  75 mg Oral Daily Cyril C Nadaud, DO   75 mg at 04/26/19 0840    ferrous sulfate tablet 325 mg  325 mg Oral BID Cyril C Nadaud, DO   325 mg at 04/26/19 2022    gabapentin (NEURONTIN) capsule 100 mg  100 mg Oral Daily Cyril C Nadaud, DO   100 mg at 04/26/19 0839    memantine (NAMENDA) tablet 10 mg  10 mg Oral BID Cyril C Nadaud, DO   10 mg at 04/26/19 2026    metoprolol succinate (TOPROL XL) extended release tablet 100 mg  100 mg Oral BID Cyril C Nadaud, DO   100 mg at 04/26/19 2022    pantoprazole (PROTONIX) tablet 40 mg  40 mg Oral QAM AC Cyril C Nadaud, DO   40 mg at 04/26/19 9441    simvastatin (ZOCOR) tablet 80 mg  80 mg Oral Nightly Cyril C Nadaud, DO   80 mg at 04/26/19 2022    0.45 % sodium chloride infusion   Intravenous Continuous Cyril Torrez DO 75 mL/hr at 04/26/19 0099      sodium chloride flush ENDOSCOPY N/A 7/17/2018    EGD BIOPSY performed by Chris Lopez MD at . [address]         Allergies: Allergies   Allergen Reactions    Diclofenac-Misoprostol      celebrex       Home Meds:  Medications Prior to Admission: gabapentin (NEURONTIN) 100 MG capsule, Take 100 mg by mouth daily. metoprolol succinate (TOPROL XL) 100 MG extended release tablet, Take 1 tablet by mouth 2 times daily  [DISCONTINUED] nitrofurantoin, macrocrystal-monohydrate, (MACROBID) 100 MG capsule, Take 1 capsule by mouth 2 times daily for 10 days  buPROPion (WELLBUTRIN SR) 150 MG extended release tablet, Take 150 mg by mouth 2 times daily  cloNIDine (CATAPRES) 0.1 MG/24HR PTWK, Place 1 patch onto the skin once a week  benzonatate (TESSALON) 200 MG capsule, Take 200 mg by mouth daily as needed for Cough  ezetimibe (ZETIA) 10 MG tablet, Take 10 mg by mouth daily  simvastatin (ZOCOR) 80 MG tablet, Take 80 mg by mouth nightly  memantine (NAMENDA) 5 MG tablet, Take 2 tablets by mouth 2 times daily  omeprazole (PRILOSEC) 20 MG delayed release capsule, TAKE 1 CAPSULE DAILY  ferrous sulfate 325 (65 Fe) MG tablet, Take 325 mg by mouth 2 times daily   clopidogrel (PLAVIX) 75 MG tablet, Take 1 tablet by mouth daily  calcium-vitamin D (OSCAL-500) 500-200 MG-UNIT per tablet, Take 1 tablet by mouth 2 times daily   allopurinol (ZYLOPRIM) 100 MG tablet, Take 100 mg by mouth 2 times daily.       Social History:   Social History     Socioeconomic History    Marital status:      Spouse name: Not on file    Number of children: Not on file    Years of education: Not on file    Highest education level: Not on file   Occupational History    Occupation: Retired   Social Needs    Financial resource strain: Not on file    Food insecurity:     Worry: Not on file     Inability: Not on file   [employer] needs:     Medical: Not on file     Non-medical: Not on file   Tobacco Use    Smoking status: Former Smoker

## 2019-04-28 ENCOUNTER — APPOINTMENT (OUTPATIENT)
Dept: GENERAL RADIOLOGY | Age: 79
DRG: 193 | End: 2019-04-28
Payer: MEDICARE

## 2019-04-28 LAB
ANION GAP SERPL CALCULATED.3IONS-SCNC: 10 MMOL/L (ref 9–17)
BUN BLDV-MCNC: 13 MG/DL (ref 8–23)
BUN/CREAT BLD: ABNORMAL (ref 9–20)
CALCIUM SERPL-MCNC: 9.3 MG/DL (ref 8.6–10.4)
CHLORIDE BLD-SCNC: 105 MMOL/L (ref 98–107)
CO2: 24 MMOL/L (ref 20–31)
CREAT SERPL-MCNC: 1.32 MG/DL (ref 0.5–0.9)
GFR AFRICAN AMERICAN: 47 ML/MIN
GFR NON-AFRICAN AMERICAN: 39 ML/MIN
GFR SERPL CREATININE-BSD FRML MDRD: ABNORMAL ML/MIN/{1.73_M2}
GFR SERPL CREATININE-BSD FRML MDRD: ABNORMAL ML/MIN/{1.73_M2}
GLUCOSE BLD-MCNC: 107 MG/DL (ref 70–99)
POTASSIUM SERPL-SCNC: 4.3 MMOL/L (ref 3.7–5.3)
SODIUM BLD-SCNC: 139 MMOL/L (ref 135–144)

## 2019-04-28 PROCEDURE — 36415 COLL VENOUS BLD VENIPUNCTURE: CPT

## 2019-04-28 PROCEDURE — 6370000000 HC RX 637 (ALT 250 FOR IP): Performed by: FAMILY MEDICINE

## 2019-04-28 PROCEDURE — 6360000002 HC RX W HCPCS: Performed by: FAMILY MEDICINE

## 2019-04-28 PROCEDURE — 94761 N-INVAS EAR/PLS OXIMETRY MLT: CPT

## 2019-04-28 PROCEDURE — 71045 X-RAY EXAM CHEST 1 VIEW: CPT

## 2019-04-28 PROCEDURE — 80048 BASIC METABOLIC PNL TOTAL CA: CPT

## 2019-04-28 PROCEDURE — 1200000000 HC SEMI PRIVATE

## 2019-04-28 PROCEDURE — 2580000003 HC RX 258: Performed by: FAMILY MEDICINE

## 2019-04-28 PROCEDURE — 94640 AIRWAY INHALATION TREATMENT: CPT

## 2019-04-28 RX ORDER — HYDRALAZINE HYDROCHLORIDE 25 MG/1
25 TABLET, FILM COATED ORAL EVERY 8 HOURS SCHEDULED
Status: DISCONTINUED | OUTPATIENT
Start: 2019-04-28 | End: 2019-04-28

## 2019-04-28 RX ORDER — CLONIDINE HYDROCHLORIDE 0.3 MG/1
0.3 TABLET ORAL 2 TIMES DAILY
Status: DISCONTINUED | OUTPATIENT
Start: 2019-04-28 | End: 2019-05-01 | Stop reason: HOSPADM

## 2019-04-28 RX ORDER — ALPRAZOLAM 0.25 MG/1
0.25 TABLET ORAL 3 TIMES DAILY PRN
Status: DISCONTINUED | OUTPATIENT
Start: 2019-04-28 | End: 2019-04-30

## 2019-04-28 RX ORDER — CLONIDINE HYDROCHLORIDE 0.2 MG/1
0.2 TABLET ORAL ONCE
Status: COMPLETED | OUTPATIENT
Start: 2019-04-28 | End: 2019-04-28

## 2019-04-28 RX ORDER — ALPRAZOLAM 0.5 MG/1
0.5 TABLET ORAL 2 TIMES DAILY PRN
Status: DISCONTINUED | OUTPATIENT
Start: 2019-04-28 | End: 2019-04-28

## 2019-04-28 RX ORDER — ALPRAZOLAM 0.5 MG/1
0.5 TABLET ORAL ONCE
Status: COMPLETED | OUTPATIENT
Start: 2019-04-28 | End: 2019-04-28

## 2019-04-28 RX ORDER — HYDRALAZINE HYDROCHLORIDE 50 MG/1
50 TABLET, FILM COATED ORAL EVERY 8 HOURS SCHEDULED
Status: DISCONTINUED | OUTPATIENT
Start: 2019-04-28 | End: 2019-04-30

## 2019-04-28 RX ORDER — RISPERIDONE 0.25 MG/1
0.25 TABLET, FILM COATED ORAL DAILY
Status: DISCONTINUED | OUTPATIENT
Start: 2019-04-28 | End: 2019-04-30

## 2019-04-28 RX ADMIN — RISPERIDONE 0.25 MG: 0.25 TABLET ORAL at 21:19

## 2019-04-28 RX ADMIN — METOPROLOL SUCCINATE 100 MG: 100 TABLET, EXTENDED RELEASE ORAL at 09:37

## 2019-04-28 RX ADMIN — GUAIFENESIN 600 MG: 600 TABLET, EXTENDED RELEASE ORAL at 20:46

## 2019-04-28 RX ADMIN — FERROUS SULFATE TAB 325 MG (65 MG ELEMENTAL FE) 325 MG: 325 (65 FE) TAB at 09:37

## 2019-04-28 RX ADMIN — FERROUS SULFATE TAB 325 MG (65 MG ELEMENTAL FE) 325 MG: 325 (65 FE) TAB at 20:47

## 2019-04-28 RX ADMIN — MEMANTINE 10 MG: 10 TABLET ORAL at 09:44

## 2019-04-28 RX ADMIN — ALPRAZOLAM 0.25 MG: 0.25 TABLET ORAL at 14:43

## 2019-04-28 RX ADMIN — BUPROPION HYDROCHLORIDE 150 MG: 150 TABLET, FILM COATED, EXTENDED RELEASE ORAL at 09:43

## 2019-04-28 RX ADMIN — MEROPENEM 1 G: 1 INJECTION, POWDER, FOR SOLUTION INTRAVENOUS at 21:22

## 2019-04-28 RX ADMIN — ALPRAZOLAM 0.5 MG: 0.5 TABLET ORAL at 05:42

## 2019-04-28 RX ADMIN — IPRATROPIUM BROMIDE AND ALBUTEROL SULFATE 1 AMPULE: .5; 3 SOLUTION RESPIRATORY (INHALATION) at 06:45

## 2019-04-28 RX ADMIN — SODIUM CHLORIDE: 4.5 INJECTION, SOLUTION INTRAVENOUS at 15:48

## 2019-04-28 RX ADMIN — CLONIDINE HYDROCHLORIDE 0.2 MG: 0.2 TABLET ORAL at 00:15

## 2019-04-28 RX ADMIN — HYDRALAZINE HYDROCHLORIDE 25 MG: 25 TABLET, FILM COATED ORAL at 02:16

## 2019-04-28 RX ADMIN — CLONIDINE HYDROCHLORIDE 0.3 MG: 0.3 TABLET ORAL at 09:38

## 2019-04-28 RX ADMIN — ALLOPURINOL 100 MG: 100 TABLET ORAL at 20:47

## 2019-04-28 RX ADMIN — ALLOPURINOL 100 MG: 100 TABLET ORAL at 09:38

## 2019-04-28 RX ADMIN — GABAPENTIN 100 MG: 100 CAPSULE ORAL at 09:38

## 2019-04-28 RX ADMIN — IPRATROPIUM BROMIDE AND ALBUTEROL SULFATE 1 AMPULE: .5; 3 SOLUTION RESPIRATORY (INHALATION) at 20:26

## 2019-04-28 RX ADMIN — MEROPENEM 1 G: 1 INJECTION, POWDER, FOR SOLUTION INTRAVENOUS at 09:44

## 2019-04-28 RX ADMIN — SIMVASTATIN 80 MG: 40 TABLET, FILM COATED ORAL at 20:47

## 2019-04-28 RX ADMIN — CLOPIDOGREL BISULFATE 75 MG: 75 TABLET ORAL at 09:37

## 2019-04-28 RX ADMIN — IPRATROPIUM BROMIDE AND ALBUTEROL SULFATE 1 AMPULE: .5; 3 SOLUTION RESPIRATORY (INHALATION) at 13:04

## 2019-04-28 RX ADMIN — HYDRALAZINE HYDROCHLORIDE 25 MG: 25 TABLET, FILM COATED ORAL at 14:43

## 2019-04-28 RX ADMIN — PANTOPRAZOLE SODIUM 40 MG: 40 TABLET, DELAYED RELEASE ORAL at 05:42

## 2019-04-28 RX ADMIN — CLONIDINE HYDROCHLORIDE 0.3 MG: 0.3 TABLET ORAL at 20:47

## 2019-04-28 RX ADMIN — MEMANTINE 10 MG: 10 TABLET ORAL at 20:51

## 2019-04-28 RX ADMIN — BUPROPION HYDROCHLORIDE 150 MG: 150 TABLET, FILM COATED, EXTENDED RELEASE ORAL at 20:48

## 2019-04-28 RX ADMIN — HYDRALAZINE HYDROCHLORIDE 50 MG: 50 TABLET, FILM COATED ORAL at 21:21

## 2019-04-28 RX ADMIN — GUAIFENESIN 600 MG: 600 TABLET, EXTENDED RELEASE ORAL at 09:38

## 2019-04-28 NOTE — PROGRESS NOTES
Writer spoke with Dr. Saralyn Meckel again about patient having high blood pressure and being anxious. New order received for one time dose of xanax 0.5 mg. See MAR.

## 2019-04-28 NOTE — PROGRESS NOTES
Writer spoke to Dr. Sheree Hamilton regarding high blood pressure. New order received for one time dose of catapres 0.2mg and increase doseage of catapres 0.3mg BID. See MAR.

## 2019-04-28 NOTE — PLAN OF CARE
Problem: Falls - Risk of:  Goal: Will remain free from falls  Description  Will remain free from falls  4/28/2019 0741 by Consuelo Lewis RN  Outcome: Ongoing  Note:   No falls this shift. Call light within reach and siderails x2. Bed in lowest position. Patient safety maintained. Problem: Falls - Risk of:  Goal: Absence of physical injury  Description  Absence of physical injury  4/28/2019 0741 by Consuelo Lewis RN  Outcome: Ongoing  Note:   No falls this shift. Call light within reach and siderails x2. Bed in lowest position. Patient safety maintained.

## 2019-04-28 NOTE — PROGRESS NOTES
Writer spoke with Dr. Cristina Fontaine regarding high blood pressure. New order received for hydralazine 25mg TID. See MAR.

## 2019-04-28 NOTE — PROGRESS NOTES
Dr. Kush Cotto        Patient:  Lela Alba  YOB: 1940    MRN: [id number]     Acct: [de-identified]     Admit date: 4/24/2019    Pt seen and Chart reviewed. Consultant notes reviewed and care evaluated.     Problem List:  Patient Active Problem List   Diagnosis Code    Breast cancer ([facility] C50.919    Renal cyst N28.1    Lumbar degenerative disc disease M51.36    Arthropathy of lumbar facet joint M47.816    Failed back syndrome of lumbar spine M96.1    Lumbar spondylosis M47.816    Sacroiliitis (HCC) M46.1    Status post lumbar spinal fusion Z98.1    TIA (transient ischemic attack) G45.9    Anemia D64.9    Dyspnea R06.00    COPD with acute exacerbation ([facility] J44.1    Acute renal failure (HCC) N17.9    [facility] N17.9    Pneumonia J18.9    CKD (chronic kidney disease) stage 4, GFR 15-29 ml/min ([facility]) N18.4    Left renal atrophy N26.1         Subjective: bp high, anxiety high, some confusion in neris    Diet:  DIET CARDIAC;      Medications:Current Inpatient    Scheduled Meds:   cloNIDine  0.3 mg Oral BID    hydrALAZINE  25 mg Oral 3 times per day    guaiFENesin  600 mg Oral BID    enoxaparin  40 mg Subcutaneous Daily    ipratropium-albuterol  1 ampule Inhalation TID    allopurinol  100 mg Oral BID    buPROPion  150 mg Oral BID    clopidogrel  75 mg Oral Daily    ferrous sulfate  325 mg Oral BID    gabapentin  100 mg Oral Daily    memantine  10 mg Oral BID    metoprolol succinate  100 mg Oral BID    pantoprazole  40 mg Oral QAM AC    simvastatin  80 mg Oral Nightly    sodium chloride flush  10 mL Intravenous 2 times per day    meropenem  1 g Intravenous Q12H     Continuous Infusions:   sodium chloride 50 mL/hr at 04/27/19 9237     PRN Meds:benzocaine-menthol, sodium chloride flush, acetaminophen, benzonatate, sodium chloride flush, magnesium hydroxide, ondansetron    Objective:    Physical Exam:  Vitals: BP (!) 151/75   Pulse 67   Temp 97.5 °F (36.4 °C) (Oral)   Resp 16   Ht 5' 5\" (1.651 m)   Wt 166 lb 0.1 oz (75.3 kg)   SpO2 98%   BMI 27.62 kg/m²   24 hour intake/output:    Intake/Output Summary (Last 24 hours) at 4/28/2019 1258  Last data filed at 4/28/2019 0739  Gross per 24 hour   Intake 2509 ml   Output 2900 ml   Net -391 ml     Last 3 weights: Wt Readings from Last 3 Encounters:   04/28/19 166 lb 0.1 oz (75.3 kg)   04/21/19 177 lb 7.5 oz (80.5 kg)   03/16/19 175 lb 0.7 oz (79.4 kg)       Physical Examination:   General appearance - alert, well appearing, and in no distress  Mental status - alert, oriented to person, place, and time  Chest - decreased air entry noted   Heart - normal rate, regular rhythm, normal S1, S2, no murmurs, rubs, clicks or gallops  Abdomen - soft, nontender, nondistended, no masses or organomegaly  Neurological - alert, oriented, normal speech, no focal findings or movement disorder noted}  Extremities - peripheral pulses normal, no pedal edema, no clubbing or cyanosis  Skin - normal coloration and turgor, no rashes, no suspicious skin lesions noted     Labs:-    CBC: No results for input(s): WBC, HGB, PLT in the last 72 hours. BMP:    Recent Labs     04/26/19  0659 04/27/19  0628 04/28/19  0724    139 139   K 3.8 4.1 4.3    107 105   CO2 20 22 24   BUN 12 11 13   CREATININE 1.27* 1.15* 1.32*   GLUCOSE 93 96 107*     Glucose:No results for input(s): POCGLU in the last 72 hours. HgbA1C: No results for input(s): LABA1C in the last 72 hours. INR: No results for input(s): INR in the last 72 hours. CARDIAC ENZYMES:No results for input(s): CKTOTAL, CKMB, CKMBINDEX, TROPONINI in the last 72 hours. BNP: No results for input(s): BNP in the last 72 hours. Lipids: No results for input(s): CHOL, TRIG, HDL, LDLCALC in the last 72 hours.     Invalid input(s):

## 2019-04-28 NOTE — CARE COORDINATION
ONGOING DISCHARGE PLAN:    Spoke with patient regarding discharge plan and patient confirms that plan is still to return home w/  & VNS, [facility]. Will need IV Merrem, 1 Gram, BID for 1 week, [facility], is following for this. Per , cost of $119, is affordable. Pulmonary continues to follow, Per Notes: Moderate left layering effusion on decubitus films this morning. Will await plans from Pulmonary. Will continue to follow for additional discharge needs.     Electronically signed by Lizandro Laura RN on 4/28/2019 at 10:39 AM

## 2019-04-29 ENCOUNTER — APPOINTMENT (OUTPATIENT)
Dept: INTERVENTIONAL RADIOLOGY/VASCULAR | Age: 79
DRG: 193 | End: 2019-04-29
Payer: MEDICARE

## 2019-04-29 ENCOUNTER — APPOINTMENT (OUTPATIENT)
Dept: GENERAL RADIOLOGY | Age: 79
DRG: 193 | End: 2019-04-29
Payer: MEDICARE

## 2019-04-29 LAB
ANION GAP SERPL CALCULATED.3IONS-SCNC: 10 MMOL/L (ref 9–17)
APPEARANCE FLUID: NORMAL
BUN BLDV-MCNC: 13 MG/DL (ref 8–23)
BUN/CREAT BLD: ABNORMAL (ref 9–20)
CALCIUM SERPL-MCNC: 9.1 MG/DL (ref 8.6–10.4)
CHLORIDE BLD-SCNC: 105 MMOL/L (ref 98–107)
CO2: 24 MMOL/L (ref 20–31)
COLOR FLUID: YELLOW
CREAT SERPL-MCNC: 1.14 MG/DL (ref 0.5–0.9)
GFR AFRICAN AMERICAN: 56 ML/MIN
GFR NON-AFRICAN AMERICAN: 46 ML/MIN
GFR SERPL CREATININE-BSD FRML MDRD: ABNORMAL ML/MIN/{1.73_M2}
GFR SERPL CREATININE-BSD FRML MDRD: ABNORMAL ML/MIN/{1.73_M2}
GLUCOSE BLD-MCNC: 99 MG/DL (ref 70–99)
GLUCOSE, FLUID: 103 MG/DL
LACTATE DEHYDROGENASE, FLUID: 89 U/L
PH FLUID: 8
POTASSIUM SERPL-SCNC: 3.9 MMOL/L (ref 3.7–5.3)
RBC FLUID: 597 /MM3
SODIUM BLD-SCNC: 139 MMOL/L (ref 135–144)
SPECIMEN TYPE: NORMAL
TOTAL PROTEIN, BODY FLUID: 2.1 G/DL
WBC FLUID: 838 /MM3

## 2019-04-29 PROCEDURE — 36415 COLL VENOUS BLD VENIPUNCTURE: CPT

## 2019-04-29 PROCEDURE — 88108 CYTOPATH CONCENTRATE TECH: CPT

## 2019-04-29 PROCEDURE — C1729 CATH, DRAINAGE: HCPCS

## 2019-04-29 PROCEDURE — 6360000002 HC RX W HCPCS: Performed by: FAMILY MEDICINE

## 2019-04-29 PROCEDURE — 83986 ASSAY PH BODY FLUID NOS: CPT

## 2019-04-29 PROCEDURE — 87205 SMEAR GRAM STAIN: CPT

## 2019-04-29 PROCEDURE — 88112 CYTOPATH CELL ENHANCE TECH: CPT

## 2019-04-29 PROCEDURE — 1200000000 HC SEMI PRIVATE

## 2019-04-29 PROCEDURE — 0W9B3ZZ DRAINAGE OF LEFT PLEURAL CAVITY, PERCUTANEOUS APPROACH: ICD-10-PCS | Performed by: RADIOLOGY

## 2019-04-29 PROCEDURE — 84157 ASSAY OF PROTEIN OTHER: CPT

## 2019-04-29 PROCEDURE — 2500000003 HC RX 250 WO HCPCS: Performed by: RADIOLOGY

## 2019-04-29 PROCEDURE — 71045 X-RAY EXAM CHEST 1 VIEW: CPT

## 2019-04-29 PROCEDURE — 87075 CULTR BACTERIA EXCEPT BLOOD: CPT

## 2019-04-29 PROCEDURE — 87116 MYCOBACTERIA CULTURE: CPT

## 2019-04-29 PROCEDURE — 2580000003 HC RX 258: Performed by: FAMILY MEDICINE

## 2019-04-29 PROCEDURE — 94640 AIRWAY INHALATION TREATMENT: CPT

## 2019-04-29 PROCEDURE — 32555 ASPIRATE PLEURA W/ IMAGING: CPT

## 2019-04-29 PROCEDURE — 87070 CULTURE OTHR SPECIMN AEROBIC: CPT

## 2019-04-29 PROCEDURE — 80048 BASIC METABOLIC PNL TOTAL CA: CPT

## 2019-04-29 PROCEDURE — 83615 LACTATE (LD) (LDH) ENZYME: CPT

## 2019-04-29 PROCEDURE — 6370000000 HC RX 637 (ALT 250 FOR IP): Performed by: FAMILY MEDICINE

## 2019-04-29 PROCEDURE — 88305 TISSUE EXAM BY PATHOLOGIST: CPT

## 2019-04-29 PROCEDURE — 82945 GLUCOSE OTHER FLUID: CPT

## 2019-04-29 PROCEDURE — 87206 SMEAR FLUORESCENT/ACID STAI: CPT

## 2019-04-29 RX ORDER — LIDOCAINE HYDROCHLORIDE 10 MG/ML
INJECTION, SOLUTION INFILTRATION; PERINEURAL
Status: COMPLETED | OUTPATIENT
Start: 2019-04-29 | End: 2019-04-29

## 2019-04-29 RX ORDER — ACETAMINOPHEN 325 MG/1
650 TABLET ORAL EVERY 4 HOURS PRN
Status: DISCONTINUED | OUTPATIENT
Start: 2019-04-29 | End: 2019-04-29 | Stop reason: SDUPTHER

## 2019-04-29 RX ADMIN — BUPROPION HYDROCHLORIDE 150 MG: 150 TABLET, FILM COATED, EXTENDED RELEASE ORAL at 22:26

## 2019-04-29 RX ADMIN — RISPERIDONE 0.25 MG: 0.25 TABLET ORAL at 14:10

## 2019-04-29 RX ADMIN — SIMVASTATIN 80 MG: 40 TABLET, FILM COATED ORAL at 22:26

## 2019-04-29 RX ADMIN — HYDRALAZINE HYDROCHLORIDE 50 MG: 50 TABLET, FILM COATED ORAL at 22:27

## 2019-04-29 RX ADMIN — METOPROLOL SUCCINATE 100 MG: 100 TABLET, EXTENDED RELEASE ORAL at 08:26

## 2019-04-29 RX ADMIN — IPRATROPIUM BROMIDE AND ALBUTEROL SULFATE 1 AMPULE: .5; 3 SOLUTION RESPIRATORY (INHALATION) at 15:01

## 2019-04-29 RX ADMIN — GUAIFENESIN 600 MG: 600 TABLET, EXTENDED RELEASE ORAL at 14:10

## 2019-04-29 RX ADMIN — METOPROLOL SUCCINATE 100 MG: 100 TABLET, EXTENDED RELEASE ORAL at 22:26

## 2019-04-29 RX ADMIN — IPRATROPIUM BROMIDE AND ALBUTEROL SULFATE 1 AMPULE: .5; 3 SOLUTION RESPIRATORY (INHALATION) at 20:49

## 2019-04-29 RX ADMIN — ALLOPURINOL 100 MG: 100 TABLET ORAL at 22:26

## 2019-04-29 RX ADMIN — HYDRALAZINE HYDROCHLORIDE 50 MG: 50 TABLET, FILM COATED ORAL at 06:19

## 2019-04-29 RX ADMIN — BUPROPION HYDROCHLORIDE 150 MG: 150 TABLET, FILM COATED, EXTENDED RELEASE ORAL at 08:27

## 2019-04-29 RX ADMIN — MEMANTINE 10 MG: 10 TABLET ORAL at 14:10

## 2019-04-29 RX ADMIN — GABAPENTIN 100 MG: 100 CAPSULE ORAL at 08:26

## 2019-04-29 RX ADMIN — ALPRAZOLAM 0.25 MG: 0.25 TABLET ORAL at 19:03

## 2019-04-29 RX ADMIN — FERROUS SULFATE TAB 325 MG (65 MG ELEMENTAL FE) 325 MG: 325 (65 FE) TAB at 14:10

## 2019-04-29 RX ADMIN — CLONIDINE HYDROCHLORIDE 0.3 MG: 0.3 TABLET ORAL at 22:26

## 2019-04-29 RX ADMIN — HYDRALAZINE HYDROCHLORIDE 50 MG: 50 TABLET, FILM COATED ORAL at 14:24

## 2019-04-29 RX ADMIN — GUAIFENESIN 600 MG: 600 TABLET, EXTENDED RELEASE ORAL at 22:26

## 2019-04-29 RX ADMIN — CLOPIDOGREL BISULFATE 75 MG: 75 TABLET ORAL at 14:11

## 2019-04-29 RX ADMIN — FERROUS SULFATE TAB 325 MG (65 MG ELEMENTAL FE) 325 MG: 325 (65 FE) TAB at 22:26

## 2019-04-29 RX ADMIN — LIDOCAINE HYDROCHLORIDE 5 ML: 10 INJECTION, SOLUTION INFILTRATION; PERINEURAL at 12:44

## 2019-04-29 RX ADMIN — SODIUM CHLORIDE: 4.5 INJECTION, SOLUTION INTRAVENOUS at 10:21

## 2019-04-29 RX ADMIN — MEROPENEM 1 G: 1 INJECTION, POWDER, FOR SOLUTION INTRAVENOUS at 10:12

## 2019-04-29 RX ADMIN — ALPRAZOLAM 0.25 MG: 0.25 TABLET ORAL at 22:43

## 2019-04-29 RX ADMIN — MEROPENEM 1 G: 1 INJECTION, POWDER, FOR SOLUTION INTRAVENOUS at 22:28

## 2019-04-29 RX ADMIN — ALLOPURINOL 100 MG: 100 TABLET ORAL at 14:10

## 2019-04-29 RX ADMIN — CLONIDINE HYDROCHLORIDE 0.3 MG: 0.3 TABLET ORAL at 08:26

## 2019-04-29 RX ADMIN — Medication 10 ML: at 22:36

## 2019-04-29 RX ADMIN — IPRATROPIUM BROMIDE AND ALBUTEROL SULFATE 1 AMPULE: .5; 3 SOLUTION RESPIRATORY (INHALATION) at 07:08

## 2019-04-29 RX ADMIN — ALPRAZOLAM 0.25 MG: 0.25 TABLET ORAL at 10:12

## 2019-04-29 RX ADMIN — MEMANTINE 10 MG: 10 TABLET ORAL at 22:29

## 2019-04-29 NOTE — CARE COORDINATION
Spoke with Dr Maurice King concerning pt anxiety c/o feeling like lt thumb and 1st two fingers numbness.  Neuro assessment negative,

## 2019-04-29 NOTE — CARE COORDINATION
ONGOING DISCHARGE PLAN:    Plan remains for Pt. To return home w/  & VNS, [facility]. Received call from Luz Maria Irving, from [facility] [place], checking on [address], she was updated about Pt's Thoracentesis, which is planned for today. Pt. Is having Elevated BP, per Primary, meds were adjusted. Pt. Will need IV Merrem, 1 Gram Bid, for 1 week. [place] continues to follow for this, cost of $119 is affordable. Will continue to follow for additional discharge needs.     Electronically signed by Tito Holbrook RN on 4/29/2019 at 12:03 PM home

## 2019-04-29 NOTE — PLAN OF CARE
Problem: Falls - Risk of:  Goal: Will remain free from falls  Description  Will remain free from falls  4/29/2019 0330 by Jennifer Dietz RN  Outcome: Ongoing  Note:   Pt. Free of falls and injuries this shift.

## 2019-04-29 NOTE — PROGRESS NOTES
Dr. Nuar Noland        Patient:  Abdoulaye Medrano  YOB: 1940    MRN: [id number]     Acct: [de-identified]     Admit date: 4/24/2019    Pt seen and Chart reviewed. Consultant notes reviewed and care evaluated.     Problem List:  Patient Active Problem List   Diagnosis Code    [facility] cancer ([facility] C50.919    Renal cyst N28.1    Lumbar degenerative disc disease M51.36    Arthropathy of lumbar facet joint M47.816    Failed back syndrome of lumbar spine M96.1    Lumbar spondylosis M47.816    Sacroiliitis (HCC) M46.1    Status post lumbar spinal fusion Z98.1    TIA (transient ischemic attack) G45.9    Anemia D64.9    Dyspnea R06.00    COPD with acute exacerbation ([facility] J44.1    Acute renal failure (HCC) N17.9    Acute renal failure (ARF) ([facility] N17.9    Pneumonia J18.9    CKD (chronic kidney disease) stage 4, GFR 15-29 ml/min ([facility]) N18.4    Left renal atrophy N26.1         Subjective: confusion with less agitation, just started respirdal last pm  Thoracentesis today  bp inc with inc stress and agitation    Diet:  Diet NPO, After Midnight      Medications:Current Inpatient    Scheduled Meds:   cloNIDine  0.3 mg Oral BID    hydrALAZINE  50 mg Oral 3 times per day    risperiDONE  0.25 mg Oral Daily    guaiFENesin  600 mg Oral BID    enoxaparin  40 mg Subcutaneous Daily    ipratropium-albuterol  1 ampule Inhalation TID    allopurinol  100 mg Oral BID    buPROPion  150 mg Oral BID    clopidogrel  75 mg Oral Daily    ferrous sulfate  325 mg Oral BID    gabapentin  100 mg Oral Daily    memantine  10 mg Oral BID    metoprolol succinate  100 mg Oral BID    pantoprazole  40 mg Oral QAM AC    simvastatin  80 mg Oral Nightly    sodium chloride flush  10 mL Intravenous 2 times per day    meropenem  1 g Intravenous Q12H     Continuous Infusions:   Kelly Breeding in the last 72 hours. BNP: No results for input(s): BNP in the last 72 hours. Lipids: No results for input(s): CHOL, TRIG, HDL, LDLCALC in the last 72 hours. Invalid input(s): LDL  ABGs: No results found for: PH, PCO2, PO2, HCO3, O2SAT  Thyroid:   Lab Results   Component Value Date    TSH 2.65 03/16/2019      Urinalysis:   Color, UA   Date Value Ref Range Status   04/24/2019 YELLOW YELLOW Final     pH, UA   Date Value Ref Range Status   04/24/2019 5.5 5.0 - 8.0 Final     Specific Gravity, UA   Date Value Ref Range Status   04/24/2019 1.016 1.000 - 1.030 Final     Protein, UA   Date Value Ref Range Status   04/24/2019 1+ (A) NEGATIVE Final     RBC, UA   Date Value Ref Range Status   04/24/2019 0 TO 2 /HPF Final     Bacteria, UA   Date Value Ref Range Status   04/24/2019 NOT REPORTED None Final     Nitrite, Urine   Date Value Ref Range Status   04/24/2019 NEGATIVE NEGATIVE Final     WBC, UA   Date Value Ref Range Status   04/24/2019 2 TO 5 /HPF Final     Leukocyte Esterase, Urine   Date Value Ref Range Status   04/24/2019 NEGATIVE NEGATIVE Final     Yeast, UA   Date Value Ref Range Status   04/24/2019 NOT REPORTED None Final     Glucose, Ur   Date Value Ref Range Status   04/24/2019 NEGATIVE NEGATIVE Final     Bilirubin Urine   Date Value Ref Range Status   04/24/2019 NEGATIVE NEGATIVE Final       CULTURES:    Current Rehabilitation Assessments:  PHYSICAL THERAPY:     OCCUPATIONAL THERAPY:     SPEECH:      Assessment:  Active Problems:    Pneumonia  Resolved Problems:    * No resolved hospital problems. *      Plan:  1.  Thoracentesis and adj london Cormier MD             4/29/2019, 7:45 AM

## 2019-04-29 NOTE — PROGRESS NOTES
PULMONARY PROGRESS NOTE:    REASON FOR VISIT: Pl effusion, mediastinal adenopathy  Interval History:    Shortness of Breath: at baseline  Cough: no  Sputum: no          Hemoptysis: no  Chest Pain: no  Fever: no                   Swelling Feet: no  Headache: no                                           Nausea, Emesis, Abdominal Pain: no  Diarrhea: no         Constipation: no    Events since last visit: none    PAST MEDICAL HISTORY:      Scheduled Meds:   cloNIDine  0.3 mg Oral BID    hydrALAZINE  50 mg Oral 3 times per day    risperiDONE  0.25 mg Oral Daily    guaiFENesin  600 mg Oral BID    enoxaparin  40 mg Subcutaneous Daily    ipratropium-albuterol  1 ampule Inhalation TID    allopurinol  100 mg Oral BID    buPROPion  150 mg Oral BID    clopidogrel  75 mg Oral Daily    ferrous sulfate  325 mg Oral BID    gabapentin  100 mg Oral Daily    memantine  10 mg Oral BID    metoprolol succinate  100 mg Oral BID    pantoprazole  40 mg Oral QAM AC    simvastatin  80 mg Oral Nightly    sodium chloride flush  10 mL Intravenous 2 times per day    meropenem  1 g Intravenous Q12H     Continuous Infusions:   sodium chloride 50 mL/hr at 04/29/19 1021     PRN Meds:benzocaine-menthol, ALPRAZolam, acetaminophen, benzonatate, sodium chloride flush, magnesium hydroxide, ondansetron        PHYSICAL EXAMINATION:  BP (!) 198/90 Comment: manual  Pulse 64   Temp 97.1 °F (36.2 °C) (Oral)   Resp 16   Ht 5' 5\" (1.651 m)   Wt 170 lb 10.2 oz (77.4 kg)   SpO2 96%   BMI 28.40 kg/m²     General : Awake, alert,   Neck - supple, no lymphadenopathy, JVD not raised  Heart - regular rhythm, S1 and S2 normal; no additional sounds heard  Lungs - Air Entry- fair bilaterally; breath sounds : vesicular;   rales/crackles - absent  Abdomen - soft, no tenderness  Upper Extremities  - no cyanosis, mottling; edema : absent  Lower Extremities: no cyanosis, mottling; edema : absent    Current Laboratory, Radiologic, Microbiologic, and Diagnostic studies reviewed  Data ReviewCBC: No results for input(s): WBC, RBC, HGB, HCT, PLT in the last 72 hours. BMP:   Recent Labs     04/27/19  0628 04/28/19  0724 04/29/19  0715   GLUCOSE 96 107* 99    139 139   K 4.1 4.3 3.9   BUN 11 13 13   CREATININE 1.15* 1.32* 1.14*   CALCIUM 8.7 9.3 9.1     ABGs: No results for input(s): PHART, PO2ART, SIT7DML, YEV3QHI, BEART, M2MKSIDQ, ZQL6FNK in the last 72 hours.    PT/INR:  No results found for: PTINR    ASSESSMENT / PLAN:    Mediastinal adenopathy/ Pulm Nodules - non specific; consider PET scan as OP and possible EBUS  COPD/ smoker - BD  Suspect pneumonia - empiric ABx, check procalcitonin - 0.14 - continue ABx  Hx breast cancer  Renal Insufficiency   Add cepacol  Moderate left layering effusion on decubitus films this morning  Hold lovenox - left thoracentesis today - Ok for DC after thoracentesis        Electronically signed by Hai Gutierres on 04/29/19

## 2019-04-30 PROBLEM — E43 SEVERE MALNUTRITION (HCC): Status: ACTIVE | Noted: 2019-04-30

## 2019-04-30 LAB
ANION GAP SERPL CALCULATED.3IONS-SCNC: 11 MMOL/L (ref 9–17)
BASO FLUID: 0 %
BUN BLDV-MCNC: 16 MG/DL (ref 8–23)
BUN/CREAT BLD: ABNORMAL (ref 9–20)
CALCIUM SERPL-MCNC: 9.6 MG/DL (ref 8.6–10.4)
CHLORIDE BLD-SCNC: 107 MMOL/L (ref 98–107)
CO2: 23 MMOL/L (ref 20–31)
CREAT SERPL-MCNC: 1.29 MG/DL (ref 0.5–0.9)
DIRECT EXAM: NORMAL
DIRECT EXAM: NORMAL
EOSINOPHIL FLUID: 0 %
FLUID DIFF COMMENT: ABNORMAL
GFR AFRICAN AMERICAN: 49 ML/MIN
GFR NON-AFRICAN AMERICAN: 40 ML/MIN
GFR SERPL CREATININE-BSD FRML MDRD: ABNORMAL ML/MIN/{1.73_M2}
GFR SERPL CREATININE-BSD FRML MDRD: ABNORMAL ML/MIN/{1.73_M2}
GLUCOSE BLD-MCNC: 116 MG/DL (ref 70–99)
LYMPHOCYTES, BODY FLUID: 46 %
Lab: NORMAL
Lab: NORMAL
MONOCYTE, FLUID: 46 %
NEUTROPHIL, FLUID: 8 %
OTHER CELLS FLUID: ABNORMAL %
POTASSIUM SERPL-SCNC: 3.8 MMOL/L (ref 3.7–5.3)
SODIUM BLD-SCNC: 141 MMOL/L (ref 135–144)
SPECIMEN DESCRIPTION: NORMAL
SPECIMEN DESCRIPTION: NORMAL
SURGICAL PATHOLOGY REPORT: NORMAL

## 2019-04-30 PROCEDURE — 94640 AIRWAY INHALATION TREATMENT: CPT

## 2019-04-30 PROCEDURE — 1200000000 HC SEMI PRIVATE

## 2019-04-30 PROCEDURE — 87086 URINE CULTURE/COLONY COUNT: CPT

## 2019-04-30 PROCEDURE — 6370000000 HC RX 637 (ALT 250 FOR IP): Performed by: FAMILY MEDICINE

## 2019-04-30 PROCEDURE — 2580000003 HC RX 258: Performed by: FAMILY MEDICINE

## 2019-04-30 PROCEDURE — 6360000002 HC RX W HCPCS: Performed by: FAMILY MEDICINE

## 2019-04-30 PROCEDURE — 94761 N-INVAS EAR/PLS OXIMETRY MLT: CPT

## 2019-04-30 PROCEDURE — 80048 BASIC METABOLIC PNL TOTAL CA: CPT

## 2019-04-30 PROCEDURE — 36415 COLL VENOUS BLD VENIPUNCTURE: CPT

## 2019-04-30 RX ORDER — HYDRALAZINE HYDROCHLORIDE 50 MG/1
50 TABLET, FILM COATED ORAL EVERY 8 HOURS
Status: DISCONTINUED | OUTPATIENT
Start: 2019-05-01 | End: 2019-05-01 | Stop reason: HOSPADM

## 2019-04-30 RX ORDER — ALPRAZOLAM 0.25 MG/1
0.12 TABLET ORAL 3 TIMES DAILY PRN
Status: DISCONTINUED | OUTPATIENT
Start: 2019-04-30 | End: 2019-05-01 | Stop reason: HOSPADM

## 2019-04-30 RX ADMIN — ALLOPURINOL 100 MG: 100 TABLET ORAL at 08:24

## 2019-04-30 RX ADMIN — CLOPIDOGREL BISULFATE 75 MG: 75 TABLET ORAL at 08:24

## 2019-04-30 RX ADMIN — GUAIFENESIN 600 MG: 600 TABLET, EXTENDED RELEASE ORAL at 22:36

## 2019-04-30 RX ADMIN — PANTOPRAZOLE SODIUM 40 MG: 40 TABLET, DELAYED RELEASE ORAL at 06:00

## 2019-04-30 RX ADMIN — MEMANTINE 10 MG: 10 TABLET ORAL at 08:26

## 2019-04-30 RX ADMIN — MEMANTINE 10 MG: 10 TABLET ORAL at 22:38

## 2019-04-30 RX ADMIN — SODIUM CHLORIDE: 4.5 INJECTION, SOLUTION INTRAVENOUS at 06:00

## 2019-04-30 RX ADMIN — FERROUS SULFATE TAB 325 MG (65 MG ELEMENTAL FE) 325 MG: 325 (65 FE) TAB at 08:24

## 2019-04-30 RX ADMIN — GUAIFENESIN 600 MG: 600 TABLET, EXTENDED RELEASE ORAL at 08:25

## 2019-04-30 RX ADMIN — IPRATROPIUM BROMIDE AND ALBUTEROL SULFATE 1 AMPULE: .5; 3 SOLUTION RESPIRATORY (INHALATION) at 06:55

## 2019-04-30 RX ADMIN — CLONIDINE HYDROCHLORIDE 0.3 MG: 0.3 TABLET ORAL at 22:35

## 2019-04-30 RX ADMIN — ALPRAZOLAM 0.12 MG: 0.25 TABLET ORAL at 08:28

## 2019-04-30 RX ADMIN — HYDRALAZINE HYDROCHLORIDE 50 MG: 50 TABLET, FILM COATED ORAL at 06:06

## 2019-04-30 RX ADMIN — MEROPENEM 1 G: 1 INJECTION, POWDER, FOR SOLUTION INTRAVENOUS at 11:07

## 2019-04-30 RX ADMIN — FERROUS SULFATE TAB 325 MG (65 MG ELEMENTAL FE) 325 MG: 325 (65 FE) TAB at 22:35

## 2019-04-30 RX ADMIN — GABAPENTIN 100 MG: 100 CAPSULE ORAL at 08:25

## 2019-04-30 RX ADMIN — SIMVASTATIN 80 MG: 40 TABLET, FILM COATED ORAL at 22:35

## 2019-04-30 RX ADMIN — CLONIDINE HYDROCHLORIDE 0.3 MG: 0.3 TABLET ORAL at 08:25

## 2019-04-30 RX ADMIN — BUPROPION HYDROCHLORIDE 150 MG: 150 TABLET, FILM COATED, EXTENDED RELEASE ORAL at 22:34

## 2019-04-30 RX ADMIN — METOPROLOL SUCCINATE 100 MG: 100 TABLET, EXTENDED RELEASE ORAL at 22:35

## 2019-04-30 RX ADMIN — ENOXAPARIN SODIUM 40 MG: 100 INJECTION SUBCUTANEOUS at 08:25

## 2019-04-30 RX ADMIN — HYDRALAZINE HYDROCHLORIDE 50 MG: 50 TABLET, FILM COATED ORAL at 22:36

## 2019-04-30 RX ADMIN — MEROPENEM 1 G: 1 INJECTION, POWDER, FOR SOLUTION INTRAVENOUS at 22:34

## 2019-04-30 RX ADMIN — ALLOPURINOL 100 MG: 100 TABLET ORAL at 22:36

## 2019-04-30 RX ADMIN — ALPRAZOLAM 0.12 MG: 0.25 TABLET ORAL at 19:05

## 2019-04-30 RX ADMIN — HYDRALAZINE HYDROCHLORIDE 50 MG: 50 TABLET, FILM COATED ORAL at 16:07

## 2019-04-30 RX ADMIN — METOPROLOL SUCCINATE 100 MG: 100 TABLET, EXTENDED RELEASE ORAL at 08:24

## 2019-04-30 RX ADMIN — BUPROPION HYDROCHLORIDE 150 MG: 150 TABLET, FILM COATED, EXTENDED RELEASE ORAL at 08:25

## 2019-04-30 RX ADMIN — IPRATROPIUM BROMIDE AND ALBUTEROL SULFATE 1 AMPULE: .5; 3 SOLUTION RESPIRATORY (INHALATION) at 13:01

## 2019-04-30 NOTE — PROGRESS NOTES
Dr. Awa Loera        Patient:  Alin Forrest  YOB: 1940    MRN: [id number]     Acct: [de-identified]     Admit date: 4/24/2019    Pt seen and Chart reviewed. Consultant notes reviewed and care evaluated.     Problem List:  Patient Active Problem List   Diagnosis Code    Breast cancer ([facility] C50.919    Renal cyst N28.1    Lumbar degenerative disc disease M51.36    Arthropathy of lumbar facet joint M47.816    Failed back syndrome of lumbar spine M96.1    Lumbar spondylosis M47.816    Sacroiliitis (HCC) M46.1    Status post lumbar spinal fusion Z98.1    TIA (transient ischemic attack) G45.9    Anemia D64.9    Dyspnea R06.00    COPD with acute exacerbation ([facility] J44.1    Acute renal failure (HCC) N17.9    [facility] N17.9    Pneumonia J18.9    CKD (chronic kidney disease) stage 4, GFR 15-29 ml/min ([facility]) N18.4    Left renal atrophy N26.1         Subjective: confusion and agitation  Hope to disch tomorrow with home health  pulm opinion welcomed  Decrease xanax, dc respirdal    Diet:  DIET CARDIAC;      Medications:Current Inpatient    Scheduled Meds:   cloNIDine  0.3 mg Oral BID    hydrALAZINE  50 mg Oral 3 times per day    guaiFENesin  600 mg Oral BID    enoxaparin  40 mg Subcutaneous Daily    ipratropium-albuterol  1 ampule Inhalation TID    allopurinol  100 mg Oral BID    buPROPion  150 mg Oral BID    clopidogrel  75 mg Oral Daily    ferrous sulfate  325 mg Oral BID    gabapentin  100 mg Oral Daily    memantine  10 mg Oral BID    metoprolol succinate  100 mg Oral BID    pantoprazole  40 mg Oral QAM AC    simvastatin  80 mg Oral Nightly    sodium chloride flush  10 mL Intravenous 2 times per day    meropenem  1 g Intravenous Q12H     Continuous Infusions:   sodium chloride 50 mL/hr at 04/30/19 0600     PRN

## 2019-04-30 NOTE — PLAN OF CARE
Problem: Falls - Risk of:  Goal: Will remain free from falls  Description  Will remain free from falls  Outcome: Met This Shift  Pt. Free of falls and injuries this shift. Problem: Falls - Risk of:  Goal: Absence of physical injury  Description  Absence of physical injury  Outcome: Met This Shift   No injuries this shift. Patient's safety maintained. Problem: Breathing Pattern - Ineffective:  Goal: Ability to achieve and maintain a regular respiratory rate will improve  Description  Ability to achieve and maintain a regular respiratory rate will improve  4/30/2019 0443 by Deya Faulkner RN  Outcome: Met This Shift  Patient has shown no signs of difficulty breathing or respiratory distress this shift. Will continue to monitor. Problem: Anxiety:  Goal: Level of anxiety will decrease  Description  Level of anxiety will decrease  Outcome: Met This Shift  Patient has not appeared anxious this shift. Patient is resting calmly in room with . Will continue to monitor.

## 2019-04-30 NOTE — PROGRESS NOTES
Nutrition Assessment    Type and Reason for Visit: Initial(Length of stay)    Nutrition Recommendations: Continue Cardiac diet. Start Ensure High Protein 2x/day. Nutrition Assessment: Patient is severely malnourished as evidence by poor p.o intakes, fat and muscle mass loss. Patient is at risk for further compromise due to generalized weakness and inadequate oral intake. Will start Ensure High Protein twice a day. Will monitor p.o intakes. Malnutrition Assessment:  · Malnutrition Status: Meets the criteria for severe malnutrition  · Context: Acute illness or injury  · Findings of the 6 clinical characteristics of malnutrition (Minimum of 2 out of 6 clinical characteristics is required to make the diagnosis of moderate or severe Protein Calorie Malnutrition based on AND/ASPEN Guidelines):  1. Energy Intake-Less than or equal to 50% of estimated energy requirement, Greater than or equal to 5 days    2. Weight Loss-No significant weight loss,    3. Fat Loss-Severe subcutaneous fat loss, Triceps  4. Muscle Loss-Severe muscle mass loss, Clavicles (pectoralis and deltoids)  5. Fluid Accumulation-No significant fluid accumulation, Extremities  6.   Strength-Not measured    Nutrition Risk Level: High    Nutrient Needs:  · Estimated Daily Total Kcal: 4621-7395 kcals based on 20-22 kcal/ kg of admission weight   · Estimated Daily Protein (g): 68-74 gm of protein based on 1.2-1.3 gm/ kg of ideal weight     Nutrition Diagnosis:   · Problem: Severe malnutrition  · Etiology: related to Insufficient energy/nutrient consumption     Signs and symptoms:  as evidenced by Diet history of poor intake, Intake 25-50%, Intake 0-25%, Severe loss of subcutaneous fat, Severe muscle loss    Objective Information:  · Nutrition-Focused Physical Findings: Generalized weakness  · Current Nutrition Therapies:  · Oral Diet Orders: Cardiac   · Oral Diet intake: 26-50%  · Oral Nutrition Supplement (ONS) Orders: None  · Anthropometric Measures:  · Ht: 5' 5\" (165.1 cm)   · Current Body Wt: 170 lb (77.1 kg)  · Admission Body Wt: 170 lb (77.1 kg)  · Ideal Body Wt: 125 lb (56.7 kg), % Ideal Body 136%  · BMI Classification: BMI 25.0 - 29.9 Overweight    Nutrition Interventions:   Continue current diet, Start ONS  Continued Inpatient Monitoring    Nutrition Evaluation:   · Evaluation: Goals set   · Goals: PO intakes are % at meals    · Monitoring: Weight, Supplement Intake, Meal Intake, Diet Tolerance, Skin Integrity, I&O, Monitor Bowel Function, Pertinent Labs      [facility], RVICKIE, L.D,  Clinical Dietitian  Cell # [phone]  Office # [phone]

## 2019-04-30 NOTE — PLAN OF CARE
Nutrition Problem: Severe malnutrition  Intervention: Food and/or Nutrient Delivery: Continue current diet, Start ONS  Nutritional Goals: PO intakes are % at meals

## 2019-05-01 ENCOUNTER — APPOINTMENT (OUTPATIENT)
Dept: GENERAL RADIOLOGY | Age: 79
DRG: 193 | End: 2019-05-01
Payer: MEDICARE

## 2019-05-01 VITALS
DIASTOLIC BLOOD PRESSURE: 68 MMHG | BODY MASS INDEX: 28.47 KG/M2 | WEIGHT: 170.86 LBS | HEART RATE: 58 BPM | TEMPERATURE: 96.8 F | RESPIRATION RATE: 16 BRPM | HEIGHT: 65 IN | OXYGEN SATURATION: 94 % | SYSTOLIC BLOOD PRESSURE: 142 MMHG

## 2019-05-01 PROBLEM — N30.00 ACUTE CYSTITIS WITHOUT HEMATURIA: Status: ACTIVE | Noted: 2019-05-01

## 2019-05-01 PROBLEM — R41.0 DISORIENTATION: Status: ACTIVE | Noted: 2019-05-01

## 2019-05-01 LAB
ANION GAP SERPL CALCULATED.3IONS-SCNC: 13 MMOL/L (ref 9–17)
BUN BLDV-MCNC: 17 MG/DL (ref 8–23)
BUN/CREAT BLD: ABNORMAL (ref 9–20)
CALCIUM SERPL-MCNC: 9.6 MG/DL (ref 8.6–10.4)
CHLORIDE BLD-SCNC: 107 MMOL/L (ref 98–107)
CO2: 22 MMOL/L (ref 20–31)
CREAT SERPL-MCNC: 1.3 MG/DL (ref 0.5–0.9)
CULTURE: NORMAL
GFR AFRICAN AMERICAN: 48 ML/MIN
GFR NON-AFRICAN AMERICAN: 40 ML/MIN
GFR SERPL CREATININE-BSD FRML MDRD: ABNORMAL ML/MIN/{1.73_M2}
GFR SERPL CREATININE-BSD FRML MDRD: ABNORMAL ML/MIN/{1.73_M2}
GLUCOSE BLD-MCNC: 86 MG/DL (ref 70–99)
Lab: NORMAL
POTASSIUM SERPL-SCNC: 4 MMOL/L (ref 3.7–5.3)
PROCALCITONIN: 0.06 NG/ML
SODIUM BLD-SCNC: 142 MMOL/L (ref 135–144)
SPECIMEN DESCRIPTION: NORMAL

## 2019-05-01 PROCEDURE — 71046 X-RAY EXAM CHEST 2 VIEWS: CPT

## 2019-05-01 PROCEDURE — 94640 AIRWAY INHALATION TREATMENT: CPT

## 2019-05-01 PROCEDURE — 6370000000 HC RX 637 (ALT 250 FOR IP): Performed by: FAMILY MEDICINE

## 2019-05-01 PROCEDURE — 97162 PT EVAL MOD COMPLEX 30 MIN: CPT

## 2019-05-01 PROCEDURE — 2580000003 HC RX 258: Performed by: FAMILY MEDICINE

## 2019-05-01 PROCEDURE — 6360000002 HC RX W HCPCS: Performed by: FAMILY MEDICINE

## 2019-05-01 PROCEDURE — 97116 GAIT TRAINING THERAPY: CPT

## 2019-05-01 PROCEDURE — 36415 COLL VENOUS BLD VENIPUNCTURE: CPT

## 2019-05-01 PROCEDURE — 84145 PROCALCITONIN (PCT): CPT

## 2019-05-01 PROCEDURE — 80048 BASIC METABOLIC PNL TOTAL CA: CPT

## 2019-05-01 PROCEDURE — 94761 N-INVAS EAR/PLS OXIMETRY MLT: CPT

## 2019-05-01 PROCEDURE — 94760 N-INVAS EAR/PLS OXIMETRY 1: CPT

## 2019-05-01 RX ORDER — BISACODYL 10 MG
10 SUPPOSITORY, RECTAL RECTAL DAILY PRN
Status: DISCONTINUED | OUTPATIENT
Start: 2019-05-01 | End: 2019-05-01 | Stop reason: HOSPADM

## 2019-05-01 RX ORDER — SODIUM PHOSPHATE, DIBASIC AND SODIUM PHOSPHATE, MONOBASIC 7; 19 G/133ML; G/133ML
1 ENEMA RECTAL
Status: COMPLETED | OUTPATIENT
Start: 2019-05-01 | End: 2019-05-01

## 2019-05-01 RX ADMIN — IPRATROPIUM BROMIDE AND ALBUTEROL SULFATE 1 AMPULE: .5; 3 SOLUTION RESPIRATORY (INHALATION) at 06:59

## 2019-05-01 RX ADMIN — MEMANTINE 10 MG: 10 TABLET ORAL at 09:18

## 2019-05-01 RX ADMIN — BUPROPION HYDROCHLORIDE 150 MG: 150 TABLET, FILM COATED, EXTENDED RELEASE ORAL at 09:06

## 2019-05-01 RX ADMIN — BISACODYL 10 MG: 10 SUPPOSITORY RECTAL at 13:08

## 2019-05-01 RX ADMIN — HYDRALAZINE HYDROCHLORIDE 50 MG: 50 TABLET, FILM COATED ORAL at 09:02

## 2019-05-01 RX ADMIN — CLONIDINE HYDROCHLORIDE 0.3 MG: 0.3 TABLET ORAL at 09:16

## 2019-05-01 RX ADMIN — GABAPENTIN 100 MG: 100 CAPSULE ORAL at 09:06

## 2019-05-01 RX ADMIN — IPRATROPIUM BROMIDE AND ALBUTEROL SULFATE 1 AMPULE: .5; 3 SOLUTION RESPIRATORY (INHALATION) at 15:03

## 2019-05-01 RX ADMIN — HYDRALAZINE HYDROCHLORIDE 50 MG: 50 TABLET, FILM COATED ORAL at 17:01

## 2019-05-01 RX ADMIN — MEROPENEM 1 G: 1 INJECTION, POWDER, FOR SOLUTION INTRAVENOUS at 11:16

## 2019-05-01 RX ADMIN — IPRATROPIUM BROMIDE AND ALBUTEROL SULFATE 1 AMPULE: .5; 3 SOLUTION RESPIRATORY (INHALATION) at 18:50

## 2019-05-01 RX ADMIN — CLOPIDOGREL BISULFATE 75 MG: 75 TABLET ORAL at 09:16

## 2019-05-01 RX ADMIN — GUAIFENESIN 600 MG: 600 TABLET, EXTENDED RELEASE ORAL at 09:06

## 2019-05-01 RX ADMIN — ALLOPURINOL 100 MG: 100 TABLET ORAL at 09:06

## 2019-05-01 RX ADMIN — SODIUM PHOSPHATE 1 ENEMA: 7; 19 ENEMA RECTAL at 18:23

## 2019-05-01 RX ADMIN — ENOXAPARIN SODIUM 40 MG: 100 INJECTION SUBCUTANEOUS at 09:25

## 2019-05-01 RX ADMIN — CLONIDINE HYDROCHLORIDE 0.3 MG: 0.3 TABLET ORAL at 19:53

## 2019-05-01 RX ADMIN — PANTOPRAZOLE SODIUM 40 MG: 40 TABLET, DELAYED RELEASE ORAL at 06:45

## 2019-05-01 RX ADMIN — METOPROLOL SUCCINATE 100 MG: 100 TABLET, EXTENDED RELEASE ORAL at 09:06

## 2019-05-01 RX ADMIN — FERROUS SULFATE TAB 325 MG (65 MG ELEMENTAL FE) 325 MG: 325 (65 FE) TAB at 09:06

## 2019-05-01 ASSESSMENT — PAIN SCALES - GENERAL: PAINLEVEL_OUTOF10: 4

## 2019-05-01 ASSESSMENT — PAIN DESCRIPTION - ORIENTATION: ORIENTATION: LOWER

## 2019-05-01 ASSESSMENT — PAIN DESCRIPTION - LOCATION: LOCATION: BACK

## 2019-05-01 ASSESSMENT — PAIN DESCRIPTION - PAIN TYPE: TYPE: CHRONIC PAIN

## 2019-05-01 NOTE — PROGRESS NOTES
Dr. Zane Agrawal        Patient:  Manuel Rashid  YOB: 1940    MRN: [id number]     Acct: [de-identified]     Admit date: 4/24/2019    Pt seen and Chart reviewed. Consultant notes reviewed and care evaluated. Problem List:  Patient Active Problem List   Diagnosis Code    [facility] C50.919    Renal cyst N28.1    Lumbar degenerative disc disease M51.36    Arthropathy of lumbar facet joint M47.816    Failed back syndrome of lumbar spine M96.1    Lumbar spondylosis M47.816    Sacroiliitis ([facility] M46.1    Status post lumbar spinal fusion Z98.1    TIA (transient ischemic attack) G45.9    Anemia D64.9    Dyspnea R06.00    COPD with acute exacerbation ([facility] J44.1    Acute renal failure (HCC) N17.9    Acute renal failure (ARF) (HCC) N17.9    Pneumonia J18.9    CKD (chronic kidney disease) stage 4, GFR 15-29 ml/min (HCC) N18.4    Left renal atrophy N26.1    Severe malnutrition (HCC) E43         Subjective: cont in pt care, home with  at [facility] when able    Diet:  DIET CARDIAC;   Dietary Nutrition Supplements: Low Calorie High Protein Supplement      Medications:Current Inpatient    Scheduled Meds:   hydrALAZINE  50 mg Oral Q8H    cloNIDine  0.3 mg Oral BID    guaiFENesin  600 mg Oral BID    enoxaparin  40 mg Subcutaneous Daily    ipratropium-albuterol  1 ampule Inhalation TID    allopurinol  100 mg Oral BID    buPROPion  150 mg Oral BID    clopidogrel  75 mg Oral Daily    ferrous sulfate  325 mg Oral BID    gabapentin  100 mg Oral Daily    memantine  10 mg Oral BID    metoprolol succinate  100 mg Oral BID    pantoprazole  40 mg Oral QAM AC    simvastatin  80 mg Oral Nightly    sodium chloride flush  10 mL Intravenous 2 times per day    meropenem  1 g Intravenous Q12H     Continuous Infusions:   sodium chloride 50 mL/hr at 04/30/19 0600     PRN Meds:magic (miracle) mouthwash with nystatin, ALPRAZolam, benzocaine-menthol, acetaminophen, benzonatate, sodium chloride flush, magnesium hydroxide, ondansetron    Objective:    Physical Exam:  Vitals: BP (!) 180/54   Pulse 62   Temp 96.2 °F (35.7 °C) (Oral)   Resp 16   Ht 5' 5\" (1.651 m)   Wt 170 lb 13.7 oz (77.5 kg)   SpO2 96%   BMI 28.43 kg/m²   24 hour intake/output:    Intake/Output Summary (Last 24 hours) at 5/1/2019 0925  Last data filed at 5/1/2019 0650  Gross per 24 hour   Intake 664 ml   Output 350 ml   Net 314 ml     Last 3 weights: Wt Readings from Last 3 Encounters:   05/01/19 170 lb 13.7 oz (77.5 kg)   04/21/19 177 lb 7.5 oz (80.5 kg)   03/16/19 175 lb 0.7 oz (79.4 kg)       Physical Examination:   General appearance - confused  Mental status - alert, oriented to person, place, and time, confused  Chest - clear to auscultation, no wheezes, rales or rhonchi, symmetric air entry  Heart - normal rate, regular rhythm, normal S1, S2, no murmurs, rubs, clicks or gallops  Abdomen - soft, nontender, nondistended, no masses or organomegaly  Neurological - alert, oriented, normal speech, no focal findings or movement disorder noted}  Extremities - peripheral pulses normal, no pedal edema, no clubbing or cyanosis  Skin - normal coloration and turgor, no rashes, no suspicious skin lesions noted     Labs:-    CBC: No results for input(s): WBC, HGB, PLT in the last 72 hours. BMP:    Recent Labs     04/29/19  0715 04/30/19  0850 05/01/19  0751    141 142   K 3.9 3.8 4.0    107 107   CO2 24 23 22   BUN 13 16 17   CREATININE 1.14* 1.29* 1.30*   GLUCOSE 99 116* 86     Glucose:No results for input(s): POCGLU in the last 72 hours. HgbA1C: No results for input(s): LABA1C in the last 72 hours. INR: No results for input(s): INR in the last 72 hours. CARDIAC ENZYMES:No results for input(s): CKTOTAL, CKMB, CKMBINDEX, TROPONINI in the last 72 hours.   BNP: No results for input(s): BNP in the last 72 hours. Lipids: No results for input(s): CHOL, TRIG, HDL, LDLCALC in the last 72 hours. Invalid input(s): LDL  ABGs: No results found for: PH, PCO2, PO2, HCO3, O2SAT  Thyroid:   Lab Results   Component Value Date    TSH 2.65 03/16/2019      Urinalysis:   Color, UA   Date Value Ref Range Status   04/24/2019 YELLOW YELLOW Final     pH, UA   Date Value Ref Range Status   04/24/2019 5.5 5.0 - 8.0 Final     Specific Gravity, UA   Date Value Ref Range Status   04/24/2019 1.016 1.000 - 1.030 Final     Protein, UA   Date Value Ref Range Status   04/24/2019 1+ (A) NEGATIVE Final     RBC, UA   Date Value Ref Range Status   04/24/2019 0 TO 2 /HPF Final     Bacteria, UA   Date Value Ref Range Status   04/24/2019 NOT REPORTED None Final     Nitrite, Urine   Date Value Ref Range Status   04/24/2019 NEGATIVE NEGATIVE Final     WBC, UA   Date Value Ref Range Status   04/24/2019 2 TO 5 /HPF Final     Leukocyte Esterase, Urine   Date Value Ref Range Status   04/24/2019 NEGATIVE NEGATIVE Final     Yeast, UA   Date Value Ref Range Status   04/24/2019 NOT REPORTED None Final     Glucose, Ur   Date Value Ref Range Status   04/24/2019 NEGATIVE NEGATIVE Final     Bilirubin Urine   Date Value Ref Range Status   04/24/2019 NEGATIVE NEGATIVE Final       CULTURES:    Current Rehabilitation Assessments:  PHYSICAL THERAPY:     OCCUPATIONAL THERAPY:     SPEECH:      Assessment:  Active Problems:    Pneumonia    Severe malnutrition (HCC)  Resolved Problems:    * No resolved hospital problems. *      Plan:  1. Attempt to ambulate with  assist, and PT at disch  2.  Maame Hughes MD             5/1/2019, 9:25 AM

## 2019-05-01 NOTE — PROGRESS NOTES
Writer calls puts a page out for Chidi Love on call for [facility] Department to let them know that pt will discharge tonight and will need merrem dose. Expecting call back.

## 2019-05-01 NOTE — DISCHARGE SUMMARY
Physician Discharge Summary     Patient ID:  Waqas Diaz  [id number]  5 y.o.  1940    Admit date: 4/24/2019    Discharge date and time:  5/1/19    Admitting Physician: Néstor Armas DO     Discharge Physician: Néstor Armas DO      Admission Diagnoses:   Pneumonia [J18.9]  Pneumonia [J18.9]    Discharge Diagnoses:   Patient Active Problem List   Diagnosis Code    Breast cancer ([facility] C50.919    Renal cyst N28.1    Lumbar degenerative disc disease M51.36    Arthropathy of lumbar facet joint M47.816    Failed back syndrome of lumbar spine M96.1    Lumbar spondylosis M47.816    Sacroiliitis ([address] M46.1    Status post lumbar spinal fusion Z98.1    TIA (transient ischemic attack) G45.9    Anemia D64.9    Dyspnea R06.00    COPD with acute exacerbation ([address] J44.1    Acute renal failure ([address] N17.9    Acute renal failure (ARF) ([address] N17.9    Pneumonia J18.9    CKD (chronic kidney disease) stage 4, GFR 15-29 ml/min ([facility]) N18.4    Left renal atrophy N26.1    Severe malnutrition ([facility]) E43    Acute cystitis without hematuria N30.00    Disorientation R41.0     Active Problems:    Lumbar degenerative disc disease    Pneumonia    Severe malnutrition ([address]    Acute cystitis without hematuria    Disorientation  Resolved Problems:    * No resolved hospital problems.  *      Admission Condition: poor    Discharged Condition: good    Hospital Course: IV fluids, Meropenem, adj meds, aerosols and thoracentesis    Significant Diagnostic Studies: labs: renal and CBC and microbiology: urine culture: positive and pleural fluid negative     Treatments: IV hydration, antibiotics: Meropenem, respiratory therapy: O2 and albuterol/atropine nebulizer and procedures: thoracentesis negative    Disposition: home    Patient Instructions:     Discharge Medications:  Current Discharge Medication List           Details   meropenem (MERREM) infusion Infuse 1,000 mg intravenously every 12 hours for 7 days Compound per protocol. Qty: 31021 mg, Refills: 0              Details   gabapentin (NEURONTIN) 100 MG capsule Take 100 mg by mouth daily. metoprolol succinate (TOPROL XL) 100 MG extended release tablet Take 1 tablet by mouth 2 times daily  Qty: 30 tablet, Refills: 3      buPROPion (WELLBUTRIN SR) 150 MG extended release tablet Take 150 mg by mouth 2 times daily      cloNIDine (CATAPRES) 0.1 MG/24HR PTWK Place 1 patch onto the skin once a week      benzonatate (TESSALON) 200 MG capsule Take 200 mg by mouth daily as needed for Cough      ezetimibe (ZETIA) 10 MG tablet Take 10 mg by mouth daily      simvastatin (ZOCOR) 80 MG tablet Take 80 mg by mouth nightly      memantine (NAMENDA) 5 MG tablet Take 2 tablets by mouth 2 times daily  Qty: 60 tablet, Refills: 3      omeprazole (PRILOSEC) 20 MG delayed release capsule TAKE 1 CAPSULE DAILY  Qty: 30 capsule, Refills: 3    Associated Diagnoses: Malignant neoplasm of left female breast, unspecified estrogen receptor status, unspecified site of breast (HCC)      ferrous sulfate 325 (65 Fe) MG tablet Take 325 mg by mouth 2 times daily       clopidogrel (PLAVIX) 75 MG tablet Take 1 tablet by mouth daily  Qty: 30 tablet, Refills: 4      calcium-vitamin D (OSCAL-500) 500-200 MG-UNIT per tablet Take 1 tablet by mouth 2 times daily       allopurinol (ZYLOPRIM) 100 MG tablet Take 100 mg by mouth 2 times daily. Activity: home health PT/OT, monitor B{ and gen care  IV MEROPENEM THRU 5/3    Diet: regular diet     Follow-up with Kerri Torrez DO in 1 to 2 weeks, patient to call  for an appointment and if has any problems.       Electronically signed by Milly Clarke DO  on 5/1/2019 at 5:21 PM

## 2019-05-01 NOTE — PROGRESS NOTES
Yes  Ambulation Assistance: Independent  Transfer Assistance: Independent  Active : Yes  Mode of Transportation: [facility]  Additional Comments:  home 24/7  Cognition        Objective          AROM RLE (degrees)  RLE AROM: WFL  AROM LLE (degrees)  LLE AROM : WFL  AROM RUE (degrees)  RUE AROM : WFL  AROM LUE (degrees)  LUE AROM : WFL  Strength RLE  Strength RLE: WFL  Comment: grossly 4/5  Strength LLE  Strength LLE: WFL  Comment: grossly 4/5  Strength RUE  Strength RUE: WFL  Comment: grossly 4/5  Strength LUE  Strength LUE: WFL  Comment: grossly 4/5     Sensation  Overall Sensation Status: Impaired(L first three fingers)  Bed mobility  Sit to Supine: Stand by assistance  Scooting: Minimal assistance  Comment: Initially pt sitting in chair and left in bed supine at end of session  Transfers  Sit to Stand: Contact guard assistance  Stand to sit: Contact guard assistance  Bed to Chair: Contact guard assistance  Comment: Pt with use of RW for transfers. verbal cues needed for hand placement  Ambulation  Ambulation?: Yes  Ambulation 1  Surface: level tile  Device: Rolling Walker  Assistance: Contact guard assistance  Quality of Gait: decreaed endurance, decreased, antalgic gait due to R knee pain  Distance: 50 ft  Stairs/Curb  Stairs?: No     Balance  Posture: Fair  Sitting - Static: Good  Sitting - Dynamic: Good  Standing - Static: Good;-  Standing - Dynamic: Fair;+  Comments: standing with RW        Plan   Plan  Times per week: 5-6x/wk  Specific instructions for Next Treatment: progress mobility as tolerated  Current Treatment Recommendations: Strengthening, ROM, Balance Training, Functional Mobility Training, Transfer Training, Endurance Training, Gait Training, Home Exercise Program, Safety Education & Training, Patient/Caregiver Education & Training  Safety Devices  Type of devices:  All fall risk precautions in place, Call light within reach, Gait belt, Patient at risk for falls, Left in bed    G-Code OutComes Score                                                  AM-PAC Score     AM-PAC Inpatient Mobility without Stair Climbing Raw Score : 15  AM-PAC Inpatient without Stair Climbing T-Scale Score : 43.03  Mobility Inpatient CMS 0-100% Score: 47.43  Mobility Inpatient without Stair CMS G-Code Modifier : CK       Goals  Short term goals  Time Frame for Short term goals: 5-6 visits  Short term goal 1: Pt to perform bed mobility independent  Short term goal 2: Pt to perform transfers with RW mod I  Short term goal 3: Pt to amb with RW on level surface 100-150 ft mod I  Short term goal 4: Pt to perform general strengthening exercises  Patient Goals   Patient goals : get stronger       Therapy Time   Individual Concurrent Group Co-treatment   Time In 1026         Time Out 1057         Minutes 31         Timed Code Treatment Minutes: 10 Minutes     PT evaluation/treatment is completed by TAWNYA Davidson under the direct supervision of co-signing therapist, who agrees with all documentation and evaluation/treatment  TAWNYA Davidson

## 2019-05-01 NOTE — PROGRESS NOTES
PULMONARY PROGRESS NOTE:    REASON FOR VISIT: Pl effusion, mediastinal adenopathy  Interval History:    Shortness of Breath: no  Cough: no  Sputum: no          Hemoptysis: no  Chest Pain: no  Fever: no                   Swelling Feet: no  Headache: no                                           Nausea, Emesis, Abdominal Pain: no  Diarrhea: no         Constipation: no  Feels weak  Events since last visit: none    PAST MEDICAL HISTORY:      Scheduled Meds:   [START ON 5/1/2019] hydrALAZINE  50 mg Oral Q8H    cloNIDine  0.3 mg Oral BID    guaiFENesin  600 mg Oral BID    enoxaparin  40 mg Subcutaneous Daily    ipratropium-albuterol  1 ampule Inhalation TID    allopurinol  100 mg Oral BID    buPROPion  150 mg Oral BID    clopidogrel  75 mg Oral Daily    ferrous sulfate  325 mg Oral BID    gabapentin  100 mg Oral Daily    memantine  10 mg Oral BID    metoprolol succinate  100 mg Oral BID    pantoprazole  40 mg Oral QAM AC    simvastatin  80 mg Oral Nightly    sodium chloride flush  10 mL Intravenous 2 times per day    meropenem  1 g Intravenous Q12H     Continuous Infusions:   sodium chloride 50 mL/hr at 04/30/19 0600     PRN Meds:ALPRAZolam, benzocaine-menthol, acetaminophen, benzonatate, sodium chloride flush, magnesium hydroxide, ondansetron        PHYSICAL EXAMINATION:  /71   Pulse 50   Temp 97.2 °F (36.2 °C) (Oral)   Resp 16   Ht 5' 5\" (1.651 m)   Wt 170 lb 10.2 oz (77.4 kg)   SpO2 96%   BMI 28.40 kg/m²     General : Awake, alert,   Neck - supple, no lymphadenopathy, JVD not raised  Heart - regular rhythm, S1 and S2 normal; no additional sounds heard  Lungs - Air Entry- fair bilaterally; breath sounds : vesicular;   rales/crackles - absent  Abdomen - soft, no tenderness  Upper Extremities  - no cyanosis, mottling; edema : absent  Lower Extremities: no cyanosis, mottling; edema : absent    Current Laboratory, Radiologic, Microbiologic, and Diagnostic studies reviewed  Data ReviewCBC: No results for input(s): WBC, RBC, HGB, HCT, PLT in the last 72 hours. BMP:   Recent Labs     04/28/19  0724 04/29/19  0715 04/30/19  0850   GLUCOSE 107* 99 116*    139 141   K 4.3 3.9 3.8   BUN 13 13 16   CREATININE 1.32* 1.14* 1.29*   CALCIUM 9.3 9.1 9.6     ABGs: No results for input(s): PHART, PO2ART, QDO2OYN, KRK7KBI, BEART, A6YXNGDN, CMM7GYW in the last 72 hours.    PT/INR:  No results found for: PTINR    ASSESSMENT / PLAN:    Mediastinal adenopathy/ Pulm Nodules - non specific; consider PET scan as OP and possible EBUS  COPD/ smoker - BD  Suspect pneumonia - empiric ABx, check procalcitonin - 0.14 - continue ABx  Hx breast cancer  Renal Insufficiency   Add cepacol  S/p thoracentesis - 4/29  OK for DC from [facility]    Electronically signed by Florecita Sy on 04/30/19

## 2019-05-01 NOTE — PROGRESS NOTES
Fahad Olmos returns call-- Loveclara Lee agrees to fax  H&P, face sheet, and AVS to [facility] at [phone]. Fahad Olmos is calling [facility] herself. Will be able to come out to [facility] to administer med.

## 2019-05-01 NOTE — CARE COORDINATION
ONGOING DISCHARGE PLAN:    Spoke with patient regarding discharge plan and patient confirms that plan is still home with [address] for IV Merrem through 5/3/19. CXR done today shows chronic pulmonary changes without focal airspace consolidation. Per pulm notes from yesterday, pt is okay to d/c from pulm standpoint. Will continue to follow for additional discharge needs. Electronically signed by Cortes Overton RN on 5/1/2019 at 1:58 PM    Spoke with Zuleima Steen from [place] to inform her that pt might be d/c'ed home today. She states coram needs to know by 1615 so that the drug can be mixed and delivered for 9pm dose tonight. Notified clin lead, Laila, who has a page out to Dr. Saralyn Meckel. Electronically signed by Cortes Overton RN on 5/1/2019 at 3:46 PM    Notified Adwoa from [place] that we have not heard back from Dr. Saralyn Meckel. Pt still has not had BM, so pt will not be d/c'ed home tonight.     Electronically signed by Cortes Overton RN on 5/1/2019 at 4:41 PM

## 2019-05-02 ENCOUNTER — CARE COORDINATION (OUTPATIENT)
Dept: CASE MANAGEMENT | Age: 79
End: 2019-05-02

## 2019-05-02 NOTE — PROGRESS NOTES
PULMONARY PROGRESS NOTE:    REASON FOR VISIT: mediastinal adenopathy, pl effusion  Interval History:    Shortness of Breath: +, better  Cough: no  Sputum: no          Hemoptysis: no  Chest Pain: no  Fever: no                   Swelling Feet: no  Headache: no                                           Nausea, Emesis, Abdominal Pain: no  Diarrhea: no         Constipation: +  Feels weak  Events since last visit: none    PAST MEDICAL HISTORY:      Scheduled Meds:   hydrALAZINE  50 mg Oral Q8H    cloNIDine  0.3 mg Oral BID    guaiFENesin  600 mg Oral BID    enoxaparin  40 mg Subcutaneous Daily    ipratropium-albuterol  1 ampule Inhalation TID    allopurinol  100 mg Oral BID    buPROPion  150 mg Oral BID    clopidogrel  75 mg Oral Daily    ferrous sulfate  325 mg Oral BID    gabapentin  100 mg Oral Daily    memantine  10 mg Oral BID    metoprolol succinate  100 mg Oral BID    pantoprazole  40 mg Oral QAM AC    simvastatin  80 mg Oral Nightly    sodium chloride flush  10 mL Intravenous 2 times per day    meropenem  1 g Intravenous Q12H     Continuous Infusions:   sodium chloride 50 mL/hr at 04/30/19 0600     PRN Meds:magic (miracle) mouthwash with nystatin, bisacodyl, ALPRAZolam, benzocaine-menthol, acetaminophen, benzonatate, sodium chloride flush, magnesium hydroxide, ondansetron        PHYSICAL EXAMINATION:  BP (!) 142/68   Pulse 58   Temp 96.8 °F (36 °C)   Resp 16   Ht 5' 5\" (1.651 m)   Wt 170 lb 13.7 oz (77.5 kg)   SpO2 94%   BMI 28.43 kg/m²     General : Awake, alert,   Neck - supple, no lymphadenopathy, JVD not raised  Heart - regular rhythm, S1 and S2 normal; no additional sounds heard  Lungs - Air Entry- fair bilaterally; breath sounds : vesicular;   rales/crackles - absent  Abdomen - soft, no tenderness  Upper Extremities  - no cyanosis, mottling; edema : absent  Lower Extremities: no cyanosis, mottling; edema : absent    Current Laboratory, Radiologic, Microbiologic, and Diagnostic studies reviewed  Data ReviewCBC: No results for input(s): WBC, RBC, HGB, HCT, PLT in the last 72 hours. BMP:   Recent Labs     04/29/19  0715 04/30/19  0850 05/01/19  0751   GLUCOSE 99 116* 86    141 142   K 3.9 3.8 4.0   BUN 13 16 17   CREATININE 1.14* 1.29* 1.30*   CALCIUM 9.1 9.6 9.6     ABGs: No results for input(s): PHART, PO2ART, EUX5MIZ, NHI8AYK, BEART, L7URWART, PGW3CKL in the last 72 hours.    PT/INR:  No results found for: PTINR    ASSESSMENT / PLAN:    Mediastinal adenopathy/ Pulm Nodules - non specific; consider PET scan as OP and possible EBUS  COPD/ smoker - BD  Suspect pneumonia - empiric ABx, check procalcitonin - 0.14 - continue ABx  Hx breast cancer  Renal Insufficiency   Add cepacol  S/p thoracentesis - 4/29 - no malignant cell seen, culture NG so far  OK for DC from [facility]        Electronically signed by Cathy Reddy on 05/01/19

## 2019-05-02 NOTE — CARE COORDINATION
Omar 45 Transitions Initial Follow Up Call    Call within 2 business days of discharge: Yes    Patient: Christo Jacobs Patient : 1940   MRN: [id number]  Reason for Admission: pneumonai  Discharge Date: 19 RARS: Readmission Risk Score: 26      Last Discharge [facility]       Complaint Diagnosis Description Type Department Provider    19 Emesis; Nausea Pneumonia due to organism . .. ED to Hosp-Admission (Discharged) (ADMITTED) [facility] Joseph, DO; Ryder Enriquez. ..            Spoke with: Samira Swift and also spoke to [address] [place] [address]: [facility]    Non-face-to-face services provided:  Obtained and reviewed discharge summary and/or continuity of care documents  Assessment and support for treatment adherence and medication management-reviewed discharge instructions and medications, 1111F order    Writer spoke to patient, she stated she was doing good just tired, reviewed BPCI-A medicare benefit with patient, VN from [facility] was present during call, writer reviewed discharge instructions and medications with the patient and then with the Raymond Griffin VN, patient has all her medications, Raymond Griffin had question about follow up chest X-ray,  She will clarify with patient's pcp when she calls office today, patient has a follow up appointment with Dr. Gi Yang on 19, will call and schedule own follow up appointment with PCP, will forward note on to Alisha BERGER//RAHUL    Care Transitions 24 Hour Call    Do you have any ongoing symptoms?:  Yes  Patient-reported symptoms:  Weakness, Fatigue  Do you have a copy of your discharge instructions?:  Yes  Do you have all of your prescriptions and are they filled?:  Yes  Were you discharged with any Home Care or Post Acute Services:  Yes  Post Acute Services:  Home Health (Comment: [facility])  Do you feel like you have everything you need to keep you well at home?:  Yes  Care Transitions Interventions     Other Services:   (Comment: [facility]) Follow Up  Future Appointments   Date Time Provider David Ospina   5/6/2019 12:45 PM Keegan Hernandez MD [facility] None       Arden Fuentes RN

## 2019-05-05 LAB
CULTURE: ABNORMAL
DIRECT EXAM: ABNORMAL
Lab: ABNORMAL
SPECIMEN DESCRIPTION: ABNORMAL

## 2019-05-06 ENCOUNTER — HOSPITAL ENCOUNTER (OUTPATIENT)
Age: 79
Discharge: HOME | End: 2019-05-06
Payer: MEDICARE

## 2019-05-06 DIAGNOSIS — N18.30 CKD (CHRONIC KIDNEY DISEASE), STAGE III (HCC): ICD-10-CM

## 2019-05-06 DIAGNOSIS — N28.1 RENAL CYST: ICD-10-CM

## 2019-05-06 DIAGNOSIS — N26.1 LEFT RENAL ATROPHY: ICD-10-CM

## 2019-05-06 DIAGNOSIS — I12.9 BENIGN HYPERTENSION WITH CKD (CHRONIC KIDNEY DISEASE) STAGE III (HCC): ICD-10-CM

## 2019-05-06 DIAGNOSIS — N18.30 BENIGN HYPERTENSION WITH CKD (CHRONIC KIDNEY DISEASE) STAGE III (HCC): ICD-10-CM

## 2019-05-06 LAB
ANION GAP SERPL CALCULATED.3IONS-SCNC: 15 MMOL/L (ref 9–17)
BUN BLDV-MCNC: 19 MG/DL (ref 8–23)
CHLORIDE BLD-SCNC: 98 MMOL/L (ref 98–107)
CO2: 22 MMOL/L (ref 20–31)
CREAT SERPL-MCNC: 1.7 MG/DL (ref 0.5–0.9)
GFR AFRICAN AMERICAN: 35 ML/MIN
GFR NON-AFRICAN AMERICAN: 29 ML/MIN
GFR SERPL CREATININE-BSD FRML MDRD: ABNORMAL ML/MIN/{1.73_M2}
GFR SERPL CREATININE-BSD FRML MDRD: ABNORMAL ML/MIN/{1.73_M2}
POTASSIUM SERPL-SCNC: 4.1 MMOL/L (ref 3.7–5.3)
SODIUM BLD-SCNC: 135 MMOL/L (ref 135–144)

## 2019-05-06 PROCEDURE — 36415 COLL VENOUS BLD VENIPUNCTURE: CPT

## 2019-05-06 PROCEDURE — 84520 ASSAY OF UREA NITROGEN: CPT

## 2019-05-06 PROCEDURE — 80051 ELECTROLYTE PANEL: CPT

## 2019-05-06 PROCEDURE — 82565 ASSAY OF CREATININE: CPT

## 2019-05-15 LAB
EKG ATRIAL RATE: 49 BPM
EKG ATRIAL RATE: 67 BPM
EKG P AXIS: 14 DEGREES
EKG P AXIS: 67 DEGREES
EKG P-R INTERVAL: 148 MS
EKG P-R INTERVAL: 166 MS
EKG Q-T INTERVAL: 424 MS
EKG Q-T INTERVAL: 484 MS
EKG QRS DURATION: 88 MS
EKG QRS DURATION: 88 MS
EKG QTC CALCULATION (BAZETT): 437 MS
EKG QTC CALCULATION (BAZETT): 448 MS
EKG R AXIS: -26 DEGREES
EKG R AXIS: -32 DEGREES
EKG T AXIS: 103 DEGREES
EKG T AXIS: 83 DEGREES
EKG VENTRICULAR RATE: 49 BPM
EKG VENTRICULAR RATE: 67 BPM

## 2019-06-17 LAB
CULTURE: NORMAL
DIRECT EXAM: NORMAL
Lab: NORMAL
SPECIMEN DESCRIPTION: NORMAL

## 2019-06-19 ENCOUNTER — TELEPHONE (OUTPATIENT)
Dept: ONCOLOGY | Age: 79
End: 2019-06-19

## 2019-06-19 ENCOUNTER — OFFICE VISIT (OUTPATIENT)
Dept: ONCOLOGY | Age: 79
End: 2019-06-19
Payer: MEDICARE

## 2019-06-19 VITALS
BODY MASS INDEX: 26.43 KG/M2 | HEART RATE: 61 BPM | DIASTOLIC BLOOD PRESSURE: 80 MMHG | TEMPERATURE: 98.5 F | WEIGHT: 158.8 LBS | SYSTOLIC BLOOD PRESSURE: 126 MMHG | RESPIRATION RATE: 18 BRPM

## 2019-06-19 DIAGNOSIS — C50.912 MALIGNANT NEOPLASM OF LEFT FEMALE BREAST, UNSPECIFIED ESTROGEN RECEPTOR STATUS, UNSPECIFIED SITE OF BREAST (HCC): ICD-10-CM

## 2019-06-19 PROCEDURE — 4040F PNEUMOC VAC/ADMIN/RCVD: CPT | Performed by: INTERNAL MEDICINE

## 2019-06-19 PROCEDURE — G8399 PT W/DXA RESULTS DOCUMENT: HCPCS | Performed by: INTERNAL MEDICINE

## 2019-06-19 PROCEDURE — 99211 OFF/OP EST MAY X REQ PHY/QHP: CPT | Performed by: INTERNAL MEDICINE

## 2019-06-19 PROCEDURE — G8427 DOCREV CUR MEDS BY ELIG CLIN: HCPCS | Performed by: INTERNAL MEDICINE

## 2019-06-19 PROCEDURE — G8419 CALC BMI OUT NRM PARAM NOF/U: HCPCS | Performed by: INTERNAL MEDICINE

## 2019-06-19 PROCEDURE — 1123F ACP DISCUSS/DSCN MKR DOCD: CPT | Performed by: INTERNAL MEDICINE

## 2019-06-19 PROCEDURE — 1036F TOBACCO NON-USER: CPT | Performed by: INTERNAL MEDICINE

## 2019-06-19 PROCEDURE — 1090F PRES/ABSN URINE INCON ASSESS: CPT | Performed by: INTERNAL MEDICINE

## 2019-06-19 PROCEDURE — 99214 OFFICE O/P EST MOD 30 MIN: CPT | Performed by: INTERNAL MEDICINE

## 2019-06-19 NOTE — PROGRESS NOTES
_    Chief Complaint   Patient presents with    Follow-up     review status of disease    Fatigue    Numbness         DIAGNOSIS:  Stage [id number] left breast cancer. CURRENT THERAPY:    1. Arimidex started July 2010. Was discontinued July 2015.   2. Status post radiation therapy of the left breast finished 7/10/2010.  3. Status post lumpectomy followed by re-excision. INTERIM HISTORY: The patient is Seen for follow-up breast cancer. Patient is doing well clinically. No significant complaints at the present time. No chest pain or shortness of breath. She denies feeling any lumps or bumps or enlarged lymph nodes. No weight loss or decreased appetite. Patient denies feeling any breast lumps. No other complaints. REVIEW OF SYSTEMS:   General: She had occasional hot flashes. Eyes: No blurred vision, eye pain or double vision. Ears: No hearing problems or drainage. No tinnitus. Throat: No sore throat, problems with swallowing or dysphagia. Respiratory: No cough, sputum or hemoptysis. No shortness of breath. Cardiovascular: No chest pain, orthopnea or PND. No lower extremity edema. No palpitation. Gastrointestinal: No problems with swallowing. No abdominal pain or bloating. No nausea or vomiting. No diarrhea or constipation. No GI bleeding. Genitourinary: No dysuria, hematuria, frequency or urgency. Musculoskeletal: She has generalized body aches. Dermatologic: No skin rashes or pruritus. No skin lesions or discolorations. Psychiatric: No depression, anxiety, or stress or signs of schizophrenia. Hematologic: No history of bleeding tendency. No bruises or ecchymosis. No history of clotting problems. Infectious disease: No fever, chills or frequent infections. Endocrine: No problems with opacity. No polydipsia or polyuria. Neurologic: No headaches or dizziness. No weakness or numbness of the extremities. SOCIAL HISTORY:  No smoking and no alcohol drinking.       PHYSICAL EXAM:  The patient is not in acute distress. Vital signs: Blood pressure 126/80, pulse 61, temperature 98.5 °F (36.9 °C), temperature source Oral, resp. rate 18, weight 158 lb 12.8 oz (72 kg). HEENT:  Eyes are normal. Ears, nose and throat are normal.  Neck: Supple. No lymph node enlargement. No thyroid enlargement. Trachea is centrally located. Breasts: The left breast is status post lumpectomy and radiation therapy. No masses are felt. No axillary lymph node enlargement. Chest:  Clear to auscultation. No wheezes or crepitations. Heart: Regular sinus rhythm. Abdomen: Soft, nontender. No hepatosplenomegaly. No masses. Extremities:  With no edema. Lymph Nodes:  No cervical, axillary or inguinal lymph node enlargement. Neurologic:  Conscious and oriented. No focal neurological deficits. Psychosocial: No depression, anxiety or stress. Skin: No rashes, bruises or ecchymoses. REVIEW OF DIAGNOSTIC DATA:    Lab Results   Component Value Date    WBC 5.3 04/25/2019    HGB 9.9 (L) 04/25/2019    HCT 29.5 (L) 04/25/2019    MCV 92.2 04/25/2019     (L) 04/25/2019       Chemistry        Component Value Date/Time     05/06/2019 1236    K 4.1 05/06/2019 1236    CL 98 05/06/2019 1236    CO2 22 05/06/2019 1236    BUN 19 05/06/2019 1236    CREATININE 1.70 (H) 05/06/2019 1236        Component Value Date/Time    CALCIUM 9.6 05/01/2019 0751    ALKPHOS 76 04/24/2019 1404    AST 13 04/24/2019 1404    ALT 10 04/24/2019 1404    BILITOT 0.23 (L) 04/24/2019 1404          Mammogram 2/26/16 negative. Dexa scan: 2/26/16 normal.    ASSESSMENT:     1. Stage H2hG4N3, left breast cancer in remission. 2. Degenerative joint disease of the cervical spine. Stable  3. Vasomotor symptoms with hot flashes. 4. New onset severe anemia  5. Probable GI bleeding  6. Recent CVA    PLAN:   The patient is in remission. I do not see any evidence of recurrence.  I will see her again in one year  for breast cancer monitoring and will continue annual mammogram. Patient had GI evaluation for anemia. It was negative. Currently stable. Dexa scan we will be repeated every 2 years  Patient's questions were answered to the best of her satisfaction and she verbalized full understanding and agreement. cc: Dimple Raza D.O. Maron Favre, M.D. Rod Martinez M.D. Likely she is having GI blood loss.

## 2019-07-17 DIAGNOSIS — C50.912 MALIGNANT NEOPLASM OF LEFT FEMALE BREAST, UNSPECIFIED ESTROGEN RECEPTOR STATUS, UNSPECIFIED SITE OF BREAST (HCC): ICD-10-CM

## 2019-07-17 RX ORDER — OMEPRAZOLE 20 MG/1
CAPSULE, DELAYED RELEASE ORAL
Qty: 30 CAPSULE | Refills: 3 | Status: SHIPPED | OUTPATIENT
Start: 2019-07-17 | End: 2019-12-01 | Stop reason: SDUPTHER

## 2019-07-31 ENCOUNTER — HOSPITAL ENCOUNTER (OUTPATIENT)
Age: 79
Setting detail: SPECIMEN
Discharge: HOME | End: 2019-07-31
Payer: MEDICARE

## 2019-07-31 DIAGNOSIS — N26.1 LEFT RENAL ATROPHY: ICD-10-CM

## 2019-07-31 DIAGNOSIS — N28.1 RENAL CYST: ICD-10-CM

## 2019-07-31 DIAGNOSIS — N18.4 BENIGN HYPERTENSION WITH CKD (CHRONIC KIDNEY DISEASE) STAGE IV (HCC): ICD-10-CM

## 2019-07-31 DIAGNOSIS — I12.9 BENIGN HYPERTENSION WITH CKD (CHRONIC KIDNEY DISEASE) STAGE IV (HCC): ICD-10-CM

## 2019-07-31 DIAGNOSIS — N18.4 CKD (CHRONIC KIDNEY DISEASE) STAGE 4, GFR 15-29 ML/MIN (HCC): ICD-10-CM

## 2019-07-31 LAB
ABSOLUTE EOS #: 0.2 K/UL (ref 0–0.4)
ABSOLUTE IMMATURE GRANULOCYTE: ABNORMAL K/UL (ref 0–0.3)
ABSOLUTE LYMPH #: 1.4 K/UL (ref 1–4.8)
ABSOLUTE MONO #: 0.6 K/UL (ref 0.1–1.3)
ANION GAP SERPL CALCULATED.3IONS-SCNC: 14 MMOL/L (ref 9–17)
BASOPHILS # BLD: 1 % (ref 0–2)
BASOPHILS ABSOLUTE: 0.1 K/UL (ref 0–0.2)
BUN BLDV-MCNC: 33 MG/DL (ref 8–23)
CALCIUM SERPL-MCNC: 9.8 MG/DL (ref 8.6–10.4)
CHLORIDE BLD-SCNC: 101 MMOL/L (ref 98–107)
CO2: 24 MMOL/L (ref 20–31)
CREAT SERPL-MCNC: 1.6 MG/DL (ref 0.5–0.9)
DIFFERENTIAL TYPE: ABNORMAL
EOSINOPHILS RELATIVE PERCENT: 2 % (ref 0–4)
GFR AFRICAN AMERICAN: 38 ML/MIN
GFR NON-AFRICAN AMERICAN: 31 ML/MIN
GFR SERPL CREATININE-BSD FRML MDRD: ABNORMAL ML/MIN/{1.73_M2}
GFR SERPL CREATININE-BSD FRML MDRD: ABNORMAL ML/MIN/{1.73_M2}
HCT VFR BLD CALC: 36.8 % (ref 36–46)
HEMOGLOBIN: 12.3 G/DL (ref 12–16)
IMMATURE GRANULOCYTES: ABNORMAL %
LYMPHOCYTES # BLD: 14 % (ref 24–44)
MAGNESIUM: 2 MG/DL (ref 1.6–2.6)
MCH RBC QN AUTO: 31 PG (ref 26–34)
MCHC RBC AUTO-ENTMCNC: 33.5 G/DL (ref 31–37)
MCV RBC AUTO: 92.6 FL (ref 80–100)
MONOCYTES # BLD: 6 % (ref 1–7)
NRBC AUTOMATED: ABNORMAL PER 100 WBC
PDW BLD-RTO: 13.1 % (ref 11.5–14.9)
PHOSPHORUS: 4.6 MG/DL (ref 2.6–4.5)
PLATELET # BLD: 226 K/UL (ref 150–450)
PLATELET ESTIMATE: ABNORMAL
PMV BLD AUTO: 7.1 FL (ref 6–12)
POTASSIUM SERPL-SCNC: 4.5 MMOL/L (ref 3.7–5.3)
RBC # BLD: 3.97 M/UL (ref 4–5.2)
RBC # BLD: ABNORMAL 10*6/UL
SEG NEUTROPHILS: 77 % (ref 36–66)
SEGMENTED NEUTROPHILS ABSOLUTE COUNT: 7.8 K/UL (ref 1.3–9.1)
SODIUM BLD-SCNC: 139 MMOL/L (ref 135–144)
WBC # BLD: 10 K/UL (ref 3.5–11)
WBC # BLD: ABNORMAL 10*3/UL

## 2019-07-31 PROCEDURE — 82565 ASSAY OF CREATININE: CPT

## 2019-07-31 PROCEDURE — 36415 COLL VENOUS BLD VENIPUNCTURE: CPT

## 2019-07-31 PROCEDURE — 84520 ASSAY OF UREA NITROGEN: CPT

## 2019-07-31 PROCEDURE — 83735 ASSAY OF MAGNESIUM: CPT

## 2019-07-31 PROCEDURE — 85025 COMPLETE CBC W/AUTO DIFF WBC: CPT

## 2019-07-31 PROCEDURE — 84100 ASSAY OF PHOSPHORUS: CPT

## 2019-07-31 PROCEDURE — 82310 ASSAY OF CALCIUM: CPT

## 2019-07-31 PROCEDURE — 80051 ELECTROLYTE PANEL: CPT

## 2019-11-05 ENCOUNTER — HOSPITAL ENCOUNTER (OUTPATIENT)
Age: 79
Discharge: HOME | End: 2019-11-07
Payer: OTHER MISCELLANEOUS

## 2019-11-05 ENCOUNTER — HOSPITAL ENCOUNTER (OUTPATIENT)
Dept: MRI IMAGING | Age: 79
Discharge: HOME | End: 2019-11-07
Payer: MEDICARE

## 2019-11-05 ENCOUNTER — HOSPITAL ENCOUNTER (OUTPATIENT)
Dept: GENERAL RADIOLOGY | Age: 79
Discharge: HOME | End: 2019-11-07
Payer: MEDICARE

## 2019-11-05 DIAGNOSIS — M96.1 POST LAMINECTOMY SYNDROME: ICD-10-CM

## 2019-11-05 DIAGNOSIS — M96.1 POSTLAMINECTOMY SYNDROME, NOT ELSEWHERE CLASSIFIED: ICD-10-CM

## 2019-11-05 PROCEDURE — 72110 X-RAY EXAM L-2 SPINE 4/>VWS: CPT

## 2019-11-05 PROCEDURE — 72148 MRI LUMBAR SPINE W/O DYE: CPT

## 2019-12-01 DIAGNOSIS — C50.912 MALIGNANT NEOPLASM OF LEFT FEMALE BREAST, UNSPECIFIED ESTROGEN RECEPTOR STATUS, UNSPECIFIED SITE OF BREAST (HCC): ICD-10-CM

## 2019-12-03 RX ORDER — OMEPRAZOLE 20 MG/1
CAPSULE, DELAYED RELEASE ORAL
Qty: 30 CAPSULE | Refills: 12 | Status: SHIPPED | OUTPATIENT
Start: 2019-12-03 | End: 2020-02-07 | Stop reason: SDUPTHER

## 2019-12-19 ENCOUNTER — APPOINTMENT (OUTPATIENT)
Dept: GENERAL RADIOLOGY | Age: 79
DRG: 305 | End: 2019-12-19
Payer: MEDICARE

## 2019-12-19 ENCOUNTER — HOSPITAL ENCOUNTER (INPATIENT)
Age: 79
LOS: 3 days | Discharge: HOME HEALTH | DRG: 305 | End: 2019-12-22
Attending: EMERGENCY MEDICINE | Admitting: FAMILY MEDICINE
Payer: MEDICARE

## 2019-12-19 ENCOUNTER — APPOINTMENT (OUTPATIENT)
Dept: CT IMAGING | Age: 79
DRG: 305 | End: 2019-12-19
Payer: MEDICARE

## 2019-12-19 DIAGNOSIS — I16.1 HYPERTENSIVE EMERGENCY: Primary | ICD-10-CM

## 2019-12-19 PROBLEM — I10 HYPERTENSION: Status: ACTIVE | Noted: 2019-12-19

## 2019-12-19 LAB
-: ABNORMAL
ABSOLUTE EOS #: 0.5 K/UL (ref 0–0.4)
ABSOLUTE IMMATURE GRANULOCYTE: ABNORMAL K/UL (ref 0–0.3)
ABSOLUTE LYMPH #: 1.1 K/UL (ref 1–4.8)
ABSOLUTE MONO #: 0.3 K/UL (ref 0.1–1.3)
ALBUMIN SERPL-MCNC: 3.6 G/DL (ref 3.5–5.2)
ALBUMIN/GLOBULIN RATIO: ABNORMAL (ref 1–2.5)
ALP BLD-CCNC: 97 U/L (ref 35–104)
ALT SERPL-CCNC: 6 U/L (ref 5–33)
AMORPHOUS: ABNORMAL
ANION GAP SERPL CALCULATED.3IONS-SCNC: 14 MMOL/L (ref 9–17)
ANION GAP SERPL CALCULATED.3IONS-SCNC: 14 MMOL/L (ref 9–17)
AST SERPL-CCNC: 14 U/L
BACTERIA: ABNORMAL
BASOPHILS # BLD: 1 % (ref 0–2)
BASOPHILS ABSOLUTE: 0.1 K/UL (ref 0–0.2)
BILIRUB SERPL-MCNC: 0.31 MG/DL (ref 0.3–1.2)
BILIRUBIN URINE: NEGATIVE
BNP INTERPRETATION: ABNORMAL
BUN BLDV-MCNC: 21 MG/DL (ref 8–23)
BUN BLDV-MCNC: 22 MG/DL (ref 8–23)
BUN/CREAT BLD: ABNORMAL (ref 9–20)
BUN/CREAT BLD: ABNORMAL (ref 9–20)
CALCIUM SERPL-MCNC: 9.5 MG/DL (ref 8.6–10.4)
CALCIUM SERPL-MCNC: 9.5 MG/DL (ref 8.6–10.4)
CASTS UA: ABNORMAL /LPF
CHLORIDE BLD-SCNC: 102 MMOL/L (ref 98–107)
CHLORIDE BLD-SCNC: 103 MMOL/L (ref 98–107)
CO2: 21 MMOL/L (ref 20–31)
CO2: 23 MMOL/L (ref 20–31)
COLOR: YELLOW
COMMENT UA: ABNORMAL
CREAT SERPL-MCNC: 1.5 MG/DL (ref 0.5–0.9)
CREAT SERPL-MCNC: 1.58 MG/DL (ref 0.5–0.9)
CRYSTALS, UA: ABNORMAL /HPF
DIFFERENTIAL TYPE: ABNORMAL
EOSINOPHILS RELATIVE PERCENT: 8 % (ref 0–4)
EPITHELIAL CELLS UA: ABNORMAL /HPF
GFR AFRICAN AMERICAN: 38 ML/MIN
GFR AFRICAN AMERICAN: 41 ML/MIN
GFR NON-AFRICAN AMERICAN: 32 ML/MIN
GFR NON-AFRICAN AMERICAN: 34 ML/MIN
GFR SERPL CREATININE-BSD FRML MDRD: ABNORMAL ML/MIN/{1.73_M2}
GLUCOSE BLD-MCNC: 102 MG/DL (ref 70–99)
GLUCOSE BLD-MCNC: 98 MG/DL (ref 70–99)
GLUCOSE URINE: NEGATIVE
HCT VFR BLD CALC: 33.4 % (ref 36–46)
HEMOGLOBIN: 11.2 G/DL (ref 12–16)
IMMATURE GRANULOCYTES: ABNORMAL %
KETONES, URINE: NEGATIVE
LEUKOCYTE ESTERASE, URINE: NEGATIVE
LYMPHOCYTES # BLD: 17 % (ref 24–44)
MCH RBC QN AUTO: 29.7 PG (ref 26–34)
MCHC RBC AUTO-ENTMCNC: 33.6 G/DL (ref 31–37)
MCV RBC AUTO: 88.6 FL (ref 80–100)
MONOCYTES # BLD: 5 % (ref 1–7)
MUCUS: ABNORMAL
NITRITE, URINE: POSITIVE
NRBC AUTOMATED: ABNORMAL PER 100 WBC
OTHER OBSERVATIONS UA: ABNORMAL
PDW BLD-RTO: 14.7 % (ref 11.5–14.9)
PH UA: 6 (ref 5–8)
PLATELET # BLD: 184 K/UL (ref 150–450)
PLATELET ESTIMATE: ABNORMAL
PMV BLD AUTO: 6.8 FL (ref 6–12)
POTASSIUM SERPL-SCNC: 4.2 MMOL/L (ref 3.7–5.3)
POTASSIUM SERPL-SCNC: 4.4 MMOL/L (ref 3.7–5.3)
PRO-BNP: ABNORMAL PG/ML
PROTEIN UA: ABNORMAL
RBC # BLD: 3.77 M/UL (ref 4–5.2)
RBC # BLD: ABNORMAL 10*6/UL
RBC UA: ABNORMAL /HPF
RENAL EPITHELIAL, UA: ABNORMAL /HPF
SEG NEUTROPHILS: 69 % (ref 36–66)
SEGMENTED NEUTROPHILS ABSOLUTE COUNT: 4.6 K/UL (ref 1.3–9.1)
SODIUM BLD-SCNC: 138 MMOL/L (ref 135–144)
SODIUM BLD-SCNC: 139 MMOL/L (ref 135–144)
SPECIFIC GRAVITY UA: 1.01 (ref 1–1.03)
TOTAL PROTEIN: 6.6 G/DL (ref 6.4–8.3)
TRICHOMONAS: ABNORMAL
TROPONIN INTERP: ABNORMAL
TROPONIN INTERP: ABNORMAL
TROPONIN T: ABNORMAL NG/ML
TROPONIN T: ABNORMAL NG/ML
TROPONIN, HIGH SENSITIVITY: 30 NG/L (ref 0–14)
TROPONIN, HIGH SENSITIVITY: 30 NG/L (ref 0–14)
TURBIDITY: ABNORMAL
URINE HGB: ABNORMAL
UROBILINOGEN, URINE: NORMAL
WBC # BLD: 6.6 K/UL (ref 3.5–11)
WBC # BLD: ABNORMAL 10*3/UL
WBC UA: ABNORMAL /HPF
YEAST: ABNORMAL

## 2019-12-19 PROCEDURE — 87088 URINE BACTERIA CULTURE: CPT

## 2019-12-19 PROCEDURE — 2000000000 HC ICU R&B

## 2019-12-19 PROCEDURE — 2500000003 HC RX 250 WO HCPCS: Performed by: EMERGENCY MEDICINE

## 2019-12-19 PROCEDURE — 6370000000 HC RX 637 (ALT 250 FOR IP): Performed by: FAMILY MEDICINE

## 2019-12-19 PROCEDURE — 2580000003 HC RX 258: Performed by: EMERGENCY MEDICINE

## 2019-12-19 PROCEDURE — 6370000000 HC RX 637 (ALT 250 FOR IP): Performed by: INTERNAL MEDICINE

## 2019-12-19 PROCEDURE — 85025 COMPLETE CBC W/AUTO DIFF WBC: CPT

## 2019-12-19 PROCEDURE — 6360000002 HC RX W HCPCS: Performed by: EMERGENCY MEDICINE

## 2019-12-19 PROCEDURE — 83880 ASSAY OF NATRIURETIC PEPTIDE: CPT

## 2019-12-19 PROCEDURE — 94761 N-INVAS EAR/PLS OXIMETRY MLT: CPT

## 2019-12-19 PROCEDURE — 99285 EMERGENCY DEPT VISIT HI MDM: CPT

## 2019-12-19 PROCEDURE — 80053 COMPREHEN METABOLIC PANEL: CPT

## 2019-12-19 PROCEDURE — 87086 URINE CULTURE/COLONY COUNT: CPT

## 2019-12-19 PROCEDURE — 80048 BASIC METABOLIC PNL TOTAL CA: CPT

## 2019-12-19 PROCEDURE — 6360000002 HC RX W HCPCS: Performed by: FAMILY MEDICINE

## 2019-12-19 PROCEDURE — 71046 X-RAY EXAM CHEST 2 VIEWS: CPT

## 2019-12-19 PROCEDURE — 96374 THER/PROPH/DIAG INJ IV PUSH: CPT

## 2019-12-19 PROCEDURE — 70450 CT HEAD/BRAIN W/O DYE: CPT

## 2019-12-19 PROCEDURE — 84484 ASSAY OF TROPONIN QUANT: CPT

## 2019-12-19 PROCEDURE — 93005 ELECTROCARDIOGRAM TRACING: CPT | Performed by: EMERGENCY MEDICINE

## 2019-12-19 PROCEDURE — 87186 SC STD MICRODIL/AGAR DIL: CPT

## 2019-12-19 PROCEDURE — 81001 URINALYSIS AUTO W/SCOPE: CPT

## 2019-12-19 PROCEDURE — 36415 COLL VENOUS BLD VENIPUNCTURE: CPT

## 2019-12-19 RX ORDER — SODIUM CHLORIDE 0.9 % (FLUSH) 0.9 %
10 SYRINGE (ML) INJECTION PRN
Status: DISCONTINUED | OUTPATIENT
Start: 2019-12-19 | End: 2019-12-22 | Stop reason: HOSPADM

## 2019-12-19 RX ORDER — HYDRALAZINE HYDROCHLORIDE 20 MG/ML
10 INJECTION INTRAMUSCULAR; INTRAVENOUS EVERY 6 HOURS PRN
Status: DISCONTINUED | OUTPATIENT
Start: 2019-12-19 | End: 2019-12-22 | Stop reason: HOSPADM

## 2019-12-19 RX ORDER — SODIUM CHLORIDE 0.9 % (FLUSH) 0.9 %
10 SYRINGE (ML) INJECTION EVERY 12 HOURS SCHEDULED
Status: DISCONTINUED | OUTPATIENT
Start: 2019-12-19 | End: 2019-12-22 | Stop reason: HOSPADM

## 2019-12-19 RX ORDER — ALLOPURINOL 100 MG/1
100 TABLET ORAL 2 TIMES DAILY
Status: DISCONTINUED | OUTPATIENT
Start: 2019-12-19 | End: 2019-12-22 | Stop reason: HOSPADM

## 2019-12-19 RX ORDER — CLONIDINE 0.1 MG/24H
1 PATCH, EXTENDED RELEASE TRANSDERMAL WEEKLY
Status: DISCONTINUED | OUTPATIENT
Start: 2019-12-19 | End: 2019-12-22 | Stop reason: HOSPADM

## 2019-12-19 RX ORDER — PANTOPRAZOLE SODIUM 40 MG/1
40 TABLET, DELAYED RELEASE ORAL
Status: DISCONTINUED | OUTPATIENT
Start: 2019-12-20 | End: 2019-12-22 | Stop reason: HOSPADM

## 2019-12-19 RX ORDER — ONDANSETRON 2 MG/ML
4 INJECTION INTRAMUSCULAR; INTRAVENOUS EVERY 6 HOURS PRN
Status: DISCONTINUED | OUTPATIENT
Start: 2019-12-19 | End: 2019-12-22 | Stop reason: HOSPADM

## 2019-12-19 RX ORDER — METOPROLOL TARTRATE 100 MG/1
50 TABLET ORAL 2 TIMES DAILY
Status: ON HOLD | COMMUNITY
End: 2020-07-27 | Stop reason: SDUPTHER

## 2019-12-19 RX ORDER — ACETAMINOPHEN 325 MG/1
650 TABLET ORAL EVERY 4 HOURS PRN
Status: DISCONTINUED | OUTPATIENT
Start: 2019-12-19 | End: 2019-12-22 | Stop reason: HOSPADM

## 2019-12-19 RX ORDER — GABAPENTIN 100 MG/1
100 CAPSULE ORAL DAILY
Status: DISCONTINUED | OUTPATIENT
Start: 2019-12-19 | End: 2019-12-22 | Stop reason: HOSPADM

## 2019-12-19 RX ORDER — CLOPIDOGREL BISULFATE 75 MG/1
75 TABLET ORAL DAILY
Status: DISCONTINUED | OUTPATIENT
Start: 2019-12-19 | End: 2019-12-22 | Stop reason: HOSPADM

## 2019-12-19 RX ORDER — METOPROLOL TARTRATE 50 MG/1
50 TABLET, FILM COATED ORAL 2 TIMES DAILY
Status: DISCONTINUED | OUTPATIENT
Start: 2019-12-19 | End: 2019-12-22 | Stop reason: HOSPADM

## 2019-12-19 RX ORDER — METOPROLOL TARTRATE 50 MG/1
50 TABLET, FILM COATED ORAL 2 TIMES DAILY
Status: DISCONTINUED | OUTPATIENT
Start: 2019-12-19 | End: 2019-12-19

## 2019-12-19 RX ORDER — METOPROLOL TARTRATE 5 MG/5ML
10 INJECTION INTRAVENOUS ONCE
Status: COMPLETED | OUTPATIENT
Start: 2019-12-19 | End: 2019-12-19

## 2019-12-19 RX ADMIN — METOPROLOL TARTRATE 50 MG: 50 TABLET, FILM COATED ORAL at 16:42

## 2019-12-19 RX ADMIN — SODIUM CHLORIDE 5 MG/HR: 9 INJECTION, SOLUTION INTRAVENOUS at 15:10

## 2019-12-19 RX ADMIN — METOPROLOL TARTRATE 10 MG: 1 INJECTION, SOLUTION INTRAVENOUS at 12:56

## 2019-12-19 RX ADMIN — SODIUM CHLORIDE 5 MG/HR: 9 INJECTION, SOLUTION INTRAVENOUS at 19:37

## 2019-12-19 RX ADMIN — ALLOPURINOL 100 MG: 100 TABLET ORAL at 20:27

## 2019-12-19 RX ADMIN — CEFTRIAXONE SODIUM 1 G: 1 INJECTION, POWDER, FOR SOLUTION INTRAMUSCULAR; INTRAVENOUS at 14:55

## 2019-12-19 RX ADMIN — ENOXAPARIN SODIUM 40 MG: 100 INJECTION SUBCUTANEOUS at 16:05

## 2019-12-19 RX ADMIN — GABAPENTIN 100 MG: 100 CAPSULE ORAL at 16:42

## 2019-12-19 ASSESSMENT — PAIN SCALES - GENERAL
PAINLEVEL_OUTOF10: 0
PAINLEVEL_OUTOF10: 8
PAINLEVEL_OUTOF10: 0

## 2019-12-19 ASSESSMENT — ENCOUNTER SYMPTOMS
SHORTNESS OF BREATH: 0
NAUSEA: 0
CONSTIPATION: 0
VOMITING: 0
EYE REDNESS: 0
ABDOMINAL PAIN: 0
DIARRHEA: 0
CHEST TIGHTNESS: 0
BACK PAIN: 0
COUGH: 0
EYE PAIN: 0
SORE THROAT: 0

## 2019-12-19 ASSESSMENT — PAIN DESCRIPTION - LOCATION: LOCATION: BACK

## 2019-12-19 ASSESSMENT — PAIN DESCRIPTION - PAIN TYPE: TYPE: CHRONIC PAIN

## 2019-12-20 LAB
ANION GAP SERPL CALCULATED.3IONS-SCNC: 12 MMOL/L (ref 9–17)
BUN BLDV-MCNC: 21 MG/DL (ref 8–23)
BUN/CREAT BLD: ABNORMAL (ref 9–20)
CALCIUM SERPL-MCNC: 9.2 MG/DL (ref 8.6–10.4)
CHLORIDE BLD-SCNC: 103 MMOL/L (ref 98–107)
CO2: 24 MMOL/L (ref 20–31)
CREAT SERPL-MCNC: 1.55 MG/DL (ref 0.5–0.9)
CULTURE: ABNORMAL
GFR AFRICAN AMERICAN: 39 ML/MIN
GFR NON-AFRICAN AMERICAN: 32 ML/MIN
GFR SERPL CREATININE-BSD FRML MDRD: ABNORMAL ML/MIN/{1.73_M2}
GFR SERPL CREATININE-BSD FRML MDRD: ABNORMAL ML/MIN/{1.73_M2}
GLUCOSE BLD-MCNC: 106 MG/DL (ref 70–99)
Lab: ABNORMAL
POTASSIUM SERPL-SCNC: 4.1 MMOL/L (ref 3.7–5.3)
SODIUM BLD-SCNC: 139 MMOL/L (ref 135–144)
SPECIMEN DESCRIPTION: ABNORMAL

## 2019-12-20 PROCEDURE — 2580000003 HC RX 258: Performed by: EMERGENCY MEDICINE

## 2019-12-20 PROCEDURE — 2500000003 HC RX 250 WO HCPCS: Performed by: INTERNAL MEDICINE

## 2019-12-20 PROCEDURE — 2000000000 HC ICU R&B

## 2019-12-20 PROCEDURE — 6360000002 HC RX W HCPCS: Performed by: FAMILY MEDICINE

## 2019-12-20 PROCEDURE — 2580000003 HC RX 258: Performed by: INTERNAL MEDICINE

## 2019-12-20 PROCEDURE — 2500000003 HC RX 250 WO HCPCS: Performed by: EMERGENCY MEDICINE

## 2019-12-20 PROCEDURE — 36415 COLL VENOUS BLD VENIPUNCTURE: CPT

## 2019-12-20 PROCEDURE — 94761 N-INVAS EAR/PLS OXIMETRY MLT: CPT

## 2019-12-20 PROCEDURE — 2580000003 HC RX 258: Performed by: FAMILY MEDICINE

## 2019-12-20 PROCEDURE — 80048 BASIC METABOLIC PNL TOTAL CA: CPT

## 2019-12-20 PROCEDURE — 6370000000 HC RX 637 (ALT 250 FOR IP): Performed by: FAMILY MEDICINE

## 2019-12-20 PROCEDURE — 6370000000 HC RX 637 (ALT 250 FOR IP): Performed by: INTERNAL MEDICINE

## 2019-12-20 RX ORDER — NIFEDIPINE 60 MG/1
60 TABLET, FILM COATED, EXTENDED RELEASE ORAL DAILY
Status: DISCONTINUED | OUTPATIENT
Start: 2019-12-20 | End: 2019-12-22 | Stop reason: HOSPADM

## 2019-12-20 RX ADMIN — ACETAMINOPHEN 650 MG: 325 TABLET, FILM COATED ORAL at 08:03

## 2019-12-20 RX ADMIN — METOPROLOL TARTRATE 50 MG: 50 TABLET, FILM COATED ORAL at 08:02

## 2019-12-20 RX ADMIN — METOPROLOL TARTRATE 50 MG: 50 TABLET, FILM COATED ORAL at 22:28

## 2019-12-20 RX ADMIN — GABAPENTIN 100 MG: 100 CAPSULE ORAL at 08:02

## 2019-12-20 RX ADMIN — SODIUM CHLORIDE 5 MG/HR: 9 INJECTION, SOLUTION INTRAVENOUS at 00:52

## 2019-12-20 RX ADMIN — DICLOFENAC 2 G: 10 GEL TOPICAL at 11:19

## 2019-12-20 RX ADMIN — SODIUM CHLORIDE 10 MG/HR: 9 INJECTION, SOLUTION INTRAVENOUS at 05:53

## 2019-12-20 RX ADMIN — SODIUM CHLORIDE 7.5 MG/HR: 9 INJECTION, SOLUTION INTRAVENOUS at 08:03

## 2019-12-20 RX ADMIN — ENOXAPARIN SODIUM 40 MG: 100 INJECTION SUBCUTANEOUS at 08:03

## 2019-12-20 RX ADMIN — DICLOFENAC 2 G: 10 GEL TOPICAL at 20:28

## 2019-12-20 RX ADMIN — CEFTRIAXONE SODIUM 1 G: 1 INJECTION, POWDER, FOR SOLUTION INTRAMUSCULAR; INTRAVENOUS at 15:42

## 2019-12-20 RX ADMIN — ALLOPURINOL 100 MG: 100 TABLET ORAL at 08:03

## 2019-12-20 RX ADMIN — PANTOPRAZOLE SODIUM 40 MG: 40 TABLET, DELAYED RELEASE ORAL at 11:19

## 2019-12-20 RX ADMIN — CLOPIDOGREL BISULFATE 75 MG: 75 TABLET ORAL at 08:02

## 2019-12-20 RX ADMIN — ACETAMINOPHEN 650 MG: 325 TABLET, FILM COATED ORAL at 14:00

## 2019-12-20 RX ADMIN — NIFEDIPINE 60 MG: 60 TABLET, EXTENDED RELEASE ORAL at 08:02

## 2019-12-20 RX ADMIN — DICLOFENAC 2 G: 10 GEL TOPICAL at 13:34

## 2019-12-20 RX ADMIN — Medication 10 ML: at 20:28

## 2019-12-20 RX ADMIN — ALLOPURINOL 100 MG: 100 TABLET ORAL at 20:28

## 2019-12-20 RX ADMIN — Medication 10 ML: at 20:29

## 2019-12-20 ASSESSMENT — PAIN SCALES - GENERAL
PAINLEVEL_OUTOF10: 6
PAINLEVEL_OUTOF10: 0
PAINLEVEL_OUTOF10: 0
PAINLEVEL_OUTOF10: 3
PAINLEVEL_OUTOF10: 7
PAINLEVEL_OUTOF10: 6
PAINLEVEL_OUTOF10: 1

## 2019-12-20 ASSESSMENT — PAIN DESCRIPTION - PAIN TYPE: TYPE: ACUTE PAIN

## 2019-12-20 ASSESSMENT — PAIN DESCRIPTION - LOCATION: LOCATION: HAND

## 2019-12-21 LAB
ANION GAP SERPL CALCULATED.3IONS-SCNC: 13 MMOL/L (ref 9–17)
BUN BLDV-MCNC: 26 MG/DL (ref 8–23)
BUN/CREAT BLD: ABNORMAL (ref 9–20)
CALCIUM SERPL-MCNC: 9 MG/DL (ref 8.6–10.4)
CHLORIDE BLD-SCNC: 103 MMOL/L (ref 98–107)
CO2: 23 MMOL/L (ref 20–31)
CREAT SERPL-MCNC: 2.03 MG/DL (ref 0.5–0.9)
EKG ATRIAL RATE: 61 BPM
EKG P AXIS: 64 DEGREES
EKG P-R INTERVAL: 150 MS
EKG Q-T INTERVAL: 426 MS
EKG QRS DURATION: 88 MS
EKG QTC CALCULATION (BAZETT): 428 MS
EKG R AXIS: -33 DEGREES
EKG T AXIS: 96 DEGREES
EKG VENTRICULAR RATE: 61 BPM
GFR AFRICAN AMERICAN: 29 ML/MIN
GFR NON-AFRICAN AMERICAN: 24 ML/MIN
GFR SERPL CREATININE-BSD FRML MDRD: ABNORMAL ML/MIN/{1.73_M2}
GFR SERPL CREATININE-BSD FRML MDRD: ABNORMAL ML/MIN/{1.73_M2}
GLUCOSE BLD-MCNC: 104 MG/DL (ref 70–99)
POTASSIUM SERPL-SCNC: 4 MMOL/L (ref 3.7–5.3)
SODIUM BLD-SCNC: 139 MMOL/L (ref 135–144)

## 2019-12-21 PROCEDURE — 6360000002 HC RX W HCPCS: Performed by: FAMILY MEDICINE

## 2019-12-21 PROCEDURE — 2060000000 HC ICU INTERMEDIATE R&B

## 2019-12-21 PROCEDURE — 6370000000 HC RX 637 (ALT 250 FOR IP): Performed by: FAMILY MEDICINE

## 2019-12-21 PROCEDURE — 36415 COLL VENOUS BLD VENIPUNCTURE: CPT

## 2019-12-21 PROCEDURE — 6370000000 HC RX 637 (ALT 250 FOR IP): Performed by: INTERNAL MEDICINE

## 2019-12-21 PROCEDURE — 2580000003 HC RX 258: Performed by: FAMILY MEDICINE

## 2019-12-21 PROCEDURE — 93010 ELECTROCARDIOGRAM REPORT: CPT | Performed by: INTERNAL MEDICINE

## 2019-12-21 PROCEDURE — 80048 BASIC METABOLIC PNL TOTAL CA: CPT

## 2019-12-21 RX ORDER — SODIUM CHLORIDE 450 MG/100ML
INJECTION, SOLUTION INTRAVENOUS CONTINUOUS
Status: DISCONTINUED | OUTPATIENT
Start: 2019-12-21 | End: 2019-12-22

## 2019-12-21 RX ORDER — NITROFURANTOIN 25; 75 MG/1; MG/1
100 CAPSULE ORAL EVERY 12 HOURS SCHEDULED
Status: DISCONTINUED | OUTPATIENT
Start: 2019-12-21 | End: 2019-12-22 | Stop reason: HOSPADM

## 2019-12-21 RX ADMIN — CLOPIDOGREL BISULFATE 75 MG: 75 TABLET ORAL at 08:14

## 2019-12-21 RX ADMIN — CEFTRIAXONE SODIUM 1 G: 1 INJECTION, POWDER, FOR SOLUTION INTRAMUSCULAR; INTRAVENOUS at 14:42

## 2019-12-21 RX ADMIN — Medication 10 ML: at 08:15

## 2019-12-21 RX ADMIN — ALLOPURINOL 100 MG: 100 TABLET ORAL at 21:29

## 2019-12-21 RX ADMIN — ENOXAPARIN SODIUM 40 MG: 100 INJECTION SUBCUTANEOUS at 08:14

## 2019-12-21 RX ADMIN — Medication 10 ML: at 21:30

## 2019-12-21 RX ADMIN — PANTOPRAZOLE SODIUM 40 MG: 40 TABLET, DELAYED RELEASE ORAL at 06:34

## 2019-12-21 RX ADMIN — SODIUM CHLORIDE: 4.5 INJECTION, SOLUTION INTRAVENOUS at 11:56

## 2019-12-21 RX ADMIN — DICLOFENAC 2 G: 10 GEL TOPICAL at 14:42

## 2019-12-21 RX ADMIN — DICLOFENAC 2 G: 10 GEL TOPICAL at 08:15

## 2019-12-21 RX ADMIN — GABAPENTIN 100 MG: 100 CAPSULE ORAL at 08:14

## 2019-12-21 RX ADMIN — NITROFURANTOIN MONOHYDRATE/MACROCRYSTALLINE 100 MG: 25; 75 CAPSULE ORAL at 23:09

## 2019-12-21 RX ADMIN — DICLOFENAC 2 G: 10 GEL TOPICAL at 21:30

## 2019-12-21 RX ADMIN — METOPROLOL TARTRATE 50 MG: 50 TABLET, FILM COATED ORAL at 21:29

## 2019-12-21 RX ADMIN — NIFEDIPINE 60 MG: 60 TABLET, EXTENDED RELEASE ORAL at 08:14

## 2019-12-21 RX ADMIN — ALLOPURINOL 100 MG: 100 TABLET ORAL at 08:14

## 2019-12-21 RX ADMIN — METOPROLOL TARTRATE 50 MG: 50 TABLET, FILM COATED ORAL at 08:14

## 2019-12-21 ASSESSMENT — PAIN SCALES - GENERAL: PAINLEVEL_OUTOF10: 0

## 2019-12-22 VITALS
HEIGHT: 65 IN | BODY MASS INDEX: 32.03 KG/M2 | DIASTOLIC BLOOD PRESSURE: 66 MMHG | OXYGEN SATURATION: 92 % | WEIGHT: 192.24 LBS | RESPIRATION RATE: 18 BRPM | TEMPERATURE: 97.3 F | SYSTOLIC BLOOD PRESSURE: 162 MMHG | HEART RATE: 91 BPM

## 2019-12-22 PROBLEM — I10 ESSENTIAL HYPERTENSION: Status: ACTIVE | Noted: 2019-12-22

## 2019-12-22 LAB
ANION GAP SERPL CALCULATED.3IONS-SCNC: 13 MMOL/L (ref 9–17)
BUN BLDV-MCNC: 24 MG/DL (ref 8–23)
BUN/CREAT BLD: ABNORMAL (ref 9–20)
CALCIUM SERPL-MCNC: 9.3 MG/DL (ref 8.6–10.4)
CHLORIDE BLD-SCNC: 101 MMOL/L (ref 98–107)
CO2: 21 MMOL/L (ref 20–31)
CREAT SERPL-MCNC: 1.9 MG/DL (ref 0.5–0.9)
GFR AFRICAN AMERICAN: 31 ML/MIN
GFR NON-AFRICAN AMERICAN: 26 ML/MIN
GFR SERPL CREATININE-BSD FRML MDRD: ABNORMAL ML/MIN/{1.73_M2}
GFR SERPL CREATININE-BSD FRML MDRD: ABNORMAL ML/MIN/{1.73_M2}
GLUCOSE BLD-MCNC: 101 MG/DL (ref 70–99)
POTASSIUM SERPL-SCNC: 4 MMOL/L (ref 3.7–5.3)
SODIUM BLD-SCNC: 135 MMOL/L (ref 135–144)

## 2019-12-22 PROCEDURE — 80048 BASIC METABOLIC PNL TOTAL CA: CPT

## 2019-12-22 PROCEDURE — 6360000002 HC RX W HCPCS: Performed by: FAMILY MEDICINE

## 2019-12-22 PROCEDURE — 36415 COLL VENOUS BLD VENIPUNCTURE: CPT

## 2019-12-22 PROCEDURE — 6370000000 HC RX 637 (ALT 250 FOR IP): Performed by: FAMILY MEDICINE

## 2019-12-22 PROCEDURE — 6370000000 HC RX 637 (ALT 250 FOR IP): Performed by: INTERNAL MEDICINE

## 2019-12-22 PROCEDURE — 2580000003 HC RX 258: Performed by: FAMILY MEDICINE

## 2019-12-22 RX ADMIN — NIFEDIPINE 60 MG: 60 TABLET, EXTENDED RELEASE ORAL at 09:48

## 2019-12-22 RX ADMIN — ALLOPURINOL 100 MG: 100 TABLET ORAL at 09:49

## 2019-12-22 RX ADMIN — SODIUM CHLORIDE: 4.5 INJECTION, SOLUTION INTRAVENOUS at 01:57

## 2019-12-22 RX ADMIN — GABAPENTIN 100 MG: 100 CAPSULE ORAL at 09:49

## 2019-12-22 RX ADMIN — ENOXAPARIN SODIUM 30 MG: 30 INJECTION SUBCUTANEOUS at 09:49

## 2019-12-22 RX ADMIN — PANTOPRAZOLE SODIUM 40 MG: 40 TABLET, DELAYED RELEASE ORAL at 06:43

## 2019-12-22 RX ADMIN — METOPROLOL TARTRATE 50 MG: 50 TABLET, FILM COATED ORAL at 09:48

## 2019-12-22 RX ADMIN — CLOPIDOGREL BISULFATE 75 MG: 75 TABLET ORAL at 09:49

## 2019-12-22 RX ADMIN — ACETAMINOPHEN 650 MG: 325 TABLET, FILM COATED ORAL at 07:20

## 2019-12-22 ASSESSMENT — PAIN SCALES - GENERAL
PAINLEVEL_OUTOF10: 7
PAINLEVEL_OUTOF10: 9

## 2020-02-12 RX ORDER — OMEPRAZOLE 20 MG/1
CAPSULE, DELAYED RELEASE ORAL
Qty: 30 CAPSULE | Refills: 12 | Status: ON HOLD | OUTPATIENT
Start: 2020-02-12 | End: 2020-07-27 | Stop reason: SDUPTHER

## 2020-03-25 PROBLEM — N17.9 ACUTE RENAL FAILURE (HCC): Status: RESOLVED | Noted: 2019-04-19 | Resolved: 2020-03-24

## 2020-06-23 ENCOUNTER — TELEPHONE (OUTPATIENT)
Dept: ONCOLOGY | Age: 80
End: 2020-06-23

## 2020-07-12 ENCOUNTER — APPOINTMENT (OUTPATIENT)
Dept: GENERAL RADIOLOGY | Age: 80
DRG: 304 | End: 2020-07-12
Payer: MEDICARE

## 2020-07-12 ENCOUNTER — APPOINTMENT (OUTPATIENT)
Dept: CT IMAGING | Age: 80
DRG: 304 | End: 2020-07-12
Payer: MEDICARE

## 2020-07-12 ENCOUNTER — HOSPITAL ENCOUNTER (INPATIENT)
Age: 80
LOS: 7 days | Discharge: NURSING, REHAB OR LONG-TERM CARE | DRG: 304 | End: 2020-07-20
Attending: EMERGENCY MEDICINE | Admitting: INTERNAL MEDICINE
Payer: MEDICARE

## 2020-07-12 LAB
ABSOLUTE EOS #: 0.3 K/UL (ref 0–0.4)
ABSOLUTE IMMATURE GRANULOCYTE: ABNORMAL K/UL (ref 0–0.3)
ABSOLUTE LYMPH #: 2.2 K/UL (ref 1–4.8)
ABSOLUTE MONO #: 0.4 K/UL (ref 0.1–1.3)
ALBUMIN SERPL-MCNC: 3.9 G/DL (ref 3.5–5.2)
ALBUMIN/GLOBULIN RATIO: ABNORMAL (ref 1–2.5)
ALLEN TEST: ABNORMAL
ALP BLD-CCNC: 123 U/L (ref 35–104)
ALT SERPL-CCNC: 7 U/L (ref 5–33)
AMMONIA: 33 UMOL/L (ref 11–51)
ANION GAP SERPL CALCULATED.3IONS-SCNC: 13 MMOL/L (ref 9–17)
AST SERPL-CCNC: 14 U/L
BASOPHILS # BLD: 1 % (ref 0–2)
BASOPHILS ABSOLUTE: 0.1 K/UL (ref 0–0.2)
BILIRUB SERPL-MCNC: 0.41 MG/DL (ref 0.3–1.2)
BILIRUBIN URINE: NEGATIVE
BNP INTERPRETATION: ABNORMAL
BUN BLDV-MCNC: 15 MG/DL (ref 8–23)
BUN/CREAT BLD: ABNORMAL (ref 9–20)
CALCIUM SERPL-MCNC: 10.2 MG/DL (ref 8.6–10.4)
CARBOXYHEMOGLOBIN: 1 % (ref 0–5)
CHLORIDE BLD-SCNC: 99 MMOL/L (ref 98–107)
CHP ED QC CHECK: YES
CO2: 24 MMOL/L (ref 20–31)
COLOR: YELLOW
COMMENT UA: ABNORMAL
CREAT SERPL-MCNC: 1.4 MG/DL (ref 0.5–0.9)
DIFFERENTIAL TYPE: ABNORMAL
EOSINOPHILS RELATIVE PERCENT: 3 % (ref 0–4)
FIO2: 40
GFR AFRICAN AMERICAN: 44 ML/MIN
GFR NON-AFRICAN AMERICAN: 34 ML/MIN
GFR NON-AFRICAN AMERICAN: 36 ML/MIN
GFR SERPL CREATININE-BSD FRML MDRD: 42 ML/MIN
GFR SERPL CREATININE-BSD FRML MDRD: ABNORMAL ML/MIN/{1.73_M2}
GLUCOSE BLD-MCNC: 138 MG/DL
GLUCOSE BLD-MCNC: 154 MG/DL (ref 70–99)
GLUCOSE URINE: NEGATIVE
HCO3 ARTERIAL: 23 MMOL/L (ref 22–26)
HCT VFR BLD CALC: 42.1 % (ref 36–46)
HEMOGLOBIN: 13.6 G/DL (ref 12–16)
IMMATURE GRANULOCYTES: ABNORMAL %
INR BLD: 0.9
KETONES, URINE: NEGATIVE
LEUKOCYTE ESTERASE, URINE: NEGATIVE
LIPASE: 30 U/L (ref 13–60)
LYMPHOCYTES # BLD: 25 % (ref 24–44)
MCH RBC QN AUTO: 26.6 PG (ref 26–34)
MCHC RBC AUTO-ENTMCNC: 32.3 G/DL (ref 31–37)
MCV RBC AUTO: 82.4 FL (ref 80–100)
METHEMOGLOBIN: 0.4 % (ref 0–1.9)
MODE: ABNORMAL
MONOCYTES # BLD: 5 % (ref 1–7)
NEGATIVE BASE EXCESS, ART: 1.6 MMOL/L (ref 0–2)
NITRITE, URINE: NEGATIVE
NOTIFICATION TIME: ABNORMAL
NOTIFICATION: ABNORMAL
NRBC AUTOMATED: ABNORMAL PER 100 WBC
O2 DEVICE/FLOW/%: ABNORMAL
O2 SAT, ARTERIAL: 98.1 % (ref 95–98)
OXYHEMOGLOBIN: ABNORMAL % (ref 95–98)
PARTIAL THROMBOPLASTIN TIME: 27.4 SEC (ref 24–36)
PATIENT TEMP: 37
PCO2 ARTERIAL: 35.8 MMHG (ref 35–45)
PCO2, ART, TEMP ADJ: ABNORMAL (ref 35–45)
PDW BLD-RTO: 16 % (ref 11.5–14.9)
PEEP/CPAP: ABNORMAL
PH ARTERIAL: 7.42 (ref 7.35–7.45)
PH UA: 6.5 (ref 5–8)
PH, ART, TEMP ADJ: ABNORMAL (ref 7.35–7.45)
PLATELET # BLD: 295 K/UL (ref 150–450)
PLATELET ESTIMATE: ABNORMAL
PMV BLD AUTO: 7.6 FL (ref 6–12)
PO2 ARTERIAL: 142 MMHG (ref 80–100)
PO2, ART, TEMP ADJ: ABNORMAL MMHG (ref 80–100)
POC CREATININE: 1.47 MG/DL (ref 0.51–1.19)
POSITIVE BASE EXCESS, ART: ABNORMAL MMOL/L (ref 0–2)
POTASSIUM SERPL-SCNC: 4.2 MMOL/L (ref 3.7–5.3)
PRO-BNP: 4169 PG/ML
PROTEIN UA: ABNORMAL
PROTHROMBIN TIME: 12.4 SEC (ref 11.8–14.6)
PSV: ABNORMAL
PT. POSITION: ABNORMAL
RBC # BLD: 5.11 M/UL (ref 4–5.2)
RBC # BLD: ABNORMAL 10*6/UL
RESPIRATORY RATE: ABNORMAL
SAMPLE SITE: ABNORMAL
SEG NEUTROPHILS: 66 % (ref 36–66)
SEGMENTED NEUTROPHILS ABSOLUTE COUNT: 6 K/UL (ref 1.3–9.1)
SET RATE: ABNORMAL
SODIUM BLD-SCNC: 136 MMOL/L (ref 135–144)
SPECIFIC GRAVITY UA: 1.01 (ref 1–1.03)
TEXT FOR RESPIRATORY: ABNORMAL
TOTAL HB: ABNORMAL G/DL (ref 12–16)
TOTAL PROTEIN: 7.2 G/DL (ref 6.4–8.3)
TOTAL RATE: ABNORMAL
TROPONIN INTERP: ABNORMAL
TROPONIN INTERP: ABNORMAL
TROPONIN T: ABNORMAL NG/ML
TROPONIN T: ABNORMAL NG/ML
TROPONIN, HIGH SENSITIVITY: 26 NG/L (ref 0–14)
TROPONIN, HIGH SENSITIVITY: 29 NG/L (ref 0–14)
TURBIDITY: CLEAR
URINE HGB: ABNORMAL
UROBILINOGEN, URINE: NORMAL
VT: ABNORMAL
WBC # BLD: 9 K/UL (ref 3.5–11)
WBC # BLD: ABNORMAL 10*3/UL

## 2020-07-12 PROCEDURE — 82805 BLOOD GASES W/O2 SATURATION: CPT

## 2020-07-12 PROCEDURE — U0002 COVID-19 LAB TEST NON-CDC: HCPCS

## 2020-07-12 PROCEDURE — 43753 TX GASTRO INTUB W/ASP: CPT

## 2020-07-12 PROCEDURE — 99285 EMERGENCY DEPT VISIT HI MDM: CPT

## 2020-07-12 PROCEDURE — 5A1945Z RESPIRATORY VENTILATION, 24-96 CONSECUTIVE HOURS: ICD-10-PCS | Performed by: EMERGENCY MEDICINE

## 2020-07-12 PROCEDURE — 96368 THER/DIAG CONCURRENT INF: CPT

## 2020-07-12 PROCEDURE — 71045 X-RAY EXAM CHEST 1 VIEW: CPT

## 2020-07-12 PROCEDURE — 96367 TX/PROPH/DG ADDL SEQ IV INF: CPT

## 2020-07-12 PROCEDURE — 96365 THER/PROPH/DIAG IV INF INIT: CPT

## 2020-07-12 PROCEDURE — 85025 COMPLETE CBC W/AUTO DIFF WBC: CPT

## 2020-07-12 PROCEDURE — 85730 THROMBOPLASTIN TIME PARTIAL: CPT

## 2020-07-12 PROCEDURE — 82565 ASSAY OF CREATININE: CPT

## 2020-07-12 PROCEDURE — 70450 CT HEAD/BRAIN W/O DYE: CPT

## 2020-07-12 PROCEDURE — 6370000000 HC RX 637 (ALT 250 FOR IP): Performed by: EMERGENCY MEDICINE

## 2020-07-12 PROCEDURE — 82140 ASSAY OF AMMONIA: CPT

## 2020-07-12 PROCEDURE — 99152 MOD SED SAME PHYS/QHP 5/>YRS: CPT

## 2020-07-12 PROCEDURE — 96366 THER/PROPH/DIAG IV INF ADDON: CPT

## 2020-07-12 PROCEDURE — 36600 WITHDRAWAL OF ARTERIAL BLOOD: CPT

## 2020-07-12 PROCEDURE — 96375 TX/PRO/DX INJ NEW DRUG ADDON: CPT

## 2020-07-12 PROCEDURE — 85610 PROTHROMBIN TIME: CPT

## 2020-07-12 PROCEDURE — 82947 ASSAY GLUCOSE BLOOD QUANT: CPT

## 2020-07-12 PROCEDURE — 36415 COLL VENOUS BLD VENIPUNCTURE: CPT

## 2020-07-12 PROCEDURE — 2500000003 HC RX 250 WO HCPCS: Performed by: EMERGENCY MEDICINE

## 2020-07-12 PROCEDURE — 80053 COMPREHEN METABOLIC PANEL: CPT

## 2020-07-12 PROCEDURE — 83880 ASSAY OF NATRIURETIC PEPTIDE: CPT

## 2020-07-12 PROCEDURE — 2580000003 HC RX 258: Performed by: EMERGENCY MEDICINE

## 2020-07-12 PROCEDURE — 83690 ASSAY OF LIPASE: CPT

## 2020-07-12 PROCEDURE — 0BH17EZ INSERTION OF ENDOTRACHEAL AIRWAY INTO TRACHEA, VIA NATURAL OR ARTIFICIAL OPENING: ICD-10-PCS | Performed by: EMERGENCY MEDICINE

## 2020-07-12 PROCEDURE — 51702 INSERT TEMP BLADDER CATH: CPT

## 2020-07-12 PROCEDURE — 93005 ELECTROCARDIOGRAM TRACING: CPT | Performed by: EMERGENCY MEDICINE

## 2020-07-12 PROCEDURE — 84484 ASSAY OF TROPONIN QUANT: CPT

## 2020-07-12 PROCEDURE — 6360000002 HC RX W HCPCS: Performed by: EMERGENCY MEDICINE

## 2020-07-12 PROCEDURE — 81001 URINALYSIS AUTO W/SCOPE: CPT

## 2020-07-12 PROCEDURE — 94002 VENT MGMT INPAT INIT DAY: CPT

## 2020-07-12 PROCEDURE — 6360000002 HC RX W HCPCS

## 2020-07-12 PROCEDURE — 31500 INSERT EMERGENCY AIRWAY: CPT

## 2020-07-12 RX ORDER — 0.9 % SODIUM CHLORIDE 0.9 %
1000 INTRAVENOUS SOLUTION INTRAVENOUS ONCE
Status: COMPLETED | OUTPATIENT
Start: 2020-07-12 | End: 2020-07-13

## 2020-07-12 RX ORDER — LORAZEPAM 2 MG/ML
2 INJECTION INTRAMUSCULAR ONCE
Status: COMPLETED | OUTPATIENT
Start: 2020-07-12 | End: 2020-07-12

## 2020-07-12 RX ORDER — ESMOLOL HYDROCHLORIDE 10 MG/ML
50 INJECTION, SOLUTION INTRAVENOUS CONTINUOUS
Status: DISCONTINUED | OUTPATIENT
Start: 2020-07-12 | End: 2020-07-13

## 2020-07-12 RX ORDER — PROPOFOL 10 MG/ML
20 INJECTION, EMULSION INTRAVENOUS ONCE
Status: COMPLETED | OUTPATIENT
Start: 2020-07-12 | End: 2020-07-12

## 2020-07-12 RX ORDER — LORAZEPAM 2 MG/ML
INJECTION INTRAMUSCULAR
Status: COMPLETED
Start: 2020-07-12 | End: 2020-07-12

## 2020-07-12 RX ORDER — 0.9 % SODIUM CHLORIDE 0.9 %
1000 INTRAVENOUS SOLUTION INTRAVENOUS ONCE
Status: COMPLETED | OUTPATIENT
Start: 2020-07-12 | End: 2020-07-12

## 2020-07-12 RX ORDER — ONDANSETRON 2 MG/ML
4 INJECTION INTRAMUSCULAR; INTRAVENOUS ONCE
Status: COMPLETED | OUTPATIENT
Start: 2020-07-12 | End: 2020-07-12

## 2020-07-12 RX ORDER — ASPIRIN 300 MG/1
300 SUPPOSITORY RECTAL EVERY 6 HOURS PRN
Status: DISCONTINUED | OUTPATIENT
Start: 2020-07-12 | End: 2020-07-20 | Stop reason: HOSPADM

## 2020-07-12 RX ADMIN — VANCOMYCIN HYDROCHLORIDE 2000 MG: 1 INJECTION, POWDER, LYOPHILIZED, FOR SOLUTION INTRAVENOUS at 23:47

## 2020-07-12 RX ADMIN — PROPOFOL 20 MCG/KG/MIN: 10 INJECTION, EMULSION INTRAVENOUS at 22:19

## 2020-07-12 RX ADMIN — LORAZEPAM 2 MG: 2 INJECTION INTRAMUSCULAR; INTRAVENOUS at 21:46

## 2020-07-12 RX ADMIN — SODIUM CHLORIDE 1000 ML: 9 INJECTION, SOLUTION INTRAVENOUS at 20:56

## 2020-07-12 RX ADMIN — LORAZEPAM 2 MG: 2 INJECTION INTRAMUSCULAR at 21:46

## 2020-07-12 RX ADMIN — SODIUM CHLORIDE 1000 ML: 9 INJECTION, SOLUTION INTRAVENOUS at 23:19

## 2020-07-12 RX ADMIN — LEVETIRACETAM 1000 MG: 100 INJECTION, SOLUTION INTRAVENOUS at 22:40

## 2020-07-12 RX ADMIN — ONDANSETRON 4 MG: 2 INJECTION INTRAMUSCULAR; INTRAVENOUS at 20:56

## 2020-07-12 RX ADMIN — PIPERACILLIN SODIUM AND TAZOBACTAM SODIUM 4.5 G: 4; .5 INJECTION, POWDER, LYOPHILIZED, FOR SOLUTION INTRAVENOUS at 22:58

## 2020-07-12 RX ADMIN — ASPIRIN 300 MG: 300 SUPPOSITORY RECTAL at 23:47

## 2020-07-12 RX ADMIN — ESMOLOL HYDROCHLORIDE 50 MCG/KG/MIN: 10 INJECTION INTRAVENOUS at 21:07

## 2020-07-12 ASSESSMENT — PULMONARY FUNCTION TESTS
PIF_VALUE: 16
PIF_VALUE: 17

## 2020-07-13 ENCOUNTER — APPOINTMENT (OUTPATIENT)
Dept: GENERAL RADIOLOGY | Age: 80
DRG: 304 | End: 2020-07-13
Payer: MEDICARE

## 2020-07-13 ENCOUNTER — APPOINTMENT (OUTPATIENT)
Dept: ULTRASOUND IMAGING | Age: 80
DRG: 304 | End: 2020-07-13
Payer: MEDICARE

## 2020-07-13 PROBLEM — N17.9 AKI (ACUTE KIDNEY INJURY) (HCC): Status: ACTIVE | Noted: 2020-07-13

## 2020-07-13 PROBLEM — N18.9 ACUTE KIDNEY INJURY SUPERIMPOSED ON CKD (HCC): Status: ACTIVE | Noted: 2019-04-21

## 2020-07-13 PROBLEM — N18.30 CHRONIC KIDNEY DISEASE (CKD), STAGE III (MODERATE) (HCC): Status: ACTIVE | Noted: 2020-07-13

## 2020-07-13 PROBLEM — R56.9 SEIZURE (HCC): Status: ACTIVE | Noted: 2020-07-13

## 2020-07-13 PROBLEM — J69.0 ASPIRATION PNEUMONIA (HCC): Status: ACTIVE | Noted: 2019-04-24

## 2020-07-13 PROBLEM — J96.00 ACUTE RESPIRATORY FAILURE (HCC): Status: ACTIVE | Noted: 2020-07-13

## 2020-07-13 PROBLEM — G93.40 ACUTE ENCEPHALOPATHY: Status: ACTIVE | Noted: 2020-07-13

## 2020-07-13 LAB
-: ABNORMAL
ABSOLUTE EOS #: 0 K/UL (ref 0–0.4)
ABSOLUTE IMMATURE GRANULOCYTE: ABNORMAL K/UL (ref 0–0.3)
ABSOLUTE LYMPH #: 1.7 K/UL (ref 1–4.8)
ABSOLUTE MONO #: 0.6 K/UL (ref 0.1–1.3)
ALLEN TEST: ABNORMAL
AMORPHOUS: ABNORMAL
ANION GAP SERPL CALCULATED.3IONS-SCNC: 13 MMOL/L (ref 9–17)
BACTERIA: ABNORMAL
BASOPHILS # BLD: 1 % (ref 0–2)
BASOPHILS ABSOLUTE: 0.1 K/UL (ref 0–0.2)
BUN BLDV-MCNC: 15 MG/DL (ref 8–23)
BUN/CREAT BLD: ABNORMAL (ref 9–20)
C-REACTIVE PROTEIN: 8.7 MG/L (ref 0–5)
CALCIUM SERPL-MCNC: 8.9 MG/DL (ref 8.6–10.4)
CARBOXYHEMOGLOBIN: 0.6 % (ref 0–5)
CASTS UA: ABNORMAL /LPF
CHLORIDE BLD-SCNC: 102 MMOL/L (ref 98–107)
CO2: 18 MMOL/L (ref 20–31)
CREAT SERPL-MCNC: 1.36 MG/DL (ref 0.5–0.9)
CREATININE URINE: 21.5 MG/DL (ref 28–217)
CRYSTALS, UA: ABNORMAL /HPF
DIFFERENTIAL TYPE: ABNORMAL
EKG ATRIAL RATE: 99 BPM
EKG P AXIS: 56 DEGREES
EKG P-R INTERVAL: 166 MS
EKG Q-T INTERVAL: 354 MS
EKG QRS DURATION: 80 MS
EKG QTC CALCULATION (BAZETT): 454 MS
EKG R AXIS: -38 DEGREES
EKG T AXIS: 86 DEGREES
EKG VENTRICULAR RATE: 99 BPM
EOSINOPHILS RELATIVE PERCENT: 0 % (ref 0–4)
EPITHELIAL CELLS UA: ABNORMAL /HPF
FIO2: 40
GFR AFRICAN AMERICAN: 45 ML/MIN
GFR NON-AFRICAN AMERICAN: 38 ML/MIN
GFR SERPL CREATININE-BSD FRML MDRD: ABNORMAL ML/MIN/{1.73_M2}
GFR SERPL CREATININE-BSD FRML MDRD: ABNORMAL ML/MIN/{1.73_M2}
GLUCOSE BLD-MCNC: 116 MG/DL (ref 70–99)
GLUCOSE BLD-MCNC: 138 MG/DL (ref 65–105)
HCO3 ARTERIAL: 22.6 MMOL/L (ref 22–26)
HCT VFR BLD CALC: 41.4 % (ref 36–46)
HEMOGLOBIN: 13 G/DL (ref 12–16)
IMMATURE GRANULOCYTES: ABNORMAL %
LV EF: 75 %
LVEF MODALITY: NORMAL
LYMPHOCYTES # BLD: 15 % (ref 24–44)
MCH RBC QN AUTO: 27 PG (ref 26–34)
MCHC RBC AUTO-ENTMCNC: 31.5 G/DL (ref 31–37)
MCV RBC AUTO: 85.8 FL (ref 80–100)
METHEMOGLOBIN: 0.9 % (ref 0–1.9)
MODE: ABNORMAL
MONOCYTES # BLD: 5 % (ref 1–7)
MUCUS: ABNORMAL
NEGATIVE BASE EXCESS, ART: 2.4 MMOL/L (ref 0–2)
NOTIFICATION TIME: ABNORMAL
NOTIFICATION: ABNORMAL
NRBC AUTOMATED: ABNORMAL PER 100 WBC
O2 DEVICE/FLOW/%: ABNORMAL
O2 SAT, ARTERIAL: 97.7 % (ref 95–98)
OTHER OBSERVATIONS UA: ABNORMAL
OXYHEMOGLOBIN: ABNORMAL % (ref 95–98)
PATIENT TEMP: 37
PCO2 ARTERIAL: 37.9 MMHG (ref 35–45)
PCO2, ART, TEMP ADJ: ABNORMAL (ref 35–45)
PDW BLD-RTO: 15.9 % (ref 11.5–14.9)
PEEP/CPAP: 5
PH ARTERIAL: 7.38 (ref 7.35–7.45)
PH, ART, TEMP ADJ: ABNORMAL (ref 7.35–7.45)
PLATELET # BLD: 104 K/UL (ref 150–450)
PLATELET ESTIMATE: ABNORMAL
PMV BLD AUTO: 7.4 FL (ref 6–12)
PO2 ARTERIAL: 156 MMHG (ref 80–100)
PO2, ART, TEMP ADJ: ABNORMAL MMHG (ref 80–100)
POSITIVE BASE EXCESS, ART: ABNORMAL MMOL/L (ref 0–2)
POTASSIUM SERPL-SCNC: 4.5 MMOL/L (ref 3.7–5.3)
PROCALCITONIN: 0.1 NG/ML
PSV: ABNORMAL
PT. POSITION: ABNORMAL
RBC # BLD: 4.82 M/UL (ref 4–5.2)
RBC # BLD: ABNORMAL 10*6/UL
RBC UA: ABNORMAL /HPF
RENAL EPITHELIAL, UA: ABNORMAL /HPF
RESPIRATORY RATE: 15
SAMPLE SITE: ABNORMAL
SARS-COV-2, PCR: NORMAL
SARS-COV-2, RAPID: NOT DETECTED
SARS-COV-2: NORMAL
SEG NEUTROPHILS: 79 % (ref 36–66)
SEGMENTED NEUTROPHILS ABSOLUTE COUNT: 8.7 K/UL (ref 1.3–9.1)
SET RATE: 15
SODIUM BLD-SCNC: 133 MMOL/L (ref 135–144)
SODIUM,UR: 36 MMOL/L
SOURCE: NORMAL
TEXT FOR RESPIRATORY: ABNORMAL
TOTAL HB: ABNORMAL G/DL (ref 12–16)
TOTAL RATE: 15
TRICHOMONAS: ABNORMAL
VT: 500
WBC # BLD: 11.1 K/UL (ref 3.5–11)
WBC # BLD: ABNORMAL 10*3/UL
WBC UA: ABNORMAL /HPF
YEAST: ABNORMAL

## 2020-07-13 PROCEDURE — 82805 BLOOD GASES W/O2 SATURATION: CPT

## 2020-07-13 PROCEDURE — 2580000003 HC RX 258: Performed by: INTERNAL MEDICINE

## 2020-07-13 PROCEDURE — 86140 C-REACTIVE PROTEIN: CPT

## 2020-07-13 PROCEDURE — 2000000000 HC ICU R&B

## 2020-07-13 PROCEDURE — 80048 BASIC METABOLIC PNL TOTAL CA: CPT

## 2020-07-13 PROCEDURE — 6370000000 HC RX 637 (ALT 250 FOR IP): Performed by: PSYCHIATRY & NEUROLOGY

## 2020-07-13 PROCEDURE — 94003 VENT MGMT INPAT SUBQ DAY: CPT

## 2020-07-13 PROCEDURE — 87899 AGENT NOS ASSAY W/OPTIC: CPT

## 2020-07-13 PROCEDURE — 76770 US EXAM ABDO BACK WALL COMP: CPT

## 2020-07-13 PROCEDURE — 85025 COMPLETE CBC W/AUTO DIFF WBC: CPT

## 2020-07-13 PROCEDURE — C8929 TTE W OR WO FOL WCON,DOPPLER: HCPCS

## 2020-07-13 PROCEDURE — 95816 EEG AWAKE AND DROWSY: CPT

## 2020-07-13 PROCEDURE — 6370000000 HC RX 637 (ALT 250 FOR IP): Performed by: INTERNAL MEDICINE

## 2020-07-13 PROCEDURE — 93005 ELECTROCARDIOGRAM TRACING: CPT | Performed by: INTERNAL MEDICINE

## 2020-07-13 PROCEDURE — 84300 ASSAY OF URINE SODIUM: CPT

## 2020-07-13 PROCEDURE — 36415 COLL VENOUS BLD VENIPUNCTURE: CPT

## 2020-07-13 PROCEDURE — 71045 X-RAY EXAM CHEST 1 VIEW: CPT

## 2020-07-13 PROCEDURE — 84145 PROCALCITONIN (PCT): CPT

## 2020-07-13 PROCEDURE — 82570 ASSAY OF URINE CREATININE: CPT

## 2020-07-13 PROCEDURE — 6360000004 HC RX CONTRAST MEDICATION: Performed by: INTERNAL MEDICINE

## 2020-07-13 PROCEDURE — 86738 MYCOPLASMA ANTIBODY: CPT

## 2020-07-13 PROCEDURE — 6370000000 HC RX 637 (ALT 250 FOR IP): Performed by: STUDENT IN AN ORGANIZED HEALTH CARE EDUCATION/TRAINING PROGRAM

## 2020-07-13 PROCEDURE — 6360000002 HC RX W HCPCS: Performed by: INTERNAL MEDICINE

## 2020-07-13 PROCEDURE — 2500000003 HC RX 250 WO HCPCS: Performed by: INTERNAL MEDICINE

## 2020-07-13 PROCEDURE — 99222 1ST HOSP IP/OBS MODERATE 55: CPT | Performed by: PSYCHIATRY & NEUROLOGY

## 2020-07-13 PROCEDURE — 36600 WITHDRAWAL OF ARTERIAL BLOOD: CPT

## 2020-07-13 PROCEDURE — 87081 CULTURE SCREEN ONLY: CPT

## 2020-07-13 PROCEDURE — 93010 ELECTROCARDIOGRAM REPORT: CPT | Performed by: INTERNAL MEDICINE

## 2020-07-13 PROCEDURE — 99291 CRITICAL CARE FIRST HOUR: CPT | Performed by: INTERNAL MEDICINE

## 2020-07-13 RX ORDER — POLYVINYL ALCOHOL 14 MG/ML
1 SOLUTION/ DROPS OPHTHALMIC EVERY 4 HOURS
Status: DISCONTINUED | OUTPATIENT
Start: 2020-07-13 | End: 2020-07-16

## 2020-07-13 RX ORDER — SPIRONOLACTONE AND HYDROCHLOROTHIAZIDE 25; 25 MG/1; MG/1
1 TABLET ORAL EVERY OTHER DAY
Status: ON HOLD | COMMUNITY
End: 2020-07-13

## 2020-07-13 RX ORDER — FENTANYL CITRATE 50 UG/ML
50 INJECTION, SOLUTION INTRAMUSCULAR; INTRAVENOUS EVERY 30 MIN PRN
Status: DISCONTINUED | OUTPATIENT
Start: 2020-07-13 | End: 2020-07-16

## 2020-07-13 RX ORDER — MIDODRINE HYDROCHLORIDE 5 MG/1
5 TABLET ORAL 2 TIMES DAILY
Status: ON HOLD | COMMUNITY
End: 2020-07-13

## 2020-07-13 RX ORDER — AMIODARONE HYDROCHLORIDE 200 MG/1
200 TABLET ORAL NIGHTLY
Status: ON HOLD | COMMUNITY
End: 2020-07-13

## 2020-07-13 RX ORDER — CHLORHEXIDINE GLUCONATE 0.12 MG/ML
15 RINSE ORAL 2 TIMES DAILY
Status: DISCONTINUED | OUTPATIENT
Start: 2020-07-13 | End: 2020-07-16

## 2020-07-13 RX ORDER — CLONIDINE 0.1 MG/24H
1 PATCH, EXTENDED RELEASE TRANSDERMAL WEEKLY
Status: DISCONTINUED | OUTPATIENT
Start: 2020-07-13 | End: 2020-07-20 | Stop reason: HOSPADM

## 2020-07-13 RX ORDER — METOPROLOL TARTRATE 50 MG/1
50 TABLET, FILM COATED ORAL 2 TIMES DAILY
Status: DISCONTINUED | OUTPATIENT
Start: 2020-07-13 | End: 2020-07-20 | Stop reason: HOSPADM

## 2020-07-13 RX ORDER — FUROSEMIDE 40 MG/1
20 TABLET ORAL 2 TIMES DAILY
Status: ON HOLD | COMMUNITY
End: 2020-07-13

## 2020-07-13 RX ORDER — SODIUM CHLORIDE, SODIUM LACTATE, POTASSIUM CHLORIDE, CALCIUM CHLORIDE 600; 310; 30; 20 MG/100ML; MG/100ML; MG/100ML; MG/100ML
75 INJECTION, SOLUTION INTRAVENOUS CONTINUOUS
Status: DISCONTINUED | OUTPATIENT
Start: 2020-07-13 | End: 2020-07-16

## 2020-07-13 RX ORDER — MINERAL OIL AND WHITE PETROLATUM 150; 830 MG/G; MG/G
OINTMENT OPHTHALMIC EVERY 4 HOURS
Status: DISCONTINUED | OUTPATIENT
Start: 2020-07-13 | End: 2020-07-16

## 2020-07-13 RX ORDER — CETIRIZINE HYDROCHLORIDE 10 MG/1
10 TABLET ORAL NIGHTLY
Status: ON HOLD | COMMUNITY
End: 2020-07-13

## 2020-07-13 RX ORDER — METOPROLOL TARTRATE 5 MG/5ML
5 INJECTION INTRAVENOUS EVERY 6 HOURS PRN
Status: DISCONTINUED | OUTPATIENT
Start: 2020-07-13 | End: 2020-07-20 | Stop reason: HOSPADM

## 2020-07-13 RX ORDER — SODIUM CHLORIDE 0.9 % (FLUSH) 0.9 %
10 SYRINGE (ML) INJECTION EVERY 12 HOURS SCHEDULED
Status: DISCONTINUED | OUTPATIENT
Start: 2020-07-13 | End: 2020-07-20 | Stop reason: HOSPADM

## 2020-07-13 RX ORDER — METOPROLOL TARTRATE 5 MG/5ML
5 INJECTION INTRAVENOUS EVERY 4 HOURS
Status: DISCONTINUED | OUTPATIENT
Start: 2020-07-13 | End: 2020-07-13

## 2020-07-13 RX ORDER — ESMOLOL HYDROCHLORIDE 10 MG/ML
50 INJECTION, SOLUTION INTRAVENOUS CONTINUOUS
Status: DISCONTINUED | OUTPATIENT
Start: 2020-07-13 | End: 2020-07-13

## 2020-07-13 RX ORDER — LEVETIRACETAM 100 MG/ML
500 SOLUTION ORAL 2 TIMES DAILY
Status: DISCONTINUED | OUTPATIENT
Start: 2020-07-13 | End: 2020-07-15

## 2020-07-13 RX ORDER — EZETIMIBE 10 MG/1
10 TABLET ORAL NIGHTLY
Status: DISCONTINUED | OUTPATIENT
Start: 2020-07-13 | End: 2020-07-20 | Stop reason: HOSPADM

## 2020-07-13 RX ORDER — HYDRALAZINE HYDROCHLORIDE 20 MG/ML
10 INJECTION INTRAMUSCULAR; INTRAVENOUS EVERY 4 HOURS PRN
Status: DISCONTINUED | OUTPATIENT
Start: 2020-07-13 | End: 2020-07-20 | Stop reason: HOSPADM

## 2020-07-13 RX ORDER — WARFARIN SODIUM 2 MG/1
2 TABLET ORAL
Status: ON HOLD | COMMUNITY
End: 2020-07-13

## 2020-07-13 RX ORDER — SODIUM CHLORIDE 0.9 % (FLUSH) 0.9 %
10 SYRINGE (ML) INJECTION PRN
Status: DISCONTINUED | OUTPATIENT
Start: 2020-07-13 | End: 2020-07-20 | Stop reason: HOSPADM

## 2020-07-13 RX ORDER — ACETAMINOPHEN 325 MG/1
650 TABLET ORAL EVERY 4 HOURS PRN
Status: DISCONTINUED | OUTPATIENT
Start: 2020-07-13 | End: 2020-07-20 | Stop reason: HOSPADM

## 2020-07-13 RX ORDER — PROPOFOL 10 MG/ML
10 INJECTION, EMULSION INTRAVENOUS CONTINUOUS
Status: DISCONTINUED | OUTPATIENT
Start: 2020-07-13 | End: 2020-07-16

## 2020-07-13 RX ORDER — CLOPIDOGREL BISULFATE 75 MG/1
75 TABLET ORAL DAILY
Status: DISCONTINUED | OUTPATIENT
Start: 2020-07-13 | End: 2020-07-20 | Stop reason: HOSPADM

## 2020-07-13 RX ADMIN — PERFLUTREN 2.2 MG: 6.52 INJECTION, SUSPENSION INTRAVENOUS at 09:01

## 2020-07-13 RX ADMIN — EZETIMIBE 10 MG: 10 TABLET ORAL at 20:53

## 2020-07-13 RX ADMIN — CHLORHEXIDINE GLUCONATE 15 ML: 1.2 RINSE ORAL at 20:50

## 2020-07-13 RX ADMIN — CLOPIDOGREL BISULFATE 75 MG: 75 TABLET ORAL at 13:31

## 2020-07-13 RX ADMIN — METOPROLOL TARTRATE 50 MG: 50 TABLET, FILM COATED ORAL at 13:31

## 2020-07-13 RX ADMIN — Medication 10 ML: at 09:42

## 2020-07-13 RX ADMIN — HYDRALAZINE HYDROCHLORIDE 10 MG: 20 INJECTION INTRAMUSCULAR; INTRAVENOUS at 21:32

## 2020-07-13 RX ADMIN — FENTANYL CITRATE 50 MCG: 50 INJECTION INTRAMUSCULAR; INTRAVENOUS at 17:11

## 2020-07-13 RX ADMIN — SODIUM CHLORIDE, POTASSIUM CHLORIDE, SODIUM LACTATE AND CALCIUM CHLORIDE 75 ML/HR: 600; 310; 30; 20 INJECTION, SOLUTION INTRAVENOUS at 07:50

## 2020-07-13 RX ADMIN — SODIUM CHLORIDE, POTASSIUM CHLORIDE, SODIUM LACTATE AND CALCIUM CHLORIDE 75 ML/HR: 600; 310; 30; 20 INJECTION, SOLUTION INTRAVENOUS at 21:02

## 2020-07-13 RX ADMIN — ENOXAPARIN SODIUM 30 MG: 30 INJECTION SUBCUTANEOUS at 07:54

## 2020-07-13 RX ADMIN — MINERAL OIL, PETROLATUM: 425; 568 OINTMENT OPHTHALMIC at 20:49

## 2020-07-13 RX ADMIN — CHLORHEXIDINE GLUCONATE 15 ML: 1.2 RINSE ORAL at 07:54

## 2020-07-13 RX ADMIN — MINERAL OIL, PETROLATUM: 425; 568 OINTMENT OPHTHALMIC at 05:03

## 2020-07-13 RX ADMIN — FENTANYL CITRATE 50 MCG: 50 INJECTION INTRAMUSCULAR; INTRAVENOUS at 11:23

## 2020-07-13 RX ADMIN — SODIUM CHLORIDE 3 G: 900 INJECTION INTRAVENOUS at 08:10

## 2020-07-13 RX ADMIN — PROPOFOL 15 MCG/KG/MIN: 10 INJECTION, EMULSION INTRAVENOUS at 13:36

## 2020-07-13 RX ADMIN — PROPOFOL 20 MCG/KG/MIN: 10 INJECTION, EMULSION INTRAVENOUS at 03:49

## 2020-07-13 RX ADMIN — MINERAL OIL, PETROLATUM: 425; 568 OINTMENT OPHTHALMIC at 07:57

## 2020-07-13 RX ADMIN — SODIUM CHLORIDE 3 G: 900 INJECTION INTRAVENOUS at 13:30

## 2020-07-13 RX ADMIN — POLYVINYL ALCOHOL 1 DROP: 14 SOLUTION/ DROPS OPHTHALMIC at 06:37

## 2020-07-13 RX ADMIN — PROPOFOL 25 MCG/KG/MIN: 10 INJECTION, EMULSION INTRAVENOUS at 02:41

## 2020-07-13 RX ADMIN — LEVETIRACETAM 500 MG: 100 SOLUTION ORAL at 20:49

## 2020-07-13 RX ADMIN — POLYVINYL ALCOHOL 1 DROP: 14 SOLUTION/ DROPS OPHTHALMIC at 11:27

## 2020-07-13 RX ADMIN — SODIUM CHLORIDE 3 G: 900 INJECTION INTRAVENOUS at 19:49

## 2020-07-13 RX ADMIN — FAMOTIDINE 20 MG: 10 INJECTION, SOLUTION INTRAVENOUS at 07:54

## 2020-07-13 RX ADMIN — POLYVINYL ALCOHOL 1 DROP: 14 SOLUTION/ DROPS OPHTHALMIC at 20:49

## 2020-07-13 ASSESSMENT — PULMONARY FUNCTION TESTS
PIF_VALUE: 18
PIF_VALUE: 19
PIF_VALUE: 18
PIF_VALUE: 10
PIF_VALUE: 20
PIF_VALUE: 18
PIF_VALUE: 20
PIF_VALUE: 19
PIF_VALUE: 18
PIF_VALUE: 17
PIF_VALUE: 20
PIF_VALUE: 20
PIF_VALUE: 18
PIF_VALUE: 19
PIF_VALUE: 20
PIF_VALUE: 19
PIF_VALUE: 18
PIF_VALUE: 18
PIF_VALUE: 17
PIF_VALUE: 18
PIF_VALUE: 20
PIF_VALUE: 19
PIF_VALUE: 19
PIF_VALUE: 18
PIF_VALUE: 20
PIF_VALUE: 20
PIF_VALUE: 18
PIF_VALUE: 19
PIF_VALUE: 19
PIF_VALUE: 18
PIF_VALUE: 18
PIF_VALUE: 20
PIF_VALUE: 20
PIF_VALUE: 18
PIF_VALUE: 19
PIF_VALUE: 18
PIF_VALUE: 22
PIF_VALUE: 20
PIF_VALUE: 21

## 2020-07-13 ASSESSMENT — PAIN SCALES - GENERAL: PAINLEVEL_OUTOF10: 0

## 2020-07-13 NOTE — ED NOTES
Bed: 07A  Expected date:   Expected time:   Means of arrival:   Comments:  Gustavo Moran (hx CVA)     Kalli Kellogg RN  07/12/20 2020

## 2020-07-13 NOTE — CONSULTS
tonsillectomy, history of EGD. SOCIAL HISTORY:  The patient is a former smoker apparently quitting 18  years ago. FAMILY HISTORY:  Noncontributory. MEDICATIONS:  She is on [employer], but no other pulmonary  medications. She is not on oxygen. REVIEW OF SYSTEMS:  Cannot be obtained. PHYSICAL EXAMINATION:  GENERAL APPEARANCE:  Elderly female sedated on the ventilator. VITAL SIGNS:  Temperature 98.2, respiratory rate 15. She is not  breathing above the ventilator. Pulse is 57, blood pressure 154/96,  oxygen saturation on FiO2 40% is 100%. HEENT:  She is intubated orally. LUNGS:  Diminished without any wheezes. Some minimal basilar crackles. CARDIAC SYSTEM:  S1, S2.  ABDOMEN:  Soft, nondistended. EXTREMITIES:  Show no edema. NEUROLOGIC:  There are no focal deficits. DIAGNOSTIC DATA:  Her chest x-ray shows minimal basilar atelectasis. Endotracheal tube in good position. LABORATORY DATA:  Sodium 133, potassium 4.5, chloride 102, bicarb 18,  BUN 15, creatinine 1.36, glucose is 116. ProBNP was 4169. White count  is 11.1 with an H and H of 13 and 41.4, platelet count of 523. Arterial  blood gas show pH of 7.38 with a pCO2 of 38, pO2 of 166. IMPRESSION:  My impression is that the patient has acute mental status  changes, hypertensive emergency, aspiration pneumonia, vomited on the  way to hospital and also in the ER and acute hypoxic respiratory failure  on the ventilator. She was then unable to protect her airway. RECOMMENDATIONS:  At this point, we are going to monitor her blood  pressure closely. We want to decrease her MAP by 25% for what she came  into, we did not want it to drop suddenly and currently she is off all  antihypertensives. If needed, we can use esmolol which is short acting. We will empirically start Unasyn to cover aspiration pneumonia. No need  of vancomycin. Full ventilatory support for another 24 hours. We will  consult Cardiology.   The patient usually sees Dr. Veronica Sahni who manages her antihypertensives at an outpatient setting. We will check an  echocardiogram.  She should be on DVT and GI prophylaxis. Critical care  time spent was 35 minutes.         Deana Murcia    D: 07/13/2020 7:42:59       T: 07/13/2020 7:46:53     [email or web address]  Job#: [id number]     Doc#: [id number]    CC:

## 2020-07-13 NOTE — CONSULTS
Full consult dictated    Acute mental status changes  Hypertensive emergency  Aspiration pneumonia-vomited on the way into hospital and also in ER  Acute hypoxic respiratory failure      Monitor blood pressure closely, do not want to drop it suddenly currently off all antihypertensives  If needed, use esmolol  Empiric start of Unasyn to cover aspiration pneumonia, no need for vancomycin  Full ventilatory support for another 24 hours  Consult cardiology, patient usually sees Dr. Chrys Buerger like them to manage antihypertensives  Check echocardiogram  DVT/GI prophylaxis  CCT >35 min

## 2020-07-13 NOTE — ED NOTES
pts , son, and grandaughter provided with update. Families phone numbers updated on pts chart.  Family went home after update     Andrea Ramesh RN  07/13/20 0025

## 2020-07-13 NOTE — PROGRESS NOTES
Patient transported to ICU room 2010 on ventilator with portable monitor. Transported with two RNs and RT. Patient transferred to ICU bed, hooked to bedside monitor, vitals taken, assessment completed.

## 2020-07-13 NOTE — H&P
[address] [facility]      [address] [facility]     HISTORY AND PHYSICAL EXAMINATION            Date:   7/13/2020  Patient name:  Marian Pineda  Date of admission:  7/12/2020  8:20 PM  MRN:   [id number]  Account:  [de-identified]  YOB: 1940  PCP:    Laura Dominguez DO  Room:   2010/2010-01  Code Status:    Full Code    Chief Complaint:     Chief Complaint   Patient presents with    Altered Mental Status    Hypertension     History Obtained From:     Nurse and chart  Reason patient could not give history:  on ventilator    History of Present Illness: The patient is a 79 y.o. Non-/non  female who presents withAltered Mental Status and Hypertension and she is admitted to the hospital for the management of hypertensive emergency. HPI unable to be obtained from patient due to being on ventilator. Per ED notes and nurse, patient had acute mental status changes x1 day and was not answering questions appropriately therefore was brought in by . In the ER patient complained of having numbness and tingling in the left upper and lower extremity concerning for stroke. Patient soon after became unresponsive and had to be intubated for airway protection. Blood pressure was also elevated with systolic being in the 275Q and treated with esmolol drip  Patient also had tremors in the ER treated with Ativan.      Past Medical History:     Past Medical History:   Diagnosis Date    Arthritis     Cancer ([address]     breast cancer, left    CKD (chronic kidney disease) stage 4, GFR 15-29 ml/min (HCC)     COPD (chronic obstructive pulmonary disease) (HCC)     CVA (cerebral vascular accident) ([address]     mini    Gout     Hyperlipidemia     Hypertension     Left renal atrophy     Other abnormality of urination(788.69)      blood disorder needs platelets prior to procedures        Past SurgicalHistory:     Past Surgical History:   Procedure Laterality Date    APPENDECTOMY      BACK SURGERY      fusion with rods and screws    BLADDER SUSPENSION      BREAST SURGERY Left     lumpectomy x2    CAROTID ENDARTERECTOMY      COLONOSCOPY      COLONOSCOPY N/A 7/17/2018    COLONOSCOPY POLYPECTOMY performed by Sue Negron MD at [facility]      JOINT REPLACEMENT      knee    JOINT REPLACEMENT      right hip    TONSILLECTOMY      UPPER GASTROINTESTINAL ENDOSCOPY N/A 7/17/2018    EGD BIOPSY performed by Sue Negron MD at [facility]          Medications Prior to Admission:        Prior to Admission medications    Medication Sig Start Date End Date Taking? Authorizing Provider   omeprazole (PRILOSEC) 20 MG delayed release capsule TAKE 1 CAPSULE DAILY 2/12/20  Yes Hyun Faulkner MD   metoprolol (LOPRESSOR) 100 MG tablet Take 50 mg by mouth 2 times daily   Yes Historical Provider, MD   gabapentin (NEURONTIN) 100 MG capsule Take 300 mg by mouth nightly. Yes Historical Provider, MD   ezetimibe (ZETIA) 10 MG tablet Take 10 mg by mouth daily   Yes Historical Provider, MD   allopurinol (ZYLOPRIM) 100 MG tablet Take 100 mg by mouth 2 times daily. Yes Historical Provider, MD   cloNIDine (CATAPRES) 0.1 MG/24HR PTWK Place 1 patch onto the skin once a week Indications: Applies on Mondays     Historical Provider, MD   benzonatate (TESSALON) 200 MG capsule Take 200 mg by mouth daily     Historical Provider, MD   clopidogrel (PLAVIX) 75 MG tablet Take 1 tablet by mouth daily 4/28/18   Asad Mason   calcium carbonate-vitamin D (CALTRATE) 600-400 MG-UNIT TABS per tab Take 1 tablet by mouth 2 times daily     Historical Provider, MD        Allergies:     Diclofenac-misoprostol    Social History:     Tobacco:    reports that she quit smoking about 21 months ago. She smoked 1.00 pack per day.  She has never used smokeless tobacco.  Alcohol:      reports no history of alcohol use. Drug Use:  reports no history of drug use. Family History:     Family History   Problem Relation Age of Onset    Heart Disease Brother     Other Mother         polycythemia    Heart Disease Father     Heart Disease Sister         enlarged heart    Cancer Son         prostate       Review of Systems:     Positive and Negative as described in HPI. Review of Systems    Unable to obtain due to being on a ventilator    Physical Exam:   BP (!) 154/56   Pulse 63   Temp 98.2 °F (36.8 °C) (Oral)   Resp 14   Ht 5' 5\" (1.651 m)   Wt 160 lb 7.9 oz (72.8 kg)   SpO2 100%   BMI 26.71 kg/m²   Temp (24hrs), Av.4 °F (36.9 °C), Min:98.2 °F (36.8 °C), Max:98.5 °F (36.9 °C)    Recent Labs     20   POCGLU 138*       Intake/Output Summary (Last 24 hours) at 2020 0978  Last data filed at 2020 0553  Gross per 24 hour   Intake --   Output 700 ml   Net -700 ml       Physical Exam  Vitals signs and nursing note reviewed. Constitutional:       Appearance: She is well-developed. Comments: Patient sedated, intubated and on ventilator   Cardiovascular:      Rate and Rhythm: Normal rate and regular rhythm. Heart sounds: Normal heart sounds. No murmur. No friction rub. No gallop. Pulmonary:      Effort: Pulmonary effort is normal. No respiratory distress. Breath sounds: Normal breath sounds. No wheezing or rales. Chest:      Chest wall: No tenderness. Abdominal:      General: Bowel sounds are normal. There is no distension. Palpations: Abdomen is soft. There is no mass. Tenderness: There is no abdominal tenderness. There is no guarding. Skin:     Capillary Refill: Capillary refill takes less than 2 seconds.       Comments: Upper distal and lower distal extremities cold to touch   Neurological:      Comments: Patient unresponsive to sternal rub or verbal stimuli         Investigations:     Laboratory Albumin/Globulin Ratio NOT REPORTED 1.0 - 2.5    GFR Non- 36 (L) >60 mL/min    GFR  44 (L) >60 mL/min    GFR Comment          GFR Staging NOT REPORTED    Lipase    Collection Time: 07/12/20  8:25 PM   Result Value Ref Range    Lipase 30 13 - 60 U/L   Brain Natriuretic Peptide    Collection Time: 07/12/20  8:25 PM   Result Value Ref Range    Pro-BNP 4,169 (H) <300 pg/mL    BNP Interpretation Pro-BNP Reference Range:    Troponin    Collection Time: 07/12/20  8:25 PM   Result Value Ref Range    Troponin, High Sensitivity 29 (H) 0 - 14 ng/L    Troponin T NOT REPORTED <0.03 ng/mL    Troponin Interp NOT REPORTED    APTT    Collection Time: 07/12/20  8:25 PM   Result Value Ref Range    PTT 27.4 24.0 - 36.0 sec   Protime-INR    Collection Time: 07/12/20  8:25 PM   Result Value Ref Range    Protime 12.4 11.8 - 14.6 sec    INR 0.9    Ammonia    Collection Time: 07/12/20  8:25 PM   Result Value Ref Range    Ammonia 33 11 - 51 umol/L   POCT Glucose    Collection Time: 07/12/20  8:30 PM   Result Value Ref Range    Glucose 138 mg/dL    QC OK?  yes    POC Glucose Fingerstick    Collection Time: 07/12/20  8:31 PM   Result Value Ref Range    POC Glucose 138 (H) 65 - 105 mg/dL   Creatinine W/GFR Point of Care    Collection Time: 07/12/20  8:43 PM   Result Value Ref Range    POC Creatinine 1.47 (H) 0.51 - 1.19 mg/dL    GFR Comment 42 (L) >60 mL/min    GFR Non- 34 (L) >60 mL/min    GFR Comment         Troponin    Collection Time: 07/12/20 10:40 PM   Result Value Ref Range    Troponin, High Sensitivity 26 (H) 0 - 14 ng/L    Troponin T NOT REPORTED <0.03 ng/mL    Troponin Interp NOT REPORTED    Arterial Blood Gases    Collection Time: 07/12/20 10:55 PM   Result Value Ref Range    pH, Arterial 7.415 7.350 - 7.450    pCO2, Arterial 35.8 35.0 - 45.0 mmHg    pO2, Arterial 142.0 (H) 80.0 - 100.0 mmHg    HCO3, Arterial 23.0 22.0 - 26.0 mmol/L    Positive Base Excess, Art NOT REPORTED 0.0 - 2.0 mmol/L    Negative Base Excess, Art 1.6 0.0 - 2.0 mmol/L    O2 Sat, Arterial 98.1 (H) 95 - 98 %    Total Hb NOT REPORTED 12.0 - 16.0 g/dl    Oxyhemoglobin NOT REPORTED 95.0 - 98.0 %    Carboxyhemoglobin 1.0 0 - 5 %    Methemoglobin 0.4 0.0 - 1.9 %    Pt Temp 37     pH, Art, Temp Adj NOT REPORTED 7.350 - 7.450    pCO2, Art, Temp Adj NOT REPORTED 35.0 - 45.0    pO2, Art, Temp Adj NOT REPORTED 80.0 - 100.0 mmHg    O2 Device/Flow/% VENTILATOR     Respiratory Rate PENDING     Basim Test PASS     Sample Site Right Radial Artery     Pt. Position SEMI-FOWLERS     Mode PRVC     Set Rate NOT REPORTED     Total Rate NOT REPORTED     VT NOT REPORTED     FIO2 40     Peep/Cpap NOT REPORTED     PSV NOT REPORTED     Text for Respiratory  RESULTS GIVEN TO RN AND PHYSICIAN. NOTIFICATION NOT REPORTED     NOTIFICATION TIME NOT REPORTED    Urinalysis Reflex to Culture    Collection Time: 07/12/20 11:37 PM    Specimen: Urine, clean catch   Result Value Ref Range    Color, UA YELLOW YELLOW    Turbidity UA CLEAR CLEAR    Glucose, Ur NEGATIVE NEGATIVE    Bilirubin Urine NEGATIVE NEGATIVE    Ketones, Urine NEGATIVE NEGATIVE    Specific [place], UA 1.011 1.000 - 1.030    Urine Hgb MOD (A) NEGATIVE    pH, UA 6.5 5.0 - 8.0    Protein, UA 2+ (A) NEGATIVE    Urobilinogen, Urine Normal Normal    Nitrite, Urine NEGATIVE NEGATIVE    Leukocyte Esterase, Urine NEGATIVE NEGATIVE    Urinalysis Comments NOT REPORTED    Microscopic Urinalysis    Collection Time: 07/12/20 11:37 PM   Result Value Ref Range    -          WBC, UA 2 TO 5 /HPF    RBC, UA 10 TO 20 /HPF    Casts UA NOT REPORTED /LPF    Crystals, UA NOT REPORTED None /HPF    Epithelial Cells UA 2 TO 5 /HPF    Renal Epithelial, UA NOT REPORTED 0 /HPF    Bacteria, UA MODERATE (A) None    Mucus, UA NOT REPORTED None    Trichomonas, UA NOT REPORTED None    Amorphous, UA NOT REPORTED None    Other Observations UA NOT REPORTED NOT REQ.     Yeast, UA NOT REPORTED None   COVID-19    Collection Time: 07/12/20 11:45 PM    Specimen: Other   Result Value Ref Range    SARS-CoV-2          SARS-CoV-2, Rapid Not Detected Not Detected    Source . NASOPHARYNGEAL SWAB     SARS-CoV-2, PCR         Blood gas, arterial    Collection Time: 07/13/20  4:20 AM   Result Value Ref Range    pH, Arterial 7.384 7.350 - 7.450    pCO2, Arterial 37.9 35.0 - 45.0 mmHg    pO2, Arterial 156.0 (H) 80.0 - 100.0 mmHg    HCO3, Arterial 22.6 22.0 - 26.0 mmol/L    Positive Base Excess, Art NOT REPORTED 0.0 - 2.0 mmol/L    Negative Base Excess, Art 2.4 (H) 0.0 - 2.0 mmol/L    O2 Sat, Arterial 97.7 95 - 98 %    Total Hb NOT REPORTED 12.0 - 16.0 g/dl    Oxyhemoglobin NOT REPORTED 95.0 - 98.0 %    Carboxyhemoglobin 0.6 0 - 5 %    Methemoglobin 0.9 0.0 - 1.9 %    Pt Temp 37.0     pH, Art, Temp Adj NOT REPORTED 7.350 - 7.450    pCO2, Art, Temp Adj NOT REPORTED 35.0 - 45.0    pO2, Art, Temp Adj NOT REPORTED 80.0 - 100.0 mmHg    O2 Device/Flow/% VENTILATOR     Respiratory Rate 15     Basim Test PASS     Sample Site Right Radial Artery     Pt.  Position SEMI-FOWLERS     Mode PRVC     Set Rate 15     Total Rate 15          FIO2 40     Peep/Cpap 5     PSV NOT REPORTED     Text for Respiratory RESULTS TO RN     NOTIFICATION NOT REPORTED     NOTIFICATION TIME NOT REPORTED    Basic Metabolic Prof    Collection Time: 07/13/20  4:39 AM   Result Value Ref Range    Glucose 116 (H) 70 - 99 mg/dL    BUN 15 8 - 23 mg/dL    CREATININE 1.36 (H) 0.50 - 0.90 mg/dL    Bun/Cre Ratio NOT REPORTED 9 - 20    Calcium 8.9 8.6 - 10.4 mg/dL    Sodium 133 (L) 135 - 144 mmol/L    Potassium 4.5 3.7 - 5.3 mmol/L    Chloride 102 98 - 107 mmol/L    CO2 18 (L) 20 - 31 mmol/L    Anion Gap 13 9 - 17 mmol/L    GFR Non-African American 38 (L) >60 mL/min    GFR  45 (L) >60 mL/min    GFR Comment          GFR Staging NOT REPORTED    CBC with DIFF    Collection Time: 07/13/20  4:39 AM   Result Value Ref Range    WBC 11.1 (H) 3.5 - 11.0 k/uL    RBC 4.82 4.0 - 5.2 m/uL Hemoglobin 13.0 12.0 - 16.0 g/dL    Hematocrit 41.4 36 - 46 %    MCV 85.8 80 - 100 fL    MCH 27.0 26 - 34 pg    MCHC 31.5 31 - 37 g/dL    RDW 15.9 (H) 11.5 - 14.9 %    Platelets 996 (L) 052 - 450 k/uL    MPV 7.4 6.0 - 12.0 fL    NRBC Automated NOT REPORTED per 100 WBC    Differential Type NOT REPORTED     Immature Granulocytes NOT REPORTED 0 %    Absolute Immature Granulocyte NOT REPORTED 0.00 - 0.30 k/uL    WBC Morphology NOT REPORTED     RBC Morphology NOT REPORTED     Platelet Estimate NOT REPORTED     Seg Neutrophils 79 (H) 36 - 66 %    Lymphocytes 15 (L) 24 - 44 %    Monocytes 5 1 - 7 %    Eosinophils % 0 0 - 4 %    Basophils 1 0 - 2 %    Segs Absolute 8.70 1.3 - 9.1 k/uL    Absolute Lymph # 1.70 1.0 - 4.8 k/uL    Absolute Mono # 0.60 0.1 - 1.3 k/uL    Absolute Eos # 0.00 0.0 - 0.4 k/uL    Basophils Absolute 0.10 0.0 - 0.2 k/uL       Imaging/Diagnostics:  Ct Head Wo Contrast    Result Date: 7/12/2020  EXAMINATION: CT OF THE HEAD WITHOUT CONTRAST  7/12/2020 8:42 pm TECHNIQUE: CT of the head was performed without the administration of intravenous contrast. Dose modulation, iterative reconstruction, and/or weight based adjustment of the mA/kV was utilized to reduce the radiation dose to as low as reasonably achievable. COMPARISON: 12/19/2018 HISTORY: ORDERING SYSTEM PROVIDED HISTORY: AMS TECHNOLOGIST PROVIDED HISTORY: AMS Reason for Exam: AMS, unable to speak, patient is unable to stop shaking her legs. patient is unable to follow any type of instructions or answer any questions. Acuity: Acute Type of Exam: Initial FINDINGS: BRAIN/VENTRICLES: There is no acute intracranial hemorrhage, mass effect or midline shift. No abnormal extra-axial fluid collection. The gray-white differentiation is maintained without evidence of an acute infarct. There is no evidence of hydrocephalus. Generalized involutional changes and moderate chronic small ischemic disease. Intracranial vascular calcifications.  Question small chronic lacune infarct identified within the region of the right paramedian dane. ORBITS: The visualized portion of the orbits demonstrate no acute abnormality. SINUSES: The visualized paranasal sinuses and mastoid air cells demonstrate no acute abnormality. SOFT TISSUES/SKULL:  No acute abnormality of the visualized skull or soft tissues. No acute intracranial abnormality. Chronic small ischemic disease and age related change. Critical results were called by Dr. Tj Keyes to Dr Ashley Thakkar on 7/12/2020 at 20:52. Xr Chest Portable    Questionable infiltrate left midlung. Xr Chest Portable    Result Date: 7/12/2020  EXAMINATION: ONE XRAY VIEW OF THE CHEST 7/12/2020 10:32 pm COMPARISON: Prior study at 9:13 p.m. HISTORY: ORDERING SYSTEM PROVIDED HISTORY: ET tube TECHNOLOGIST PROVIDED HISTORY: ET tube Reason for Exam: ET tube placement Acuity: Acute Type of Exam: Initial Additional signs and symptoms: ET tube placement Relevant Medical/Surgical History: ET tube placement FINDINGS: Interval intubation. Enteric tube has also been placed with both tubes appropriately positioned. The heart is enlarged. Persistent ground-glass opacification in the left lower lung zone. Right lung appears clear. No significant skeletal finding. Supportive devices are positioned appropriately. Persistent airspace changes at the left lung base. Xr Chest Portable    Result Date: 7/12/2020  EXAMINATION: ONE XRAY VIEW OF THE CHEST 7/12/2020 9:21 pm COMPARISON: 12/19/2019 radiograph HISTORY: ORDERING SYSTEM PROVIDED HISTORY: cough TECHNOLOGIST PROVIDED HISTORY: cough Reason for Exam: cough Acuity: Acute Type of Exam: Initial FINDINGS: The heart is enlarged. Mediastinum and pulmonary vascular markings are normal.  Right lung is clear. Stable mild ground-glass opacities at the left costophrenic sulcus. No significant skeletal finding. Asymmetric ground-glass opacification at the left lung base.   Pattern may represent edema or small pleural effusion. Developing pneumonitis can not be excluded. Assessment :      Primary Problem  Acute encephalopathy    Active Hospital Problems    Diagnosis Date Noted    Acute respiratory failure ([facility] [J96.00] 07/13/2020    Acute encephalopathy [G93.40] 07/13/2020    Hypertensive emergency [I16.1] 12/19/2019    Asp[facility] pneumonia ([facility] [J69.0] 04/24/2019    Acute kidney injury superimposed on CKD ([facility] [N17.9, N18.9] 04/21/2019       Plan:     Patient status Admit as inpatient in the  Medical ICU    1. Acute Hypoxic Respiratory failure likely 2/2 acute encephalopathy  -Intubated and on ventilator  -ABG unremarkable for hypoxia or hypercapnia  -Propofol infusion  -Fentanyl 50 MCG for agitation  -Pulmonology  -continue full vent support for 24 hours    2. Acute encephalopathy -likely TIA  -Neurology consulted for possible CVA  -Carotid Duplex (05/27)  - Left artery 50-59 stenosis; Right < 50% stenosis  -CT head on remarkable for any intracranial abnormality  -ABG unremarkable for hypercapnia or hypoxia  -EKG normal sinus rhythm  -UA unremarkable for any urinary tract infection  -Liver profile unremarkable    3. Aspiration pneumonia  -Chest x-ray remarkable for questionable left midlung infiltrate  -Continue with Unasyn  -Pro-Feliciano and CRP ordered  -Pneumonia work-up ordered  -Pulmonology -empirical coverage with Unasyn to cover for aspiration pneumonia. No need for vancomycin    4. Seizure activity  - Continue with Keppra   - Ativan in the ED  -Neurology following    5. Hypertensive emergency - Stable  -Esmolol drip on hold currently due to blood pressure being stable  -Clonidine patch once a week -held  -Cardiology consulted for management of high antihypertensives  -Echo pending    6. History of carotid endarterectomy  -Lipitor 20 mg daily  -Plavix 75 mg daily  -At the 10 be 10 mg daily  -Lopressor 50 mg twice daily   -Carotid Duplex (05/27): Left 50-59% ;  Right <50%  -Seen Dr. Jenifer Medel is patient's vascular surgeon on 6/8/2020    6. CKD stage 3; GFR 34  -Creatinine at 1.36; BL at 1.4  -Renal ultrasound pending  -Urine sodium and creatinine ordered    Recently saw cardiology for cardiac clearance and cardiovascular examination for placement of spinal cord stimulator on 6/16/2020    Consultations:   PHARMACY TO DOSE VANCOMYCIN  IP CONSULT TO INTERNAL MEDICINE  IP CONSULT TO PULMONOLOGY  IP CONSULT TO NEUROLOGY  IP CONSULT TO DIETITIAN  IP CONSULT TO CARDIOLOGY    Patient is admitted as inpatient status because of co-morbiditieslisted above, severity of signs and symptoms as outlined, requirement for current medical therapies and most importantly because of direct risk to patient if care not provided in a hospital setting.     Chiki Fisher MD  7/13/2020  9:45 AM    Copy sent to Dr. Clarence Verdugo DO

## 2020-07-13 NOTE — PLAN OF CARE
Problem: Restraint Use - Nonviolent/Non-Self-Destructive Behavior:  Goal: Absence of restraint indications  Description: Absence of restraint indications  Outcome: Ongoing    Attempts to pull at tubes when released. ROM assessed every 2 hours and visual safety checks completed hourly. Restraints maintained to preserve the patient's airway. Problem: Restraint Use - Nonviolent/Non-Self-Destructive Behavior:  Goal: Absence of restraint-related injury  Description: Absence of restraint-related injury  Outcome: Ongoing    Attempts to pull at tubes when released. ROM assessed every 2 hours and visual safety checks completed hourly. Restraints maintained to preserve the patient's airway.       Problem: Skin Integrity:  Goal: Will show no infection signs and symptoms  Description: Will show no infection signs and symptoms  Outcome: Ongoing     Problem: Skin Integrity:  Goal: Absence of new skin breakdown  Description: Absence of new skin breakdown  Outcome: Ongoing     Problem: Infection - Ventilator-Associated Pneumonia:  Goal: Prevent a ventilator associated event (VAE)  Description: Prevent a ventilator associated event (VAE)  Outcome: Ongoing     Problem: Infection - Ventilator-Associated Pneumonia:  Goal: Absence of pulmonary infection  Description: Absence of pulmonary infection  Outcome: Ongoing     Problem: Urinary Elimination:  Goal: Signs and symptoms of infection will decrease  Description: Signs and symptoms of infection will decrease  Outcome: Ongoing     Problem: Urinary Elimination:  Goal: Complications related to the disease process, condition or treatment will be avoided or minimized  Description: Complications related to the disease process, condition or treatment will be avoided or minimized  Outcome: Ongoing

## 2020-07-13 NOTE — ED NOTES
Pt arm, leg and eye twitching stopped. Pt now unresponsive to verbal and pain.       Kimo Simental RN  07/12/20 0201

## 2020-07-13 NOTE — ED NOTES
Report given to Swetha Leigh RN from ICU. Report method in person   The following was reviewed with receiving RN:   Current vital signs:  BP (!) 157/76   Pulse 65   Temp 98.5 °F (36.9 °C) (Oral)   Resp 15   Ht 5' 5\" (1.651 m)   Wt 190 lb (86.2 kg)   SpO2 99%   BMI 31.62 kg/m²                MEWS Score: 5     Any medication or safety alerts were reviewed. Any pending diagnostics and notifications were also reviewed, as well as any safety concerns or issues, abnormal labs, abnormal imaging, and abnormal assessment findings. Questions were answered.             Jerad Espinoza RN  07/13/20 [id number]

## 2020-07-13 NOTE — ED NOTES
Mode of arrival (squad #, walk in, police, etc) : Paige Rolle from home        Chief complaint(s): AMS, emesis, hypertension        Arrival Note (brief scenario, treatment PTA, etc). : Pt arrives due to AMS. Pts  states that pt began to feel ill yesterday.  states that pt started to complain of a headache and shortly after became altered.  states pt is normally A&Ox4. Per squad pt unable to follow commands, hypertensive, tachycardic and experienced an episode of emesis during transport. Squad states they initiated an IV and administered 4mg zofran. Upon flushing the medication, squad states the IV infiltrated and IV was removed. Pt arrives A&Ox0 and hypertensive. Pt intermittently following commands. Pt attached to cardiac monitor and continuous pulse oximetry. Pt  eupneic, PWD. GCS=12. Call light in reach.  at bedside.  states that due to patient being scheduled for back surgery on Tuesdays, pt was instructed to stop taking daily aspirin. C= \"Have you ever felt that you should Cut down on your drinking? \"  JAMIE  A= \"Have people Annoyed you by criticizing your drinking? \" JAMIE  G= \"Have you ever felt bad or Guilty about your drinking? \" JAMIE  E= \"Have you ever had a drink as an Eye-opener first thing in the morning to steady your nerves or to help a hangover? \"  JAMIE  Deferred []      Reason for deferring: N/A    *If yes to two or more: probable alcohol abuse. Jemal Boyce RN  07/13/20 8452

## 2020-07-13 NOTE — CONSULTS
Comprehensive Nutrition Assessment    Type and Reason for Visit:  Consult(TF ordering and management per vent bundle order set)    Nutrition Recommendations/Plan: Continue NPO as ordered. Follow for nutrition progression. Nutrition Assessment:  Tube feedings will not be started today. Pt became unresponsive, intubated for airway protection with Propofol at 6.6 ml providing 174 kcal.    Malnutrition Assessment:  Malnutrition Status:  Insufficient data    Context:  Acute Illness     Findings of the 6 clinical characteristics of malnutrition:  Energy Intake:  Unable to assess  Weight Loss:  Unable to assess(intubated- no family present)     Body Fat Loss:  Unable to assess     Muscle Mass Loss:  Unable to assess    Fluid Accumulation:  No significant fluid accumulation     Strength:  Not Performed    Estimated Daily Nutrient Needs:  Energy (kcal):  1332 kcal based on [facility] 2003b using adm wt 72.8 kg; Weight Used for Energy Requirements:  Admission     Protein (g):  114 gm protein based on 2 gm/kg IBW; Weight Used for Protein Requirements:  Ideal          Nutrition Related Findings:  vomited PTA with possible aspiration pneumonia. Wounds:  None       Current Nutrition Therapies:    Diet NPO Effective Now    Anthropometric Measures:  · Height: 5' 5\" (165.1 cm)  · Current Body Weight: 160 lb (72.6 kg)   · Admission Body Weight: 160 lb (72.6 kg)    · Ideal Body Weight: 125 lbs; 128 lbs   · BMI: 26.6  · BMI Categories: Overweight (BMI 25.0-29. 9)       Nutrition Diagnosis:   · Inadequate oral intake related to impaired respiratory funtion, cognitive or neurological impairment as evidenced by NPO or clear liquid status due to medical condition, intubation      Nutrition Interventions:   Food and/or Nutrient Delivery:  Continue NPO  Nutrition Education/Counseling:  No recommendation at this time   Coordination of Nutrition Care:  Continued Inpatient Monitoring    Goals:  Intiate PO intake or nutrition support. Discharge Planning: Too soon to determine     Some areas of assessment may be incomplete due to COVID-19 precautions. Genny Wheatley R.D. L.D.   Clinical Dietitian  Office: [phone]

## 2020-07-13 NOTE — CARE COORDINATION
Patient was intubated with glidescope times 1 with a 7.5 ET tube by Doctor Concepcion Giang. Easy  CAP CO2 detector immediately  Changed color and bilateral breath sounds were equal. ET tube 24 cm at the lip. Xray taken and confirmed appropriate location. Pulse Ox prior to intubation was 99% and remained at 99% or higher after being placed on mechanical ventilation. Yudelka [facility].

## 2020-07-13 NOTE — ED NOTES
Propofol stopped. Arcelia notified of pts BP. New orders received.       Johann Cutler RN  07/12/20 2679

## 2020-07-13 NOTE — CONSULTS
[address]  [facility], [address] [facility], [address] [place] [address]  Ph: [phone] or [phone]  FAX: [phone]        Reason for consult: Encephalopathy    I had the pleasure of seeing your patient in neurology consultation for her symptoms. As you would recall Tian Klein is a 79 y.o. yo female admitted on 7/12/2020. The history is obtained from the medical record. The patient has a past medical history of stroke. She had a sudden onset of encephalopathy, vomiting, headache. Apparently patient was awake and oriented yesterday when she developed sudden onset of the symptoms. As per EMS record the patient was unable to follow commands and was tachycardic and hypertensive. She received IV Zofran 4 mg. Initial CT scan of the head was negative. The patient was subsequently intubated for airway protection. She was noted to have twitching involving the left face arm and leg. There was a concern of seizure and patient was loaded with Keppra 1 g.   Past Medical History:   Diagnosis Date    Arthritis     Cancer [facility])     breast cancer, left    CKD (chronic kidney disease) stage 4, GFR 15-29 ml/min (HCC)     COPD (chronic obstructive pulmonary disease) (HCC)     CVA (cerebral vascular accident) ([facility] [address]     mini    Gout     Hyperlipidemia     Hypertension     Left renal atrophy     Other abnormality of urination(788.69)      blood disorder needs platelets prior to procedures     Past Surgical History:   Procedure Laterality Date    APPENDECTOMY      BACK SURGERY      fusion with rods and screws    BLADDER SUSPENSION      BREAST SURGERY Left     lumpectomy x2    CAROTID ENDARTERECTOMY      COLONOSCOPY      COLONOSCOPY N/A 7/17/2018    COLONOSCOPY POLYPECTOMY performed by Roddy Newell MD at [address] [facility]      JOINT REPLACEMENT      knee    JOINT REPLACEMENT      right hip    TONSILLECTOMY      UPPER GASTROINTESTINAL ENDOSCOPY °F (36.8 °C) (Oral)   Resp 14   Ht 5' 5\" (1.651 m)   Wt 160 lb 7.9 oz (72.8 kg)   SpO2 100%   BMI 26.71 kg/m²      ROS: Unable to obtain due to patient's condition      Mental status   Patient is intubated. No spontaneous eye opening. Patient is not purposefully following commands   Cranial nerves   Pupil response: Present   Corneal Reflex: Present    Oculocephalic reflex: Present     Cough reflex: Present        Motor and sensory function  Flexion withdrawal in all ext. DTR Intact symmetric  Babinski Absent   Gait Not tested        Lab Results   Component Value Date    LDLCHOLESTEROL 50 03/17/2019     No components found for: CHLPL  Lab Results   Component Value Date    TRIG 55 03/17/2019    TRIG 117 04/28/2018    TRIG 200 (H) 09/24/2013     Lab Results   Component Value Date    HDL 45 03/17/2019    HDL 47 04/28/2018    HDL 60 09/24/2013     No results found for: LDLCALC  No results found for: LABVLDL  No results found for: LABA1C  No results found for: EAG  Lab Results   Component Value Date    CVMJFFHV41 1710 (H) 04/21/2019      Neurological work up:  CT head negative for acute changes   CTA head and neck  MRI brain   2 D echo     Assessment and Recommendations:   Encephalopathy  Hypertensive emergency  Other comorbid conditions include aspiration pneumonia, acute hypoxic respiratory failure status post intubation    The patient appeared to have a focal seizure-like activity yesterday. Seizure could be provoked from hypertensive emergency, possibility of a posterior reversible encephalopathy syndrome and stroke cannot be ruled out. Will obtain EEG brain and continue Keppra 500 mg twice daily for now. EEG brain MRI scan of the brain without contrast rule out any functional and/or structural brain abnormality. Continue blood pressure control as being done.       Luis Enrique Lester MD  Neurology    This note is created with the assistance of a speech-recognition program. While intending to generate a document that actually reflects the content of the visit, the document can still have some errors including those of syntax and sound a- like substitutions which may escape proofreading. In such instances, actual meaning can be extrapolated by contextual derivation.

## 2020-07-13 NOTE — PROGRESS NOTES
Pharmacy Note  Vancomycin Consult    Laura Gtz is a 79 y.o. female started on Vancomycin for possible pneumonia; consult received from Dr. Carly Alexander to manage therapy. Patient Active Problem List   Diagnosis    Breast cancer [facility])    Renal cyst    Lumbar degenerative disc disease    Arthropathy of lumbar facet joint    Failed back syndrome of lumbar spine    Lumbar spondylosis    Sacroiliitis (HCC)    Status post lumbar spinal fusion    TIA (transient ischemic attack)    Anemia    Dyspnea    COPD with acute exacerbation (HCC)    Acute renal failure (ARF) (HCC)    Pneumonia    CKD (chronic kidney disease) stage 4, GFR 15-29 ml/min (HCC)    Left renal atrophy    Severe malnutrition ([facility] [address]    Acute cystitis without hematuria    Disorientation    Hypertensive emergency    Hypertension    Essential hypertension       Allergies:  Diclofenac-misoprostol     Temp max: 98.2    Recent Labs     07/12/20 2025   BUN 15       Recent Labs     07/12/20 2025 07/12/20 2043   CREATININE 1.40* 1.47*       Recent Labs     07/12/20 2025   WBC 9.0       No intake or output data in the 24 hours ending 07/13/20 0408    Culture Date      Source                       Results  7/13                     sputum  7/13                     MRSA    Ht Readings from Last 1 Encounters:   07/12/20 5' 5\" (1.651 m)        Wt Readings from Last 1 Encounters:   07/13/20 160 lb 7.9 oz (72.8 kg)         Body mass index is 26.71 kg/m². Estimated Creatinine Clearance: 31 mL/min (A) (based on SCr of 1.47 mg/dL (H)). Goal Trough Level: 15-20 mcg/mL    Assessment/Plan:  Will initiate Vancomycin with a one time loading dose of 2000 mg x1, followed by 1000 mg IV every 36 hours. Timing of trough level will be determined based on culture results, renal function, and clinical response. Thank you for the consult. Will continue to follow.

## 2020-07-13 NOTE — ED NOTES
Dr Patricia Shipley at bedside. RN instructed to administer 2mg ativan IV. Ativan administered.       Garret Lr, SARAH  07/12/20 [id number]

## 2020-07-13 NOTE — PLAN OF CARE
Nutrition Problem #1: Inadequate oral intake  Intervention: Food and/or Nutrient Delivery: Continue NPO  Nutritional Goals: Intiate PO intake or nutrition support.

## 2020-07-13 NOTE — CONSULTS
[facility] CONSULT NOTE      Date of Admission:  7/12/2020    Date of Consultation:  7/13/2020    PCP:  Po Lin DO      REASON FOR CONSULT: Accelerated hypertension. HISTORY OF PRESENT ILLNESS:  Lora Silva is a 79 y.o. female who was just seen by Dr. Thor Roberson on June 16, 2020 in our office for preoperative cardiac clearance prior to spinal cord stimulator placement, history of essential hypertension, bilateral carotid artery disease, prior carotid endarterectomy, COPD, ex tobacco abuse. History of ESBL. Patient was admitted through the emergency room after she was complaining of headache and legs were jumping and she could not write with scribbling when she called the tax preparation person, per  Tom's report. Also nurse stated that patient was complaining of tingling of the left eye left upper extremity and lower extremity. In the emergency room patient was given IV Ativan following which she became unresponsive to the point of requiring oral endotracheal tube intubation with mechanical ventilation. Patient was also started on IV esmolol for hypertension in the emergency room. At the time of my evaluation, patient is orally intubated and I am unable to obtain any history from the patient. Most of the history was from the patient's nurse and from  at bedside. Please also refer to Dr. Butler Proper office visit note dated June 16, 2020 along with admitting history and physical and other consultants notes and ER MDs notes for further details. PMH:   has a past medical history of Arthritis, Cancer ([address] CKD (chronic kidney disease) stage 4, GFR 15-29 ml/min ([facility]), COPD (chronic obstructive pulmonary disease) ([address] CVA (cerebral vascular accident) ([address] Gout, Hyperlipidemia, Hypertension, Left renal atrophy, and Other abnormality of urination(788.69).     PSH:   has a past surgical history that includes Tonsillectomy; Varicose vein surgery; Hammer toe surgery; Hysterectomy; Carotid endarterectomy; Appendectomy; joint replacement; bladder suspension; joint replacement; Breast surgery (Left); Colonoscopy; back surgery; Upper gastrointestinal endoscopy (N/A, 7/17/2018); and Colonoscopy (N/A, 7/17/2018). Allergies: Allergies   Allergen Reactions    Diclofenac-Misoprostol Other (See Comments)     Per Dr Aracelis Ghosh - patient has GI intolerance to oral formulations        Home Meds:    Prior to Admission medications    Medication Sig Start Date End Date Taking? Authorizing Provider   omeprazole (PRILOSEC) 20 MG delayed release capsule TAKE 1 CAPSULE DAILY 2/12/20  Yes Corrinne Mulders, MD   metoprolol (LOPRESSOR) 100 MG tablet Take 50 mg by mouth 2 times daily   Yes Historical Provider, MD   gabapentin (NEURONTIN) 100 MG capsule Take 300 mg by mouth nightly. Yes Historical Provider, MD   ezetimibe (ZETIA) 10 MG tablet Take 10 mg by mouth daily   Yes Historical Provider, MD   allopurinol (ZYLOPRIM) 100 MG tablet Take 100 mg by mouth 2 times daily.      Yes Historical Provider, MD   cloNIDine (CATAPRES) 0.1 MG/24HR PTWK Place 1 patch onto the skin once a week Indications: Applies on Mondays     Historical Provider, MD   benzonatate (TESSALON) 200 MG capsule Take 200 mg by mouth daily     Historical Provider, MD   clopidogrel (PLAVIX) 75 MG tablet Take 1 tablet by mouth daily 4/28/18   Chrsitina Ferguson   calcium carbonate-vitamin D (CALTRATE) 600-400 MG-UNIT TABS per tab Take 1 tablet by mouth 2 times daily     Historical Provider, MD        Hospital Meds:    Current Facility-Administered Medications   Medication Dose Route Frequency Provider Last Rate Last Dose    sodium chloride flush 0.9 % injection 10 mL  10 mL Intravenous 2 times per day Zheng Ac MD   10 mL at 07/13/20 0942    sodium chloride flush 0.9 % injection 10 mL  10 mL Intravenous PRN Zheng Ac MD        acetaminophen (TYLENOL) tablet 650 mg  650 mg Oral Q4H PRN Rob Farooq Campo MD        enoxaparin (LOVENOX) injection 30 mg  30 mg Subcutaneous Daily Charisse Edmonds MD   30 mg at 07/13/20 0754    fentaNYL (SUBLIMAZE) injection 50 mcg  50 mcg Intravenous Q30 Min PRN Kendall Dumont MD        propofol injection  10 mcg/kg/min Intravenous Continuous Kendall Dumont MD 6.6 mL/hr at 07/13/20 0900 15 mcg/kg/min at 07/13/20 0900    polyvinyl alcohol (LIQUIFILM TEARS) 1.4 % ophthalmic solution 1 drop  1 drop Both Eyes Q4H Kendall Dumont MD   1 drop at 07/13/20 6676    And    lubrifresh P.M. (artificial tears) ophthalmic ointment   Both Eyes Q4H Kendall Dumont MD        chlorhexidine (PERIDEX) 0.12 % solution 15 mL  15 mL Mouth/Throat BID Kendall Dumont MD   15 mL at 07/13/20 0754    famotidine (PEPCID) injection 20 mg  20 mg Intravenous Daily Kendall Dumont MD   20 mg at 07/13/20 0754    ampicillin-sulbactam (UNASYN) 3 g ivpb minibag  3 g Intravenous Q6H Kendall Dumont MD   Stopped at 07/13/20 0840    lactated ringers infusion  75 mL/hr Intravenous Continuous Kendall Dumont MD 75 mL/hr at 07/13/20 0750 75 mL/hr at 07/13/20 0750    clopidogrel (PLAVIX) tablet 75 mg  75 mg Oral Daily Celeste Prasad MD        metoprolol tartrate (LOPRESSOR) tablet 50 mg  50 mg Oral BID Celeste Prasad MD        cloNIDine (CATAPRES) 0.1 MG/24HR 1 patch  1 patch Transdermal Weekly Celeste Prasad MD        ezetimibe (ZETIA) tablet 10 mg  10 mg Oral Nightly Celeste Prasad MD        metoprolol (LOPRESSOR) injection 5 mg  5 mg Intravenous Q6H PRN Celeste Prasad MD        metoprolol (LOPRESSOR) injection 5 mg  5 mg Intravenous Q4H Azeb Domínguez MD        aspirin suppository 300 mg  300 mg Rectal Q6H PRN Avni Jyoce MD   300 mg at 07/12/20 7889       Social History:    Social History     Socioeconomic History    Marital status:      Spouse name: None    Number of children: None    Years of education: None    Highest education level: None   Occupational History    edema.     Pulses:  Bilaterally symmetrical and intact radial pulses. Neurologic: Not performed, as patient is intubated. Skin:  No rashes. Warm and dry.       EKG:     Normal sinus rhythm , artifacts   Left axis deviation   Inferior infarct , age undetermined   Anteroseptal infarct (cited on or before 27-APR-2018)   Abnormal ECG   When compared with ECG of 12-JUL-2020 20:23, (unconfirmed)   Aberrant conduction is no longer Present       Labs:      CBC:   Recent Labs     07/12/20 2025 07/13/20 0439   WBC 9.0 11.1*   HGB 13.6 13.0   HCT 42.1 41.4   MCV 82.4 85.8    104*     BMP:   Recent Labs     07/12/20 2025 07/12/20 2043 07/13/20 0439     --  133*   K 4.2  --  4.5   CL 99  --  102   CO2 24  --  18*   BUN 15  --  15   CREATININE 1.40* 1.47* 1.36*     PT/INR:   Recent Labs     07/12/20 2025   PROTIME 12.4   INR 0.9     APTT:   Recent Labs     07/12/20 2025   APTT 27.4       Recent Results (from the past 24 hour(s))   EKG 12 Lead    Collection Time: 07/12/20  8:24 PM   Result Value Ref Range    Ventricular Rate 99 BPM    Atrial Rate 99 BPM    P-R Interval 166 ms    QRS Duration 80 ms    Q-T Interval 354 ms    QTc Calculation (Bazett) 454 ms    P Axis 56 degrees    R Axis -38 degrees    T Axis 86 degrees   CBC Auto Differential    Collection Time: 07/12/20  8:25 PM   Result Value Ref Range    WBC 9.0 3.5 - 11.0 k/uL    RBC 5.11 4.0 - 5.2 m/uL    Hemoglobin 13.6 12.0 - 16.0 g/dL    Hematocrit 42.1 36 - 46 %    MCV 82.4 80 - 100 fL    MCH 26.6 26 - 34 pg    MCHC 32.3 31 - 37 g/dL    RDW 16.0 (H) 11.5 - 14.9 %    Platelets 093 196 - 756 k/uL    MPV 7.6 6.0 - 12.0 fL    NRBC Automated NOT REPORTED per 100 WBC    Differential Type NOT REPORTED     Seg Neutrophils 66 36 - 66 %    Lymphocytes 25 24 - 44 %    Monocytes 5 1 - 7 %    Eosinophils % 3 0 - 4 %    Basophils 1 0 - 2 %    Immature Granulocytes NOT REPORTED 0 %    Segs Absolute 6.00 1.3 - 9.1 k/uL    Absolute Lymph # 2.20 1.0 - 4.8 k/uL Absolute Mono # 0.40 0.1 - 1.3 k/uL    Absolute Eos # 0.30 0.0 - 0.4 k/uL    Basophils Absolute 0.10 0.0 - 0.2 k/uL    Absolute Immature Granulocyte NOT REPORTED 0.00 - 0.30 k/uL    WBC Morphology NOT REPORTED     RBC Morphology NOT REPORTED     Platelet Estimate NOT REPORTED    Comprehensive Metabolic Panel w/ Reflex to MG    Collection Time: 07/12/20  8:25 PM   Result Value Ref Range    Glucose 154 (H) 70 - 99 mg/dL    BUN 15 8 - 23 mg/dL    CREATININE 1.40 (H) 0.50 - 0.90 mg/dL    Bun/Cre Ratio NOT REPORTED 9 - 20    Calcium 10.2 8.6 - 10.4 mg/dL    Sodium 136 135 - 144 mmol/L    Potassium 4.2 3.7 - 5.3 mmol/L    Chloride 99 98 - 107 mmol/L    CO2 24 20 - 31 mmol/L    Anion Gap 13 9 - 17 mmol/L    Alkaline Phosphatase 123 (H) 35 - 104 U/L    ALT 7 5 - 33 U/L    AST 14 <32 U/L    Total Bilirubin 0.41 0.3 - 1.2 mg/dL    Total Protein 7.2 6.4 - 8.3 g/dL    Alb 3.9 3.5 - 5.2 g/dL    Albumin/Globulin Ratio NOT REPORTED 1.0 - 2.5    GFR Non- 36 (L) >60 mL/min    GFR  44 (L) >60 mL/min    GFR Comment          GFR Staging NOT REPORTED    Lipase    Collection Time: 07/12/20  8:25 PM   Result Value Ref Range    Lipase 30 13 - 60 U/L   Brain Natriuretic Peptide    Collection Time: 07/12/20  8:25 PM   Result Value Ref Range    Pro-BNP 4,169 (H) <300 pg/mL    BNP Interpretation Pro-BNP Reference Range:    Troponin    Collection Time: 07/12/20  8:25 PM   Result Value Ref Range    Troponin, High Sensitivity 29 (H) 0 - 14 ng/L    Troponin T NOT REPORTED <0.03 ng/mL    Troponin Interp NOT REPORTED    APTT    Collection Time: 07/12/20  8:25 PM   Result Value Ref Range    PTT 27.4 24.0 - 36.0 sec   Protime-INR    Collection Time: 07/12/20  8:25 PM   Result Value Ref Range    Protime 12.4 11.8 - 14.6 sec    INR 0.9    Ammonia    Collection Time: 07/12/20  8:25 PM   Result Value Ref Range    Ammonia 33 11 - 51 umol/L   POCT Glucose    Collection Time: 07/12/20  8:30 PM   Result Value Ref Range Glucose 138 mg/dL    QC OK? yes    POC Glucose Fingerstick    Collection Time: 07/12/20  8:31 PM   Result Value Ref Range    POC Glucose 138 (H) 65 - 105 mg/dL   Creatinine W/GFR Point of Care    Collection Time: 07/12/20  8:43 PM   Result Value Ref Range    POC Creatinine 1.47 (H) 0.51 - 1.19 mg/dL    GFR Comment 42 (L) >60 mL/min    GFR Non- 34 (L) >60 mL/min    GFR Comment         Troponin    Collection Time: 07/12/20 10:40 PM   Result Value Ref Range    Troponin, High Sensitivity 26 (H) 0 - 14 ng/L    Troponin T NOT REPORTED <0.03 ng/mL    Troponin Interp NOT REPORTED    Arterial Blood Gases    Collection Time: 07/12/20 10:55 PM   Result Value Ref Range    pH, Arterial 7.415 7.350 - 7.450    pCO2, Arterial 35.8 35.0 - 45.0 mmHg    pO2, Arterial 142.0 (H) 80.0 - 100.0 mmHg    HCO3, Arterial 23.0 22.0 - 26.0 mmol/L    Positive Base Excess, Art NOT REPORTED 0.0 - 2.0 mmol/L    Negative Base Excess, Art 1.6 0.0 - 2.0 mmol/L    O2 Sat, Arterial 98.1 (H) 95 - 98 %    Total Hb NOT REPORTED 12.0 - 16.0 g/dl    Oxyhemoglobin NOT REPORTED 95.0 - 98.0 %    Carboxyhemoglobin 1.0 0 - 5 %    Methemoglobin 0.4 0.0 - 1.9 %    Pt Temp 37     pH, Art, Temp Adj NOT REPORTED 7.350 - 7.450    pCO2, Art, Temp Adj NOT REPORTED 35.0 - 45.0    pO2, Art, Temp Adj NOT REPORTED 80.0 - 100.0 mmHg    O2 Device/Flow/% VENTILATOR     Respiratory Rate PENDING     Basim Test PASS     Sample Site Right Radial Artery     Pt. Position SEMI-FOWLERS     Mode PRVC     Set Rate NOT REPORTED     Total Rate NOT REPORTED     VT NOT REPORTED     FIO2 40     Peep/Cpap NOT REPORTED     PSV NOT REPORTED     Text for Respiratory  RESULTS GIVEN TO RN AND PHYSICIAN.      NOTIFICATION NOT REPORTED     NOTIFICATION TIME NOT REPORTED    Urinalysis Reflex to Culture    Collection Time: 07/12/20 11:37 PM    Specimen: Urine, clean catch   Result Value Ref Range    Color, UA YELLOW YELLOW    Turbidity UA CLEAR CLEAR    Glucose, Ur NEGATIVE NEGATIVE Bilirubin Urine NEGATIVE NEGATIVE    Ketones, Urine NEGATIVE NEGATIVE    Specific Grant, UA 1.011 1.000 - 1.030    Urine Hgb MOD (A) NEGATIVE    pH, UA 6.5 5.0 - 8.0    Protein, UA 2+ (A) NEGATIVE    Urobilinogen, Urine Normal Normal    Nitrite, Urine NEGATIVE NEGATIVE    Leukocyte Esterase, Urine NEGATIVE NEGATIVE    Urinalysis Comments NOT REPORTED    Microscopic Urinalysis    Collection Time: 07/12/20 11:37 PM   Result Value Ref Range    -          WBC, UA 2 TO 5 /HPF    RBC, UA 10 TO 20 /HPF    Casts UA NOT REPORTED /LPF    Crystals, UA NOT REPORTED None /HPF    Epithelial Cells UA 2 TO 5 /HPF    Renal Epithelial, UA NOT REPORTED 0 /HPF    Bacteria, UA MODERATE (A) None    Mucus, UA NOT REPORTED None    Trichomonas, UA NOT REPORTED None    Amorphous, UA NOT REPORTED None    Other Observations UA NOT REPORTED NOT REQ. Yeast, UA NOT REPORTED None   COVID-19    Collection Time: 07/12/20 11:45 PM    Specimen: Other   Result Value Ref Range    SARS-CoV-2          SARS-CoV-2, Rapid Not Detected Not Detected    Source . NASOPHARYNGEAL SWAB     SARS-CoV-2, PCR         Blood gas, arterial    Collection Time: 07/13/20  4:20 AM   Result Value Ref Range    pH, Arterial 7.384 7.350 - 7.450    pCO2, Arterial 37.9 35.0 - 45.0 mmHg    pO2, Arterial 156.0 (H) 80.0 - 100.0 mmHg    HCO3, Arterial 22.6 22.0 - 26.0 mmol/L    Positive Base Excess, Art NOT REPORTED 0.0 - 2.0 mmol/L    Negative Base Excess, Art 2.4 (H) 0.0 - 2.0 mmol/L    O2 Sat, Arterial 97.7 95 - 98 %    Total Hb NOT REPORTED 12.0 - 16.0 g/dl    Oxyhemoglobin NOT REPORTED 95.0 - 98.0 %    Carboxyhemoglobin 0.6 0 - 5 %    Methemoglobin 0.9 0.0 - 1.9 %    Pt Temp 37.0     pH, Art, Temp Adj NOT REPORTED 7.350 - 7.450    pCO2, Art, Temp Adj NOT REPORTED 35.0 - 45.0    pO2, Art, Temp Adj NOT REPORTED 80.0 - 100.0 mmHg    O2 Device/Flow/% VENTILATOR     Respiratory Rate 15     Basim Test PASS     Sample Site Right Radial Artery     Pt.  Position SEMI-FOWLERS     Mode exclude underlying coronary artery disease. Acute respiratory failure requiring oral intubation and mechanical ventilation currently. History of carotid artery disease. Altered mental status with headache prompting hospitalization. Other problems as charted. REC/PLAN:    As ordered. We will start on IV hydralazine 10 mg every 4 hours PRN for systolic blood pressure greater than 160. We will continue on IV esmolol drip and titrate. 2D echocardiogram was ordered. Will check serial troponin and EKG. Maintain potassium around 4-4.5 range and magnesium around 2-2.2 range. Once patient is extubated, will resume prior home cardiac medications including antihypertensive medications orally. We will check her blood pressure in all 4 arms to make sure that there is no differential blood pressure and I spoke with the patient's nurse at bedside in the ICU. Also spoke with patient's  Guicho Ovidio at bedside in the ICU. Planned surgical procedure for tomorrow was canceled, per 's report and I agree with that approach. Also spoke with Dr. Junie Adam over the phone this morning. Continued supportive care as you are doing. I will try to get more history from the patient, once she is extubated. Will follow. Further recommendations to follow depending on hospitalization course. Thank you once again for your kind consultation. Electronically signed by Marietta Nicolas MD, [facility] [place]      PLEASE NOTE:  This progress note was completed using a voice transcription system. Every effort was made to ensure accuracy. However, inadvertent computerized transcription errors may be present.

## 2020-07-13 NOTE — PLAN OF CARE
Problem: Restraint Use - Nonviolent/Non-Self-Destructive Behavior:  Goal: Absence of restraint indications  Description: Absence of restraint indications  Outcome: Ongoing  Goal: Absence of restraint-related injury  Description: Absence of restraint-related injury  Outcome: Ongoing     Problem: Skin Integrity:  Goal: Will show no infection signs and symptoms  Description: Will show no infection signs and symptoms  Outcome: Ongoing  Goal: Absence of new skin breakdown  Description: Absence of new skin breakdown  Outcome: Ongoing     Problem: Infection - Ventilator-Associated Pneumonia:  Goal: Prevent a ventilator associated event (VAE)  Description: Prevent a ventilator associated event (VAE)  Outcome: Ongoing  Goal: Absence of pulmonary infection  Description: Absence of pulmonary infection  Outcome: Ongoing     Problem: Urinary Elimination:  Goal: Signs and symptoms of infection will decrease  Description: Signs and symptoms of infection will decrease  Outcome: Ongoing  Goal: Complications related to the disease process, condition or treatment will be avoided or minimized  Description: Complications related to the disease process, condition or treatment will be avoided or minimized  Outcome: Ongoing     Problem: Falls - Risk of:  Goal: Will remain free from falls  Description: Will remain free from falls  Outcome: Ongoing  Goal: Absence of physical injury  Description: Absence of physical injury  Outcome: Ongoing     Problem: Nutrition  Goal: Optimal nutrition therapy  Outcome: Ongoing

## 2020-07-13 NOTE — ED PROVIDER NOTES
EMERGENCY DEPARTMENT ENCOUNTER    Pt Name: Andrew Brown  MRN: [id number]  [place] 1940  Date of evaluation: 7/12/20  CHIEF COMPLAINT       Chief Complaint   Patient presents with    Altered Mental Status    Hypertension     HISTORY OF PRESENT ILLNESS   HPI    HISTORY OF PRESENT ILLNESS:  Past medical history of CVA presents for chief complaint of AMS. Patient had acute onset AMS with vomiting. Was initially complaint of mild headache. Unable to give complaints at this time. All history is from . Severity is moderate. No aggravating or relieving factors. Timing is 1 day. Course constant.   Context is previous CVA  -----------------------  -----------------------  REVIEW OF SYSTEMS  ED Caveat: [Clinical condition  -----------------------  -----------------------  ALLERGIES  -per nursing records, reviewed    PAST MEDICAL HISTORY  -See HPI    SOCIAL HISTORY  -No daily drinking, no IV drugs  -----------------------  -----------------------  PHYSICAL EXAM  Gen: Alert, mild distress  Skin: Warm, no rashes  Head: Normocephalic, atraumatic  Neck: No midline tenderness, no nuchal rigidity  Eye: EOMI, PERRLA, normal conjunctiva  ENT: Mucous membranes moist, no pharyngeal erythema  CV: Tachycardic, no rubs  Resp: Respirations unlabored, lungs clear to auscultation  GI: Soft, non distended, no large abdominal masses, non tender  MSK: No midline back pain, no large joint effusions  Neuro: Alert but not oriented, confused, not following commands  Psych: Confused, not following commands  -----------------------  -----------------------  MEDICAL DECISION MAKING  Differential Diagnosis:  - Consideration is given for    Uti, pneumonia, meningitis, cellulitis, sepsis, bacteremia, thyroid abnormality, neuroleptic malignant syndrome, intracranial hemorhage, intraabdominal infection, cva, acs, cardiac arrhythmia, encephalitis, encephalopathy, medication overdose, drug overdose, seizure, elevated ammonia, vitamin deficiency, adrenal insuficiency  -  #Impression/Plan:  - Clinically patient's presentation is most consistent with intracranial hemorrhage or ST elevation MI. Given patient presentation will get laboratory testing imaging. Patient will be admitted for further evaluation. Called over to head CT right away. Patient will go right over. Will get an EKG here right away. -  ##Reevaluation/Conversations on care:  -EKG difficult to obtain given patient's clinical condition and her moving. However no clear ST elevation MI.  -2150 patient has worsening left-sided shaking. Concern for focal seizure. 2 mg of Ativan ordered. After the 2 mg patient became more calm stop shaking. However was quite sedated. Decision was made to intubate patient. Spoke with  who is agreeable with this plan.  -----------------------  CRITICAL CARE TIME  Total time: 120 minutes spent engaged in work directly related to patient care and/or available for direct patient care exclusive of procedures and exclusive of teaching time. Management: Bedside assessment, supervision of care, interpretation of results (chest x-ray, blood gases, EKG, blood pressure), case review with medical staff.  -----------------------  Procedure: Intubation  Consent: Emergent  Indication: airway protection  Procedural Sedation: See nurses notes  Monitoring:  Cardiac, BP, Pulse Ox  Preparation: Pre oxygenated, ensured proper cuff inflation, backup airway management in room  Technique: [7.5] ET tube, Secured at [22] at the front teeth  Confirmation: Bilateral chest rise, ETCO2 Color change  Post procedure exam: Equal BS, no epigastric breath sounds  Vent Settings: [As Documented by RN/Resp]  Complications: [none]  -----------------------  Pilar Davis MD, [facility] [place]  Emergency Medicine Attending  Questions? Please contact my cell phone anytime.   [phone]  *This charting supersedes any ED resident or staff charting and was written using speech recognition software    ## The patient was evaluated during the global COVID-19 pandemic, and that diagnosis was suspected/considered upon their initial presentation. PASTMEDICAL HISTORY     Past Medical History:   Diagnosis Date    Arthritis     Cancer [facility])     breast cancer, left    CKD (chronic kidney disease) stage 4, GFR 15-29 ml/min (HCC)     COPD (chronic obstructive pulmonary disease) (HCC)     CVA (cerebral vascular accident) ([address]     mini    Gout     Hyperlipidemia     Hypertension     Left renal atrophy     Other abnormality of urination(788.69)      blood disorder needs platelets prior to procedures     SURGICAL HISTORY       Past Surgical History:   Procedure Laterality Date    APPENDECTOMY      BACK SURGERY      fusion with rods and screws    BLADDER SUSPENSION      BREAST SURGERY Left     lumpectomy x2    CAROTID ENDARTERECTOMY      COLONOSCOPY      COLONOSCOPY N/A 7/17/2018    COLONOSCOPY POLYPECTOMY performed by Diann Canales MD at [facility]      knee    JOINT REPLACEMENT      right hip    TONSILLECTOMY      UPPER GASTROINTESTINAL ENDOSCOPY N/A 7/17/2018    EGD BIOPSY performed by Diann Canales MD at [facility]       Current Discharge Medication List      CONTINUE these medications which have NOT CHANGED    Details   omeprazole (PRILOSEC) 20 MG delayed release capsule TAKE 1 CAPSULE DAILY  Qty: 30 capsule, Refills: 12    Associated Diagnoses: Malignant neoplasm of left female breast, unspecified estrogen receptor status, unspecified site of breast (HCC)      metoprolol (LOPRESSOR) 100 MG tablet Take 50 mg by mouth 2 times daily      gabapentin (NEURONTIN) 100 MG capsule Take 100 mg by mouth daily.       cloNIDine (CATAPRES) 0.1 MG/24HR PTWK Place 1 patch onto the skin once a week Indications: Applies on Mondays       benzonatate (TESSALON) 200 MG capsule Take 200 mg by components within normal limits   TROPONIN - Abnormal; Notable for the following components:    Troponin, High Sensitivity 26 (*)     All other components within normal limits   URINE RT REFLEX TO CULTURE - Abnormal; Notable for the following components:    Urine Hgb MOD (*)     Protein, UA 2+ (*)     All other components within normal limits   BLOOD GAS, ARTERIAL - Abnormal; Notable for the following components:    pO2, Arterial 142.0 (*)     O2 Sat, Arterial 98.1 (*)     All other components within normal limits   MICROSCOPIC URINALYSIS - Abnormal; Notable for the following components:    Bacteria, UA MODERATE (*)     All other components within normal limits   CREATININE W/GFR POINT OF CARE - Abnormal; Notable for the following components:    POC Creatinine 1.47 (*)     GFR Comment 42 (*)     GFR Non- 34 (*)     All other components within normal limits   POCT GLUCOSE - Normal   LIPASE   APTT   PROTIME-INR   AMMONIA   COVID-19     EMERGENCY DEPARTMENTCOURSE:         Vitals:    Vitals:    07/13/20 0115 07/13/20 0153 07/13/20 0209 07/13/20 0213   BP: (!) 157/76      Pulse: 65 85     Resp: 15 17     Temp:    98.2 °F (36.8 °C)   TempSrc:       SpO2: 99% 100%     Weight:   160 lb 7.9 oz (72.8 kg)    Height:           The patient was given the following medications while in the emergency department:  Orders Placed This Encounter   Medications    0.9 % sodium chloride bolus    ondansetron (ZOFRAN) injection 4 mg    DISCONTD: esmolol (BREVIBLOC) 2.5gm/250ml NS infusion    LORazepam (ATIVAN) 2 MG/ML injection     Alexia Barkley: cabinet override    levETIRAcetam (KEPPRA) 1,000 mg in sodium chloride 0.9 % 100 mL IVPB    DISCONTD: levETIRAcetam (KEPPRA) 1,000 mg in sodium chloride 0.9 % 100 mL IVPB    piperacillin-tazobactam (ZOSYN) 4.5 g in dextrose 5 % 100 mL IVPB (mini-bag)    LORazepam (ATIVAN) injection 2 mg    propofol injection    aspirin suppository 300 mg    vancomycin (VANCOCIN) 2,000 mg in dextrose 5 % 500 mL IVPB    vancomycin (VANCOCIN) intermittent dosing (placeholder)    0.9 % sodium chloride bolus    0.9 % sodium chloride bolus    esmolol (BREVIBLOC) 2.5gm/250ml NS infusion     CONSULTS:  PHARMACY TO DOSE VANCOMYCIN  IP CONSULT TO INTERNAL MEDICINE  IP CONSULT TO PULMONOLOGY  IP CONSULT TO NEUROLOGY    FINAL IMPRESSION      1. Altered mental status, unspecified altered mental status type          DISPOSITION/PLAN   DISPOSITION Admitted 07/13/2020 12:07:25 AM      PATIENT REFERRED TO:  Marybel Vang [address]  [phone]          DISCHARGE MEDICATIONS:  Current Discharge Medication List        Governor MD Alin  Attending Emergency Physician      eMERGENCY dEPARTMENT eNCOUnter   Attending Attestation     Pt Name: Andrew Brown  MRN: [id number]  [place] 1940  Date of evaluation: 7/12/20       Andrew Brown is a 79 y.o. female who presents with Altered Mental Status and Hypertension      MDM:         Vitals:   Vitals:    07/13/20 0115 07/13/20 0153 07/13/20 0209 07/13/20 0213   BP: (!) 157/76      Pulse: 65 85     Resp: 15 17     Temp:    98.2 °F (36.8 °C)   TempSrc:       SpO2: 99% 100%     Weight:   160 lb 7.9 oz (72.8 kg)    Height:             I personally evaluated and examined the patient in conjunction with the resident and agree with the assessment, treatment plan, and disposition of the patient as recorded by the resident. I performed a history and physical examination of the patient and discussed management with the resident. I reviewed the residents note and agree with the documented findings and plan of care. Any areas of disagreement are noted on the chart. I was personally present for the key portions of any procedures. I have documented in the chart those procedures where I was not present during the key portions. I have personally reviewed all images and agree with the resident's interpretation.  I have reviewed the emergency nurses triage note. I agree with the chief complaint, past medical history, past surgical history, allergies, medications, social and family history as documented unless otherwise noted.     Evelina Awan MD  Attending Emergency Physician                   Ursula Yen MD  07/13/20 2197

## 2020-07-13 NOTE — ED NOTES
Dr Bernadette Sheriff instructed RN to order 1G Keppra. Pharmacy contacted.      Dinesh Andrade RN  07/12/20 0971

## 2020-07-13 NOTE — CARE COORDINATION
CASE MANAGEMENT NOTE:    Admission Date:  7/12/2020 Abril Omer is a 79 y.o.  female    Admitted for : Hypertensive emergency [I16.1]  Hypertensive emergency [I16.1]    Met with:   - Spoke with  James Hernadez on telephone    PCP:  Dr. Lesley Reddy:  Medicare      Current Residence/ Living Arrangements:  independently at home with  in [facility] home with livable 1st floor            Current Services PTA:  No    Is patient agreeable to VNS: Yes    Freedom of choice provided:  Yes    List of [address] provided: No    VNS chosen:  Yes - Would like [facility] [address] [facility] send referral    DME:  straight cane, walker, shower chair and scooter, GB. Home Oxygen: No    Nebulizer: No    CPAP/BIPAP: No    Supplier: N/A    Potential Assistance Needed: Maybe    SNF needed: Maybe    Freedom of choice and list provided: NA    Pharmacy:  [address]       Does Patient want to use MEDS to BEDS? No    Is the Patient an [facility] with Readmission Risk Score greater than 14%? No  If yes, pt needs a follow up appointment made within 7 days. Family Members/Caregivers that pt would like involved in their care:    Yes    If yes, list name here:   [place] Provider:  Family             Is patient in Isolation/One on One/Altered Mental Status? Yes  If yes, skip next question. If no, would they like an I-Pad to  use? NA  If yes, call [phone]. Discharge Plan:  7/13: MEDICARE - VENT - Patient is from 2-story home with livable 1st floor with . DME - cane, walker, SC, scooter, GB. Depends on family for transportation. Would like [facility] [place] - referral sent. On IV Unasyn, Renal US, EEG.  ILIA NEEDS SIGNED/COMPLETED. Anayeli Bledsoe              Electronically signed by: Rosenda Balderas RN on 7/13/2020 at 3:27 PM

## 2020-07-14 ENCOUNTER — APPOINTMENT (OUTPATIENT)
Dept: INTERVENTIONAL RADIOLOGY/VASCULAR | Age: 80
DRG: 304 | End: 2020-07-14
Payer: MEDICARE

## 2020-07-14 ENCOUNTER — APPOINTMENT (OUTPATIENT)
Dept: GENERAL RADIOLOGY | Age: 80
DRG: 304 | End: 2020-07-14
Payer: MEDICARE

## 2020-07-14 LAB
ABSOLUTE EOS #: 0.3 K/UL (ref 0–0.4)
ABSOLUTE IMMATURE GRANULOCYTE: ABNORMAL K/UL (ref 0–0.3)
ABSOLUTE LYMPH #: 1.1 K/UL (ref 1–4.8)
ABSOLUTE MONO #: 0.5 K/UL (ref 0.1–1.3)
ALLEN TEST: ABNORMAL
ANION GAP SERPL CALCULATED.3IONS-SCNC: 13 MMOL/L (ref 9–17)
BASOPHILS # BLD: 1 % (ref 0–2)
BASOPHILS ABSOLUTE: 0.1 K/UL (ref 0–0.2)
BUN BLDV-MCNC: 15 MG/DL (ref 8–23)
BUN/CREAT BLD: ABNORMAL (ref 9–20)
CALCIUM SERPL-MCNC: 9 MG/DL (ref 8.6–10.4)
CARBOXYHEMOGLOBIN: 0.6 % (ref 0–5)
CHLORIDE BLD-SCNC: 108 MMOL/L (ref 98–107)
CO2: 21 MMOL/L (ref 20–31)
CREAT SERPL-MCNC: 1.36 MG/DL (ref 0.5–0.9)
DIFFERENTIAL TYPE: ABNORMAL
EKG ATRIAL RATE: 86 BPM
EKG P AXIS: 65 DEGREES
EKG P-R INTERVAL: 168 MS
EKG Q-T INTERVAL: 444 MS
EKG QRS DURATION: 88 MS
EKG QTC CALCULATION (BAZETT): 531 MS
EKG R AXIS: -33 DEGREES
EKG T AXIS: 74 DEGREES
EKG VENTRICULAR RATE: 86 BPM
EOSINOPHILS RELATIVE PERCENT: 3 % (ref 0–4)
FIO2: 28
GFR AFRICAN AMERICAN: 45 ML/MIN
GFR NON-AFRICAN AMERICAN: 38 ML/MIN
GFR SERPL CREATININE-BSD FRML MDRD: ABNORMAL ML/MIN/{1.73_M2}
GFR SERPL CREATININE-BSD FRML MDRD: ABNORMAL ML/MIN/{1.73_M2}
GLUCOSE BLD-MCNC: 88 MG/DL (ref 70–99)
HCO3 ARTERIAL: 24.1 MMOL/L (ref 22–26)
HCT VFR BLD CALC: 35 % (ref 36–46)
HEMOGLOBIN: 11.4 G/DL (ref 12–16)
IMMATURE GRANULOCYTES: ABNORMAL %
LYMPHOCYTES # BLD: 13 % (ref 24–44)
MCH RBC QN AUTO: 27.5 PG (ref 26–34)
MCHC RBC AUTO-ENTMCNC: 32.5 G/DL (ref 31–37)
MCV RBC AUTO: 84.5 FL (ref 80–100)
METHEMOGLOBIN: 0.8 % (ref 0–1.9)
MODE: ABNORMAL
MONOCYTES # BLD: 6 % (ref 1–7)
NEGATIVE BASE EXCESS, ART: 0.7 MMOL/L (ref 0–2)
NOTIFICATION TIME: ABNORMAL
NOTIFICATION: ABNORMAL
NRBC AUTOMATED: ABNORMAL PER 100 WBC
O2 DEVICE/FLOW/%: ABNORMAL
O2 SAT, ARTERIAL: 97.2 % (ref 95–98)
OXYHEMOGLOBIN: ABNORMAL % (ref 95–98)
PATIENT TEMP: 37
PCO2 ARTERIAL: 38.9 MMHG (ref 35–45)
PCO2, ART, TEMP ADJ: ABNORMAL (ref 35–45)
PDW BLD-RTO: 16.3 % (ref 11.5–14.9)
PEEP/CPAP: 7
PH ARTERIAL: 7.4 (ref 7.35–7.45)
PH, ART, TEMP ADJ: ABNORMAL (ref 7.35–7.45)
PLATELET # BLD: 211 K/UL (ref 150–450)
PLATELET ESTIMATE: ABNORMAL
PMV BLD AUTO: 7.5 FL (ref 6–12)
PO2 ARTERIAL: 118 MMHG (ref 80–100)
PO2, ART, TEMP ADJ: ABNORMAL MMHG (ref 80–100)
POSITIVE BASE EXCESS, ART: ABNORMAL MMOL/L (ref 0–2)
POTASSIUM SERPL-SCNC: 3.6 MMOL/L (ref 3.7–5.3)
PSV: ABNORMAL
PT. POSITION: ABNORMAL
RBC # BLD: 4.15 M/UL (ref 4–5.2)
RBC # BLD: ABNORMAL 10*6/UL
RESPIRATORY RATE: 14
SAMPLE SITE: ABNORMAL
SEG NEUTROPHILS: 77 % (ref 36–66)
SEGMENTED NEUTROPHILS ABSOLUTE COUNT: 6.5 K/UL (ref 1.3–9.1)
SET RATE: 14
SODIUM BLD-SCNC: 142 MMOL/L (ref 135–144)
SOURCE: NORMAL
STREP PNEUMONIAE ANTIGEN: NEGATIVE
TEXT FOR RESPIRATORY: ABNORMAL
TOTAL HB: ABNORMAL G/DL (ref 12–16)
TOTAL RATE: 18
VT: 500
WBC # BLD: 8.4 K/UL (ref 3.5–11)
WBC # BLD: ABNORMAL 10*3/UL

## 2020-07-14 PROCEDURE — 71045 X-RAY EXAM CHEST 1 VIEW: CPT

## 2020-07-14 PROCEDURE — 94003 VENT MGMT INPAT SUBQ DAY: CPT

## 2020-07-14 PROCEDURE — 6370000000 HC RX 637 (ALT 250 FOR IP): Performed by: INTERNAL MEDICINE

## 2020-07-14 PROCEDURE — 2500000003 HC RX 250 WO HCPCS: Performed by: STUDENT IN AN ORGANIZED HEALTH CARE EDUCATION/TRAINING PROGRAM

## 2020-07-14 PROCEDURE — 2000000000 HC ICU R&B

## 2020-07-14 PROCEDURE — 80048 BASIC METABOLIC PNL TOTAL CA: CPT

## 2020-07-14 PROCEDURE — 94761 N-INVAS EAR/PLS OXIMETRY MLT: CPT

## 2020-07-14 PROCEDURE — 95816 EEG AWAKE AND DROWSY: CPT | Performed by: PSYCHIATRY & NEUROLOGY

## 2020-07-14 PROCEDURE — 36600 WITHDRAWAL OF ARTERIAL BLOOD: CPT

## 2020-07-14 PROCEDURE — 93010 ELECTROCARDIOGRAM REPORT: CPT | Performed by: INTERNAL MEDICINE

## 2020-07-14 PROCEDURE — 36573 INSJ PICC RS&I 5 YR+: CPT | Performed by: RADIOLOGY

## 2020-07-14 PROCEDURE — 6370000000 HC RX 637 (ALT 250 FOR IP): Performed by: STUDENT IN AN ORGANIZED HEALTH CARE EDUCATION/TRAINING PROGRAM

## 2020-07-14 PROCEDURE — 6360000002 HC RX W HCPCS: Performed by: INTERNAL MEDICINE

## 2020-07-14 PROCEDURE — 99233 SBSQ HOSP IP/OBS HIGH 50: CPT | Performed by: INTERNAL MEDICINE

## 2020-07-14 PROCEDURE — 99232 SBSQ HOSP IP/OBS MODERATE 35: CPT | Performed by: PSYCHIATRY & NEUROLOGY

## 2020-07-14 PROCEDURE — 2700000000 HC OXYGEN THERAPY PER DAY

## 2020-07-14 PROCEDURE — 2580000003 HC RX 258: Performed by: INTERNAL MEDICINE

## 2020-07-14 PROCEDURE — 85025 COMPLETE CBC W/AUTO DIFF WBC: CPT

## 2020-07-14 PROCEDURE — 2709999900 IR FLUORO GUIDED CVA DEVICE PLMT/REPLACE/REMOVAL

## 2020-07-14 PROCEDURE — 82805 BLOOD GASES W/O2 SATURATION: CPT

## 2020-07-14 PROCEDURE — 02HV33Z INSERTION OF INFUSION DEVICE INTO SUPERIOR VENA CAVA, PERCUTANEOUS APPROACH: ICD-10-PCS | Performed by: RADIOLOGY

## 2020-07-14 PROCEDURE — 2500000003 HC RX 250 WO HCPCS: Performed by: INTERNAL MEDICINE

## 2020-07-14 PROCEDURE — 36415 COLL VENOUS BLD VENIPUNCTURE: CPT

## 2020-07-14 PROCEDURE — 6370000000 HC RX 637 (ALT 250 FOR IP): Performed by: PSYCHIATRY & NEUROLOGY

## 2020-07-14 PROCEDURE — 94770 HC ETCO2 MONITOR DAILY: CPT

## 2020-07-14 PROCEDURE — 2580000003 HC RX 258: Performed by: RADIOLOGY

## 2020-07-14 RX ORDER — SPIRONOLACTONE 25 MG/1
25 TABLET ORAL EVERY OTHER DAY
Status: DISCONTINUED | OUTPATIENT
Start: 2020-07-14 | End: 2020-07-15

## 2020-07-14 RX ORDER — METOPROLOL TARTRATE 50 MG/1
50 TABLET, FILM COATED ORAL 2 TIMES DAILY
Status: DISCONTINUED | OUTPATIENT
Start: 2020-07-14 | End: 2020-07-14 | Stop reason: SDUPTHER

## 2020-07-14 RX ORDER — HYDROCHLOROTHIAZIDE 25 MG/1
25 TABLET ORAL EVERY OTHER DAY
Status: DISCONTINUED | OUTPATIENT
Start: 2020-07-14 | End: 2020-07-15

## 2020-07-14 RX ORDER — SPIRONOLACTONE AND HYDROCHLOROTHIAZIDE 25; 25 MG/1; MG/1
1 TABLET ORAL EVERY OTHER DAY
Status: DISCONTINUED | OUTPATIENT
Start: 2020-07-14 | End: 2020-07-14

## 2020-07-14 RX ORDER — SODIUM CHLORIDE 0.9 % (FLUSH) 0.9 %
10 SYRINGE (ML) INJECTION PRN
Status: DISCONTINUED | OUTPATIENT
Start: 2020-07-14 | End: 2020-07-20 | Stop reason: HOSPADM

## 2020-07-14 RX ORDER — ESMOLOL HYDROCHLORIDE 10 MG/ML
50 INJECTION, SOLUTION INTRAVENOUS CONTINUOUS
Status: DISCONTINUED | OUTPATIENT
Start: 2020-07-14 | End: 2020-07-17

## 2020-07-14 RX ORDER — SODIUM CHLORIDE 0.9 % (FLUSH) 0.9 %
10 SYRINGE (ML) INJECTION EVERY 12 HOURS SCHEDULED
Status: DISCONTINUED | OUTPATIENT
Start: 2020-07-14 | End: 2020-07-20 | Stop reason: HOSPADM

## 2020-07-14 RX ADMIN — MINERAL OIL, PETROLATUM: 425; 568 OINTMENT OPHTHALMIC at 20:07

## 2020-07-14 RX ADMIN — FENTANYL CITRATE 50 MCG: 50 INJECTION INTRAMUSCULAR; INTRAVENOUS at 07:45

## 2020-07-14 RX ADMIN — CHLORHEXIDINE GLUCONATE 15 ML: 1.2 RINSE ORAL at 22:14

## 2020-07-14 RX ADMIN — LEVETIRACETAM 500 MG: 100 SOLUTION ORAL at 08:38

## 2020-07-14 RX ADMIN — METOPROLOL TARTRATE 50 MG: 50 TABLET, FILM COATED ORAL at 08:37

## 2020-07-14 RX ADMIN — POLYVINYL ALCOHOL 1 DROP: 14 SOLUTION/ DROPS OPHTHALMIC at 20:08

## 2020-07-14 RX ADMIN — CHLORHEXIDINE GLUCONATE 15 ML: 1.2 RINSE ORAL at 07:30

## 2020-07-14 RX ADMIN — METOPROLOL TARTRATE 5 MG: 1 INJECTION, SOLUTION INTRAVENOUS at 04:21

## 2020-07-14 RX ADMIN — POLYVINYL ALCOHOL 1 DROP: 14 SOLUTION/ DROPS OPHTHALMIC at 06:24

## 2020-07-14 RX ADMIN — SPIRONOLACTONE 25 MG: 25 TABLET, FILM COATED ORAL at 12:00

## 2020-07-14 RX ADMIN — PROPOFOL 15 MCG/KG/MIN: 10 INJECTION, EMULSION INTRAVENOUS at 03:39

## 2020-07-14 RX ADMIN — METOPROLOL TARTRATE 50 MG: 50 TABLET, FILM COATED ORAL at 20:07

## 2020-07-14 RX ADMIN — CLOPIDOGREL BISULFATE 75 MG: 75 TABLET ORAL at 08:37

## 2020-07-14 RX ADMIN — MINERAL OIL, PETROLATUM: 425; 568 OINTMENT OPHTHALMIC at 04:23

## 2020-07-14 RX ADMIN — FENTANYL CITRATE 50 MCG: 50 INJECTION INTRAMUSCULAR; INTRAVENOUS at 12:17

## 2020-07-14 RX ADMIN — MINERAL OIL, PETROLATUM: 425; 568 OINTMENT OPHTHALMIC at 12:30

## 2020-07-14 RX ADMIN — MINERAL OIL, PETROLATUM: 425; 568 OINTMENT OPHTHALMIC at 08:38

## 2020-07-14 RX ADMIN — HYDRALAZINE HYDROCHLORIDE 10 MG: 20 INJECTION INTRAMUSCULAR; INTRAVENOUS at 06:22

## 2020-07-14 RX ADMIN — FAMOTIDINE 20 MG: 10 INJECTION, SOLUTION INTRAVENOUS at 08:36

## 2020-07-14 RX ADMIN — SODIUM CHLORIDE 3 G: 900 INJECTION INTRAVENOUS at 08:34

## 2020-07-14 RX ADMIN — POLYVINYL ALCOHOL 1 DROP: 14 SOLUTION/ DROPS OPHTHALMIC at 22:57

## 2020-07-14 RX ADMIN — SODIUM CHLORIDE, POTASSIUM CHLORIDE, SODIUM LACTATE AND CALCIUM CHLORIDE 75 ML/HR: 600; 310; 30; 20 INJECTION, SOLUTION INTRAVENOUS at 10:00

## 2020-07-14 RX ADMIN — MINERAL OIL, PETROLATUM: 425; 568 OINTMENT OPHTHALMIC at 01:20

## 2020-07-14 RX ADMIN — SODIUM CHLORIDE 3 G: 900 INJECTION INTRAVENOUS at 01:20

## 2020-07-14 RX ADMIN — LEVETIRACETAM 500 MG: 100 SOLUTION ORAL at 20:07

## 2020-07-14 RX ADMIN — HYDRALAZINE HYDROCHLORIDE 10 MG: 20 INJECTION INTRAMUSCULAR; INTRAVENOUS at 12:16

## 2020-07-14 RX ADMIN — PROPOFOL 15 MCG/KG/MIN: 10 INJECTION, EMULSION INTRAVENOUS at 14:47

## 2020-07-14 RX ADMIN — POLYVINYL ALCOHOL 1 DROP: 14 SOLUTION/ DROPS OPHTHALMIC at 00:34

## 2020-07-14 RX ADMIN — Medication 10 ML: at 08:39

## 2020-07-14 RX ADMIN — POLYVINYL ALCOHOL 1 DROP: 14 SOLUTION/ DROPS OPHTHALMIC at 11:00

## 2020-07-14 RX ADMIN — HYDRALAZINE HYDROCHLORIDE 10 MG: 20 INJECTION INTRAMUSCULAR; INTRAVENOUS at 17:36

## 2020-07-14 RX ADMIN — ENOXAPARIN SODIUM 30 MG: 30 INJECTION SUBCUTANEOUS at 08:37

## 2020-07-14 RX ADMIN — SODIUM CHLORIDE 3 G: 900 INJECTION INTRAVENOUS at 20:08

## 2020-07-14 RX ADMIN — SODIUM CHLORIDE 3 G: 900 INJECTION INTRAVENOUS at 12:57

## 2020-07-14 RX ADMIN — EZETIMIBE 10 MG: 10 TABLET ORAL at 20:07

## 2020-07-14 RX ADMIN — HYDRALAZINE HYDROCHLORIDE 10 MG: 20 INJECTION INTRAMUSCULAR; INTRAVENOUS at 22:57

## 2020-07-14 RX ADMIN — SODIUM CHLORIDE, POTASSIUM CHLORIDE, SODIUM LACTATE AND CALCIUM CHLORIDE 75 ML/HR: 600; 310; 30; 20 INJECTION, SOLUTION INTRAVENOUS at 23:29

## 2020-07-14 RX ADMIN — POLYVINYL ALCOHOL 1 DROP: 14 SOLUTION/ DROPS OPHTHALMIC at 03:38

## 2020-07-14 RX ADMIN — Medication 10 ML: at 22:14

## 2020-07-14 RX ADMIN — HYDROCHLOROTHIAZIDE 25 MG: 25 TABLET ORAL at 12:00

## 2020-07-14 ASSESSMENT — PULMONARY FUNCTION TESTS
PIF_VALUE: 13
PIF_VALUE: 20
PIF_VALUE: 14
PIF_VALUE: 20
PIF_VALUE: 14
PIF_VALUE: 21
PIF_VALUE: 13
PIF_VALUE: 20
PIF_VALUE: 21
PIF_VALUE: 20
PIF_VALUE: 14
PIF_VALUE: 30
PIF_VALUE: 18
PIF_VALUE: 21
PIF_VALUE: 19
PIF_VALUE: 20
PIF_VALUE: 17
PIF_VALUE: 34
PIF_VALUE: 17
PIF_VALUE: 11
PIF_VALUE: 29
PIF_VALUE: 19
PIF_VALUE: 21
PIF_VALUE: 20
PIF_VALUE: 21
PIF_VALUE: 19
PIF_VALUE: 20
PIF_VALUE: 13

## 2020-07-14 ASSESSMENT — PAIN SCALES - GENERAL
PAINLEVEL_OUTOF10: 0

## 2020-07-14 NOTE — PROGRESS NOTES
Dr. Dove Clarity rounding at bedside, assessing patient. Updated on patient overnight condition, blood pressures, and sedation levels. States to do a sedation vacation but do not extubate today due to poor mentation, use diprivan if patient is agitated and esmolol if blood pressure increases. Also states to hold off on starting tube feed until tomorrow.

## 2020-07-14 NOTE — PROGRESS NOTES
Pt placed on CPAP 7, PS 7. .28. pt had no respiratory effort so placed back on previous PRVC settings.

## 2020-07-14 NOTE — PROCEDURES
EEG REPORT       Patient: Emmanuel Juárez Age: 79 y.o. MRN: [id number]    Date: 7/12/2020  Referring Provider: No ref. provider found    History: This routine 30 minute scalp EEG was recorded with video- monitoring for a 79 y.o. Thomasena Dilling female who presented with encephalopathy. This EEG was performed to evaluate for focal and epileptiform abnormalities.      Milla Navarro   Current Facility-Administered Medications   Medication Dose Route Frequency Provider Last Rate Last Dose    esmolol (BREVIBLOC) 2.5gm/250ml NS infusion  50 mcg/kg/min Intravenous Continuous Sarika Bob MD        spironolactone (ALDACTONE) tablet 25 mg  25 mg Oral Every Other Day Thaddeus Hodges MD   25 mg at 07/14/20 1200    And    hydroCHLOROthiazide (HYDRODIURIL) tablet 25 mg  25 mg Oral Every Other Day Thaddeus Hodges MD   25 mg at 07/14/20 1200    sodium chloride flush 0.9 % injection 10 mL  10 mL Intravenous 2 times per day Maribel Benedict MD        sodium chloride flush 0.9 % injection 10 mL  10 mL Intravenous PRN Maribel Benedict MD        sodium chloride flush 0.9 % injection 10 mL  10 mL Intravenous 2 times per day Aurora Maloney MD   10 mL at 07/14/20 0839    sodium chloride flush 0.9 % injection 10 mL  10 mL Intravenous PRN Aurora Maloney MD        acetaminophen (TYLENOL) tablet 650 mg  650 mg Oral Q4H PRN Aurora Maloney MD        enoxaparin (LOVENOX) injection 30 mg  30 mg Subcutaneous Daily Aurora Maloney MD   30 mg at 07/14/20 0837    fentaNYL (SUBLIMAZE) injection 50 mcg  50 mcg Intravenous Q30 Min PRN Sarika Bob MD   50 mcg at 07/14/20 1217    propofol injection  10 mcg/kg/min Intravenous Continuous Sarika Bob MD 6.6 mL/hr at 07/14/20 1447 15 mcg/kg/min at 07/14/20 1447    polyvinyl alcohol (LIQUIFILM TEARS) 1.4 % ophthalmic solution 1 drop  1 drop Both Eyes Q4H aSrika Bob MD   1 drop at 07/14/20 1100    And    lubrifresh P.M. (artificial tears) ophthalmic ointment   Both Eyes Q4H Charlotte Evans Rupinder Mccullough MD        chlorhexidine (PERIDEX) 0.12 % solution 15 mL  15 mL Mouth/Throat BID Chema Ernandez MD   15 mL at 07/14/20 0730    famotidine (PEPCID) injection 20 mg  20 mg Intravenous Daily Chema Ernandez MD   20 mg at 07/14/20 0836    ampicillin-sulbactam (UNASYN) 3 g ivpb minibag  3 g Intravenous Q6H Chema Ernandez MD   Stopped at 07/14/20 1327    lactated ringers infusion  75 mL/hr Intravenous Continuous Chema Ernandez MD 75 mL/hr at 07/14/20 1000 75 mL/hr at 07/14/20 1000    clopidogrel (PLAVIX) tablet 75 mg  75 mg Oral Daily Basim Marcelino MD   75 mg at 07/14/20 0837    metoprolol tartrate (LOPRESSOR) tablet 50 mg  50 mg Oral BID Basim Marcelino MD   50 mg at 07/14/20 6153    cloNIDine (CATAPRES) 0.1 MG/24HR 1 patch  1 patch Transdermal Weekly Basim Marcelino MD   1 patch at 07/13/20 1331    ezetimibe (ZETIA) tablet 10 mg  10 mg Oral Nightly Basim Marcelino MD   10 mg at 07/13/20 2053    metoprolol (LOPRESSOR) injection 5 mg  5 mg Intravenous Q6H PRN Basim Marcelino MD   5 mg at 07/14/20 0421    hydrALAZINE (APRESOLINE) injection 10 mg  10 mg Intravenous Q4H PRN Maya Domínguez MD   10 mg at 07/14/20 1216    levETIRAcetam (KEPPRA) 100 MG/ML solution 500 mg  500 mg Oral BID Kristian Hill MD   500 mg at 07/14/20 0366    aspirin suppository 300 mg  300 mg Rectal Q6H PRN Dewayne Claude, MD   300 mg at 07/12/20 0677       Technical Description: This is a 21 channel digital EEG recording with time-locked video. Electrodes were placed in accordance with the 10-20 International System of Electrode Placement. Single lead EKG monitoring as well as temporal electrodes were included. The patient was not sleep deprived. This recording was obtained during wakefulness. EEG Description: The dominant background activity during maximal recorded wakefulness consisted of 4-5 Hz of polymorphic delta and theta activity. There was poor anterior posterior amplitude gradient.  Occasional runs of generalized periodic discharges with triphasic morphology were seen. Activation procedures were not performed. The EKG channel demonstrated a normal sinus rhythm. Interpretation  This EEG was abnormal due to disorganized and slow background in polymorphic delta and theta frequency. Occasional runs of generalized periodic discharges with triphasic morphology were seen. Clinical correlation  This EEG was abnormal. Disorganized and slow background and triphasic waves suggested mild to moderate encephalopathy of non specific etiology.      Lindy Bocanegra MD    Diplomate, American Board of Psychiatry and Neurology  Diplomate, American Board of Clinical Neurophysiology  Diplomate, American Board of Epilepsy

## 2020-07-14 NOTE — PROGRESS NOTES
[address] [facility]    PROGRESS NOTE             7/14/2020    9:10 AM    Name:   Tiffanie Morse  MRN:     [id number]     Acct:      [de-identified]   Room:   2010/2010-01  IP Day:  1  Admit Date:  7/12/2020  8:20 PM    PCP:  Anastasia Severe,   Code Status:  Full Code    Subjective:     C/C:   Chief Complaint   Patient presents with    Altered Mental Status    Hypertension     Interval History Status: improved. BP is improving    Patient was seen and evaluated at bedside. Per nurse report no overnight events. Patient is continue to be sedated and intubated on the ventilator. FiO2 28, PEEP 7, RR 14, . Per pulmonary sedation vacation today, no extubation, patient on drip of van for agitation, will re-start esmolol, and tube feedings held till tomorrow. Brief History:     See H&P    Review of Systems:     Review of Systems  Unable to obtain because patient is intubated    Medications: Allergies:     Allergies   Allergen Reactions    Diclofenac-Misoprostol Other (See Comments)     Per Dr Zhao Lipscomb - patient has GI intolerance to oral formulations       Current Meds:   Scheduled Meds:    sodium chloride flush  10 mL Intravenous 2 times per day    enoxaparin  30 mg Subcutaneous Daily    polyvinyl alcohol  1 drop Both Eyes Q4H    And    artificial tears   Both Eyes Q4H    chlorhexidine  15 mL Mouth/Throat BID    famotidine (PEPCID) injection  20 mg Intravenous Daily    ampicillin-sulbactam  3 g Intravenous Q6H    clopidogrel  75 mg Oral Daily    metoprolol tartrate  50 mg Oral BID    cloNIDine  1 patch Transdermal Weekly    ezetimibe  10 mg Oral Nightly    levETIRAcetam  500 mg Oral BID     Continuous Infusions:    esmolol      propofol 10 mcg/kg/min (07/14/20 0750)    lactated ringers 75 mL/hr (07/13/20 2102)     PRN Meds: sodium chloride flush, acetaminophen, fentanNYL, metoprolol, hydrALAZINE, aspirin    Data:     Past Medical History:   has a past medical history of Arthritis, Cancer ([facility] [address] CKD (chronic kidney disease) stage 4, GFR 15-29 ml/min ([facility]), COPD (chronic obstructive pulmonary disease) ([facility] [address] CVA (cerebral vascular accident) ([facility] [address] Gout, Hyperlipidemia, Hypertension, Left renal atrophy, and Other abnormality of urination(788.69). Social History:   reports that she quit smoking about 21 months ago. She smoked 1.00 pack per day. She has never used smokeless tobacco. She reports that she does not drink alcohol or use drugs. Family History:   Family History   Problem Relation Age of Onset    Heart Disease Brother     Other Mother         polycythemia    Heart Disease Father     Heart Disease Sister         enlarged heart    Cancer Son         prostate       Vitals:  BP (!) 175/58   Pulse 100   Temp 97.8 °F (36.6 °C) (Oral)   Resp 15   Ht 5' 5\" (1.651 m)   Wt 153 lb 8 oz (69.6 kg)   SpO2 99%   BMI 25.54 kg/m²   Temp (24hrs), Av.9 °F (36.6 °C), Min:97.7 °F (36.5 °C), Max:98 °F (36.7 °C)    Recent Labs     20  2031   POCGLU 138*       I/O(24Hr): Intake/Output Summary (Last 24 hours) at 2020 0910  Last data filed at 2020 0845  Gross per 24 hour   Intake 2389 ml   Output 1625 ml   Net 764 ml       Labs:    [unfilled]    Lab Results   Component Value Date/Time    SPECIAL NOT REPORTED 2019 12:20 PM     Lab Results   Component Value Date/Time    CULTURE (A) 2019 12:20 PM     ESCHERICHIA COLI >[id number] CFU/ML THIS ORGANISM IS AN EXTENDED-SPECTRUM BETA-LACTAMASE  AND RESISTANCE TO THERAPY WITH PENICILLINS, CEPHALOSPORINS AND AZTREONAM IS EXPECTED. THESE ORGANISMS GENERALLY REMAIN SUSCEPTIBLE TO CARBAPENEMS. CONSIDER ID CONSULTATION.        [facility]    Radiology:    Ct Head Wo Contrast    Result Date: 2020  EXAMINATION: CT OF THE HEAD WITHOUT CONTRAST  2020 8:42 pm TECHNIQUE: CT of the head was performed without the administration of intravenous contrast. Dose modulation, iterative reconstruction, and/or weight based adjustment of the mA/kV was utilized to reduce the radiation dose to as low as reasonably achievable. COMPARISON: 12/19/2018 HISTORY: ORDERING SYSTEM PROVIDED HISTORY: AMS TECHNOLOGIST PROVIDED HISTORY: AMS Reason for Exam: AMS, unable to speak, patient is unable to stop shaking her legs. patient is unable to follow any type of instructions or answer any questions. Acuity: Acute Type of Exam: Initial FINDINGS: BRAIN/VENTRICLES: There is no acute intracranial hemorrhage, mass effect or midline shift. No abnormal extra-axial fluid collection. The gray-white differentiation is maintained without evidence of an acute infarct. There is no evidence of hydrocephalus. Generalized involutional changes and moderate chronic small ischemic disease. Intracranial vascular calcifications. Question small chronic lacune infarct identified within the region of the right paramedian dane. ORBITS: The visualized portion of the orbits demonstrate no acute abnormality. SINUSES: The visualized paranasal sinuses and mastoid air cells demonstrate no acute abnormality. SOFT TISSUES/SKULL:  No acute abnormality of the visualized skull or soft tissues. No acute intracranial abnormality. Chronic small ischemic disease and age related change. Critical results were called by Dr. Rolando Cullen to Dr Zack Fallon on 7/12/2020 at 20:52. Us Renal Complete    Result Date: 7/13/2020  EXAMINATION: RETROPERITONEAL ULTRASOUND OF THE KIDNEYS 7/13/2020 COMPARISON: 04/19/2019 and prior HISTORY: ORDERING SYSTEM PROVIDED HISTORY: TIMO TECHNOLOGIST PROVIDED HISTORY: TIMO FINDINGS: Kidneys: The right kidney measures 10.2 x 5.8 x 5.2 cm in length and the left kidney measures 8.4 x 4.5 x 4.6 cm in length. Study limited by body habitus and overlying bowel gas. Simple-appearing cyst on the right measuring 1.3 x 1.2 x 1.6 cm noted. There is no hydronephrosis or abnormal echogenicity.  Left kidney is somewhat Date: 7/12/2020  EXAMINATION: ONE XRAY VIEW OF THE CHEST 7/12/2020 10:32 pm COMPARISON: Prior study at 9:13 p.m. HISTORY: ORDERING SYSTEM PROVIDED HISTORY: ET tube TECHNOLOGIST PROVIDED HISTORY: ET tube Reason for Exam: ET tube placement Acuity: Acute Type of Exam: Initial Additional signs and symptoms: ET tube placement Relevant Medical/Surgical History: ET tube placement FINDINGS: Interval intubation. Enteric tube has also been placed with both tubes appropriately positioned. The heart is enlarged. Persistent ground-glass opacification in the left lower lung zone. Right lung appears clear. No significant skeletal finding. Supportive devices are positioned appropriately. Persistent airspace changes at the left lung base. Xr Chest Portable    Result Date: 7/12/2020  EXAMINATION: ONE XRAY VIEW OF THE CHEST 7/12/2020 9:21 pm COMPARISON: 12/19/2019 radiograph HISTORY: ORDERING SYSTEM PROVIDED HISTORY: cough TECHNOLOGIST PROVIDED HISTORY: cough Reason for Exam: cough Acuity: Acute Type of Exam: Initial FINDINGS: The heart is enlarged. Mediastinum and pulmonary vascular markings are normal.  Right lung is clear. Stable mild ground-glass opacities at the left costophrenic sulcus. No significant skeletal finding. Asymmetric ground-glass opacification at the left lung base. Pattern may represent edema or small pleural effusion. Developing pneumonitis can not be excluded. Physical Examination:        Physical Exam  HENT:      Nose:      Comments: NG tube in place     Mouth/Throat:      Mouth: Mucous membranes are dry. Eyes:      Conjunctiva/sclera: Conjunctivae normal.   Cardiovascular:      Rate and Rhythm: Normal rate and regular rhythm. Heart sounds: Normal heart sounds. No murmur. Pulmonary:      Breath sounds: Normal breath sounds. No wheezing. Abdominal:      General: Bowel sounds are normal.      Palpations: Abdomen is soft. Tenderness: There is no abdominal tenderness. Genitourinary:     Comments: Cool cath in place  Neurological:      Comments: Patient is sedated. Spines to voice does not follow commands           Assessment:        Primary Problem  Acute encephalopathy    Active Hospital Problems    Diagnosis Date Noted    Acute respiratory failure ([facility] [address] [J96.00] 07/13/2020    Acute encephalopathy [G93.40] 07/13/2020    Chronic kidney disease (CKD), stage III (moderate) ([address] [N18.3] 07/13/2020    Seizure ([facility] [address] [R56.9] 07/13/2020    Altered mental status [R41.82]     Hypertensive emergency [I16.1] 12/19/2019    Aspiration pneumonia ([facility] [address] [J69.0] 04/24/2019       Plan:        1. Acute hypoxic respiratory failure 2/2  Hypertensive emergency  -Intubated and on ventilator  -ABG unremarkable for hypoxia or hypercapnia  -Propofol infusion  -Fentanyl 50 MCG for agitation  -Pulmonology: Sedation vacation today, no extubation, and attempt tube feedings tomorrow.     2. Posterior reversible encephalopathy syndrome  -Neurology; MRI of brain without contrast, stroke cannot be ruled out  -Carotid Duplex (05/27)  - Left artery 50-59 stenosis; Right < 50% stenosis  -CT head on remarkable for any intracranial abnormality  -ABG unremarkable for hypercapnia or hypoxia  -EKG normal sinus rhythm  -UA shows no sign of infection     3. Aspiration pneumonia  -Chest x-ray remarkable for questionable left midlung infiltrate  -Continue with Unasyn  -Pro-Feliciano- 0.10, Sed rate- 23 (H), CRP- 8.6 (H). - Respiratory viral panel, Legionella, mycoplasma pending  -Pulmonology -continue Unasyn for aspiration pneumonia     4. Seizure-like activity 2/2 hypertensive emergency  - Continue with Keppra 500 mg twice daily  -Neurology consult, EEG results pending  - MRI results pending     5.  Hypertensive emergency - Stable  -Esmolol drip started for S BP: Greater than 160  -Continue Lopressor 5 mg IV every 6 hrs  -Clonidine patch once a week -held  -Cardiology consult, hydralazine 10 mg IV every 4 hours, esmolol drip and titrate IV  -Serial troponin and EKG   -Echo- LV EF-75%     6. History of carotid endarterectomy  -Lipitor 20 mg daily  -Plavix 75 mg daily  -At the 10 be 10 mg daily  -Lopressor 50 mg twice daily   -Carotid Duplex (05/27): Left 50-59% ; Right <50%  -Seen Dr. Yu Gibson is patient's vascular surgeon on 6/8/2020     6.  CKD stage 3; GFR 34  -Creatinine at 1.36; BL at 1.4  -Renal ultrasound pending  -Urine sodium and creatinine ordered    DVT PPX: Enoxaparin  GI P PPX: Famotidine       Recently saw cardiology for cardiac clearance and cardiovascular examination for placement of spinal cord stimulator on 6/16/2020    Adrianne Heredia MD  7/14/2020  9:10 AM

## 2020-07-14 NOTE — PROGRESS NOTES
Dr. Yashira Pulido at bedside with residents. Spoke with spouse regarding patient's condition. Home medications re-ordered. Order to use Hydralazine PRN for Blood pressure and not to start Brevibloc.

## 2020-07-14 NOTE — PROGRESS NOTES
Jessa Tavarez RN unable to come to the phone, but gave message that patient is still not stable and is intubated.

## 2020-07-14 NOTE — PROGRESS NOTES
[facility] CARDIOLOGY Progress Note    7/14/2020 7:29 AM      Subjective:  Ms. Lencho Houses intubated and currently off of sedatives. Unable to obtain history from the patient who remains intubated. Nurse at bedside updated overnight events. Nurse was trying to start peripheral IV. Was off of IV esmolol overnight which will be started again as her blood pressure was running around 180/100 range currently. .    Review of systems:   unobtainable.              LABS:     Recent Results (from the past 24 hour(s))   Procalcitonin    Collection Time: 07/13/20 10:58 AM   Result Value Ref Range    Procalcitonin 0.10 (H) <0.09 ng/mL   C-Reactive Protein    Collection Time: 07/13/20 10:58 AM   Result Value Ref Range    CRP 8.7 (H) 0.0 - 5.0 mg/L   Sodium, Random Ur    Collection Time: 07/13/20  9:20 PM   Result Value Ref Range    Sodium,Ur 36 mmol/L   Creatinine,Random Ur    Collection Time: 07/13/20  9:20 PM   Result Value Ref Range    Creatinine, Ur 21.5 (L) 28.0 - 217.0 mg/dL   EKG 12 Lead    Collection Time: 07/13/20  9:42 PM   Result Value Ref Range    Ventricular Rate 86 BPM    Atrial Rate 86 BPM    P-R Interval 168 ms    QRS Duration 88 ms    Q-T Interval 444 ms    QTc Calculation (Bazett) 531 ms    P Axis 65 degrees    R Axis -33 degrees    T Axis 74 degrees   Blood gas, arterial    Collection Time: 07/14/20  4:02 AM   Result Value Ref Range    pH, Arterial 7.401 7.350 - 7.450    pCO2, Arterial 38.9 35.0 - 45.0 mmHg    pO2, Arterial 118.0 (H) 80.0 - 100.0 mmHg    HCO3, Arterial 24.1 22.0 - 26.0 mmol/L    Positive Base Excess, Art NOT REPORTED 0.0 - 2.0 mmol/L    Negative Base Excess, Art 0.7 0.0 - 2.0 mmol/L    O2 Sat, Arterial 97.2 95 - 98 %    Total Hb NOT REPORTED 12.0 - 16.0 g/dl    Oxyhemoglobin NOT REPORTED 95.0 - 98.0 %    Carboxyhemoglobin 0.6 0 - 5 %    Methemoglobin 0.8 0.0 - 1.9 %    Pt Temp 37     pH, Art, Temp Adj NOT REPORTED 7.350 - 7.450    pCO2, Art, Temp Adj NOT REPORTED 35.0 - 45.0    pO2, Art, Temp Adj NOT REPORTED 80.0 - 100.0 mmHg    O2 Device/Flow/% VENTILATOR     Respiratory Rate 14     Basim Test PASS     Sample Site Right Radial Artery     Pt.  Position SEMI-FOWLERS     Mode PRVC     Set Rate 14     Total Rate 18          FIO2 28     Peep/Cpap 7     PSV NOT REPORTED     Text for Respiratory RESULTS GIVEN TO RN     NOTIFICATION NOT REPORTED     NOTIFICATION TIME NOT REPORTED    Basic Metabolic Prof    Collection Time: 07/14/20  4:32 AM   Result Value Ref Range    Glucose 88 70 - 99 mg/dL    BUN 15 8 - 23 mg/dL    CREATININE 1.36 (H) 0.50 - 0.90 mg/dL    Bun/Cre Ratio NOT REPORTED 9 - 20    Calcium 9.0 8.6 - 10.4 mg/dL    Sodium 142 135 - 144 mmol/L    Potassium 3.6 (L) 3.7 - 5.3 mmol/L    Chloride 108 (H) 98 - 107 mmol/L    CO2 21 20 - 31 mmol/L    Anion Gap 13 9 - 17 mmol/L    GFR Non-African American 38 (L) >60 mL/min    GFR  45 (L) >60 mL/min    GFR Comment          GFR Staging NOT REPORTED    CBC with DIFF    Collection Time: 07/14/20  4:32 AM   Result Value Ref Range    WBC 8.4 3.5 - 11.0 k/uL    RBC 4.15 4.0 - 5.2 m/uL    Hemoglobin 11.4 (L) 12.0 - 16.0 g/dL    Hematocrit 35.0 (L) 36 - 46 %    MCV 84.5 80 - 100 fL    MCH 27.5 26 - 34 pg    MCHC 32.5 31 - 37 g/dL    RDW 16.3 (H) 11.5 - 14.9 %    Platelets 657 793 - 506 k/uL    MPV 7.5 6.0 - 12.0 fL    NRBC Automated NOT REPORTED per 100 WBC    Differential Type NOT REPORTED     Immature Granulocytes NOT REPORTED 0 %    Absolute Immature Granulocyte NOT REPORTED 0.00 - 0.30 k/uL    WBC Morphology NOT REPORTED     RBC Morphology NOT REPORTED     Platelet Estimate NOT REPORTED     Seg Neutrophils 77 (H) 36 - 66 %    Lymphocytes 13 (L) 24 - 44 %    Monocytes 6 1 - 7 %    Eosinophils % 3 0 - 4 %    Basophils 1 0 - 2 %    Segs Absolute 6.50 1.3 - 9.1 k/uL    Absolute Lymph # 1.10 1.0 - 4.8 k/uL    Absolute Mono # 0.50 0.1 - 1.3 k/uL    Absolute Eos # 0.30 0.0 - 0.4 k/uL    Basophils Absolute 0.10 0.0 - 0.2 k/uL Pulse Ox: SpO2  Av.8 %  Min: 98 %  Max: 100 %    Supplemental O2:       Current Meds:    sodium chloride flush  10 mL Intravenous 2 times per day    enoxaparin  30 mg Subcutaneous Daily    polyvinyl alcohol  1 drop Both Eyes Q4H    And    artificial tears   Both Eyes Q4H    chlorhexidine  15 mL Mouth/Throat BID    famotidine (PEPCID) injection  20 mg Intravenous Daily    ampicillin-sulbactam  3 g Intravenous Q6H    clopidogrel  75 mg Oral Daily    metoprolol tartrate  50 mg Oral BID    cloNIDine  1 patch Transdermal Weekly    ezetimibe  10 mg Oral Nightly    levETIRAcetam  500 mg Oral BID         Continuous Infusions:    propofol 20 mcg/kg/min (20 0616)    lactated ringers 75 mL/hr (20)              VITAL SIGNS:    BP (!) 152/71   Pulse 102   Temp 97.9 °F (36.6 °C) (Oral)   Resp 19   Ht 5' 5\" (1.651 m)   Wt 153 lb 8 oz (69.6 kg)   SpO2 98%   BMI 25.54 kg/m²         Admit Weight:  190 lb (86.2 kg)    Last 3 weights: Wt Readings from Last 3 Encounters:   20 153 lb 8 oz (69.6 kg)   19 192 lb 3.9 oz (87.2 kg)   19 171 lb 3.2 oz (77.7 kg)       BMI: Body mass index is 25.54 kg/m². INPUT/OUTPUT:          Intake/Output Summary (Last 24 hours) at 2020 0729  Last data filed at 2020 0530  Gross per 24 hour   Intake 2329 ml   Output 1625 ml   Net 704 ml         Telemetry shows sinus. EXAM:     General appearance:  Remains orally intubated. Winces to painful stimuli. Neck: No JVD  Chest: clear bilaterally. No tenderness. No rhonchi or wheezing. Cardiac:  Tachycardic. Regular rate and rhythm. No significant murmur or gallop or rubs. Abdomen: softr. Extremities: no cyanosis, no clubbing, no leg edema. Skin:  warm and dry. Neuro:    Moving all 4 extremities.     EKG:    Normal sinus rhythm   Left axis deviation   Inferior infarct (cited on or before 10-DEC-2016)   Anteroseptal infarct (cited on or before 2018)   Prolonged QT Abnormal ECG   When compared with ECG of 12-JUL-2020 20:24,   QT has lengthened       2 D ECHO July 13, 2020:    Patient supine, on ventilator. Contrast was utilized on this technically   difficult study. Small LV cavity. Hyperdynamic left ventricular function. Estimated LV EF 75   %. Moderate to severe left ventricular hypertrophy. Obstructive Doppler pattern   mid LV mean 14 mmHg, max 40 mmHg. No segmental wall motion abnormalities. Grade I (mild) left ventricular diastolic dysfunction. Normal right ventricular size and function. Left atrium is normal in size. Right atrium is normal in size. Aortic valve was not well visualized. No aortic stenosis. No aortic insufficiency. Normal aortic root dimension. Mitral annular calcification is seen. Trivial mitral regurgitation. Tricuspid valve was not well visualized. Mild tricuspid regurgitation. Estimated right ventricular systolic pressure is 19 mmHg. Normal right   ventricular systolic pressure. Pulmonic valve not well visualized but Doppler velocities are normal. No   pulmonic insufficiency. IVC normal diameter. No significant pericardial effusion is seen. ASSESSMENT:    Accelerated hypertension requiring IV esmolol PRN. Apparently history of labile blood pressures per 's report.     Abnormal EKG. Cannot exclude underlying coronary artery disease. No active angina pectoris. Hyperdynamic LVEF 75%, mild TR on 2 D ECHO July 2020.     Acute respiratory failure requiring oral intubation and mechanical ventilation currently.     History of carotid artery disease.     Altered mental status with headache, acute encephalopathy of unspecified etiology prompting hospitalization.     Other problems as charted.       REC/PLAN:    Status quo. Agree with plans to resume IV esmolol drip for better blood pressure control.       Discussed with nurse at bedside and advised her to check blood pressure in all 4 extremities and to make sure there was no peripheral arterial disease and false blood pressure readings. Continued supportive care as you are doing and no plans for any invasive cardiac procedures. Will follow. Electronically signed by Pat Khanna MD, [facility]        PLEASE NOTE:  This progress note was completed using a voice transcription system. Every effort was made to ensure accuracy. However, inadvertent computerized transcription errors may be present.

## 2020-07-14 NOTE — FLOWSHEET NOTE
07/14/20 1313   Encounter Summary   Services provided to: Patient   Referral/Consult From: Rounding   Complexity of Encounter Low   Length of Encounter 15 minutes   Spiritual/Zoroastrian   Type Spiritual support   Assessment Unable to respond   Intervention Prayer

## 2020-07-14 NOTE — BRIEF OP NOTE
Brief Postoperative Note    Lora Silva  YOB: 1940  [id number]    Pre-operative Diagnosis: respiratory distress/failure poor venous access    Post-operative Diagnosis: Same    Procedure: PICC placement    Anesthesia: Local    Surgeons/Assistants: Dipak    Estimated Blood Loss: less than 50     Complications: None    Specimens: Was Not Obtained    Electronically signed by Daquan Jenkins MD on 7/14/2020 at 3:43 PM

## 2020-07-14 NOTE — PROGRESS NOTES
Francisco Priest called to state patient now had IV access and inquired if MRI would be tonight or tomorrow so they could coordinate with respiratory. Told her it could be tonight, but need a screening form before we give any definitive times. [address] to call family as patient is unable to do screening form.

## 2020-07-14 NOTE — PROGRESS NOTES
Writer attempted IV access for patient. Unsuccessful. Dr. Gadiel Cabrera notified. PICC line ordered.

## 2020-07-14 NOTE — PLAN OF CARE
Problem: Restraint Use - Nonviolent/Non-Self-Destructive Behavior:  Goal: Absence of restraint indications  Description: Absence of restraint indications  7/14/2020 0248 by Guerrero Cabrera RN  Outcome: Ongoing    Attempts to pull at tubes when released. ROM assessed every 2 hours and visual safety checks completed hourly. Restraints maintained to preserve the patient's airway. Problem: Restraint Use - Nonviolent/Non-Self-Destructive Behavior:  Goal: Absence of restraint-related injury  Description: Absence of restraint-related injury  7/14/2020 0248 by Guerrero Cabrera RN  Outcome: Ongoing    Attempts to pull at tubes when released. ROM assessed every 2 hours and visual safety checks completed hourly. Restraints maintained to preserve the patient's airway.       Problem: Skin Integrity:  Goal: Will show no infection signs and symptoms  Description: Will show no infection signs and symptoms  7/14/2020 0248 by Guerrero Cabrera RN  Outcome: Ongoing     Problem: Skin Integrity:  Goal: Absence of new skin breakdown  Description: Absence of new skin breakdown  7/14/2020 0248 by Guerrero Cabrera RN  Outcome: Ongoing     Problem: Infection - Ventilator-Associated Pneumonia:  Goal: Prevent a ventilator associated event (VAE)  Description: Prevent a ventilator associated event (VAE)  7/14/2020 0248 by Guerrero Cabrera RN  Outcome: Ongoing     Problem: Urinary Elimination:  Goal: Signs and symptoms of infection will decrease  Description: Signs and symptoms of infection will decrease  7/14/2020 0248 by Guerrero Cabrera RN  Outcome: Ongoing     Problem: Urinary Elimination:  Goal: Complications related to the disease process, condition or treatment will be avoided or minimized  Description: Complications related to the disease process, condition or treatment will be avoided or minimized  7/14/2020 0248 by Guerrero Cabrera RN  Outcome: Ongoing     Problem: Falls - Risk of:  Goal: Will remain free from

## 2020-07-14 NOTE — PROGRESS NOTES
Dr. Silvestre Martin at bedside. Evaluated patient's condition, medications, plan of care. Order for Holiday sedation, wean trial, do not start TF today. If blood pressure elevates use Esmolol for HTN control.

## 2020-07-14 NOTE — PROGRESS NOTES
ratio: 353.57  Sensitivity: 5  PEEP/CPAP: 7  I Time/ I Time %: 1 s  Humidification Source: HME  Additional Respiratory  Assessments  Pulse: 100  Resp: 15  SpO2: 99 %  End Tidal CO2: 33 (%)  Position: Semi-Howe's  Humidification Source: HME  Oral Care Completed?: Yes  Oral Care: Mouth suctioned  Cuff Pressure (cm H2O): 25 cm H2O       ABGs:   Lab Results   Component Value Date    PHART 7.401 07/14/2020    PO2ART 118.0 07/14/2020    CZQ9LDM 38.9 07/14/2020       Lab Results   Component Value Date    MODE PRVC 07/14/2020         Medications   IV   esmolol      propofol 10 mcg/kg/min (07/14/20 0750)    lactated ringers 75 mL/hr (07/13/20 2102)      sodium chloride flush  10 mL Intravenous 2 times per day    enoxaparin  30 mg Subcutaneous Daily    polyvinyl alcohol  1 drop Both Eyes Q4H    And    artificial tears   Both Eyes Q4H    chlorhexidine  15 mL Mouth/Throat BID    famotidine (PEPCID) injection  20 mg Intravenous Daily    ampicillin-sulbactam  3 g Intravenous Q6H    clopidogrel  75 mg Oral Daily    metoprolol tartrate  50 mg Oral BID    cloNIDine  1 patch Transdermal Weekly    ezetimibe  10 mg Oral Nightly    levETIRAcetam  500 mg Oral BID       Diet/Nutrition   Diet NPO Effective Now    Exam   VITALS    height is 5' 5\" (1.651 m) and weight is 153 lb 8 oz (69.6 kg). Her oral temperature is 97.8 °F (36.6 °C). Her blood pressure is 175/58 (abnormal) and her pulse is 100. Her respiration is 15 and oxygen saturation is 99%. Ventilator Settings (Basic)  Vent Mode: PRVC Rate Set: 14 bmp/Vt Ordered: 500 mL/ /FiO2 : 28 %    Constitutional - Sedated, but does not follow commands when sedation is decreased or off  General Appearance  well developed, well nourished  HEENT - Life support devices in place (ET, OG),normocephalic, atraumatic. PERRLA  Lungs - Chest expands equally, no wheezes, rales or rhonchi.   Cardiovascular - Heart sounds are normal.  normal rate and rhythm regular, no murmur, gallop or rub.  Abdomen - soft, nontender, nondistended, no masses or organomegaly  Neurologic - CN II-XII are grossly intact. There are no focal motor deficits  Skin - no bruising or bleeding  Extremities - no cyanosis, clubbing or edema    Lab Results   CBC     Lab Results   Component Value Date    WBC 8.4 07/14/2020    RBC 4.15 07/14/2020    RBC 3.78 04/23/2012    HGB 11.4 07/14/2020    HCT 35.0 07/14/2020     07/14/2020     04/23/2012    MCV 84.5 07/14/2020    MCH 27.5 07/14/2020    MCHC 32.5 07/14/2020    RDW 16.3 07/14/2020    LYMPHOPCT 13 07/14/2020    LYMPHOPCT 9.8 04/23/2019    MONOPCT 6 07/14/2020    MONOPCT 6.0 04/23/2019    EOSPCT 4.0 04/23/2019    BASOPCT 1 07/14/2020    BASOPCT 0.5 04/23/2019    MONOSABS 0.50 07/14/2020    MONOSABS 0.5 04/23/2019    LYMPHSABS 1.10 07/14/2020    LYMPHSABS 0.8 04/23/2019    EOSABS 0.30 07/14/2020    EOSABS 0.33 04/23/2019    BASOSABS 0.10 07/14/2020    DIFFTYPE NOT REPORTED 07/14/2020       BMP   Lab Results   Component Value Date     07/14/2020    K 3.6 07/14/2020     07/14/2020    CO2 21 07/14/2020    BUN 15 07/14/2020    CREATININE 1.36 07/14/2020    GLUCOSE 88 07/14/2020    GLUCOSE 118 04/23/2012    CALCIUM 9.0 07/14/2020       LFTS  Lab Results   Component Value Date    ALKPHOS 123 07/12/2020    ALT 7 07/12/2020    AST 14 07/12/2020    PROT 7.2 07/12/2020    BILITOT 0.41 07/12/2020    BILIDIR 0.12 04/12/2012    IBILI 0.22 04/12/2012    LABALBU 3.9 07/12/2020    LABALBU 4.1 04/23/2012       INR  Recent Labs     07/12/20 2025   PROTIME 12.4   INR 0.9       APTT  Recent Labs     07/12/20 2025   APTT 27.4       Lactic Acid  Lab Results   Component Value Date    LACTA 1.1 04/24/2019        BNP   No results for input(s): BNP in the last 72 hours.      Cultures       Radiology     Plain Films             SYSTEM ASSESSMENT    Acute mental status changes  Hypertensive emergency  Aspiration pneumonia  Acute hypoxic respiratory failure      Neuro   Awaiting EEG results  Still acting encephalopathic even when blood pressure is better controlled    Respiratory   Wean oxygen as tolerated.  Keep O2 sat > 88%  Okay to wean her as tolerated  Will not extubate if the patient does not follow commands or becomes agitated     Hemodynamics   Continue esmolol to keep systolic blood pressure controlled  Recheck BNP tomorrow  Echo shows normal RV and LV EF, but left ventricular cavity is small  Gastrointestinal/Nutrition   Hold off on tube feeds another 24 hours    Renal   Stable renal function    Infectious Disease   Continue Unasyn for aspiration pneumonia    Hematology/Oncology   DVT prophylaxis with enoxaparin    Endocrine   Monitoring blood sugars    Social/Spiritual/DNR/Disposition/Other   No family here      Critical Care Time   >35 min    Electronically signed by Madison Morris MD on 7/14/2020 at 8:57 AM

## 2020-07-14 NOTE — PROGRESS NOTES
Spoke to Ifrah, pts BP slightly elevated again. Only one respiratory therapist and not able to come with patient. Dr Donnell Brooks said mri can wait until tomorrow(7/15).  Thank You

## 2020-07-14 NOTE — PROGRESS NOTES
Diprivan off for Holiday sedation, patient restless, grimacing, drawing knees upward. Patient opens eyes, does not look at writer, does not follow directions. Fentanyl given for discomfort per order.

## 2020-07-14 NOTE — PROGRESS NOTES
Writer spoke with Iva SMITH from IR regarding PICC line. Iva will verify ability for PICC line insertion and will call writer back.

## 2020-07-14 NOTE — PROGRESS NOTES
Results   Component Value Date    HDL 45 03/17/2019    HDL 47 04/28/2018    HDL 60 09/24/2013     No results found for: LDLCALC  No results found for: LABVLDL  No results found for: LABA1C  No results found for: EAG  Lab Results   Component Value Date    [id number] 1710 (H) 04/21/2019      Neurological work up:  CT head negative for acute changes   CTA head and neck  MRI brain   2 D echo     Assessment and Recommendations:   Encephalopathy  Hypertensive emergency  Other comorbid conditions include aspiration pneumonia, acute hypoxic respiratory failure status post intubation    Encephalopathy is somewhat improved. The patient is opening eyes and tracking with eyes. She is still not following commands consistently. EEG shows diffuse slowing with no abnormal epileptiform activity. Recommend continue Keppra 500 mg twice daily for now. We will gradually wean off Keppra next 2 - 3 days. Await MRI scan of the brain. We will follow. Florecita Cartwright MD  Neurology    This note is created with the assistance of a speech-recognition program. While intending to generate a document that actually reflects the content of the visit, the document can still have some errors including those of syntax and sound a- like substitutions which may escape proofreading. In such instances, actual meaning can be extrapolated by contextual derivation.

## 2020-07-15 ENCOUNTER — APPOINTMENT (OUTPATIENT)
Dept: GENERAL RADIOLOGY | Age: 80
DRG: 304 | End: 2020-07-15
Payer: MEDICARE

## 2020-07-15 LAB
ABSOLUTE EOS #: 0.3 K/UL (ref 0–0.4)
ABSOLUTE IMMATURE GRANULOCYTE: ABNORMAL K/UL (ref 0–0.3)
ABSOLUTE LYMPH #: 0.8 K/UL (ref 1–4.8)
ABSOLUTE MONO #: 0.6 K/UL (ref 0.1–1.3)
ABSOLUTE RETIC #: 0.05 M/UL (ref 0.02–0.1)
ALLEN TEST: ABNORMAL
ANION GAP SERPL CALCULATED.3IONS-SCNC: 15 MMOL/L (ref 9–17)
BASOPHILS # BLD: 1 % (ref 0–2)
BASOPHILS ABSOLUTE: 0.1 K/UL (ref 0–0.2)
BUN BLDV-MCNC: 12 MG/DL (ref 8–23)
BUN/CREAT BLD: ABNORMAL (ref 9–20)
CALCIUM SERPL-MCNC: 8.8 MG/DL (ref 8.6–10.4)
CARBOXYHEMOGLOBIN: 0.8 % (ref 0–5)
CHLORIDE BLD-SCNC: 104 MMOL/L (ref 98–107)
CO2: 21 MMOL/L (ref 20–31)
CREAT SERPL-MCNC: 1.47 MG/DL (ref 0.5–0.9)
CULTURE: NORMAL
DIFFERENTIAL TYPE: ABNORMAL
EOSINOPHILS RELATIVE PERCENT: 3 % (ref 0–4)
FERRITIN: 128 UG/L (ref 13–150)
FIO2: 28
GFR AFRICAN AMERICAN: 42 ML/MIN
GFR NON-AFRICAN AMERICAN: 34 ML/MIN
GFR SERPL CREATININE-BSD FRML MDRD: ABNORMAL ML/MIN/{1.73_M2}
GFR SERPL CREATININE-BSD FRML MDRD: ABNORMAL ML/MIN/{1.73_M2}
GLUCOSE BLD-MCNC: 84 MG/DL (ref 70–99)
HAPTOGLOBIN: 176 MG/DL (ref 30–200)
HCO3 ARTERIAL: 22.7 MMOL/L (ref 22–26)
HCT VFR BLD CALC: 31.2 % (ref 36–46)
HEMOGLOBIN: 10.2 G/DL (ref 12–16)
IMMATURE GRANULOCYTES: ABNORMAL %
IMMATURE RETIC FRACT: NORMAL %
IRON SATURATION: 14 % (ref 20–55)
IRON: 23 UG/DL (ref 37–145)
LACTATE DEHYDROGENASE: 193 U/L (ref 135–214)
LYMPHOCYTES # BLD: 10 % (ref 24–44)
Lab: NORMAL
MCH RBC QN AUTO: 27.3 PG (ref 26–34)
MCHC RBC AUTO-ENTMCNC: 32.6 G/DL (ref 31–37)
MCV RBC AUTO: 83.7 FL (ref 80–100)
METHEMOGLOBIN: 0.5 % (ref 0–1.9)
MODE: ABNORMAL
MONOCYTES # BLD: 7 % (ref 1–7)
MYCOPLASMA PNEUMONIAE IGM: 0.05
NEGATIVE BASE EXCESS, ART: 2.2 MMOL/L (ref 0–2)
NOTIFICATION TIME: ABNORMAL
NOTIFICATION: ABNORMAL
NRBC AUTOMATED: ABNORMAL PER 100 WBC
O2 DEVICE/FLOW/%: ABNORMAL
O2 SAT, ARTERIAL: 94.5 % (ref 95–98)
OXYHEMOGLOBIN: ABNORMAL % (ref 95–98)
PATIENT TEMP: 37
PCO2 ARTERIAL: 37.2 MMHG (ref 35–45)
PCO2, ART, TEMP ADJ: ABNORMAL (ref 35–45)
PDW BLD-RTO: 16.2 % (ref 11.5–14.9)
PEEP/CPAP: 7
PH ARTERIAL: 7.39 (ref 7.35–7.45)
PH, ART, TEMP ADJ: ABNORMAL (ref 7.35–7.45)
PLATELET # BLD: 194 K/UL (ref 150–450)
PLATELET ESTIMATE: ABNORMAL
PMV BLD AUTO: 7.8 FL (ref 6–12)
PO2 ARTERIAL: 78.8 MMHG (ref 80–100)
PO2, ART, TEMP ADJ: ABNORMAL MMHG (ref 80–100)
POSITIVE BASE EXCESS, ART: ABNORMAL MMOL/L (ref 0–2)
POTASSIUM SERPL-SCNC: 3.5 MMOL/L (ref 3.7–5.3)
PROCALCITONIN: 0.11 NG/ML
PSV: ABNORMAL
PT. POSITION: ABNORMAL
RBC # BLD: 3.73 M/UL (ref 4–5.2)
RBC # BLD: ABNORMAL 10*6/UL
RESPIRATORY RATE: 20
RETIC %: 1.4 % (ref 0.5–2)
RETIC HEMOGLOBIN: NORMAL PG (ref 28.2–35.7)
SAMPLE SITE: ABNORMAL
SEG NEUTROPHILS: 79 % (ref 36–66)
SEGMENTED NEUTROPHILS ABSOLUTE COUNT: 6.6 K/UL (ref 1.3–9.1)
SET RATE: 14
SODIUM BLD-SCNC: 140 MMOL/L (ref 135–144)
SPECIMEN DESCRIPTION: NORMAL
TEXT FOR RESPIRATORY: ABNORMAL
TOTAL HB: ABNORMAL G/DL (ref 12–16)
TOTAL IRON BINDING CAPACITY: 161 UG/DL (ref 250–450)
TOTAL RATE: 20
UNSATURATED IRON BINDING CAPACITY: 138 UG/DL (ref 112–347)
VT: 500
WBC # BLD: 8.4 K/UL (ref 3.5–11)
WBC # BLD: ABNORMAL 10*3/UL

## 2020-07-15 PROCEDURE — 36600 WITHDRAWAL OF ARTERIAL BLOOD: CPT

## 2020-07-15 PROCEDURE — 6370000000 HC RX 637 (ALT 250 FOR IP): Performed by: INTERNAL MEDICINE

## 2020-07-15 PROCEDURE — 99233 SBSQ HOSP IP/OBS HIGH 50: CPT | Performed by: INTERNAL MEDICINE

## 2020-07-15 PROCEDURE — 6370000000 HC RX 637 (ALT 250 FOR IP): Performed by: PSYCHIATRY & NEUROLOGY

## 2020-07-15 PROCEDURE — 87205 SMEAR GRAM STAIN: CPT

## 2020-07-15 PROCEDURE — 6360000002 HC RX W HCPCS: Performed by: INTERNAL MEDICINE

## 2020-07-15 PROCEDURE — 83615 LACTATE (LD) (LDH) ENZYME: CPT

## 2020-07-15 PROCEDURE — 2500000003 HC RX 250 WO HCPCS: Performed by: INTERNAL MEDICINE

## 2020-07-15 PROCEDURE — 85045 AUTOMATED RETICULOCYTE COUNT: CPT

## 2020-07-15 PROCEDURE — 2580000003 HC RX 258: Performed by: INTERNAL MEDICINE

## 2020-07-15 PROCEDURE — 6370000000 HC RX 637 (ALT 250 FOR IP): Performed by: STUDENT IN AN ORGANIZED HEALTH CARE EDUCATION/TRAINING PROGRAM

## 2020-07-15 PROCEDURE — 94003 VENT MGMT INPAT SUBQ DAY: CPT

## 2020-07-15 PROCEDURE — 85025 COMPLETE CBC W/AUTO DIFF WBC: CPT

## 2020-07-15 PROCEDURE — 2580000003 HC RX 258: Performed by: RADIOLOGY

## 2020-07-15 PROCEDURE — 87070 CULTURE OTHR SPECIMN AEROBIC: CPT

## 2020-07-15 PROCEDURE — 83550 IRON BINDING TEST: CPT

## 2020-07-15 PROCEDURE — 36415 COLL VENOUS BLD VENIPUNCTURE: CPT

## 2020-07-15 PROCEDURE — 2500000003 HC RX 250 WO HCPCS: Performed by: STUDENT IN AN ORGANIZED HEALTH CARE EDUCATION/TRAINING PROGRAM

## 2020-07-15 PROCEDURE — 83540 ASSAY OF IRON: CPT

## 2020-07-15 PROCEDURE — 82805 BLOOD GASES W/O2 SATURATION: CPT

## 2020-07-15 PROCEDURE — 84145 PROCALCITONIN (PCT): CPT

## 2020-07-15 PROCEDURE — 99232 SBSQ HOSP IP/OBS MODERATE 35: CPT | Performed by: PSYCHIATRY & NEUROLOGY

## 2020-07-15 PROCEDURE — 71045 X-RAY EXAM CHEST 1 VIEW: CPT

## 2020-07-15 PROCEDURE — 82728 ASSAY OF FERRITIN: CPT

## 2020-07-15 PROCEDURE — 6370000000 HC RX 637 (ALT 250 FOR IP): Performed by: NURSE PRACTITIONER

## 2020-07-15 PROCEDURE — 80048 BASIC METABOLIC PNL TOTAL CA: CPT

## 2020-07-15 PROCEDURE — 2000000000 HC ICU R&B

## 2020-07-15 PROCEDURE — 83010 ASSAY OF HAPTOGLOBIN QUANT: CPT

## 2020-07-15 RX ORDER — 0.9 % SODIUM CHLORIDE 0.9 %
500 INTRAVENOUS SOLUTION INTRAVENOUS ONCE
Status: COMPLETED | OUTPATIENT
Start: 2020-07-15 | End: 2020-07-16

## 2020-07-15 RX ORDER — POTASSIUM CHLORIDE 20 MEQ/1
40 TABLET, EXTENDED RELEASE ORAL ONCE
Status: DISCONTINUED | OUTPATIENT
Start: 2020-07-15 | End: 2020-07-15

## 2020-07-15 RX ORDER — SPIRONOLACTONE 25 MG/1
25 TABLET ORAL DAILY
Status: DISCONTINUED | OUTPATIENT
Start: 2020-07-16 | End: 2020-07-20 | Stop reason: HOSPADM

## 2020-07-15 RX ORDER — HYDROCHLOROTHIAZIDE 25 MG/1
25 TABLET ORAL DAILY
Status: DISCONTINUED | OUTPATIENT
Start: 2020-07-16 | End: 2020-07-20 | Stop reason: HOSPADM

## 2020-07-15 RX ORDER — HYDROCHLOROTHIAZIDE 25 MG/1
25 TABLET ORAL ONCE
Status: COMPLETED | OUTPATIENT
Start: 2020-07-15 | End: 2020-07-15

## 2020-07-15 RX ORDER — LEVETIRACETAM 100 MG/ML
250 SOLUTION ORAL 2 TIMES DAILY
Status: DISCONTINUED | OUTPATIENT
Start: 2020-07-15 | End: 2020-07-16

## 2020-07-15 RX ORDER — SPIRONOLACTONE 25 MG/1
25 TABLET ORAL ONCE
Status: COMPLETED | OUTPATIENT
Start: 2020-07-15 | End: 2020-07-15

## 2020-07-15 RX ADMIN — FENTANYL CITRATE 50 MCG: 50 INJECTION INTRAMUSCULAR; INTRAVENOUS at 08:49

## 2020-07-15 RX ADMIN — ESMOLOL HYDROCHLORIDE 225 MCG/KG/MIN: 10 INJECTION INTRAVENOUS at 16:43

## 2020-07-15 RX ADMIN — CLOPIDOGREL BISULFATE 75 MG: 75 TABLET ORAL at 07:52

## 2020-07-15 RX ADMIN — METOPROLOL TARTRATE 50 MG: 50 TABLET, FILM COATED ORAL at 07:52

## 2020-07-15 RX ADMIN — METOPROLOL TARTRATE 5 MG: 1 INJECTION, SOLUTION INTRAVENOUS at 04:49

## 2020-07-15 RX ADMIN — SODIUM CHLORIDE 3 G: 900 INJECTION INTRAVENOUS at 07:42

## 2020-07-15 RX ADMIN — ESMOLOL HYDROCHLORIDE 50 MCG/KG/MIN: 10 INJECTION INTRAVENOUS at 08:59

## 2020-07-15 RX ADMIN — EZETIMIBE 10 MG: 10 TABLET ORAL at 20:08

## 2020-07-15 RX ADMIN — PROPOFOL 20 MCG/KG/MIN: 10 INJECTION, EMULSION INTRAVENOUS at 03:48

## 2020-07-15 RX ADMIN — SODIUM CHLORIDE, POTASSIUM CHLORIDE, SODIUM LACTATE AND CALCIUM CHLORIDE 75 ML/HR: 600; 310; 30; 20 INJECTION, SOLUTION INTRAVENOUS at 12:03

## 2020-07-15 RX ADMIN — ESMOLOL HYDROCHLORIDE 125 MCG/KG/MIN: 10 INJECTION INTRAVENOUS at 20:07

## 2020-07-15 RX ADMIN — PROPOFOL 5 MCG/KG/MIN: 10 INJECTION, EMULSION INTRAVENOUS at 20:20

## 2020-07-15 RX ADMIN — MINERAL OIL, PETROLATUM: 425; 568 OINTMENT OPHTHALMIC at 07:52

## 2020-07-15 RX ADMIN — MINERAL OIL, PETROLATUM: 425; 568 OINTMENT OPHTHALMIC at 00:55

## 2020-07-15 RX ADMIN — CHLORHEXIDINE GLUCONATE 15 ML: 1.2 RINSE ORAL at 07:52

## 2020-07-15 RX ADMIN — METOPROLOL TARTRATE 50 MG: 50 TABLET, FILM COATED ORAL at 21:58

## 2020-07-15 RX ADMIN — SODIUM CHLORIDE, POTASSIUM CHLORIDE, SODIUM LACTATE AND CALCIUM CHLORIDE 75 ML/HR: 600; 310; 30; 20 INJECTION, SOLUTION INTRAVENOUS at 23:04

## 2020-07-15 RX ADMIN — HYDROCHLOROTHIAZIDE 25 MG: 25 TABLET ORAL at 15:35

## 2020-07-15 RX ADMIN — ESMOLOL HYDROCHLORIDE 250 MCG/KG/MIN: 10 INJECTION INTRAVENOUS at 14:29

## 2020-07-15 RX ADMIN — LEVETIRACETAM 500 MG: 100 SOLUTION ORAL at 07:52

## 2020-07-15 RX ADMIN — MINERAL OIL, PETROLATUM: 425; 568 OINTMENT OPHTHALMIC at 04:08

## 2020-07-15 RX ADMIN — POLYVINYL ALCOHOL 1 DROP: 14 SOLUTION/ DROPS OPHTHALMIC at 06:33

## 2020-07-15 RX ADMIN — LEVETIRACETAM 250 MG: 100 SOLUTION ORAL at 20:08

## 2020-07-15 RX ADMIN — ESMOLOL HYDROCHLORIDE 250 MCG/KG/MIN: 10 INJECTION INTRAVENOUS at 11:50

## 2020-07-15 RX ADMIN — POLYVINYL ALCOHOL 1 DROP: 14 SOLUTION/ DROPS OPHTHALMIC at 04:08

## 2020-07-15 RX ADMIN — PROPOFOL 10 MCG/KG/MIN: 10 INJECTION, EMULSION INTRAVENOUS at 23:05

## 2020-07-15 RX ADMIN — CHLORHEXIDINE GLUCONATE 15 ML: 1.2 RINSE ORAL at 20:07

## 2020-07-15 RX ADMIN — Medication 10 ML: at 07:51

## 2020-07-15 RX ADMIN — ENOXAPARIN SODIUM 30 MG: 30 INJECTION SUBCUTANEOUS at 07:53

## 2020-07-15 RX ADMIN — Medication 10 ML: at 07:53

## 2020-07-15 RX ADMIN — SODIUM CHLORIDE 500 ML: 9 INJECTION, SOLUTION INTRAVENOUS at 23:04

## 2020-07-15 RX ADMIN — SODIUM CHLORIDE 3 G: 900 INJECTION INTRAVENOUS at 01:25

## 2020-07-15 RX ADMIN — POLYVINYL ALCOHOL 1 DROP: 14 SOLUTION/ DROPS OPHTHALMIC at 23:04

## 2020-07-15 RX ADMIN — SPIRONOLACTONE 25 MG: 25 TABLET, FILM COATED ORAL at 15:35

## 2020-07-15 RX ADMIN — POTASSIUM BICARBONATE 40 MEQ: 782 TABLET, EFFERVESCENT ORAL at 06:29

## 2020-07-15 RX ADMIN — FAMOTIDINE 20 MG: 10 INJECTION, SOLUTION INTRAVENOUS at 07:52

## 2020-07-15 ASSESSMENT — PULMONARY FUNCTION TESTS
PIF_VALUE: 19
PIF_VALUE: 22
PIF_VALUE: 18
PIF_VALUE: 14
PIF_VALUE: 17
PIF_VALUE: 14
PIF_VALUE: 22
PIF_VALUE: 19
PIF_VALUE: 18
PIF_VALUE: 20
PIF_VALUE: 15
PIF_VALUE: 15
PIF_VALUE: 20
PIF_VALUE: 20
PIF_VALUE: 15
PIF_VALUE: 19
PIF_VALUE: 27
PIF_VALUE: 15
PIF_VALUE: 21
PIF_VALUE: 19
PIF_VALUE: 23
PIF_VALUE: 14
PIF_VALUE: 14
PIF_VALUE: 22
PIF_VALUE: 20
PIF_VALUE: 17
PIF_VALUE: 19
PIF_VALUE: 14
PIF_VALUE: 14
PIF_VALUE: 18
PIF_VALUE: 22
PIF_VALUE: 17
PIF_VALUE: 20
PIF_VALUE: 14
PIF_VALUE: 14
PIF_VALUE: 24
PIF_VALUE: 19
PIF_VALUE: 14
PIF_VALUE: 22
PIF_VALUE: 14
PIF_VALUE: 13
PIF_VALUE: 14
PIF_VALUE: 19
PIF_VALUE: 14
PIF_VALUE: 24
PIF_VALUE: 20
PIF_VALUE: 14
PIF_VALUE: 18
PIF_VALUE: 20
PIF_VALUE: 15
PIF_VALUE: 19
PIF_VALUE: 20
PIF_VALUE: 15
PIF_VALUE: 17
PIF_VALUE: 21
PIF_VALUE: 20
PIF_VALUE: 14
PIF_VALUE: 14
PIF_VALUE: 21
PIF_VALUE: 17
PIF_VALUE: 20
PIF_VALUE: 19
PIF_VALUE: 20
PIF_VALUE: 17
PIF_VALUE: 19
PIF_VALUE: 23
PIF_VALUE: 22

## 2020-07-15 ASSESSMENT — PAIN SCALES - GENERAL
PAINLEVEL_OUTOF10: 9
PAINLEVEL_OUTOF10: 0
PAINLEVEL_OUTOF10: 1
PAINLEVEL_OUTOF10: 0
PAINLEVEL_OUTOF10: 0

## 2020-07-15 NOTE — PROGRESS NOTES
Pt remains on cpap/ps pt tolerating well, and in no distress at this time. Will continue cpap/ps until evening then will switch pt back to full support over night  as long as pt continues to tolerate.  Cuurent spo2 100% RR 14 pt getting good volumes

## 2020-07-15 NOTE — PLAN OF CARE
injury  Description: Absence of physical injury  Outcome: Ongoing  Note: Cll bell kept in easy reach. Encourage patient to use call bell for assistance. Non slip socks on pt. Side rails up x3. Bed kept locked in lowest position.       Problem: Nutrition  Goal: Optimal nutrition therapy  Outcome: Ongoing

## 2020-07-15 NOTE — PROGRESS NOTES
[facility] CARDIOLOGY Progress Note    7/15/2020 8:02 AM      Subjective:  Ms. Felicitas José remains intubated. Unable to obtain any history from the patient. Nurse at bedside updated overnight events. Blood pressure remains labile. Apparently no attempts to wean off her ventilator. LABS:     Recent Results (from the past 24 hour(s))   Blood gas, arterial    Collection Time: 07/15/20  4:20 AM   Result Value Ref Range    pH, Arterial 7.394 7.350 - 7.450    pCO2, Arterial 37.2 35.0 - 45.0 mmHg    pO2, Arterial 78.8 (L) 80.0 - 100.0 mmHg    HCO3, Arterial 22.7 22.0 - 26.0 mmol/L    Positive Base Excess, Art NOT REPORTED 0.0 - 2.0 mmol/L    Negative Base Excess, Art 2.2 (H) 0.0 - 2.0 mmol/L    O2 Sat, Arterial 94.5 (L) 95 - 98 %    Total Hb NOT REPORTED 12.0 - 16.0 g/dl    Oxyhemoglobin NOT REPORTED 95.0 - 98.0 %    Carboxyhemoglobin 0.8 0 - 5 %    Methemoglobin 0.5 0.0 - 1.9 %    Pt Temp 37     pH, Art, Temp Adj NOT REPORTED 7.350 - 7.450    pCO2, Art, Temp Adj NOT REPORTED 35.0 - 45.0    pO2, Art, Temp Adj NOT REPORTED 80.0 - 100.0 mmHg    O2 Device/Flow/% VENTILATOR     Respiratory Rate 20     Basim Test PASS     Sample Site Right Radial Artery     Pt.  Position SEMI-FOWLERS     Mode PRVC     Set Rate 14     Total Rate 20          FIO2 28     Peep/Cpap 7     PSV NOT REPORTED     Text for Respiratory RESULTS GIVEN TO MALISSA SMITH     NOTIFICATION NOT REPORTED     NOTIFICATION TIME NOT REPORTED    Basic Metabolic Prof    Collection Time: 07/15/20  4:38 AM   Result Value Ref Range    Glucose 84 70 - 99 mg/dL    BUN 12 8 - 23 mg/dL    CREATININE 1.47 (H) 0.50 - 0.90 mg/dL    Bun/Cre Ratio NOT REPORTED 9 - 20    Calcium 8.8 8.6 - 10.4 mg/dL    Sodium 140 135 - 144 mmol/L    Potassium 3.5 (L) 3.7 - 5.3 mmol/L    Chloride 104 98 - 107 mmol/L    CO2 21 20 - 31 mmol/L    Anion Gap 15 9 - 17 mmol/L    GFR Non-African American 34 (L) >60 mL/min    GFR  42 (L) >60 mL/min    GFR Comment GFR Staging NOT REPORTED    CBC with DIFF    Collection Time: 07/15/20  4:38 AM   Result Value Ref Range    WBC 8.4 3.5 - 11.0 k/uL    RBC 3.73 (L) 4.0 - 5.2 m/uL    Hemoglobin 10.2 (L) 12.0 - 16.0 g/dL    Hematocrit 31.2 (L) 36 - 46 %    MCV 83.7 80 - 100 fL    MCH 27.3 26 - 34 pg    MCHC 32.6 31 - 37 g/dL    RDW 16.2 (H) 11.5 - 14.9 %    Platelets 903 636 - 545 k/uL    MPV 7.8 6.0 - 12.0 fL    NRBC Automated NOT REPORTED per 100 WBC    Differential Type NOT REPORTED     Seg Neutrophils 79 (H) 36 - 66 %    Lymphocytes 10 (L) 24 - 44 %    Monocytes 7 1 - 7 %    Eosinophils % 3 0 - 4 %    Basophils 1 0 - 2 %    Immature Granulocytes NOT REPORTED 0 %    Segs Absolute 6.60 1.3 - 9.1 k/uL    Absolute Lymph # 0.80 (L) 1.0 - 4.8 k/uL    Absolute Mono # 0.60 0.1 - 1.3 k/uL    Absolute Eos # 0.30 0.0 - 0.4 k/uL    Basophils Absolute 0.10 0.0 - 0.2 k/uL    Absolute Immature Granulocyte NOT REPORTED 0.00 - 0.30 k/uL    WBC Morphology NOT REPORTED     RBC Morphology NOT REPORTED     Platelet Estimate NOT REPORTED    Retic Count    Collection Time: 07/15/20  4:38 AM   Result Value Ref Range    Retic % 1.4 0.5 - 2.0 %    Absolute Retic # 0.053 0.0245 - 0.098 M/uL    Immature Retic Fract NOT REPORTED %    Retic Hemoglobin NOT REPORTED 28.2 - 35.7 pg       Pulse Ox:  SpO2  Av.5 %  Min: 97 %  Max: 100 %    Supplemental O2:       Current Meds:    spironolactone  25 mg Oral Every Other Day    And    hydroCHLOROthiazide  25 mg Oral Every Other Day    sodium chloride flush  10 mL Intravenous 2 times per day    sodium chloride flush  10 mL Intravenous 2 times per day    enoxaparin  30 mg Subcutaneous Daily    polyvinyl alcohol  1 drop Both Eyes Q4H    And    artificial tears   Both Eyes Q4H    chlorhexidine  15 mL Mouth/Throat BID    famotidine (PEPCID) injection  20 mg Intravenous Daily    ampicillin-sulbactam  3 g Intravenous Q6H    clopidogrel  75 mg Oral Daily    metoprolol tartrate  50 mg Oral BID    cloNIDine  1 patch Transdermal Weekly    ezetimibe  10 mg Oral Nightly    levETIRAcetam  500 mg Oral BID         Continuous Infusions:    esmolol      propofol 15 mcg/kg/min (07/15/20 0744)    lactated ringers 75 mL/hr (07/14/20 9219)              VITAL SIGNS:    BP (!) 143/43   Pulse 75   Temp 98.4 °F (36.9 °C) (Axillary)   Resp 15   Ht 5' 5\" (1.651 m)   Wt 153 lb 8 oz (69.6 kg)   SpO2 100%   BMI 25.54 kg/m²         Admit Weight:  190 lb (86.2 kg)    Last 3 weights: Wt Readings from Last 3 Encounters:   07/14/20 153 lb 8 oz (69.6 kg)   12/22/19 192 lb 3.9 oz (87.2 kg)   07/31/19 171 lb 3.2 oz (77.7 kg)       BMI: Body mass index is 25.54 kg/m². INPUT/OUTPUT:          Intake/Output Summary (Last 24 hours) at 7/15/2020 0802  Last data filed at 7/15/2020 0645  Gross per 24 hour   Intake 2483.75 ml   Output 975 ml   Net 1508.75 ml         Telemetry shows sinus tachycardia. EXAM:     General appearance: Orally intubated and on mechanical ventilator. Winces to painful stimuli. .  Neck: No JVD or thyromegaly  Chest: clear bilaterally. No tenderness. No rhonchi or wheezing. Cardiac: R tachycardic. Egular rate and rhythm. No S3 gallop or rubs. Abdomen: soft, non-tender. Extremities: no cyanosis, no clubbing, no calf tenderness, no leg edema. Skin:  warm and dry. 2 D ECHO July 13, 2020:     Patient supine, on ventilator. Contrast was utilized on this technically   difficult study. Small LV cavity. Hyperdynamic left ventricular function. Estimated LV EF 75   %. Moderate to severe left ventricular hypertrophy. Obstructive Doppler pattern   mid LV mean 14 mmHg, max 40 mmHg. No segmental wall motion abnormalities. Grade I (mild) left ventricular diastolic dysfunction. Normal right ventricular size and function. Left atrium is normal in size. Right atrium is normal in size. Aortic valve was not well visualized. No aortic stenosis. No aortic insufficiency. Normal aortic root dimension. Mitral annular calcification is seen. Trivial mitral regurgitation. Tricuspid valve was not well visualized. Mild tricuspid regurgitation. Estimated right ventricular systolic pressure is 19 mmHg. Normal right   ventricular systolic pressure. Pulmonic valve not well visualized but Doppler velocities are normal. No   pulmonic insufficiency. IVC normal diameter. No significant pericardial effusion is seen. ASSESSMENT:    Accelerated hypertension requiring IV esmolol PRN.  History of labile blood pressures.     Abnormal EKG.  Cannot exclude underlying coronary artery disease. No active angina pectoris.     Hyperdynamic LVEF 75%, mild TR on 2 D ECHO July 2020.     Acute respiratory failure requiring oral intubation and mechanical ventilation currently.     History of carotid artery disease.     Altered mental status with headache, acute encephalopathy of unspecified etiology prompting hospitalization.     Other problems as charted.       REC/PLAN:    Status quo. Suggest continuation of IV esmolol for blood pressure control given its shorter acting nature and use IV hydralazine as needed for systolic blood pressure greater than 160. Continue on other current cardiac medications. Discussed with patient's nurse at bedside. Discussed with Dr. Kinga Lozano over the phone. No family members available at bedside to discuss. We will follow. Electronically signed by Mickey Chery MD, [facility]        PLEASE NOTE:  This progress note was completed using a voice transcription system. Every effort was made to ensure accuracy. However, inadvertent computerized transcription errors may be present.

## 2020-07-15 NOTE — PROGRESS NOTES
Pt placed back on wean trial. Pt tolerating well at this time.  Pt off sedation and responding appropriately

## 2020-07-15 NOTE — PLAN OF CARE
Problem: Restraint Use - Nonviolent/Non-Self-Destructive Behavior:  Goal: Absence of restraint indications  Description: Absence of restraint indications  7/15/2020 0317 by Christ Gray RN  Outcome: Ongoing    Attempts to pull at tubes when released. ROM assessed every 2 hours and visual safety checks completed hourly. Restraints maintained to preserve the patient's airway. Problem: Restraint Use - Nonviolent/Non-Self-Destructive Behavior:  Goal: Absence of restraint-related injury  Description: Absence of restraint-related injury  7/15/2020 0317 by Christ Gray RN  Outcome: Ongoing    Attempts to pull at tubes when released. ROM assessed every 2 hours and visual safety checks completed hourly. Restraints maintained to preserve the patient's airway.       Problem: Skin Integrity:  Goal: Will show no infection signs and symptoms  Description: Will show no infection signs and symptoms  7/15/2020 0317 by Christ Gray RN  Outcome: Ongoing     Problem: Skin Integrity:  Goal: Absence of new skin breakdown  Description: Absence of new skin breakdown  7/15/2020 0317 by Christ Gray RN  Outcome: Ongoing     Problem: Infection - Ventilator-Associated Pneumonia:  Goal: Prevent a ventilator associated event (VAE)  Description: Prevent a ventilator associated event (VAE)  7/15/2020 0317 by Christ Gray RN  Outcome: Ongoing     Problem: Infection - Ventilator-Associated Pneumonia:  Goal: Absence of pulmonary infection  Description: Absence of pulmonary infection  7/15/2020 0317 by Christ Gray RN  Outcome: Ongoing     Problem: Urinary Elimination:  Goal: Signs and symptoms of infection will decrease  Description: Signs and symptoms of infection will decrease  7/15/2020 0317 by Christ Gray RN  Outcome: Ongoing     Problem: Urinary Elimination:  Goal: Complications related to the disease process, condition or treatment will be avoided or minimized  Description: Complications related to the disease process, condition or treatment will be avoided or minimized  7/15/2020 0317 by Vivianne Lanes, RN  Outcome: Ongoing     Problem: Falls - Risk of:  Goal: Will remain free from falls  Description: Will remain free from falls  7/15/2020 0317 by Vivianne Lanes, RN  Outcome: Ongoing     Problem: Falls - Risk of:  Goal: Absence of physical injury  Description: Absence of physical injury  7/15/2020 0317 by Vivianne Lanes, RN  Outcome: Ongoing     Problem: Nutrition  Goal: Optimal nutrition therapy  7/15/2020 0317 by Vivianne Lanes, RN  Outcome: Ongoing

## 2020-07-15 NOTE — FLOWSHEET NOTE
07/15/20 1427   Encounter Summary   Services provided to: Patient   Referral/Consult From: Rounding   Complexity of Encounter Low   Length of Encounter 15 minutes   Spiritual/Congregational   Type Spiritual support   Assessment Unable to respond   Intervention Prayer   Outcome Did not respond

## 2020-07-15 NOTE — PROGRESS NOTES
[facility]) Neurology Specialist  [address]eda [address]  [place], [address]:  [phone] or [phone]  FAX:  [phone]    Bhupinder Ga Kvng, [facility], [address], [address], [place]        Brief history: Santos Chan is a 79 y.o. old female admitted on 7/12/2020 with hypertensive encephalopathy     Subjective: Off sedation the patient has been following commands. She continues to be intubated. The patient is weaning. No new neurological event. Objective: BP (!) 147/54   Pulse 70   Temp 99.3 °F (37.4 °C) (Oral)   Resp 13   Ht 5' 5\" (1.651 m)   Wt 153 lb 8 oz (69.6 kg)   SpO2 99%   BMI 25.54 kg/m²       Medications:    [START ON 7/16/2020] spironolactone  25 mg Oral Daily    And    [START ON 7/16/2020] hydroCHLOROthiazide  25 mg Oral Daily    sodium chloride flush  10 mL Intravenous 2 times per day    sodium chloride flush  10 mL Intravenous 2 times per day    enoxaparin  30 mg Subcutaneous Daily    polyvinyl alcohol  1 drop Both Eyes Q4H    And    artificial tears   Both Eyes Q4H    chlorhexidine  15 mL Mouth/Throat BID    famotidine (PEPCID) injection  20 mg Intravenous Daily    clopidogrel  75 mg Oral Daily    metoprolol tartrate  50 mg Oral BID    cloNIDine  1 patch Transdermal Weekly    ezetimibe  10 mg Oral Nightly    levETIRAcetam  500 mg Oral BID            Mental status   Patient is intubated. Spontaneous eye opening. Patient is purposefully following commands   Cranial nerves   Pupil response: Present   Corneal Reflex: Present    Oculocephalic reflex: Present     Cough reflex: Present        Motor and sensory function  Flexion withdrawal in all ext.   Intermittently following commands by squeezing hands     DTR Intact symmetric  Babinski Absent   Gait Not tested        Lab Results   Component Value Date    LDLCHOLESTEROL 50 03/17/2019     No components found for: CHLPL  Lab Results Component Value Date    TRIG 55 03/17/2019    TRIG 117 04/28/2018    TRIG 200 (H) 09/24/2013     Lab Results   Component Value Date    HDL 45 03/17/2019    HDL 47 04/28/2018    HDL 60 09/24/2013     No results found for: LDLCALC  No results found for: LABVLDL  No results found for: LABA1C  No results found for: EAG  Lab Results   Component Value Date    [id number] 1710 (H) 04/21/2019      Neurological work up:  CT head negative for acute changes   CTA head and neck  MRI brain   2 D echo     Assessment and Recommendations:   Encephalopathy  Hypertensive emergency  Other comorbid conditions include aspiration pneumonia, acute hypoxic respiratory failure status post intubation    Encephalopathy is improving. The patient is following commands and tracking with eyes. Await MRI scan of the brain  I would taper down Keppra to 250 mg twice daily with intention to completely taper off next 2 to 4 days. Weaning as per pulmonary critical care. Possible extubation soon  We will follow. Eden Modi MD  Neurology    This note is created with the assistance of a speech-recognition program. While intending to generate a document that actually reflects the content of the visit, the document can still have some errors including those of syntax and sound a- like substitutions which may escape proofreading. In such instances, actual meaning can be extrapolated by contextual derivation.

## 2020-07-15 NOTE — PROGRESS NOTES
Discussed possibility of extubation with Dr. Nguyễn Sinha. BP stable with esmolol throughout this shift. Pt remains awake and able to follow commands and answer questions with nodding head. Will plan to keep pt on CPAP overnight as tolerated and extubate in AM. Dr. Nguyễn Sinha ok with keeping pt on small amount of sedation for comfort overnight.

## 2020-07-15 NOTE — PROGRESS NOTES
sodium chloride flush, sodium chloride flush, acetaminophen, fentanNYL, metoprolol, hydrALAZINE, aspirin    Data:     Past Medical History:   has a past medical history of Arthritis, Cancer ([facility]), CKD (chronic kidney disease) stage 4, GFR 15-29 ml/min ([facility]), COPD (chronic obstructive pulmonary disease) ([facility]), CVA (cerebral vascular accident) ([facility]), Gout, Hyperlipidemia, Hypertension, Left renal atrophy, and Other abnormality of urination(788.69). Social History:   reports that she quit smoking about 21 months ago. She smoked 1.00 pack per day. She has never used smokeless tobacco. She reports that she does not drink alcohol or use drugs. Family History:   Family History   Problem Relation Age of Onset    Heart Disease Brother     Other Mother         polycythemia    Heart Disease Father     Heart Disease Sister         enlarged heart    Cancer Son         prostate       Vitals:  BP (!) 143/43   Pulse 89   Temp 98.4 °F (36.9 °C) (Axillary)   Resp 15   Ht 5' 5\" (1.651 m)   Wt 153 lb 8 oz (69.6 kg)   SpO2 100%   BMI 25.54 kg/m²   Temp (24hrs), Av.6 °F (37 °C), Min:97.7 °F (36.5 °C), Max:99.4 °F (37.4 °C)    Recent Labs     20   POCGLU 138*       I/O(24Hr):     Intake/Output Summary (Last 24 hours) at 7/15/2020 0857  Last data filed at 7/15/2020 0645  Gross per 24 hour   Intake 2423.75 ml   Output 975 ml   Net 1448.75 ml       Labs:    [unfilled]    Lab Results   Component Value Date/Time    SPECIAL NOT REPORTED 2020 08:00 AM     Lab Results   Component Value Date/Time    CULTURE  2020 08:00 AM     NEGATIVE FOR: METHICILLIN RESISTANT STAPHYLOCOCCUS AUREUS       [unfilled]    Radiology:    Ct Head Wo Contrast    Result Date: 2020  EXAMINATION: CT OF THE HEAD WITHOUT CONTRAST  2020 8:42 pm TECHNIQUE: CT of the head was performed without the administration of intravenous contrast. Dose modulation, iterative reconstruction, and/or weight based adjustment of the mA/kV was utilized to reduce the radiation dose to as low as reasonably achievable. COMPARISON: 12/19/2018 HISTORY: ORDERING SYSTEM PROVIDED HISTORY: AMS TECHNOLOGIST PROVIDED HISTORY: AMS Reason for Exam: AMS, unable to speak, patient is unable to stop shaking her legs. patient is unable to follow any type of instructions or answer any questions. Acuity: Acute Type of Exam: Initial FINDINGS: BRAIN/VENTRICLES: There is no acute intracranial hemorrhage, mass effect or midline shift. No abnormal extra-axial fluid collection. The gray-white differentiation is maintained without evidence of an acute infarct. There is no evidence of hydrocephalus. Generalized involutional changes and moderate chronic small ischemic disease. Intracranial vascular calcifications. Question small chronic lacune infarct identified within the region of the right paramedian dane. ORBITS: The visualized portion of the orbits demonstrate no acute abnormality. SINUSES: The visualized paranasal sinuses and mastoid air cells demonstrate no acute abnormality. SOFT TISSUES/SKULL:  No acute abnormality of the visualized skull or soft tissues. No acute intracranial abnormality. Chronic small ischemic disease and age related change. Critical results were called by Dr. Vamshi Kennedy to Dr Frantz Andrews on 7/12/2020 at 20:52. Us Renal Complete    Result Date: 7/13/2020  EXAMINATION: RETROPERITONEAL ULTRASOUND OF THE KIDNEYS 7/13/2020 COMPARISON: 04/19/2019 and prior HISTORY: ORDERING SYSTEM PROVIDED HISTORY: TIMO TECHNOLOGIST PROVIDED HISTORY: TIMO FINDINGS: Kidneys: The right kidney measures 10.2 x 5.8 x 5.2 cm in length and the left kidney measures 8.4 x 4.5 x 4.6 cm in length. Study limited by body habitus and overlying bowel gas. Simple-appearing cyst on the right measuring 1.3 x 1.2 x 1.6 cm noted. There is no hydronephrosis or abnormal echogenicity. Left kidney is somewhat limited in evaluation without evidence of hydronephrosis.   There is mild cortical thinning. Limited study secondary to body habitus and overlying bowel gas. Right kidney appears grossly unremarkable in appearance. Left kidney is limited but grossly unremarkable. There is mild cortical thinning. Jo Ann Skelton Device Plmt/replace/removal    Result Date: 7/14/2020  PROCEDURE: ULTRASOUND GUIDED VASCULAR ACCESS. FLUOROSCOPY GUIDED PICC PLACEMENT 7/14/2020. HISTORY: ORDERING SYSTEM PROVIDED HISTORY: PICC line TECHNOLOGIST PROVIDED HISTORY: Patient in ICU, on vent, sedated. In need of IV access for drips, sedation, Please place ASAP. Spouse's name Tonya Pulido, home No. [phone]. Spouse POA, only one to make decisions for patient. PICC line SEDATION: None FLUOROSCOPY DOSE AND TYPE OR TIME AND EXPOSURES: DAP 88 cGy cm squared TECHNIQUE: Informed consent was obtained after a detailed explanation of the procedure including risks, benefits, and alternatives. Universal protocol was observed. The right arm was prepped and draped in sterile fashion using maximum sterile barrier technique. Local anesthesia was achieved with lidocaine. A micropuncture needle was used to access the right basilic vein using ultrasound guidance. An ultrasound image demonstrating patency of the vein with needle tip located within it. An image was obtained and stored in PACs. A 0.018 guidewire was used to place a peel-a-way sheath and a 5 [employer] Leeann power injectable dual-lumen PICC was advanced with fluoroscopic guidance with the tip at the cavo-atrial junction. The catheter flushed easily and there was a good blood return. The catheter was secured to the skin. The patient tolerated the procedure well and there were no immediate complications. FINDINGS: Fluoroscopic image demonstrates the tip of the catheter at the cavo-atrial junction. Successful ultrasound and fluoroscopy guided right basilic vein 5 Amharic power injectable dual-lumen PICC placement. Ready for use.      Xr Chest Portable    Result Date: 7/15/2020  EXAMINATION: ONE XRAY VIEW OF THE CHEST 7/15/2020 6:31 am COMPARISON: Chest radiograph performed 07/14/2020. HISTORY: ORDERING SYSTEM PROVIDED HISTORY: ETT placement TECHNOLOGIST PROVIDED HISTORY: ETT placement Reason for Exam: on vent Acuity: Acute Type of Exam: Subsequent/Follow-up FINDINGS: There is mild chronic pulmonary change. There is no sara consolidation or effusion. There is no pneumothorax. The mediastinal structures are stable. There is an endotracheal tube with the tip in the midtrachea. There is a gastric tube and the tip is not visualized. The extrathoracic soft tissues are unremarkable. There is a right-sided PICC line with the tip in the distal SVC. Chronic pulmonary change without definite acute cardiopulmonary process. Support tubes as described above. Xr Chest Portable    Result Date: 7/14/2020  EXAMINATION: ONE XRAY VIEW OF THE CHEST 7/14/2020 6:52 am COMPARISON: 07/13/2020 HISTORY: ORDERING SYSTEM PROVIDED HISTORY: ETT placement TECHNOLOGIST PROVIDED HISTORY: ETT placement Reason for Exam: Vent protocol. H/O COPD. Acuity: Unknown Type of Exam: Unknown Additional signs and symptoms: Vent protocol. H/O COPD. FINDINGS: Endotracheal tube extends to the mid trachea. Nasogastric tube extends below the diaphragm. Subtle asymmetric left perihilar opacity is again demonstrated. No pleural effusion. No pneumothorax. Cardiac and mediastinal silhouettes are similar to prior. Left axillary clips. No significant interval change in subtle left perihilar airspace disease as compared to prior. Follow-up to resolution recommended.      Xr Chest Portable    Result Date: 7/13/2020  EXAMINATION: ONE XRAY VIEW OF THE CHEST 7/13/2020 6:04 am COMPARISON: 07/12/2020 HISTORY: ORDERING SYSTEM PROVIDED HISTORY: ETT placement TECHNOLOGIST PROVIDED HISTORY: ETT placement Reason for Exam: ETT placement Acuity: Unknown Type of Exam: Ongoing Additional signs and symptoms: ETT placement Relevant Medical/Surgical History: ETT placement FINDINGS: Endotracheal tube and NG tube remain in place. The heart and mediastinal structures are stable. There is a questionable infiltrate in the left mid lung laterally. This may relate to previous treatment for presumed left breast disease. The lungs are otherwise clear. Orthopedic hardware overlies the lower cervical spine. Surgical clips are seen in the left axilla. Questionable infiltrate left mid lung. Xr Chest Portable    Result Date: 7/12/2020  EXAMINATION: ONE XRAY VIEW OF THE CHEST 7/12/2020 10:32 pm COMPARISON: Prior study at 9:13 p.m. HISTORY: ORDERING SYSTEM PROVIDED HISTORY: ET tube TECHNOLOGIST PROVIDED HISTORY: ET tube Reason for Exam: ET tube placement Acuity: Acute Type of Exam: Initial Additional signs and symptoms: ET tube placement Relevant Medical/Surgical History: ET tube placement FINDINGS: Interval intubation. Enteric tube has also been placed with both tubes appropriately positioned. The heart is enlarged. Persistent ground-glass opacification in the left lower lung zone. Right lung appears clear. No significant skeletal finding. Supportive devices are positioned appropriately. Persistent airspace changes at the left lung base. Xr Chest Portable    Result Date: 7/12/2020  EXAMINATION: ONE XRAY VIEW OF THE CHEST 7/12/2020 9:21 pm COMPARISON: 12/19/2019 radiograph HISTORY: ORDERING SYSTEM PROVIDED HISTORY: cough TECHNOLOGIST PROVIDED HISTORY: cough Reason for Exam: cough Acuity: Acute Type of Exam: Initial FINDINGS: The heart is enlarged. Mediastinum and pulmonary vascular markings are normal.  Right lung is clear. Stable mild ground-glass opacities at the left costophrenic sulcus. No significant skeletal finding. Asymmetric ground-glass opacification at the left lung base. Pattern may represent edema or small pleural effusion. Developing pneumonitis can not be excluded.          Physical Examination: Physical Exam  HENT:      Nose:      Comments: NG tube is in place     Mouth/Throat:      Mouth: Mucous membranes are dry. Eyes:      Conjunctiva/sclera: Conjunctivae normal.   Cardiovascular:      Rate and Rhythm: Normal rate and regular rhythm. Heart sounds: Normal heart sounds. No murmur. Pulmonary:      Breath sounds: Normal breath sounds. Comments: ET tube in place  Abdominal:      General: Bowel sounds are normal.      Palpations: Abdomen is soft. Genitourinary:     Comments: Cool cath in place  Skin:     General: Skin is warm. Assessment:        Primary Problem  Acute encephalopathy    Active Hospital Problems    Diagnosis Date Noted    Acute respiratory failure ([address] [J96.00] 07/13/2020    Acute encephalopathy [G93.40] 07/13/2020    Chronic kidney disease (CKD), stage III (moderate) ([address] [N18.3] 07/13/2020    Seizure ([facility] [R56.9] 07/13/2020    Altered mental status [R41.82]     Hypertensive emergency [I16.1] 12/19/2019    Aspiration pneumonia ([facility] [J69.0] 04/24/2019       Plan:        1. Acute hypoxic respiratory failure 2/2  Hypertensive emergency  -Intubated and on ventilator  -ABG unremarkable for hypoxia or hypercapnia  -Propofol infusion  -Fentanyl 50 MCG for agitation  -Pulmonology: Sedation vacation today, no extubation.      2. Posterior reversible encephalopathy syndrome  -Neurology; MRI of brain without contrast, stroke cannot be ruled out  -Carotid Duplex (05/27)  - Left artery 50-59 stenosis; Right < 50% stenosis  -CT head on remarkable for any intracranial abnormality  -ABG unremarkable for hypercapnia or hypoxia  -EKG normal sinus rhythm  -UA shows no sign of infection     3. Aspiration pneumonia  -Chest x-ray shows mild chronic pulmonary changes  -Continue with Unasyn  -Pro-Feliciano- 0.10, Sed rate- 23 (H), CRP- 8.6 (H).   - Respiratory viral panel, Legionella, mycoplasma pending  -Pulmonology -continue Unasyn for aspiration pneumonia     4. Seizure-like activity 2/2 hypertensive emergency  - Continue with Keppra 500 mg twice daily and will wean pt off within next 2-3 days  -Neurology consult,  - EEG: Disorganized and slow background triphasic waves suggestive of mild/moderate encephalopathy non-etiology  - MRI-we will attempt once patient's BP is stable     5. Hypertensive emergency   -Restarted  Esmolol due to SBP >160.  -Cardiology consult, hydralazine 10 mg IV every 4 hours,  -Continue Lopressor 5 mg IV every 6 hrs  -Clonidine patch once a week    -Serial troponin and EKG   -Echo- LV EF-75%     6. History of carotid endarterectomy  -Lipitor 20 mg daily  -Plavix 75 mg daily  -At the 10 be 10 mg daily  -Lopressor 50 mg twice daily   -Carotid Duplex (05/27): Left 50-59% ;  Right <50%  -Seen Dr. Velez is patient's vascular surgeon on 6/8/2020     6. CKD stage 3; GFR 34  -Creatinine at 1.47 (H,, trending up 1.36); BL at 1.4  -Renal ultrasound pending  -Urine sodium and creatinine ordered     DVT PPX: Enoxaparin  GI P PPX: Famotidine        Recently saw cardiology for cardiac clearance and cardiovascular examination for placement of spinal cord stimulator on 6/16/2020    Maria Guadalupe Solomon MD  7/15/2020  8:57 AM

## 2020-07-15 NOTE — PROGRESS NOTES
Chava Enrique NP notified of low potassium of 3.5 and CrCl of 30, one time dose of 40 mEq of potassium ordered.

## 2020-07-15 NOTE — PROGRESS NOTES
Dr. Wei Ke at bedside to evaluate pt and update spouse. Informed of decreased urine output of 400 during night shift and 125mL over the past 4 hours. No new orders noted. Pt BP stabilizing with Esmolol gtt. See Emar. Antihypertensive meds increased with plan to wean off esmolol if pt tolerates. Pt continues off sedation and responding to commands. Plan to extubate if able and get MRI as ordered if BP remains stable.

## 2020-07-15 NOTE — CARE COORDINATION
DISCHARGE PLANNING NOTE:     Writer reviewed chart. Pt remains on the vent. Plan is for patient to be discharged home with her spouse and [address] [place] [address]. following for possible SNF. Will need PT/OT ordered once extubated. Esmolol restarted d/t elevated BP. MRI brain to be done once BP is stable. Will continue to follow for additional discharge needs.     Electronically signed by Raymond Rodarte RN on 7/15/2020 at 1:38 PM

## 2020-07-15 NOTE — PROGRESS NOTES
Comprehensive Nutrition Assessment    Type and Reason for Visit:  Initial    Nutrition Recommendations/Plan: Continue NPO as ordered. Follow for nutrition progression. Nutrition Assessment:  Pat on CPAP/PS tolerating well, to continue till evening then switch back to full support overnight. Pulmonologist holding off on starting tube feedings in hopes of extubating. Malnutrition Assessment:  Malnutrition Status:  Insufficient data    Context:  Acute Illness     Findings of the 6 clinical characteristics of malnutrition:  Energy Intake:  Unable to assess  Weight Loss:  Unable to assess(intubated- no family present)     Body Fat Loss:  Unable to assess     Muscle Mass Loss:  Unable to assess    Fluid Accumulation:  No significant fluid accumulation     Strength:  Not Performed    Estimated Daily Nutrient Needs:  Energy (kcal):  1354 kcals based on [facility] 2003b using current wt 69.6 kg; Weight Used for Energy Requirements:  Admission     Protein (g):  114 gm protein based on 2 gm/kg IBW; Weight Used for Protein Requirements:  Ideal          Nutrition Related Findings:  vomited PTA with possible aspiration pneumonia. Wounds:  None       Current Nutrition Therapies:    Diet NPO Effective Now    Anthropometric Measures:  · Height: 5' 5\" (165.1 cm)  · Current Body Weight: 153 lb (69.4 kg)   · Admission Body Weight: 160 lb (72.6 kg)    · Ideal Body Weight: 125 lbs; 128 lbs   · BMI: 25.5  · BMI Categories: Overweight (BMI 25.0-29. 9)       Nutrition Diagnosis:   · Inadequate oral intake related to impaired respiratory funtion, cognitive or neurological impairment as evidenced by NPO or clear liquid status due to medical condition, intubation      Nutrition Interventions:   Food and/or Nutrient Delivery:  Continue NPO  Nutrition Education/Counseling:  No recommendation at this time   Coordination of Nutrition Care:  Continued Inpatient Monitoring    Goals:  Intiate PO intake or nutrition support. Discharge Planning: Too soon to determine     Some areas of assessment may be incomplete due to COVID-19 precautions. CARMEN Galloway R.D..   Clinical Dietitian  Office: [phone]

## 2020-07-15 NOTE — PROGRESS NOTES
ICU Progress Note (Vent)   [facility]    Patient - Santos Chan,  Age - 79 y.o.    - 1940      Room Number -    N -  [id number]   Acct # - [de-identified]  Date of Admission -  2020  8:20 PM    Events of Past 24 Hours   Blood pressures consistently elevated, esmolol was not started  Sedation increased now once again unresponsive having apneic episodes on weaning trial    Vitals    height is 5' 5\" (1.651 m) and weight is 153 lb 8 oz (69.6 kg). Her axillary temperature is 98.4 °F (36.9 °C). Her blood pressure is 143/43 (abnormal) and her pulse is 89. Her respiration is 15 and oxygen saturation is 100%. Temperature Range: Temp: 98.4 °F (36.9 °C) Temp  Av.6 °F (37 °C)  Min: 97.7 °F (36.5 °C)  Max: 99.4 °F (37.4 °C)  BP Range:  Systolic (34XRY), JAO:076 , Min:95 , CDM:544     Diastolic (55NPG), BJZ:05, Min:28, Max:209    Pulse Range: Pulse  Av.7  Min: 60  Max: 118  Respiration Range: Resp  Av  Min: 10  Max: 26  Current Pulse Ox[de-identified]  SpO2: 100 %  24HR Pulse Ox Range:  SpO2  Av.6 %  Min: 97 %  Max: 100 %  Oxygen Amount and Delivery: Wt Readings from Last 3 Encounters:   20 153 lb 8 oz (69.6 kg)   19 192 lb 3.9 oz (87.2 kg)   19 171 lb 3.2 oz (77.7 kg)     I/O       Intake/Output Summary (Last 24 hours) at 7/15/2020 0849  Last data filed at 7/15/2020 0645  Gross per 24 hour   Intake 2423.75 ml   Output 975 ml   Net 1448.75 ml     I/O last 3 completed shifts:   In: 2483.8 [I.V.:3.8; NG/GT:60; IV Piggyback:400]  Out: 975 [Urine:875; Emesis/NG output:100]     DRAIN/TUBE OUTPUT:     Invasive Lines   ETT Day -   3  Lines -  0    ICP PRESSURE RANGE:  No data recorded  CVP PRESSURE RANGE:  No data recorded  Mechanical Ventilation Data   SETTINGS (Comprehensive)  Vent Information  $Ventilation: $Subsequent Day  Equipment ID: [id number]  Vent Type: Servo i  Vent Mode: PRVC  Vt Ordered: 500 mL  Rate Set: 14 Component Value Date    LACTA 1.1 04/24/2019        BNP   No results for input(s): BNP in the last 72 hours. Cultures       Radiology     Plain Films             SYSTEM ASSESSMENT    Acute mental status changes  Hypertensive emergency  Aspiration pneumonia  Acute hypoxic respiratory failure      Neuro   Would stop all sedation and would not use to keep blood pressure under control    Respiratory   Wean oxygen as tolerated.  Keep O2 sat > 88%  We will place back on full support; reevaluate weaning when more alert     Hemodynamics   Use short acting esmolol to control blood pressure as it is labile  Esmolol is recommended for hypertensive urgency/emergency and avoidance of blood pressure swings  I believe her encephalopathy was due to poor blood pressure control    Gastrointestinal/Nutrition   Hold off on tube feeds if we can extubate her today  GI prophylaxis  Renal   Creatinine is slightly up    Infectious Disease   Continue Unasyn empirically for aspiration,  Once extubated and taking orally, can switch to Augmentin    Hematology/Oncology   DVT prophylaxis    Endocrine   Blood sugars okay  Social/Spiritual/DNR/Disposition/Other     Updated  in detail discussed with nursing and housestaff  Critical Care Time   >35 min    Electronically signed by Srini Dillard MD on 7/15/2020 at 8:49 AM

## 2020-07-16 ENCOUNTER — APPOINTMENT (OUTPATIENT)
Dept: GENERAL RADIOLOGY | Age: 80
DRG: 304 | End: 2020-07-16
Payer: MEDICARE

## 2020-07-16 ENCOUNTER — APPOINTMENT (OUTPATIENT)
Dept: MRI IMAGING | Age: 80
DRG: 304 | End: 2020-07-16
Payer: MEDICARE

## 2020-07-16 LAB
ABSOLUTE EOS #: 0.4 K/UL (ref 0–0.4)
ABSOLUTE IMMATURE GRANULOCYTE: ABNORMAL K/UL (ref 0–0.3)
ABSOLUTE LYMPH #: 0.9 K/UL (ref 1–4.8)
ABSOLUTE MONO #: 0.4 K/UL (ref 0.1–1.3)
ALLEN TEST: ABNORMAL
ANION GAP SERPL CALCULATED.3IONS-SCNC: 12 MMOL/L (ref 9–17)
BASOPHILS # BLD: 1 % (ref 0–2)
BASOPHILS ABSOLUTE: 0.1 K/UL (ref 0–0.2)
BNP INTERPRETATION: ABNORMAL
BUN BLDV-MCNC: 12 MG/DL (ref 8–23)
BUN/CREAT BLD: ABNORMAL (ref 9–20)
CALCIUM IONIZED: 1.26 MMOL/L (ref 1.13–1.33)
CALCIUM SERPL-MCNC: 8.3 MG/DL (ref 8.6–10.4)
CARBOXYHEMOGLOBIN: 1.2 % (ref 0–5)
CHLORIDE BLD-SCNC: 105 MMOL/L (ref 98–107)
CO2: 20 MMOL/L (ref 20–31)
CREAT SERPL-MCNC: 1.43 MG/DL (ref 0.5–0.9)
CULTURE: NORMAL
DIFFERENTIAL TYPE: ABNORMAL
DIRECT EXAM: NORMAL
EOSINOPHILS RELATIVE PERCENT: 7 % (ref 0–4)
FIO2: 28
GFR AFRICAN AMERICAN: 43 ML/MIN
GFR NON-AFRICAN AMERICAN: 35 ML/MIN
GFR SERPL CREATININE-BSD FRML MDRD: ABNORMAL ML/MIN/{1.73_M2}
GFR SERPL CREATININE-BSD FRML MDRD: ABNORMAL ML/MIN/{1.73_M2}
GLUCOSE BLD-MCNC: 71 MG/DL (ref 70–99)
HCO3 ARTERIAL: 21 MMOL/L (ref 22–26)
HCT VFR BLD CALC: 27.5 % (ref 36–46)
HEMOGLOBIN: 8.7 G/DL (ref 12–16)
IMMATURE GRANULOCYTES: ABNORMAL %
LYMPHOCYTES # BLD: 17 % (ref 24–44)
Lab: NORMAL
MCH RBC QN AUTO: 26.4 PG (ref 26–34)
MCHC RBC AUTO-ENTMCNC: 31.5 G/DL (ref 31–37)
MCV RBC AUTO: 83.8 FL (ref 80–100)
METHEMOGLOBIN: 0.7 % (ref 0–1.9)
MODE: ABNORMAL
MONOCYTES # BLD: 8 % (ref 1–7)
NEGATIVE BASE EXCESS, ART: 4.5 MMOL/L (ref 0–2)
NOTIFICATION TIME: ABNORMAL
NOTIFICATION: ABNORMAL
NRBC AUTOMATED: ABNORMAL PER 100 WBC
O2 DEVICE/FLOW/%: ABNORMAL
O2 SAT, ARTERIAL: 95.8 % (ref 95–98)
OXYHEMOGLOBIN: ABNORMAL % (ref 95–98)
PATIENT TEMP: 37
PCO2 ARTERIAL: 37.3 MMHG (ref 35–45)
PCO2, ART, TEMP ADJ: ABNORMAL (ref 35–45)
PDW BLD-RTO: 16.3 % (ref 11.5–14.9)
PEEP/CPAP: 7
PH ARTERIAL: 7.36 (ref 7.35–7.45)
PH, ART, TEMP ADJ: ABNORMAL (ref 7.35–7.45)
PLATELET # BLD: 171 K/UL (ref 150–450)
PLATELET ESTIMATE: ABNORMAL
PMV BLD AUTO: 7.7 FL (ref 6–12)
PO2 ARTERIAL: 99.5 MMHG (ref 80–100)
PO2, ART, TEMP ADJ: ABNORMAL MMHG (ref 80–100)
POSITIVE BASE EXCESS, ART: ABNORMAL MMOL/L (ref 0–2)
POTASSIUM SERPL-SCNC: 3.7 MMOL/L (ref 3.7–5.3)
PRO-BNP: ABNORMAL PG/ML
PSV: 7
PT. POSITION: ABNORMAL
RBC # BLD: 3.29 M/UL (ref 4–5.2)
RBC # BLD: ABNORMAL 10*6/UL
RESPIRATORY RATE: 18
SAMPLE SITE: ABNORMAL
SEG NEUTROPHILS: 67 % (ref 36–66)
SEGMENTED NEUTROPHILS ABSOLUTE COUNT: 3.5 K/UL (ref 1.3–9.1)
SET RATE: ABNORMAL
SODIUM BLD-SCNC: 137 MMOL/L (ref 135–144)
SPECIMEN DESCRIPTION: NORMAL
TEXT FOR RESPIRATORY: ABNORMAL
TOTAL HB: ABNORMAL G/DL (ref 12–16)
TOTAL RATE: 18
VT: ABNORMAL
WBC # BLD: 5.2 K/UL (ref 3.5–11)
WBC # BLD: ABNORMAL 10*3/UL

## 2020-07-16 PROCEDURE — 94770 HC ETCO2 MONITOR DAILY: CPT

## 2020-07-16 PROCEDURE — 6360000002 HC RX W HCPCS: Performed by: INTERNAL MEDICINE

## 2020-07-16 PROCEDURE — 6370000000 HC RX 637 (ALT 250 FOR IP): Performed by: INTERNAL MEDICINE

## 2020-07-16 PROCEDURE — 82330 ASSAY OF CALCIUM: CPT

## 2020-07-16 PROCEDURE — 80048 BASIC METABOLIC PNL TOTAL CA: CPT

## 2020-07-16 PROCEDURE — 82805 BLOOD GASES W/O2 SATURATION: CPT

## 2020-07-16 PROCEDURE — 36415 COLL VENOUS BLD VENIPUNCTURE: CPT

## 2020-07-16 PROCEDURE — 99232 SBSQ HOSP IP/OBS MODERATE 35: CPT | Performed by: PSYCHIATRY & NEUROLOGY

## 2020-07-16 PROCEDURE — 6370000000 HC RX 637 (ALT 250 FOR IP): Performed by: STUDENT IN AN ORGANIZED HEALTH CARE EDUCATION/TRAINING PROGRAM

## 2020-07-16 PROCEDURE — 36600 WITHDRAWAL OF ARTERIAL BLOOD: CPT

## 2020-07-16 PROCEDURE — 94761 N-INVAS EAR/PLS OXIMETRY MLT: CPT

## 2020-07-16 PROCEDURE — 6360000002 HC RX W HCPCS: Performed by: STUDENT IN AN ORGANIZED HEALTH CARE EDUCATION/TRAINING PROGRAM

## 2020-07-16 PROCEDURE — 71045 X-RAY EXAM CHEST 1 VIEW: CPT

## 2020-07-16 PROCEDURE — 2580000003 HC RX 258: Performed by: INTERNAL MEDICINE

## 2020-07-16 PROCEDURE — 2500000003 HC RX 250 WO HCPCS: Performed by: INTERNAL MEDICINE

## 2020-07-16 PROCEDURE — 83880 ASSAY OF NATRIURETIC PEPTIDE: CPT

## 2020-07-16 PROCEDURE — 2000000000 HC ICU R&B

## 2020-07-16 PROCEDURE — 99233 SBSQ HOSP IP/OBS HIGH 50: CPT | Performed by: INTERNAL MEDICINE

## 2020-07-16 PROCEDURE — 85025 COMPLETE CBC W/AUTO DIFF WBC: CPT

## 2020-07-16 PROCEDURE — 2580000003 HC RX 258: Performed by: RADIOLOGY

## 2020-07-16 PROCEDURE — 94003 VENT MGMT INPAT SUBQ DAY: CPT

## 2020-07-16 PROCEDURE — 82088 ASSAY OF ALDOSTERONE: CPT

## 2020-07-16 PROCEDURE — 84244 ASSAY OF RENIN: CPT

## 2020-07-16 PROCEDURE — 2700000000 HC OXYGEN THERAPY PER DAY

## 2020-07-16 PROCEDURE — 2580000003 HC RX 258: Performed by: STUDENT IN AN ORGANIZED HEALTH CARE EDUCATION/TRAINING PROGRAM

## 2020-07-16 PROCEDURE — 70551 MRI BRAIN STEM W/O DYE: CPT

## 2020-07-16 PROCEDURE — 6370000000 HC RX 637 (ALT 250 FOR IP): Performed by: PSYCHIATRY & NEUROLOGY

## 2020-07-16 RX ORDER — AMLODIPINE BESYLATE 5 MG/1
5 TABLET ORAL DAILY
Status: DISCONTINUED | OUTPATIENT
Start: 2020-07-16 | End: 2020-07-19

## 2020-07-16 RX ORDER — AMOXICILLIN AND CLAVULANATE POTASSIUM 875; 125 MG/1; MG/1
1 TABLET, FILM COATED ORAL EVERY 12 HOURS SCHEDULED
Status: COMPLETED | OUTPATIENT
Start: 2020-07-16 | End: 2020-07-19

## 2020-07-16 RX ORDER — FUROSEMIDE 10 MG/ML
40 INJECTION INTRAMUSCULAR; INTRAVENOUS ONCE
Status: COMPLETED | OUTPATIENT
Start: 2020-07-16 | End: 2020-07-16

## 2020-07-16 RX ADMIN — CHLORHEXIDINE GLUCONATE 15 ML: 1.2 RINSE ORAL at 08:14

## 2020-07-16 RX ADMIN — SPIRONOLACTONE 25 MG: 25 TABLET, FILM COATED ORAL at 08:15

## 2020-07-16 RX ADMIN — HYDROCHLOROTHIAZIDE 25 MG: 25 TABLET ORAL at 08:15

## 2020-07-16 RX ADMIN — Medication 10 ML: at 19:29

## 2020-07-16 RX ADMIN — METOPROLOL TARTRATE 50 MG: 50 TABLET, FILM COATED ORAL at 08:15

## 2020-07-16 RX ADMIN — ESMOLOL HYDROCHLORIDE 150 MCG/KG/MIN: 10 INJECTION INTRAVENOUS at 07:48

## 2020-07-16 RX ADMIN — AMOXICILLIN AND CLAVULANATE POTASSIUM 1 TABLET: 875; 125 TABLET, FILM COATED ORAL at 19:27

## 2020-07-16 RX ADMIN — HYDRALAZINE HYDROCHLORIDE 10 MG: 20 INJECTION INTRAMUSCULAR; INTRAVENOUS at 13:36

## 2020-07-16 RX ADMIN — METOPROLOL TARTRATE 50 MG: 50 TABLET, FILM COATED ORAL at 19:28

## 2020-07-16 RX ADMIN — FAMOTIDINE 20 MG: 10 INJECTION, SOLUTION INTRAVENOUS at 08:14

## 2020-07-16 RX ADMIN — POLYVINYL ALCOHOL 1 DROP: 14 SOLUTION/ DROPS OPHTHALMIC at 04:52

## 2020-07-16 RX ADMIN — PROPOFOL 10 MCG/KG/MIN: 10 INJECTION, EMULSION INTRAVENOUS at 04:51

## 2020-07-16 RX ADMIN — HYDRALAZINE HYDROCHLORIDE 10 MG: 20 INJECTION INTRAMUSCULAR; INTRAVENOUS at 22:56

## 2020-07-16 RX ADMIN — AMLODIPINE BESYLATE 5 MG: 5 TABLET ORAL at 11:33

## 2020-07-16 RX ADMIN — AMOXICILLIN AND CLAVULANATE POTASSIUM 1 TABLET: 875; 125 TABLET, FILM COATED ORAL at 10:00

## 2020-07-16 RX ADMIN — ESMOLOL HYDROCHLORIDE 150 MCG/KG/MIN: 10 INJECTION INTRAVENOUS at 00:44

## 2020-07-16 RX ADMIN — CLOPIDOGREL BISULFATE 75 MG: 75 TABLET ORAL at 08:15

## 2020-07-16 RX ADMIN — ESMOLOL HYDROCHLORIDE 100 MCG/KG/MIN: 10 INJECTION INTRAVENOUS at 11:56

## 2020-07-16 RX ADMIN — ENOXAPARIN SODIUM 30 MG: 30 INJECTION SUBCUTANEOUS at 08:14

## 2020-07-16 RX ADMIN — LEVETIRACETAM 250 MG: 100 SOLUTION ORAL at 10:01

## 2020-07-16 RX ADMIN — FUROSEMIDE 40 MG: 10 INJECTION, SOLUTION INTRAMUSCULAR; INTRAVENOUS at 11:34

## 2020-07-16 RX ADMIN — ESMOLOL HYDROCHLORIDE 150 MCG/KG/MIN: 10 INJECTION INTRAVENOUS at 04:52

## 2020-07-16 RX ADMIN — Medication 10 ML: at 19:28

## 2020-07-16 RX ADMIN — IRON SUCROSE 50 MG: 20 INJECTION, SOLUTION INTRAVENOUS at 10:24

## 2020-07-16 ASSESSMENT — PULMONARY FUNCTION TESTS
PIF_VALUE: 14
PIF_VALUE: 13
PIF_VALUE: 14
PIF_VALUE: 12
PIF_VALUE: 14
PIF_VALUE: 13
PIF_VALUE: 14
PIF_VALUE: 14
PIF_VALUE: 13
PIF_VALUE: 14
PIF_VALUE: 15
PIF_VALUE: 14
PIF_VALUE: 15
PIF_VALUE: 14
PIF_VALUE: 14

## 2020-07-16 ASSESSMENT — ENCOUNTER SYMPTOMS
SHORTNESS OF BREATH: 0
DIARRHEA: 0
NAUSEA: 0
VOMITING: 0
ABDOMINAL PAIN: 0
CONSTIPATION: 0
SORE THROAT: 1

## 2020-07-16 ASSESSMENT — PAIN SCALES - GENERAL
PAINLEVEL_OUTOF10: 0

## 2020-07-16 NOTE — PROGRESS NOTES
Order obtained for extubation. SpO2 of 100 on 28% FiO2. Patient extubated and placed on 2 liters/min via nasal cannula. Post extubation SpO2 is 97% with HR  82 bpm and RR 22 breaths/min. Patient had moderate cough that was non-productive. Extubation Well tolerated by patient. .   Breath Sounds: diminished    Kelli Zuniga   8:25 AM

## 2020-07-16 NOTE — CARE COORDINATION
DISCHARGE PLANNING NOTE:     Writer reviewed chart.  Pt was extubated . LSW referred patient to ARU. PM&R consult placed. Pt also needs orders for PT/OT, however has strict bedrest order. Plan was for patient to be discharged home with her spouse and [facility] [address] on Esmolol for elevated BP. Also on PO Augmentin for aspiration pneumonia. Will continue to follow for additional discharge needs.     Electronically signed by Suri Donato RN on 7/16/2020 at 12:15 PM

## 2020-07-16 NOTE — PROGRESS NOTES
[facility] CARDIOLOGY Progress Note    7/16/2020 7:36 AM      Subjective:  Ms. Ashley Telles   Is more awake and alert and being weaned off ventilator currently at the time of my evaluation. Patient denies any chest pain or palpitations or lightheadedness or dizziness. Nurse at bedside updated overnight events. Patient remains on IV esmolol drip with better controlled blood pressures. LABS:     Recent Results (from the past 24 hour(s))   Sputum culture    Collection Time: 07/15/20  9:19 AM    Specimen: Sputum Induced   Result Value Ref Range    Specimen Description . INDUCED SPUTUM     Special Requests NOT REPORTED     Direct Exam < 10 EPITHELIAL CELLS/LPF     Direct Exam >25 NEUTROPHILS/LPF     Direct Exam NO BACTERIA SEEN     Culture PENDING    Blood gas, arterial    Collection Time: 07/16/20  3:10 AM   Result Value Ref Range    pH, Arterial 7.359 7.350 - 7.450    pCO2, Arterial 37.3 35.0 - 45.0 mmHg    pO2, Arterial 99.5 80.0 - 100.0 mmHg    HCO3, Arterial 21.0 (L) 22.0 - 26.0 mmol/L    Positive Base Excess, Art NOT REPORTED 0.0 - 2.0 mmol/L    Negative Base Excess, Art 4.5 (H) 0.0 - 2.0 mmol/L    O2 Sat, Arterial 95.8 95 - 98 %    Total Hb NOT REPORTED 12.0 - 16.0 g/dl    Oxyhemoglobin NOT REPORTED 95.0 - 98.0 %    Carboxyhemoglobin 1.2 0 - 5 %    Methemoglobin 0.7 0.0 - 1.9 %    Pt Temp 37.0     pH, Art, Temp Adj NOT REPORTED 7.350 - 7.450    pCO2, Art, Temp Adj NOT REPORTED 35.0 - 45.0    pO2, Art, Temp Adj NOT REPORTED 80.0 - 100.0 mmHg    O2 Device/Flow/% VENTILATOR     Respiratory Rate 18     Basim Test PASS     Sample Site Right Radial Artery     Pt.  Position SEMI-FOWLERS     Mode CPAP     Set Rate NOT REPORTED     Total Rate 18     VT NOT REPORTED     FIO2 28     Peep/Cpap 7     PSV 7     Text for Respiratory RESULTS TO RN     NOTIFICATION NOT REPORTED     NOTIFICATION TIME NOT REPORTED    Basic Metabolic Prof    Collection Time: 07/16/20  4:19 AM   Result Value Ref Range Mouth/Throat BID    famotidine (PEPCID) injection  20 mg Intravenous Daily    clopidogrel  75 mg Oral Daily    metoprolol tartrate  50 mg Oral BID    cloNIDine  1 patch Transdermal Weekly    ezetimibe  10 mg Oral Nightly         Continuous Infusions:    esmolol 150 mcg/kg/min (07/16/20 0640)    propofol 5 mcg/kg/min (07/16/20 0640)    lactated ringers 75 mL/hr (07/15/20 2304)              VITAL SIGNS:    BP (!) 148/47   Pulse 75   Temp 98 °F (36.7 °C) (Oral)   Resp 16   Ht 5' 5\" (1.651 m)   Wt 179 lb (81.2 kg)   SpO2 99%   BMI 29.79 kg/m²         Admit Weight:  190 lb (86.2 kg)    Last 3 weights: Wt Readings from Last 3 Encounters:   07/16/20 179 lb (81.2 kg)   12/22/19 192 lb 3.9 oz (87.2 kg)   07/31/19 171 lb 3.2 oz (77.7 kg)       BMI: Body mass index is 29.79 kg/m². INPUT/OUTPUT:          Intake/Output Summary (Last 24 hours) at 7/16/2020 0737  Last data filed at 7/16/2020 0600  Gross per 24 hour   Intake 3468.67 ml   Output 685 ml   Net 2783.67 ml         Telemetry shows  Sinus rhythm. EXAM:     General appearance: remains orally intubated. Awake, alert and nodding head appropriately to questions. Pleasant. Neck: No JVD. Chest: clear bilaterally. No tenderness. No rhonchi or wheezing. Cardiac: Regular rate and rhythm. No S3 gallop or rubs. Abdomen: soft, non-tender. Extremities: no cyanosis, no clubbing, no calf tenderness, no leg edema. Pulses: intact bilateral radial pulses. Skin:  warm and dry. Neuro:  Able to move all 4 extremities. 2 D ECHO July 13, 2020:     Patient supine, on ventilator. Contrast was utilized on this technically   difficult study. Small LV cavity. Hyperdynamic left ventricular function. Estimated LV EF 75   %. Moderate to severe left ventricular hypertrophy. Obstructive Doppler pattern   mid LV mean 14 mmHg, max 40 mmHg. No segmental wall motion abnormalities. Grade I (mild) left ventricular diastolic dysfunction.    Normal right ventricular size and function. Left atrium is normal in size. Right atrium is normal in size. Aortic valve was not well visualized. No aortic stenosis. No aortic insufficiency. Normal aortic root dimension. Mitral annular calcification is seen. Trivial mitral regurgitation. Tricuspid valve was not well visualized. Mild tricuspid regurgitation. Estimated right ventricular systolic pressure is 19 mmHg. Normal right   ventricular systolic pressure. Pulmonic valve not well visualized but Doppler velocities are normal. No   pulmonic insufficiency. IVC normal diameter. No significant pericardial effusion is seen. ASSESSMENT:    Accelerated hypertension requiring IV esmolol  drip currently.  History of labile blood pressures.     Abnormal EKG.  Cannot exclude underlying coronary artery disease.  No active angina pectoris.     Hyperdynamic LVEF 75%, mild TR on 2 D ECHO July 2020.     Acute respiratory failure requiring oral intubation and mechanical ventilation currently. Weaning off vent in progress currently.     History of carotid artery disease.     Altered mental status with headache prompting hospitalization. Improving mental status currently.     Other problems as charted. REC/PLAN:    Improving  Mental status and better blood pressure. Hopefully patient can be extubated from ventilator this a.m. .      Suggest continuation of IV esmolol for now and once she is extubated, will plan to start her on oral hypertensive medications and wean off IV esmolol, depending on hospitalization course. Discussed with the nurse at bedside. Will follow. Electronically signed by Vidya Smith MD, [facility]        PLEASE NOTE:  This progress note was completed using a voice transcription system. Every effort was made to ensure accuracy. However, inadvertent computerized transcription errors may be present.

## 2020-07-16 NOTE — PROGRESS NOTES
[address] [facility]    PROGRESS NOTE             7/16/2020    8:48 AM    Name:   Emmanuel Juárez  MRN:     [id number]     Acct:      [de-identified]   Room:   2010/2010-01  IP Day:  3  Admit Date:  7/12/2020  8:20 PM    PCP:  Jagdish Saldivar DO  Code Status:  Full Code    Subjective:     C/C:   Chief Complaint   Patient presents with    Altered Mental Status    Hypertension     Interval History Status: improved. Patient was seen and examined at bedside. Per nurse report no events overnight. Patient was successfully extubated today at 8:30 AM.  Advance diet as tolerated. MRI of the brain once BP is stable. Patient is continued on esmolol drip. Patient started on Augmentin 1 tablet twice a day for aspiration pneumonia. Brief History:     See H&P    Review of Systems:       Review of Systems   Constitutional: Negative for fever. HENT: Positive for sore throat. Respiratory: Negative for shortness of breath. Cardiovascular: Negative for chest pain and palpitations. Gastrointestinal: Negative for abdominal pain, constipation, diarrhea, nausea and vomiting. Neurological: Negative for headaches. Limited due to patient just being extubated      Medications: Allergies:     Allergies   Allergen Reactions    Diclofenac-Misoprostol Other (See Comments)     Per Dr Neal Canavan - patient has GI intolerance to oral formulations       Current Meds:   Scheduled Meds:    amoxicillin-clavulanate  1 tablet Oral 2 times per day    spironolactone  25 mg Oral Daily    And    hydroCHLOROthiazide  25 mg Oral Daily    levETIRAcetam  250 mg Oral BID    sodium chloride flush  10 mL Intravenous 2 times per day    sodium chloride flush  10 mL Intravenous 2 times per day    enoxaparin  30 mg Subcutaneous Daily    famotidine (PEPCID) injection  20 mg Intravenous Daily    clopidogrel  75 mg Oral Daily    metoprolol tartrate  50 mg Oral BID    cloNIDine 1 patch Transdermal Weekly    ezetimibe  10 mg Oral Nightly     Continuous Infusions:    esmolol 150 mcg/kg/min (20 0748)    lactated ringers 75 mL/hr (07/15/20 2304)     PRN Meds: sodium chloride flush, sodium chloride flush, acetaminophen, metoprolol, hydrALAZINE, aspirin    Data:     Past Medical History:   has a past medical history of Arthritis, Cancer ([facility] [address] CKD (chronic kidney disease) stage 4, GFR 15-29 ml/min (HCC), COPD (chronic obstructive pulmonary disease) ([facility] [address] CVA (cerebral vascular accident) ([facility] [address] Gout, Hyperlipidemia, Hypertension, Left renal atrophy, and Other abnormality of urination(788.69). Social History:   reports that she quit smoking about 21 months ago. She smoked 1.00 pack per day. She has never used smokeless tobacco. She reports that she does not drink alcohol or use drugs. Family History:   Family History   Problem Relation Age of Onset    Heart Disease Brother     Other Mother         polycythemia    Heart Disease Father     Heart Disease Sister         enlarged heart    Cancer Son         prostate       Vitals:  BP (!) 148/47   Pulse 75   Temp 98 °F (36.7 °C) (Oral)   Resp 16   Ht 5' 5\" (1.651 m)   Wt 179 lb (81.2 kg)   SpO2 99%   BMI 29.79 kg/m²   Temp (24hrs), Av.7 °F (37.1 °C), Min:98 °F (36.7 °C), Max:99.3 °F (37.4 °C)    No results for input(s): POCGLU in the last 72 hours. I/O(24Hr):     Intake/Output Summary (Last 24 hours) at 2020 0848  Last data filed at 2020 0600  Gross per 24 hour   Intake 3468.67 ml   Output 685 ml   Net 2783.67 ml       Labs:    [unfilled]    Lab Results   Component Value Date/Time    SPECIAL NOT REPORTED 07/15/2020 09:19 AM     Lab Results   Component Value Date/Time    CULTURE PENDING 07/15/2020 09:19 AM       [unfilled]    Radiology:    Ct Head Wo Contrast    Result Date: 2020  EXAMINATION: CT OF THE HEAD WITHOUT CONTRAST  2020 8:42 pm TECHNIQUE: CT of the head was performed without the administration of intravenous contrast. Dose modulation, iterative reconstruction, and/or weight based adjustment of the mA/kV was utilized to reduce the radiation dose to as low as reasonably achievable. COMPARISON: 12/19/2018 HISTORY: ORDERING SYSTEM PROVIDED HISTORY: AMS TECHNOLOGIST PROVIDED HISTORY: AMS Reason for Exam: AMS, unable to speak, patient is unable to stop shaking her legs. patient is unable to follow any type of instructions or answer any questions. Acuity: Acute Type of Exam: Initial FINDINGS: BRAIN/VENTRICLES: There is no acute intracranial hemorrhage, mass effect or midline shift. No abnormal extra-axial fluid collection. The gray-white differentiation is maintained without evidence of an acute infarct. There is no evidence of hydrocephalus. Generalized involutional changes and moderate chronic small ischemic disease. Intracranial vascular calcifications. Question small chronic lacune infarct identified within the region of the right paramedian dane. ORBITS: The visualized portion of the orbits demonstrate no acute abnormality. SINUSES: The visualized paranasal sinuses and mastoid air cells demonstrate no acute abnormality. SOFT TISSUES/SKULL:  No acute abnormality of the visualized skull or soft tissues. No acute intracranial abnormality. Chronic small ischemic disease and age related change. Critical results were called by Dr. James Gomes to Dr Nito Soto on 7/12/2020 at 20:52. Us Renal Complete    Result Date: 7/13/2020  EXAMINATION: RETROPERITONEAL ULTRASOUND OF THE KIDNEYS 7/13/2020 COMPARISON: 04/19/2019 and prior HISTORY: ORDERING SYSTEM PROVIDED HISTORY: TIMO TECHNOLOGIST PROVIDED HISTORY: TIMO FINDINGS: Kidneys: The right kidney measures 10.2 x 5.8 x 5.2 cm in length and the left kidney measures 8.4 x 4.5 x 4.6 cm in length. Study limited by body habitus and overlying bowel gas. Simple-appearing cyst on the right measuring 1.3 x 1.2 x 1.6 cm noted.   There is no hydronephrosis or abnormal echogenicity. Left kidney is somewhat limited in evaluation without evidence of hydronephrosis. There is mild cortical thinning. Limited study secondary to body habitus and overlying bowel gas. Right kidney appears grossly unremarkable in appearance. Left kidney is limited but grossly unremarkable. There is mild cortical thinning. Jo Ann Bernard Device Plmt/replace/removal    Result Date: 7/14/2020  PROCEDURE: ULTRASOUND GUIDED VASCULAR ACCESS. FLUOROSCOPY GUIDED PICC PLACEMENT 7/14/2020. HISTORY: ORDERING SYSTEM PROVIDED HISTORY: PICC line TECHNOLOGIST PROVIDED HISTORY: Patient in ICU, on vent, sedated. In need of IV access for drips, sedation, Please place ASAP. Spouse's name Tian Hutchins, home No. [phone]. Spouse POA, only one to make decisions for patient. PICC line SEDATION: None FLUOROSCOPY DOSE AND TYPE OR TIME AND EXPOSURES: DAP 88 cGy cm squared TECHNIQUE: Informed consent was obtained after a detailed explanation of the procedure including risks, benefits, and alternatives. Universal protocol was observed. The right arm was prepped and draped in sterile fashion using maximum sterile barrier technique. Local anesthesia was achieved with lidocaine. A micropuncture needle was used to access the right basilic vein using ultrasound guidance. An ultrasound image demonstrating patency of the vein with needle tip located within it. An image was obtained and stored in PACs. A 0.018 guidewire was used to place a peel-a-way sheath and a 5 [place] power injectable dual-lumen PICC was advanced with fluoroscopic guidance with the tip at the cavo-atrial junction. The catheter flushed easily and there was a good blood return. The catheter was secured to the skin. The patient tolerated the procedure well and there were no immediate complications. FINDINGS: Fluoroscopic image demonstrates the tip of the catheter at the cavo-atrial junction.      Successful ultrasound and fluoroscopy guided right basilic vein 5 [place] power injectable dual-lumen PICC placement. Ready for use. Xr Chest Portable    Result Date: 7/16/2020  EXAMINATION: ONE XRAY VIEW OF THE CHEST 7/16/2020 5:26 am COMPARISON: 07/15/2020 HISTORY: ORDERING SYSTEM PROVIDED HISTORY: ETT placement TECHNOLOGIST PROVIDED HISTORY: ETT placement Reason for Exam: ETT placement Acuity: Unknown Type of Exam: Ongoing Additional signs and symptoms: ETT placement Relevant Medical/Surgical History: ETT placement FINDINGS: Right upper extremity PICC in good position. Low ET tube projecting 1.3 cm from the rianna. The tube should be withdrawn 2-3 cm for better positioning. Enteric tube passes beneath the diaphragm but the tip is not seen. Shallow inspiration. Cardiomegaly. Perihilar congestion and patchy peripheral airspace disease possibly edema or pneumonia. Low ET tube projects 1.3 cm from the rianna. Tube should be withdrawn 2-3 cm for better positioning. Shallow inspiration magnifies central congestion. Patchy peripheral airspace disease possibly from pneumonia. The findings were sent to the Radiology Results [address] at 8:34 am on 7/16/2020to be communicated to a licensed caregiver. Xr Chest Portable    Result Date: 7/15/2020  EXAMINATION: ONE XRAY VIEW OF THE CHEST 7/15/2020 6:31 am COMPARISON: Chest radiograph performed 07/14/2020. HISTORY: ORDERING SYSTEM PROVIDED HISTORY: ETT placement TECHNOLOGIST PROVIDED HISTORY: ETT placement Reason for Exam: on vent Acuity: Acute Type of Exam: Subsequent/Follow-up FINDINGS: There is mild chronic pulmonary change. There is no sara consolidation or effusion. There is no pneumothorax. The mediastinal structures are stable. There is an endotracheal tube with the tip in the midtrachea. There is a gastric tube and the tip is not visualized. The extrathoracic soft tissues are unremarkable. There is a right-sided PICC line with the tip in the distal SVC.      Chronic pulmonary without contrast, stroke cannot be ruled out  -Carotid Duplex (05/27)  - Left artery 50-59 stenosis; Right < 50% stenosis  -CT head on remarkable for any intracranial abnormality  -ABG unremarkable for hypercapnia or hypoxia  -EKG normal sinus rhythm  -UA shows no sign of infection     3. Aspiration pneumonia  -Chest x-ray: Patchy peripheral airspace disease possibly from pneumonia  - Pulmonology -D/C Unasyn   -Started patient on Augmentin 1 tablet twice a day for 3 days (end date 07/19/20)  -Pro-Feliciano- 0.11, Sed rate- 23 (H), CRP- 8.6 (H). - Respiratory viral panel, Legionella, mycoplasma pending     4. Seizure-like activity 2/2 hypertensive emergency  - Decreased Keppra 500 mg twice daily to Keppra 250 mg BID and will continue to  wean pt off within next 2-3 days  -Neurology consult,  - EEG: Disorganized and slow background triphasic waves suggestive of mild/moderate encephalopathy non-etiology  - MRI-we will attempt once patient's BP is stable     5. Hypertensive emergency   -Restarted  Esmolol due to SBP >160.  -Cardiology consult, hydralazine 10 mg IV every 4 hours,  -Continue Lopressor 5 mg IV every 6 hrs  -Clonidine patch once a week    -Serial troponin and EKG   -Echo- LV EF-75%     6. History of carotid endarterectomy  -Lipitor 20 mg daily  -Plavix 75 mg daily  -At the 10 be 10 mg daily  -Lopressor 50 mg twice daily   -Carotid Duplex (05/27): Left 50-59% ; Right <50%  -Seen Dr. Velez is patient's vascular surgeon on 6/8/2020     6. CKD stage 3; GFR 34  -Creatinine at 1.43 (H,, trending up 1.36); BL at 1.4  -Patient is not on Lasix  -BNP ordered  -Renal ultrasound pending  -Urine sodium and creatinine ordered  -Continue to monitor I's and O's per shift    7.   Iron deficiency anemia  -Labs: FE-23 (L), TIBC-161 (L), iron binding capacity-1 4 (L), H/H- 8.7/27.5 (L, and trending down)  -Started patient on Venofer IV 50 mcg 1 time  -Continue to monitor H&H       DVT PPX: Enoxaparin  GI P PPX: Famotidine        Recently saw cardiology for cardiac clearance and cardiovascular examination for placement of spinal cord stimulator on 6/16/2020    Amie Castro MD  7/16/2020  8:48 AM         I have discussed the care of Yandy Ball , including pertinent history and exam findings,    today with the resident. I have seen and examined the patient and the key elements of all parts of the encounter have been performed by me . I agree with the assessment, plan and orders as documented by the resident. Principal Problem:    Acute encephalopathy  Active Problems:    Aspiration pneumonia (HCC)    Hypertensive emergency    Acute respiratory failure (HCC)    Chronic kidney disease (CKD), stage III (moderate) (HCC)    Seizure (HCC)    Altered mental status  Resolved Problems:    * No resolved hospital problems. *        Overall  course ;                                   are improving over time.         Patient extubated today  Blood pressure still high on esmolol drip, will add Norvasc  Patient had CT abdomen, done in the past concerning for atrophic left kidney  Concern for renal artery stenosis  Ordering secondary work-up for hypertension  Drop in hemoglobin, no source of bleeding  Ordering stool for occult blood, will monitor hemoglobin, holding Lovenox  Fluid discontinued with elevated BNP  Discussed with patient and her  at bedside          Electronically signed by Brennen Meraz MD

## 2020-07-16 NOTE — PROGRESS NOTES
Vascular tech calls unit, states that patient has to be NPO prior to renal arterial duplex. Tech notified that patient has already eaten today, but will place patient NPO after midnight tonight. Scan to be completed tomorrow morning.

## 2020-07-16 NOTE — PROGRESS NOTES
Spoke with pt's nurse, Kiera Ibarra, earlier regarding MRI ordered a few days ago. Kiera Ibarra states pt was extubated this morning and tomorrow would be better for MRI. Waiting for Neuro to make rounds, if anything chg's and Neuro wants MRI today she will call us, otherwise check tomorrow for pt availability.

## 2020-07-16 NOTE — PROGRESS NOTES
Comprehensive Nutrition Assessment    Type and Reason for Visit:  Reassess    Nutrition Recommendations/Plan: Continue diet as ordered. Provide Ensure High Protein twice daily. Follow for nutrition progression. Nutrition Assessment:  Patient extubated yesterday with full liquid diet started. MD noted elevated Pro-BNP at 12,814 ordering 1 dose Lasix 40 mg. Monitor oral intake and provide Ensure High Protein twice daily. Malnutrition Assessment:  Malnutrition Status:  Insufficient data    Context:  Acute Illness     Findings of the 6 clinical characteristics of malnutrition:  Energy Intake:  Unable to assess  Weight Loss:  Unable to assess(intubated- no family present)     Body Fat Loss:  Unable to assess     Muscle Mass Loss:  Unable to assess    Fluid Accumulation:  No significant fluid accumulation     Strength:  Not Performed    Estimated Daily Nutrient Needs:  Energy (kcal):  1354 kcals based on [facility] 2003b using current wt 69.6 kg; Weight Used for Energy Requirements:  Admission     Protein (g):  114 gm protein based on 2 gm/kg IBW; Weight Used for Protein Requirements:  Ideal            Nutrition Related Findings:  vomited PTA with possible aspiration pneumonia. Wounds:  None       Current Nutrition Therapies:    DIET FULL LIQUID;  Dietary Nutrition Supplements: Low Calorie High Protein Supplement  Diet NPO, After Midnight    Anthropometric Measures:  · Height: 5' 5\" (165.1 cm)  · Current Body Weight: 179 lb (81.2 kg)(increase in edema)   · Admission Body Weight: 160 lb (72.6 kg)     · Ideal Body Weight: 125 lbs; 128 lbs   · BMI: 29.8  · BMI Categories: Overweight (BMI 25.0-29. 9)       Nutrition Diagnosis:   · Inadequate oral intake related to cognitive or neurological impairment(plan MRI) as evidenced by intake 26-50%      Nutrition Interventions:   Food and/or Nutrient Delivery:  Continue Current Diet, Start Oral Nutrition Supplement  Nutrition Education/Counseling:  No recommendation

## 2020-07-16 NOTE — PLAN OF CARE
Problem: Restraint Use - Nonviolent/Non-Self-Destructive Behavior:  Goal: Absence of restraint indications  Description: Absence of restraint indications  Outcome: Ongoing  Note: Pt attempted to pull at lines and tubes when intubated. Restraints removed upon extubation. Problem: Skin Integrity:  Goal: Will show no infection signs and symptoms  Description: Will show no infection signs and symptoms  7/16/2020 1642 by Wing Valerio  Outcome: Ongoing  Note: No new skin breakdown noted this shift. Pt turned every two hours. Problem: Urinary Elimination:  Goal: Signs and symptoms of infection will decrease  Description: Signs and symptoms of infection will decrease  Outcome: Ongoing  Note: Cool hung below level of the bladder. Cath secure and in place. Problem: Falls - Risk of:  Goal: Will remain free from falls  Description: Will remain free from falls  Outcome: Ongoing  Note: Pt remained free from falls this shift. Appropriate safety measures in place.      Problem: Nutrition  Goal: Optimal nutrition therapy  Outcome: Ongoing  Note: Pt started on full liquid diet this shift

## 2020-07-16 NOTE — PROGRESS NOTES
Dr. Naun Pabon notified of elevated BNP level. Order to heplock IV and give a one time dose of IV lasix, 40mg.

## 2020-07-16 NOTE — PLAN OF CARE
Nutrition Problem #1: Inadequate oral intake  Intervention: Food and/or Nutrient Delivery: Continue Current Diet, Start Oral Nutrition Supplement  Nutritional Goals: Intiate PO intake or nutrition support.

## 2020-07-16 NOTE — PROGRESS NOTES
56 03/17/2019    TRIG 117 04/28/2018    TRIG 200 (H) 09/24/2013     Lab Results   Component Value Date    HDL 45 03/17/2019    HDL 47 04/28/2018    HDL 60 09/24/2013     No results found for: LDLCALC  No results found for: LABVLDL  No results found for: LABA1C  No results found for: EAG  Lab Results   Component Value Date    TFHHDSSD38 1710 (H) 04/21/2019      Neurological work up:  CT head negative for acute changes   CTA head and neck  MRI brain     2 D echo     Assessment and Recommendations:   Posterior reversible encephalopathy syndrome secondary to hypertension  Other comorbid conditions include aspiration pneumonia, acute hypoxic respiratory failure status post intubation    The patient is now extubated. Her encephalopathy is improving. MRI scan of the brain is personally reviewed and is consistent with posterior reversible encephalopathy syndrome. Recommend continued blood pressure control as being done. The patient's seizures were reactive. At this time, I would DC Keppra and continue holding off on antiepileptic medication unless she has further seizures. We will follow. Vinod Mckenzie MD  Neurology    This note is created with the assistance of a speech-recognition program. While intending to generate a document that actually reflects the content of the visit, the document can still have some errors including those of syntax and sound a- like substitutions which may escape proofreading. In such instances, actual meaning can be extrapolated by contextual derivation.

## 2020-07-16 NOTE — PROGRESS NOTES
ICU Progress Note (Vent)   [facility]    Patient - Laura Gtz,  Age - 79 y.o.    - 1940      Room Number -    N -  [id number]   [facility]  Date of Admission -  2020  8:20 PM    Events of Past 24 Hours   Alert and oriented, remains on CPAP and pressure support most of the day yesterday but was too sedated to extubate; yesterday evening had to be placed on full support but has been on CPAP pressure for now since about midnight. Patient alert and oriented follows commands  Still on esmolol drip    Vitals    height is 5' 5\" (1.651 m) and weight is 179 lb (81.2 kg). Her oral temperature is 98 °F (36.7 °C). Her blood pressure is 148/47 (abnormal) and her pulse is 75. Her respiration is 16 and oxygen saturation is 99%. Temperature Range: Temp: 98 °F (36.7 °C) Temp  Av.7 °F (37.1 °C)  Min: 98 °F (36.7 °C)  Max: 99.3 °F (37.4 °C)  BP Range:  Systolic (88EJB), JWC:977 , Min:91 , QBO:374     Diastolic (68BWL), VZQ:92, Min:36, Max:109    Pulse Range: Pulse  Av  Min: 58  Max: 105  Respiration Range: Resp  Avg: 15.2  Min: 0  Max: 26  Current Pulse Ox[de-identified]  SpO2: 99 %  24HR Pulse Ox Range:  SpO2  Av.1 %  Min: 91 %  Max: 100 %  Oxygen Amount and Delivery: Wt Readings from Last 3 Encounters:   20 179 lb (81.2 kg)   19 192 lb 3.9 oz (87.2 kg)   19 171 lb 3.2 oz (77.7 kg)     I/O       Intake/Output Summary (Last 24 hours) at 2020 0810  Last data filed at 2020 0600  Gross per 24 hour   Intake 3468.67 ml   Output 685 ml   Net 2783.67 ml     I/O last 3 completed shifts: In: 3468.7 [I.V.:3388. 7; NG/GT:80]  Out: 685 [Urine:585; Emesis/NG output:100]     DRAIN/TUBE OUTPUT:     Invasive Lines   ETT Day -   4  Lines -  0    ICP PRESSURE RANGE:  No data recorded  CVP PRESSURE RANGE:  No data recorded  Mechanical Ventilation Data   SETTINGS (Comprehensive)  Vent Information  $Ventilation: $Subsequent Day  Equipment ID: [id number]  Vent Type: Servo i  Vent Mode: CPAP  Vt Ordered: 500 mL  Rate Set: 14 bmp  Pressure Support: 7 cmH20  FiO2 : 28 %  SpO2: 99 %  SpO2/FiO2 ratio: 353.57  Sensitivity: 5  PEEP/CPAP: 7  I Time/ I Time %: 0.9 s  Humidification Source: HME  Additional Respiratory  Assessments  Pulse: 75  Resp: 16  SpO2: 99 %  End Tidal CO2: 32 (%)  Position: Semi-Howe's  Humidification Source: HME  Oral Care Completed?: Yes  Oral Care: Mouth suctioned  Cuff Pressure (cm H2O): 26 cm H2O       ABGs:   Lab Results   Component Value Date    PHART 7.359 07/16/2020    PO2ART 99.5 07/16/2020    LTV1AEE 37.3 07/16/2020       Lab Results   Component Value Date    MODE CPAP 07/16/2020         Medications   IV   esmolol 150 mcg/kg/min (07/16/20 0748)    propofol 5 mcg/kg/min (07/16/20 0640)    lactated ringers 75 mL/hr (07/15/20 3137)      spironolactone  25 mg Oral Daily    And    hydroCHLOROthiazide  25 mg Oral Daily    levETIRAcetam  250 mg Oral BID    sodium chloride flush  10 mL Intravenous 2 times per day    sodium chloride flush  10 mL Intravenous 2 times per day    enoxaparin  30 mg Subcutaneous Daily    polyvinyl alcohol  1 drop Both Eyes Q4H    And    artificial tears   Both Eyes Q4H    chlorhexidine  15 mL Mouth/Throat BID    famotidine (PEPCID) injection  20 mg Intravenous Daily    clopidogrel  75 mg Oral Daily    metoprolol tartrate  50 mg Oral BID    cloNIDine  1 patch Transdermal Weekly    ezetimibe  10 mg Oral Nightly       Diet/Nutrition   Diet NPO Effective Now    Exam   VITALS    height is 5' 5\" (1.651 m) and weight is 179 lb (81.2 kg). Her oral temperature is 98 °F (36.7 °C). Her blood pressure is 148/47 (abnormal) and her pulse is 75. Her respiration is 16 and oxygen saturation is 99%.    Ventilator Settings (Basic)  Vent Mode: CPAP Rate Set: 14 bmp/Vt Ordered: 500 mL/ /FiO2 : 28 %    Constitutional -alert off sedation   General Appearance  well developed, well nourished  HEENT - Life support devices in place (ET, OG),normocephalic, atraumatic. PERRLA  Lungs - Chest expands equally, no wheezes, rales or rhonchi. Cardiovascular - Heart sounds are normal.  normal rate and rhythm regular, no murmur, gallop or rub. Abdomen - soft, nontender, nondistended, no masses or organomegaly  Neurologic - CN II-XII are grossly intact. There are no focal motor deficits  Skin - no bruising or bleeding  Extremities - no cyanosis, clubbing; upper extremity edema    Lab Results   CBC     Lab Results   Component Value Date    WBC 5.2 07/16/2020    RBC 3.29 07/16/2020    RBC 3.78 04/23/2012    HGB 8.7 07/16/2020    HCT 27.5 07/16/2020     07/16/2020     04/23/2012    MCV 83.8 07/16/2020    MCH 26.4 07/16/2020    MCHC 31.5 07/16/2020    RDW 16.3 07/16/2020    LYMPHOPCT 17 07/16/2020    LYMPHOPCT 9.8 04/23/2019    MONOPCT 8 07/16/2020    MONOPCT 6.0 04/23/2019    EOSPCT 4.0 04/23/2019    BASOPCT 1 07/16/2020    BASOPCT 0.5 04/23/2019    MONOSABS 0.40 07/16/2020    MONOSABS 0.5 04/23/2019    LYMPHSABS 0.90 07/16/2020    LYMPHSABS 0.8 04/23/2019    EOSABS 0.40 07/16/2020    EOSABS 0.33 04/23/2019    BASOSABS 0.10 07/16/2020    DIFFTYPE NOT REPORTED 07/16/2020       BMP   Lab Results   Component Value Date     07/16/2020    K 3.7 07/16/2020     07/16/2020    CO2 20 07/16/2020    BUN 12 07/16/2020    CREATININE 1.43 07/16/2020    GLUCOSE 71 07/16/2020    GLUCOSE 118 04/23/2012    CALCIUM 8.3 07/16/2020       LFTS  Lab Results   Component Value Date    ALKPHOS 123 07/12/2020    ALT 7 07/12/2020    AST 14 07/12/2020    PROT 7.2 07/12/2020    BILITOT 0.41 07/12/2020    BILIDIR 0.12 04/12/2012    IBILI 0.22 04/12/2012    LABALBU 3.9 07/12/2020    LABALBU 4.1 04/23/2012       INR  No results for input(s): PROTIME, INR in the last 72 hours. APTT  No results for input(s): APTT in the last 72 hours.     Lactic Acid  Lab Results   Component Value Date    LACTA 1.1 04/24/2019        BNP   No results for input(s): BNP in the last 72 hours. Cultures       Radiology     Plain Films             SYSTEM ASSESSMENT    Acute mental status changes  Hypertensive emergency  Aspiration pneumonia  Acute hypoxic respiratory failure    Neuro   Mental status now appropriate since blood pressure better controlled  Neurology notes reviewed and Keppra dose    Respiratory   Wean oxygen as tolerated.  Keep O2 sat > 88%  Extubation planned today     Hemodynamics   Continue on esmolol drip to control blood pressure since still fairly labile  Further blood pressure adjustment per cardiology    Gastrointestinal/Nutrition   Advance diet as tolerated once extubated  GI prophylaxis    Renal   Urine output decreased but overall, creatinine has stabilized and is not going up  Do not think she needs Lasix right now  We will check BNP    Infectious Disease   Would finish treatment for aspiration pneumonia with Augmentin for 3 more days    Hematology/Oncology   DVT prophylaxis    Endocrine   Blood sugars are okay  Social/Spiritual/DNR/Disposition/Other   Observe in ICU 24-hour      Critical Care Time   >35 min    Electronically signed by Srini Dillard MD on 7/16/2020 at 8:10 AM

## 2020-07-16 NOTE — PROGRESS NOTES
Dr Leonora Templeton notified of urine output 80ml so far this shift, urine output 225ml for day shift, IV of LR continues at 75ml/hr, creat 1.47, 1.36 the day before, CXR today without acute process, orders received

## 2020-07-17 LAB
ABSOLUTE EOS #: 0.3 K/UL (ref 0–0.4)
ABSOLUTE IMMATURE GRANULOCYTE: ABNORMAL K/UL (ref 0–0.3)
ABSOLUTE LYMPH #: 0.9 K/UL (ref 1–4.8)
ABSOLUTE MONO #: 0.5 K/UL (ref 0.1–1.3)
ANION GAP SERPL CALCULATED.3IONS-SCNC: 16 MMOL/L (ref 9–17)
BASOPHILS # BLD: 1 % (ref 0–2)
BASOPHILS ABSOLUTE: 0 K/UL (ref 0–0.2)
BUN BLDV-MCNC: 14 MG/DL (ref 8–23)
BUN/CREAT BLD: ABNORMAL (ref 9–20)
CALCIUM SERPL-MCNC: 8.7 MG/DL (ref 8.6–10.4)
CHLORIDE BLD-SCNC: 103 MMOL/L (ref 98–107)
CO2: 19 MMOL/L (ref 20–31)
CREAT SERPL-MCNC: 1.58 MG/DL (ref 0.5–0.9)
DIFFERENTIAL TYPE: ABNORMAL
EOSINOPHILS RELATIVE PERCENT: 6 % (ref 0–4)
GFR AFRICAN AMERICAN: 38 ML/MIN
GFR NON-AFRICAN AMERICAN: 32 ML/MIN
GFR SERPL CREATININE-BSD FRML MDRD: ABNORMAL ML/MIN/{1.73_M2}
GFR SERPL CREATININE-BSD FRML MDRD: ABNORMAL ML/MIN/{1.73_M2}
GLUCOSE BLD-MCNC: 125 MG/DL (ref 65–105)
GLUCOSE BLD-MCNC: 58 MG/DL (ref 65–105)
GLUCOSE BLD-MCNC: 66 MG/DL (ref 70–99)
GLUCOSE BLD-MCNC: 72 MG/DL (ref 65–105)
HCT VFR BLD CALC: 30.2 % (ref 36–46)
HEMOGLOBIN: 9.5 G/DL (ref 12–16)
IMMATURE GRANULOCYTES: ABNORMAL %
LYMPHOCYTES # BLD: 15 % (ref 24–44)
MCH RBC QN AUTO: 26.4 PG (ref 26–34)
MCHC RBC AUTO-ENTMCNC: 31.4 G/DL (ref 31–37)
MCV RBC AUTO: 84 FL (ref 80–100)
MONOCYTES # BLD: 8 % (ref 1–7)
NRBC AUTOMATED: ABNORMAL PER 100 WBC
PDW BLD-RTO: 16.4 % (ref 11.5–14.9)
PLATELET # BLD: 180 K/UL (ref 150–450)
PLATELET ESTIMATE: ABNORMAL
PMV BLD AUTO: 8.1 FL (ref 6–12)
POTASSIUM SERPL-SCNC: 3.6 MMOL/L (ref 3.7–5.3)
RBC # BLD: 3.6 M/UL (ref 4–5.2)
RBC # BLD: ABNORMAL 10*6/UL
SEG NEUTROPHILS: 70 % (ref 36–66)
SEGMENTED NEUTROPHILS ABSOLUTE COUNT: 4.1 K/UL (ref 1.3–9.1)
SODIUM BLD-SCNC: 138 MMOL/L (ref 135–144)
WBC # BLD: 5.9 K/UL (ref 3.5–11)
WBC # BLD: ABNORMAL 10*3/UL

## 2020-07-17 PROCEDURE — 80048 BASIC METABOLIC PNL TOTAL CA: CPT

## 2020-07-17 PROCEDURE — 6370000000 HC RX 637 (ALT 250 FOR IP): Performed by: STUDENT IN AN ORGANIZED HEALTH CARE EDUCATION/TRAINING PROGRAM

## 2020-07-17 PROCEDURE — 97166 OT EVAL MOD COMPLEX 45 MIN: CPT

## 2020-07-17 PROCEDURE — 6370000000 HC RX 637 (ALT 250 FOR IP): Performed by: INTERNAL MEDICINE

## 2020-07-17 PROCEDURE — 2580000003 HC RX 258: Performed by: RADIOLOGY

## 2020-07-17 PROCEDURE — 85025 COMPLETE CBC W/AUTO DIFF WBC: CPT

## 2020-07-17 PROCEDURE — 99239 HOSP IP/OBS DSCHRG MGMT >30: CPT | Performed by: INTERNAL MEDICINE

## 2020-07-17 PROCEDURE — 97535 SELF CARE MNGMENT TRAINING: CPT

## 2020-07-17 PROCEDURE — 97530 THERAPEUTIC ACTIVITIES: CPT

## 2020-07-17 PROCEDURE — 2580000003 HC RX 258: Performed by: INTERNAL MEDICINE

## 2020-07-17 PROCEDURE — 2060000000 HC ICU INTERMEDIATE R&B

## 2020-07-17 PROCEDURE — 93976 VASCULAR STUDY: CPT

## 2020-07-17 PROCEDURE — 82947 ASSAY GLUCOSE BLOOD QUANT: CPT

## 2020-07-17 PROCEDURE — 99232 SBSQ HOSP IP/OBS MODERATE 35: CPT | Performed by: PSYCHIATRY & NEUROLOGY

## 2020-07-17 PROCEDURE — 97162 PT EVAL MOD COMPLEX 30 MIN: CPT

## 2020-07-17 PROCEDURE — 36415 COLL VENOUS BLD VENIPUNCTURE: CPT

## 2020-07-17 PROCEDURE — 2500000003 HC RX 250 WO HCPCS: Performed by: INTERNAL MEDICINE

## 2020-07-17 PROCEDURE — 6360000002 HC RX W HCPCS: Performed by: INTERNAL MEDICINE

## 2020-07-17 PROCEDURE — 2580000003 HC RX 258: Performed by: HOSPITALIST

## 2020-07-17 RX ORDER — HYDROCHLOROTHIAZIDE 25 MG/1
25 TABLET ORAL DAILY
Status: CANCELLED | OUTPATIENT
Start: 2020-07-18

## 2020-07-17 RX ORDER — CLOPIDOGREL BISULFATE 75 MG/1
75 TABLET ORAL DAILY
Status: CANCELLED | OUTPATIENT
Start: 2020-07-18

## 2020-07-17 RX ORDER — AMOXICILLIN AND CLAVULANATE POTASSIUM 875; 125 MG/1; MG/1
1 TABLET, FILM COATED ORAL EVERY 12 HOURS SCHEDULED
Status: CANCELLED | OUTPATIENT
Start: 2020-07-17

## 2020-07-17 RX ORDER — AMLODIPINE BESYLATE 5 MG/1
5 TABLET ORAL DAILY
Status: CANCELLED | OUTPATIENT
Start: 2020-07-18

## 2020-07-17 RX ORDER — METOPROLOL TARTRATE 50 MG/1
50 TABLET, FILM COATED ORAL 2 TIMES DAILY
Status: CANCELLED | OUTPATIENT
Start: 2020-07-17

## 2020-07-17 RX ORDER — EZETIMIBE 10 MG/1
10 TABLET ORAL NIGHTLY
Status: CANCELLED | OUTPATIENT
Start: 2020-07-17

## 2020-07-17 RX ORDER — CLONIDINE 0.1 MG/24H
1 PATCH, EXTENDED RELEASE TRANSDERMAL WEEKLY
Status: CANCELLED | OUTPATIENT
Start: 2020-07-20

## 2020-07-17 RX ORDER — DEXTROSE MONOHYDRATE 25 G/50ML
12.5 INJECTION, SOLUTION INTRAVENOUS PRN
Status: DISCONTINUED | OUTPATIENT
Start: 2020-07-17 | End: 2020-07-20 | Stop reason: HOSPADM

## 2020-07-17 RX ORDER — DEXTROSE MONOHYDRATE 50 MG/ML
100 INJECTION, SOLUTION INTRAVENOUS PRN
Status: DISCONTINUED | OUTPATIENT
Start: 2020-07-17 | End: 2020-07-20 | Stop reason: HOSPADM

## 2020-07-17 RX ORDER — NICOTINE POLACRILEX 4 MG
15 LOZENGE BUCCAL PRN
Status: DISCONTINUED | OUTPATIENT
Start: 2020-07-17 | End: 2020-07-20 | Stop reason: HOSPADM

## 2020-07-17 RX ORDER — SPIRONOLACTONE 25 MG/1
25 TABLET ORAL DAILY
Status: CANCELLED | OUTPATIENT
Start: 2020-07-18

## 2020-07-17 RX ORDER — FAMOTIDINE 20 MG/1
20 TABLET, FILM COATED ORAL DAILY
Status: DISCONTINUED | OUTPATIENT
Start: 2020-07-17 | End: 2020-07-20 | Stop reason: HOSPADM

## 2020-07-17 RX ORDER — ASPIRIN 300 MG/1
300 SUPPOSITORY RECTAL EVERY 6 HOURS PRN
Status: CANCELLED | OUTPATIENT
Start: 2020-07-17

## 2020-07-17 RX ORDER — ACETAMINOPHEN 325 MG/1
650 TABLET ORAL EVERY 4 HOURS PRN
Status: CANCELLED | OUTPATIENT
Start: 2020-07-17

## 2020-07-17 RX ADMIN — EZETIMIBE 10 MG: 10 TABLET ORAL at 22:27

## 2020-07-17 RX ADMIN — METOPROLOL TARTRATE 50 MG: 50 TABLET, FILM COATED ORAL at 22:10

## 2020-07-17 RX ADMIN — Medication 10 ML: at 08:51

## 2020-07-17 RX ADMIN — METOPROLOL TARTRATE 50 MG: 50 TABLET, FILM COATED ORAL at 08:50

## 2020-07-17 RX ADMIN — AMOXICILLIN AND CLAVULANATE POTASSIUM 1 TABLET: 875; 125 TABLET, FILM COATED ORAL at 08:51

## 2020-07-17 RX ADMIN — Medication 10 ML: at 22:10

## 2020-07-17 RX ADMIN — SPIRONOLACTONE 25 MG: 25 TABLET, FILM COATED ORAL at 08:50

## 2020-07-17 RX ADMIN — HYDRALAZINE HYDROCHLORIDE 10 MG: 20 INJECTION INTRAMUSCULAR; INTRAVENOUS at 02:59

## 2020-07-17 RX ADMIN — FAMOTIDINE 20 MG: 10 INJECTION, SOLUTION INTRAVENOUS at 08:50

## 2020-07-17 RX ADMIN — FAMOTIDINE 20 MG: 20 TABLET ORAL at 12:09

## 2020-07-17 RX ADMIN — AMOXICILLIN AND CLAVULANATE POTASSIUM 1 TABLET: 875; 125 TABLET, FILM COATED ORAL at 22:27

## 2020-07-17 RX ADMIN — Medication 10 ML: at 22:11

## 2020-07-17 RX ADMIN — DEXTROSE MONOHYDRATE 12.5 G: 25 INJECTION, SOLUTION INTRAVENOUS at 05:38

## 2020-07-17 RX ADMIN — HYDROCHLOROTHIAZIDE 25 MG: 25 TABLET ORAL at 08:51

## 2020-07-17 RX ADMIN — CLOPIDOGREL BISULFATE 75 MG: 75 TABLET ORAL at 08:51

## 2020-07-17 RX ADMIN — AMLODIPINE BESYLATE 5 MG: 5 TABLET ORAL at 08:50

## 2020-07-17 ASSESSMENT — ENCOUNTER SYMPTOMS
DIARRHEA: 0
NAUSEA: 0
SHORTNESS OF BREATH: 0
ABDOMINAL PAIN: 0
WHEEZING: 0
SORE THROAT: 0
VOMITING: 0
CONSTIPATION: 0

## 2020-07-17 ASSESSMENT — PAIN SCALES - GENERAL
PAINLEVEL_OUTOF10: 0

## 2020-07-17 NOTE — PROGRESS NOTES
Darian Huff called up to unit requesting update. Updated on patient condition and questions answered at this time.

## 2020-07-17 NOTE — PROGRESS NOTES
[facility] Progress Note    7/17/2020 7:42 AM      Subjective:  Ms. Yuli Barba was extubated yesterday. Off of IV esmolol drip since my evaluation yesterday. Patient is awake and alert and denies any chest pain or worsening shortness of breath or palpitations or lightheadedness or dizziness. Review of systems:  No fever or chills. Undergoing bedside renal ultrasound in the ICU at the time of my evaluation. Derril [place]              LABS:     Recent Results (from the past 24 hour(s))   Calcium, Ionized    Collection Time: 07/16/20  9:04 AM   Result Value Ref Range    Calcium, Ion 1.26 1.13 - 1.33 mmol/L   Basic Metabolic Prof    Collection Time: 07/17/20  4:27 AM   Result Value Ref Range    Glucose 66 (L) 70 - 99 mg/dL    BUN 14 8 - 23 mg/dL    CREATININE 1.58 (H) 0.50 - 0.90 mg/dL    Bun/Cre Ratio NOT REPORTED 9 - 20    Calcium 8.7 8.6 - 10.4 mg/dL    Sodium 138 135 - 144 mmol/L    Potassium 3.6 (L) 3.7 - 5.3 mmol/L    Chloride 103 98 - 107 mmol/L    CO2 19 (L) 20 - 31 mmol/L    Anion Gap 16 9 - 17 mmol/L    GFR Non-African American 32 (L) >60 mL/min    GFR  38 (L) >60 mL/min    GFR Comment          GFR Staging NOT REPORTED    CBC with DIFF    Collection Time: 07/17/20  4:27 AM   Result Value Ref Range    WBC 5.9 3.5 - 11.0 k/uL    RBC 3.60 (L) 4.0 - 5.2 m/uL    Hemoglobin 9.5 (L) 12.0 - 16.0 g/dL    Hematocrit 30.2 (L) 36 - 46 %    MCV 84.0 80 - 100 fL    MCH 26.4 26 - 34 pg    MCHC 31.4 31 - 37 g/dL    RDW 16.4 (H) 11.5 - 14.9 %    Platelets 588 464 - 837 k/uL    MPV 8.1 6.0 - 12.0 fL    NRBC Automated NOT REPORTED per 100 WBC    Differential Type NOT REPORTED     Seg Neutrophils 70 (H) 36 - 66 %    Lymphocytes 15 (L) 24 - 44 %    Monocytes 8 (H) 1 - 7 %    Eosinophils % 6 (H) 0 - 4 %    Basophils 1 0 - 2 %    Immature Granulocytes NOT REPORTED 0 %    Segs Absolute 4.10 1.3 - 9.1 k/uL    Absolute Lymph # 0.90 (L) 1.0 - 4.8 k/uL    Absolute Mono # 0.50 0.1 - 1.3 k/uL    Absolute Eos # 0.30 0.0 - 0.4 k/uL    Basophils Absolute 0.00 0.0 - 0.2 k/uL    Absolute Immature Granulocyte NOT REPORTED 0.00 - 0.30 k/uL    WBC Morphology NOT REPORTED     RBC Morphology NOT REPORTED     Platelet Estimate NOT REPORTED    POC Glucose Fingerstick    Collection Time: 20  5:20 AM   Result Value Ref Range    POC Glucose 58 (L) 65 - 105 mg/dL   POC Glucose Fingerstick    Collection Time: 20  5:56 AM   Result Value Ref Range    POC Glucose 125 (H) 65 - 105 mg/dL       Pulse Ox: SpO2  Av.1 %  Min: 91 %  Max: 99 %    Supplemental O2: O2 Flow Rate (L/min): 2 L/min     Current Meds:    amoxicillin-clavulanate  1 tablet Oral 2 times per day    amLODIPine  5 mg Oral Daily    spironolactone  25 mg Oral Daily    And    hydroCHLOROthiazide  25 mg Oral Daily    sodium chloride flush  10 mL Intravenous 2 times per day    sodium chloride flush  10 mL Intravenous 2 times per day    [Held by provider] enoxaparin  30 mg Subcutaneous Daily    famotidine (PEPCID) injection  20 mg Intravenous Daily    clopidogrel  75 mg Oral Daily    metoprolol tartrate  50 mg Oral BID    cloNIDine  1 patch Transdermal Weekly    ezetimibe  10 mg Oral Nightly         Continuous Infusions:    dextrose      esmolol Stopped (20 1448)              VITAL SIGNS:    BP (!) 128/35   Pulse 73   Temp 98.2 °F (36.8 °C) (Axillary)   Resp 15   Ht 5' 5\" (1.651 m)   Wt 179 lb (81.2 kg)   SpO2 93%   BMI 29.79 kg/m²  2 L/min      Admit Weight:  190 lb (86.2 kg)    Last 3 weights: Wt Readings from Last 3 Encounters:   20 179 lb (81.2 kg)   19 192 lb 3.9 oz (87.2 kg)   19 171 lb 3.2 oz (77.7 kg)       BMI: Body mass index is 29.79 kg/m². INPUT/OUTPUT:          Intake/Output Summary (Last 24 hours) at 2020 0742  Last data filed at 2020 0400  Gross per 24 hour   Intake 530.85 ml   Output 2600 ml   Net -2069.15 ml         Telemetry shows sinus rhythm.       EXAM:     General appearance: awake, alert.  Pleasant. Neck: No JVD  Chest: Diminished air entry but clear bilaterally. No tenderness. No wheezing. Cardiac: Regular rate and rhythm. No S3 gallop or rubs. Abdomen: soft, non-tender. Extremities: no cyanosis, no clubbing, no leg edema. Skin:  warm and dry. Neuro:  Able to move all 4 extremities. 2 D ECHO July 13, 2020:     Patient supine, on ventilator. Contrast was utilized on this technically   difficult study. Small LV cavity. Hyperdynamic left ventricular function. Estimated LV EF 75   %. Moderate to severe left ventricular hypertrophy. Obstructive Doppler pattern   mid LV mean 14 mmHg, max 40 mmHg. No segmental wall motion abnormalities. Grade I (mild) left ventricular diastolic dysfunction. Normal right ventricular size and function. Left atrium is normal in size. Right atrium is normal in size. Aortic valve was not well visualized. No aortic stenosis. No aortic insufficiency. Normal aortic root dimension. Mitral annular calcification is seen. Trivial mitral regurgitation. Tricuspid valve was not well visualized. Mild tricuspid regurgitation. Estimated right ventricular systolic pressure is 19 mmHg. Normal right   ventricular systolic pressure. Pulmonic valve not well visualized but Doppler velocities are normal. No   pulmonic insufficiency. IVC normal diameter. No significant pericardial effusion is seen.      ASSESSMENT:     Accelerated hypertension - improved currently.  History of labile blood pressures.     Abnormal EKG.  Cannot exclude underlying coronary artery disease.  No active angina pectoris.     Hyperdynamic LVEF 75%, mild TR on 2 D ECHO July 2020.     Acute respiratory failure requiring oral intubation and mechanical ventilation, extubated 7/16/2020.      History of carotid artery disease.     Altered mental status with headache prompting hospitalization.   Improved mental status currently.     Other problems as charted. REC/PLAN:    Significant improvement overall. Patient was placed on oral metoprolol 50 mg twice daily, amlodipine 5 mg daily, Catapres patch 0.1 mg per 24 hours weekly, Aldactone 25 mg daily, HCTZ 25 mg daily. Continue on Plavix 75 mg daily, Zetia 10 mg daily. Okay to transfer out of ICU to stepdown telemetric monitoring bed from cardiac standpoint, if okay with other physicians. Discussed with the charge nurse [address]. No family members available at bedside to discuss. Electronically signed by Paty Rogers MD, [facility]        PLEASE NOTE:  This progress note was completed using a voice transcription system. Every effort was made to ensure accuracy. However, inadvertent computerized transcription errors may be present.

## 2020-07-17 NOTE — CARE COORDINATION
ONGOING DISCHARGE PLAN:    LSW following for ARU. [facility] - [address] [facility]    Will continue to follow for additional discharge needs.     Electronically signed by Star Boyle RN on 7/17/2020 at 4:16 PM

## 2020-07-17 NOTE — PROGRESS NOTES
Physical Therapy    Facility/Department: [address] [place] [address] CARE  Initial Assessment    NAME: Gage Lucas  : 1940  MRN: [id number]    Date of Service: 2020    Discharge Recommendations:  Patient would benefit from continued therapy after discharge   PT Equipment Recommendations  Other: TBD - may need manual w/c pending d/c location    Assessment   Body structures, Functions, Activity limitations: Decreased functional mobility ; Decreased ADL status; Decreased strength;Decreased safe awareness;Decreased cognition;Decreased balance;Decreased endurance;Decreased posture  Assessment: Pt requiring 2 assist for all functional mobility. Unable to advance today to take steps. Pt has slowed processing and aphasia at times (hx of CVA). Supportive  who is a caregiver. Pt demonstrates increased decline since being home. Pt would benefit from continued PT to maximize her mobility potential.  Treatment Diagnosis: Impaired functional mobility 2* hypertensive urgency  Specific instructions for Next Treatment: progress standing tolerance, sitting balance, core, therex  Prognosis: Fair  Decision Making: Medium Complexity  History: patient had acute mental status changes x1 day and was not answering questions appropriately therefore was brought in by . In the ER patient complained of having numbness and tingling in the left upper and lower extremity concerning for stroke. Patient soon after became unresponsive and had to be intubated for airway protection. Blood pressure was also elevated with systolic being in the 937D  Exam: ROM, MMT, bed mobility, transfers, activity tolerance, balance  Clinical Presentation: Pt agreeable and cooperative for PT  Barriers to Learning: cognition, safety awareness  REQUIRES PT FOLLOW UP: Yes  Activity Tolerance  Activity Tolerance: Patient limited by cognitive status; Patient limited by endurance; Patient limited by fatigue       Patient Diagnosis(es): The encounter diagnosis was Altered mental status, unspecified altered mental status type. has a past medical history of Arthritis, Cancer ([facility] [address] CKD (chronic kidney disease) stage 4, GFR 15-29 ml/min (HCC), COPD (chronic obstructive pulmonary disease) ([facility] [address] CVA (cerebral vascular accident) ([facility] [address] Gout, Hyperlipidemia, Hypertension, Left renal atrophy, and Other abnormality of urination(788.69). has a past surgical history that includes Tonsillectomy; Varicose vein surgery; Hammer toe surgery; Hysterectomy; Carotid endarterectomy; Appendectomy; joint replacement; bladder suspension; joint replacement; Breast surgery (Left); Colonoscopy; back surgery; Upper gastrointestinal endoscopy (N/A, 7/17/2018); and Colonoscopy (N/A, 7/17/2018). Restrictions  Restrictions/Precautions  Restrictions/Precautions: General Precautions, Fall Risk  Required Braces or Orthoses?: No  Implants present? : Metal implants(R JAILENE, L TKA, neck and back sx)  Position Activity Restriction  Other position/activity restrictions: up with assistance  Vision/Hearing  Vision: Impaired  Vision Exceptions: Wears glasses for distance  Hearing: Exceptions to [facility]  Hearing Exceptions: Hard of hearing/hearing concerns; No hearing aid     Subjective  General  Chart Reviewed: Yes  Patient assessed for rehabilitation services?: Yes  Additional Pertinent Hx: exubated 7/16/20, HTN, CVA, COPD, breast CA, negative MRI brain for acute infarct or hemorrhage  Family / Caregiver Present: No  Referring Practitioner: Srini Dillard MD   Referral Date : 07/17/20  Diagnosis: hypertensive urgency  Follows Commands: Within Functional Limits(requires repetition at times)  General Comment  Comments: Regular call light replaced with easy touch call light - pt trialed with therapy  Subjective  Subjective: Pt in bed, agreeable to PT OT norberto baron. RN Manny Duckworth.    Pain Screening  Patient Currently in Pain: Denies  Vital Signs  Patient Currently in Pain: Denies  Oxygen Therapy  O2 Device: None (Room air)  Patient Observation  Observations: expressive aphasia,slowed processing time       Orientation  Orientation  Overall Orientation Status: Impaired  Orientation Level: Oriented to person;Disoriented to situation;Disoriented to time;Disoriented to place  Social/Functional History  Social/Functional History  Lives With: Spouse  Type of Home: House  Home Layout: Able to Live on Main level with bedroom/bathroom, Two level  Home Access: Ramped entrance  Bathroom Shower/Tub: Walk-in shower, Shower chair with back  Bathroom Toilet: Standard(with toilet raiser for taller height)  Bathroom Equipment: Shower chair, Grab bars in shower, Hand-held shower, Toilet raiser, Grab bars around toilet  Bathroom Accessibility: Accessible, Walker accessible  Home Equipment: Rolling walker, 4 wheeled walker, Cane, Sock aid, [address] bed  [employer] Help From: Family(spouse)  ADL Assistance: Independent  Homemaking Assistance: Needs assistance(spouse does cooking, cleaning, laundry)  Homemaking Responsibilities: No( does shopping)  Ambulation Assistance: Independent(uses rollator)  Transfer Assistance: Needs assistance(spouse assists with bed mobility)  Active : No  Patient's  Info:   Occupation: Retired  Type of occupation: manufacturing  IADL Comments: sleeps in hospital bed in living room  Additional Comments: Spouse is retired and available to assist 24/7. Per Pt's spouse Gene who was present at end of evaluation, Pt was IND with all self-care and functional mobility (with use of 4WW) 3-4 months ago. However, Pt's spouse has been providing additional assist for the last 3-4 months due to Pt's chronic back pain.   Cognition        Objective          AROM RLE (degrees)  RLE AROM: WFL  AROM LLE (degrees)  LLE AROM : WFL  AROM RUE (degrees)  RUE General AROM: See OT  AROM LUE (degrees)  LUE General AROM: See OT  Strength RLE  Strength RLE: WFL  Comment: Grossly 3+/5  Strength LLE  Strength LLE: WFL  Comment: Grossly 3+/5  Strength RUE  Comment: See OT  Strength LUE  Comment: See OT     Sensation  Overall Sensation Status: (JAMIE)  Bed mobility  Supine to Sit: Maximum assistance;2 Person assistance  Scooting: Maximal assistance;2 Person assistance  Comment: Pt requiring cues to initiate bed mobility to sit edge of bed. Poor trunk control with occasional posterior and R lateral lean. min to mod x1 to correct. Pt sits edge of bed ~5 minutes. Transfers  Sit to Stand: Maximum Assistance;2 Person Assistance(RW and merlin stedy)  Stand to sit: Maximum Assistance;2 Person Assistance(RW and merlin stedy)  Bed to Chair: Dependent/Total(merlin stedy)  Comment: max x2 to stand twice. Pt requiring max cues for sequencing and technique. Pt pulling up on RW with B hands despite cues. Poor posture upon standing requiring verbal and tactile cues for improved upright posture and hip extension  Ambulation  Ambulation?: No(attempted - pt unable to initiate side stepping)  Stairs/Curb  Stairs?: No     Balance  Posture: Fair  Sitting - Static: Fair  Sitting - Dynamic: Fair;-  Standing - Static: Poor  Standing - Dynamic: (JAMIE)  Comments: High fall risk, standing balance assessed with RW. Stood twice for ~15-20 seconds with 2 assist. Pt unable to advance LE to take side steps. Plan   Plan  Times per week: 5-6x/week  Specific instructions for Next Treatment: progress standing tolerance, sitting balance, core, therex  Current Treatment Recommendations: Strengthening, Balance Training, Functional Mobility Training, Transfer Training, Endurance Training, Gait Training, Equipment Evaluation, Education, & procurement, Patient/Caregiver Education & Training, Safety Education & Training  Safety Devices  Type of devices:  All fall risk precautions in place, Call light within reach, Chair alarm in place, Gait belt, Patient at risk for falls, Left in chair, Nurse notified(SARAH King)  Restraints  Initially in place: No    G-Code       OutComes Score                                                  AM-PAC Score     AM-PAC Inpatient Mobility without Stair Climbing Raw Score : 6 (07/17/20 1251)  AM-PAC Inpatient without Stair Climbing T-Scale Score : 26.48 (07/17/20 1251)  Mobility Inpatient CMS 0-100% Score: 92.18 (07/17/20 1251)  Mobility Inpatient without Stair CMS G-Code Modifier : CM (07/17/20 1251)       Goals  Short term goals  Time Frame for Short term goals: 3-4 days  Short term goal 1: Pt to demonstrate mod x1 bed mobility. Short term goal 2: Pt to improve sit<->stand transfers min to mod x2. Short term goal 3: Pt to improve standing tolerance with improved posture to at least 1 minute, 2 assist.  Short term goal 4: Pt to improve posture to GOOD. Short term goal 5: Pt to improve sitting balance to [facility] with improved trunk control and core strength. Short term goal 6: Pt to improve BLE strength by 1/2 MMG. Short term goal 7: Pt to progress to taking steps at bedside 10' min to mod x2. Patient Goals   Patient goals :  To go home       Therapy Time   Individual Concurrent Group Co-treatment   Time In 1251         Time Out 1342         Minutes 51         Timed Code Treatment Minutes: [address] Candace Andrade, PT

## 2020-07-17 NOTE — PROGRESS NOTES
[address]   Occupational Therapy Evaluation  Date: 20  Patient Name: Merline Birchwood       Room:   MRN: [id number]  Account: [de-identified]   : 1940  (76 y.o.) Gender: female     Discharge Recommendations:  Further Occupational  Therapy is recommended upon facility discharge. Equipment Needed: (TBD)    Referring Practitioner: Dr. Ally Paredes  Diagnosis: Hypertensive emergency, Acute encephalopathy       Treatment Diagnosis: Impaired self-care status. Past Medical History:  has a past medical history of Arthritis, Cancer ([facility] [address] CKD (chronic kidney disease) stage 4, GFR 15-29 ml/min ([facility]), COPD (chronic obstructive pulmonary disease) ([facility] [address] CVA (cerebral vascular accident) ([facility] [address] Gout, Hyperlipidemia, Hypertension, Left renal atrophy, and Other abnormality of urination(788.69). Past Surgical History:   has a past surgical history that includes Tonsillectomy; Varicose vein surgery; Hammer toe surgery; Hysterectomy; Carotid endarterectomy; Appendectomy; joint replacement; bladder suspension; joint replacement; Breast surgery (Left); Colonoscopy; back surgery; Upper gastrointestinal endoscopy (N/A, 2018); and Colonoscopy (N/A, 2018).     Restrictions  Restrictions/Precautions: General Precautions, Fall Risk  Implants present? : Metal implants(R JAILENE, L TKA, neck and back sx)  Other position/activity restrictions: up with assistance  Required Braces or Orthoses?: No     Vitals  Temp: 97.9 °F (36.6 °C)  Pulse: 67  Resp: 17  BP: (!) 146/58  Height: 5' 5\" (165.1 cm)  Weight: 179 lb (81.2 kg)  BMI (Calculated): 29.8  Oxygen Therapy  SpO2: 95 %  Pulse Oximeter Device Mode: Continuous  Pulse Oximeter Device Location: Finger  O2 Device: None (Room air)  FiO2 : 28 %  O2 Flow Rate (L/min): 2 L/min  End Tidal CO2: 35 (%)  Blood Gas  Performed?: Yes  Basim's Test #1: Collateral flow confirmed  Site #1: Right Radial  Site Prepped #1: Yes  Number of Attempts #1: 1  Pressure Held #1: Yes  Complications #1: None  Post-procedure #1: Standard  Specimen Status #1: Point of care  How Tolerated?: Tolerated well  Site #2: Right Radial  Level of Consciousness: Alert    Subjective  Subjective: \"Stimulus\" Pt stated in response to \"are you in pain? \" at beginning of evaluation. Pt's spouse present at end of evaluation and reported that prior to admission, Pt was undergoing testing for a spinal stimulator to be inserted to address Pt's chronic back pain. Overall Orientation Status: Impaired  Orientation Level: Oriented to person, Disoriented to place, Disoriented to time, Disoriented to situation  Vision  Vision: Impaired  Vision Exceptions: Wears glasses for distance  Hearing  Hearing: Exceptions to [facility] [address] [facility]  Hearing Exceptions: Hard of hearing/hearing concerns, No hearing aid  Social/Functional History  Lives With: Spouse  Type of Home: House  Home Layout: Able to Live on Main level with bedroom/bathroom, Two level  Home Access: Ramped entrance  Bathroom Shower/Tub: Walk-in shower, Shower chair with back  [place] Toilet: Standard(with toilet raiser for taller height)  Bathroom Equipment: Shower chair, Grab bars in shower, Hand-held shower, Toilet raiser, Grab bars around toilet  Bathroom Accessibility: Accessible, Walker accessible  Home Equipment: Rolling walker, 4 wheeled walker, Cane, Sock aid, Long-handled shoehorn, [address] bed  Seann Caitlyn Help From: Family(spouse)  ADL Assistance: Independent  Homemaking Assistance: Needs assistance(spouse does cooking, cleaning, laundry)  Homemaking Responsibilities: No( does shopping)  Ambulation Assistance: Independent(uses rollator)  Transfer Assistance: Needs assistance(spouse assists with bed mobility)  Active : No  Patient's  Info:   Occupation: Retired  Type of occupation: manufacturing  IADL Comments: sleeps in hospital bed in living room  Additional Comments: Spouse is retired and available to assist 24/7.  Per Pt's spouse Gene who was present at end of evaluation, Pt was IND with all self-care and functional mobility (with use of 4WW) 3-4 months ago. However, Pt's spouse has been providing additional assist for the last 3-4 months due to Pt's chronic back pain. Objective  Vision - Basic Assessment  Prior Vision: Wears glasses for distance only   Cognition  Overall Cognitive Status: Impaired  Arousal/Alertness: Inconsistent responses to stimuli  Attention Span: Difficulty attending to directions  Memory: Decreased recall of recent events, Decreased short term memory  Following Commands: Follows one step commands with increased time  Safety Judgement: Decreased awareness of need for safety  Awareness of Errors: Decreased awareness of errors  Insights: Decreased awareness of deficits  Sequencing and Organization: Assistance required to implement solutions, Assistance required to generate solutions, Assistance required to identify errors made  Additional Comments: OT provided Pt with one-touch call light and trialled with Pt. Pt successfully utilized call light 2/3 trials. Perception  Overall Perceptual Status: Impaired  Initiation: Hand over hand to initiate tasks  Motor Planning: Hand over hand to sequence tasks  Sensation  Overall Sensation Status: (JAMIE)   ADL  Feeding: Dependent/Total  Grooming: Dependent/Total  UE Bathing: Dependent/Total  LE Bathing: Dependent/Total  UE Dressing: Dependent/Total  LE Dressing: Dependent/Total  Toileting: Dependent/Total  Additional Comments: Pt demonstrates significantly impaired AROM limiting Pt's ability to participate in self-care tasks. Pt also demonstrated impaired attention to task, delayed processing, and impaired sequencing. ADL scores based on skilled observations and clinical reasoning unless otherwise noted.     UE Function  Hand Dominance  Hand Dominance: Right        LUE Strength  Gross LUE Strength: Exceptions to [employer]  L Hand General: 3+/5  LUE Strength Comment: Shoulder 2-/5, elbow 3+/5     LUE Tone: Normotonic  LUE PROM (degrees)  LUE PROM: WFL  LUE AROM (degrees)  LUE AROM : Exceptions  LUE General AROM: Shoulder AROM ~20%. Elbow AROM WFL. Left Hand PROM (degrees)  Left Hand PROM: WFL  Left Hand AROM (degrees)  Left Hand AROM: WFL  RUE Strength  Gross RUE Strength: Exceptions to [facility]  R Hand General: 3+/5  RUE Strength Comment: Shoulder 2-/5, elbow 3+/5      RUE Tone: Normotonic  RUE PROM (degrees)  RUE PROM: WFL  RUE AROM (degrees)  RUE AROM : Exceptions  RUE General AROM: Shoulder AROM ~10%. Elbow WFL. Right Hand PROM (degrees)  Right Hand PROM: WFL  Right Hand AROM (degrees)  Right Hand AROM: WFL    Fine Motor Skills  Coordination  Movements Are Fluid And Coordinated: No  Coordination and Movement description: Left UE, Right UE, Fine motor impairments, Gross motor impairments, Decreased speed, Decreased accuracy                           Mobility  Supine to Sit: Maximum assistance, 2 Person assistance  Sit to Supine: 2 Person assistance, Dependent/Total       Balance  Sitting Balance: Contact guard assistance  Standing Balance: Dependent/Total(Ax2 in SS)  Standing Balance  Comment: Attempted to stand at RW level, required Max A x 2 and unable to stand fully erect. [address] utilized for functional transfer to chair. Bed mobility  Supine to Sit: Maximum assistance;2 Person assistance  Sit to Supine: 2 Person assistance;Dependent/Total  Scooting: Maximal assistance;2 Person assistance  Comment: Pt requiring cues to initiate bed mobility to sit edge of bed. Poor trunk control with occasional posterior and R lateral lean. min to mod x1 to correct. Pt sits edge of bed ~5 minutes.      Transfers  Stand Pivot Transfers: Dependent/Total(Cathleen Araiza utilized)  Sit to stand: 2 Person assistance, Maximum assistance  Stand to sit: 2 Person assistance, Maximum assistance  Transfer Comments: Max VCs and tactile cues for posture  Functional Activity Tolerance  Functional Activity Tolerance: Tolerates 30 min exercise with multiple rests   Assessment  Performance deficits / Impairments: Decreased functional mobility , Decreased ADL status, Decreased strength, Decreased safe awareness, Decreased cognition, Decreased ROM, Decreased endurance, Decreased sensation, Decreased balance, Decreased fine motor control, Decreased coordination, Decreased posture  Treatment Diagnosis: Impaired self-care status. Prognosis: Fair  Decision Making: Medium Complexity  REQUIRES OT FOLLOW UP: Yes  Discharge Recommendations: Patient would benefit from continued therapy after discharge  Activity Tolerance: Patient limited by fatigue, Treatment limited secondary to decreased cognition         Functional Outcome Measures  AM-PAC Daily Activity Inpatient   How much help for putting on and taking off regular lower body clothing?: Total  How much help for Bathing?: Total  How much help for Toileting?: Total  How much help for putting on and taking off regular upper body clothing?: Total  How much help for taking care of personal grooming?: Total  How much help for eating meals?: Total  AM-PAC Inpatient Daily Activity Raw Score: 6  AM-PAC Inpatient ADL T-Scale Score : 17.07  ADL Inpatient CMS 0-100% Score: 100  ADL Inpatient CMS G-Code Modifier : CN       Goals  Patient Goals   Patient goals : To return home with spouse  Short term goals  Time Frame for Short term goals: By discharge  Short term goal 1: Pt will perform bed mobility with Mod A to sit EOB for 10+ minutes unsupported while engaging in functional activity of choice/self-care. Short term goal 2: Pt will follow one-step commands 75% of the time while engaging in self-care. Short term goal 3: Pt will perform self-feeding with Min A and Fair attention to task. Short term goal 4: Pt will perform grooming tasks with Mod A and Fair attention to task.   Short term goal 5: Pt will perform sit to stand transfers with Mod A and use of Evetta Hymen to promote increased

## 2020-07-17 NOTE — PROGRESS NOTES
[facility] ([facility]) Neurology Specialist  [address], [address] [facility]:  [phone] or [phone]  FAX:  [phone]          Brief history: Queenie Jarvis is a 79 y.o. old female admitted on 7/12/2020 with hypertensive encephalopathy     Subjective: The patient has now been moved to the floor. No new weakness and numbness      Objective: BP (!) 146/58   Pulse 67   Temp 97.9 °F (36.6 °C) (Oral)   Resp 17   Ht 5' 5\" (1.651 m)   Wt 179 lb (81.2 kg)   SpO2 95%   BMI 29.79 kg/m²       Medications:    famotidine  20 mg Oral Daily    amoxicillin-clavulanate  1 tablet Oral 2 times per day    amLODIPine  5 mg Oral Daily    spironolactone  25 mg Oral Daily    And    hydroCHLOROthiazide  25 mg Oral Daily    sodium chloride flush  10 mL Intravenous 2 times per day    sodium chloride flush  10 mL Intravenous 2 times per day    [Held by provider] enoxaparin  30 mg Subcutaneous Daily    clopidogrel  75 mg Oral Daily    metoprolol tartrate  50 mg Oral BID    cloNIDine  1 patch Transdermal Weekly    ezetimibe  10 mg Oral Nightly            Mental status   The patient is now extubated. She is awake. Oriented to self, knows that she is in the hospital.  Able to follow commands. Cranial nerves   Pupil response: Present   Corneal Reflex: Present    Oculocephalic reflex: Present     Cough reflex: Present        Motor and sensory function  Flexion withdrawal in all ext. Following commands with both upper extremities lower extremities with no focal weakness.      DTR Intact symmetric  Babinski Absent   Gait Not tested        Lab Results   Component Value Date    LDLCHOLESTEROL 50 03/17/2019     No components found for: CHLPL  Lab Results   Component Value Date    TRIG 55 03/17/2019    TRIG 117 04/28/2018    TRIG 200 (H) 09/24/2013     Lab Results   Component Value Date    HDL 45 03/17/2019    HDL 47 04/28/2018    HDL 60 09/24/2013     No results found for: LDLCALC  No results found for: LABVLDL  No results found for: LABA1C  No results found for: EAG  Lab Results   Component Value Date    LPOXOMSG20 8011 (H) 04/21/2019      Neurological work up:  CT head negative for acute changes   CTA head and neck  MRI brain     2 D echo     Assessment and Recommendations:   Posterior reversible encephalopathy syndrome secondary to hypertension  Other comorbid conditions include aspiration pneumonia, acute hypoxic respiratory failure status post intubation    The patient continues to have intermittent encephalopathy. No electrographic or clinical evidence of seizures. Recommend continued symptomatic management as being done. Okay to discharge from neurological standpoint  We will follow        Juan A Villegas MD  Neurology    This note is created with the assistance of a speech-recognition program. While intending to generate a document that actually reflects the content of the visit, the document can still have some errors including those of syntax and sound a- like substitutions which may escape proofreading. In such instances, actual meaning can be extrapolated by contextual derivation.

## 2020-07-17 NOTE — DISCHARGE INSTR - COC
Continuity of Care Form    Patient Name: Russ Cohen   :  [birth year]  MRN:  [id number]    [facility] date:  2020  Discharge date:      Code Status Order: Full Code   Advance Directives:     Admitting Physician:  Karey Mayberry MD  PCP: Orin Yang DO    Discharging Nurse: [facility] Unit/Room#: 2096/2096-01  Discharging Unit Phone Number: ***    Emergency Contact:   Extended Emergency Contact Information  Primary Emergency Contact: Hilda Senegalese  Address: [address] [facility], [address] [place] [address] [place] Car[place] [address] Phone: [phone]  Work Phone: [phone]  Mobile Phone: [phone]  Relation: Spouse  Hearing or visual needs: None  Other needs: None  Preferred language: English   needed?  No  Secondary Emergency Contact: Natan Pineda  [place] Phone: [phone]  Mobile Phone: [phone]  Relation: Child    Past Surgical History:  Past Surgical History:   Procedure Laterality Date    APPENDECTOMY      BACK SURGERY      fusion with rods and screws    BLADDER SUSPENSION      BREAST SURGERY Left     lumpectomy x2    CAROTID ENDARTERECTOMY      COLONOSCOPY      COLONOSCOPY N/A 2018    COLONOSCOPY POLYPECTOMY performed by Angely Zhu MD at [facility]      JOINT REPLACEMENT      knee    JOINT REPLACEMENT      right hip    TONSILLECTOMY      UPPER GASTROINTESTINAL ENDOSCOPY N/A 2018    EGD BIOPSY performed by Angely Zhu MD at [address]         Immunization History:   Immunization History   Administered Date(s) Administered    Pneumococcal Conjugate 13-valent (Zlidsfh23) 2018    Pneumococcal Polysaccharide (Nlyfbhlus94) 2014       Active Problems:  Patient Active Problem List   Diagnosis Code    Breast cancer ([facility] [address] C50.919    Renal cyst N28.1    Lumbar degenerative disc disease M51.36    Arthropathy of lumbar facet joint M47.816    Failed back syndrome of lumbar spine RRF}  Dressing  {CHP DME SOBV:331619026}  Toileting  {CHP DME AGCF:276601550}  Feeding  {P DME RJXS:790466931}  Med Admin  {CHP DME IAYC:942004293}  Med Delivery   { ILIA MED Delivery:178337289}    Wound Care Documentation and Therapy:        Elimination:  Continence:   · Bowel: {YES / KX:96731}  · Bladder: {YES / TY:99322}  Urinary Catheter: {Urinary Catheter:917913793}   Colostomy/Ileostomy/Ileal Conduit: {YES / KZ:22547}       Date of Last BM: ***    Intake/Output Summary (Last 24 hours) at 2020 1140  Last data filed at 2020 0400  Gross per 24 hour   Intake 530.85 ml   Output 2600 ml   Net -2069.15 ml     I/O last 3 completed shifts:   In: 530.9 [I.V.:530.9]  Out: 2600 [Urine:2600]    Safety Concerns:     [employer] Safety Concerns:322210217}    Impairments/Disabilities:      [address] [employer] Impairments/Disabilities:780772185}    Nutrition Therapy:  Current Nutrition Therapy:   [address] [employer] Diet List:083807237}    Routes of Feeding: {[facility] DME Other Feedings:759709417}  Liquids: {Slp liquid thickness:55980}  Daily Fluid Restriction: {P DME Yes amt example:968958228}  Last Modified Barium Swallow with Video (Video Swallowing Test): {Done Not Done GUNY:633194442}    Treatments at the Time of Hospital Discharge:   Respiratory Treatments: ***  Oxygen Therapy:  {Therapy; copd oxygen:12031}  Ventilator:    { CC Vent AKBQ:174172528}    Rehab Therapies: {THERAPEUTIC INTERVENTION:3565857173}  Weight Bearing Status/Restrictions: [employer] Weight Bearin}  Other Medical Equipment (for information only, NOT a DME order):  {EQUIPMENT:227956591}  Other Treatments: ***    Patient's personal belongings (please select all that are sent with patient):  {[facility] DME Belongings:508779702}    RN SIGNATURE:  {Esignature:550431613}    CASE MANAGEMENT/SOCIAL WORK SECTION    Inpatient Status Date: ***    Readmission Risk Assessment Score:  Readmission Risk              Risk of Unplanned Readmission:        21

## 2020-07-17 NOTE — PROGRESS NOTES
provider] enoxaparin  30 mg Subcutaneous Daily    clopidogrel  75 mg Oral Daily    metoprolol tartrate  50 mg Oral BID    cloNIDine  1 patch Transdermal Weekly    ezetimibe  10 mg Oral Nightly     glucose, dextrose, glucagon (rDNA), dextrose, sodium chloride flush, sodium chloride flush, acetaminophen, metoprolol, hydrALAZINE, aspirin  IV Drips/Infusions   dextrose         Diet/Nutrition   Dietary Nutrition Supplements: Low Calorie High Protein Supplement  Diet NPO, After Midnight    Exam      Constitutional - Alert, arousable  General Appearance  well developed, well nourished  HEENT -normocephalic, atraumatic. PERRLA  Lungs - Chest expands equally, no wheezes, rales or rhonchi. Cardiovascular - Heart sounds are normal.  normal rate and rhythm regular, no murmur, gallop or rub. Abdomen - soft, nontender, nondistended, no masses or organomegaly  Neurologic - CN II-XII are grossly intact.  There are no focal motor deficits  Skin - no bruising or bleeding  Extremities - no cyanosis, clubbing or edema    Lab Results   CBC     Lab Results   Component Value Date    WBC 5.9 07/17/2020    RBC 3.60 07/17/2020    RBC 3.78 04/23/2012    HGB 9.5 07/17/2020    HCT 30.2 07/17/2020     07/17/2020     04/23/2012    MCV 84.0 07/17/2020    MCH 26.4 07/17/2020    MCHC 31.4 07/17/2020    RDW 16.4 07/17/2020    LYMPHOPCT 15 07/17/2020    LYMPHOPCT 9.8 04/23/2019    MONOPCT 8 07/17/2020    MONOPCT 6.0 04/23/2019    EOSPCT 4.0 04/23/2019    BASOPCT 1 07/17/2020    BASOPCT 0.5 04/23/2019    MONOSABS 0.50 07/17/2020    MONOSABS 0.5 04/23/2019    LYMPHSABS 0.90 07/17/2020    LYMPHSABS 0.8 04/23/2019    EOSABS 0.30 07/17/2020    EOSABS 0.33 04/23/2019    BASOSABS 0.00 07/17/2020    DIFFTYPE NOT REPORTED 07/17/2020       BMP   Lab Results   Component Value Date     07/17/2020    K 3.6 07/17/2020     07/17/2020    CO2 19 07/17/2020    BUN 14 07/17/2020    CREATININE 1.58 07/17/2020    GLUCOSE 66 07/17/2020 GLUCOSE 118 04/23/2012       LFTS  Lab Results   Component Value Date    ALKPHOS 123 07/12/2020    ALT 7 07/12/2020    AST 14 07/12/2020    PROT 7.2 07/12/2020    BILITOT 0.41 07/12/2020    BILIDIR 0.12 04/12/2012    IBILI 0.22 04/12/2012    LABALBU 3.9 07/12/2020    LABALBU 4.1 04/23/2012       ABG ABGs:   Lab Results   Component Value Date    PHART 7.359 07/16/2020    PO2ART 99.5 07/16/2020    OFH4SWS 37.3 07/16/2020       Lab Results   Component Value Date    MODE CPAP 07/16/2020         INR  No results for input(s): PROTIME, INR in the last 72 hours. APTT  No results for input(s): APTT in the last 72 hours. Lactic Acid  Lab Results   Component Value Date    LACTA 1.1 04/24/2019        BNP   No results for input(s): BNP in the last 72 hours.      Cultures       Radiology     CXR      CT Scans    (See actual reports for details)      SYSTEMS ASSESSMENT    Acute mental status changes  Hypertensive emergency  Aspiration pneumonia  Acute hypoxic respiratory failure-extubated 7/16 after  4 days on ventilator       Neuro   Seems to be appropriate although perhaps forgetful and hard of hearing    Respiratory   Currently not on oxygen, no history of any underlying lung disease    Cardiovascular   Blood pressure better controlled off esmolol drip     Gastrointestinal   Switch to oral Pepcid  Advance diet as tolerated    Renal   Creatinine went up probably due to diuretics    Infectious Disease   Augmentin for 2 more days for aspiration pneumonia    Hematology/Oncology   DVT prophylaxis    Endocrine   Blood sugars are okay    Social/Spiritual/DNR/Disposition/Other     Transfer to stepdown  Critical Care Time   0 min    Electronically signed by Mary Carlton MD on 7/17/2020 at 9:10 AM

## 2020-07-17 NOTE — PLAN OF CARE
Problem: Restraint Use - Nonviolent/Non-Self-Destructive Behavior:  Goal: Absence of restraint indications  Description: Absence of restraint indications  7/17/2020 0447 by Melissa Wray RN  Outcome: Met This Shift  7/16/2020 1642 by Cale Wolf  Outcome: Ongoing  Note: Pt attempted to pull at lines and tubes when intubated. Restraints removed upon extubation.   Goal: Absence of restraint-related injury  Description: Absence of restraint-related injury  7/17/2020 0447 by Melissa Wray RN  Outcome: Met This Shift  7/16/2020 1642 by Cale Wolf  Outcome: Ongoing     Problem: Infection - Ventilator-Associated Pneumonia:  Goal: Prevent a ventilator associated event (VAE)  Description: Prevent a ventilator associated event (VAE)  7/17/2020 0447 by Melissa Wray RN  Outcome: Met This Shift  7/16/2020 1642 by Cale Wolf  Outcome: Ongoing  Goal: Absence of pulmonary infection  Description: Absence of pulmonary infection  7/17/2020 0447 by Melissa Wray RN  Outcome: Met This Shift  7/16/2020 1642 by Cale Wolf  Outcome: Ongoing

## 2020-07-17 NOTE — PLAN OF CARE
Problem: Restraint Use - Nonviolent/Non-Self-Destructive Behavior:  Goal: Absence of restraint indications  Description: Absence of restraint indications  7/17/2020 0747 by Genesis Contreras  Outcome: Completed  7/17/2020 0447 by Marissa Mullins RN  Outcome: Met This Shift  Goal: Absence of restraint-related injury  Description: Absence of restraint-related injury  7/17/2020 0747 by Genesis Contreras  Outcome: Completed  7/17/2020 0447 by Marissa Mullins RN  Outcome: Met This Shift     Problem: Skin Integrity:  Goal: Will show no infection signs and symptoms  Description: Will show no infection signs and symptoms  7/17/2020 1624 by Lu Perez RN  Outcome: Ongoing  Note: Skin assessment as charted. Continue to monitor and reposition pt as needed. 7/17/2020 0447 by Marissa Mullins RN  Outcome: Ongoing  Note: Patient remained afebrile throughout shift. No signs of infection noted. Goal: Absence of new skin breakdown  Description: Absence of new skin breakdown  7/17/2020 1624 by Lu Perez RN  Outcome: Ongoing  Note: Skin assessment as charted. Continue to monitor and reposition pt as needed. 7/17/2020 0447 by Marissa Mullins RN  Outcome: Ongoing  Note: Patient turned and repositioned every 2 hours and as needed for comfort. Skin remains dry and intact. No new skin breakdown noted.       Problem: Infection - Ventilator-Associated Pneumonia:  Goal: Prevent a ventilator associated event (VAE)  Description: Prevent a ventilator associated event (VAE)  7/17/2020 0800 by Genesis Contreras  Outcome: Completed  7/17/2020 0447 by Marissa Mullins RN  Outcome: Met This Shift  Goal: Absence of pulmonary infection  Description: Absence of pulmonary infection  7/17/2020 0800 by Genesis Contreras  Outcome: Completed  7/17/2020 0447 by Marissa Mullins RN  Outcome: Met This Shift     Problem: Urinary Elimination:  Goal: Signs and symptoms of infection will decrease  Description: Signs and symptoms of infection will decrease  7/17/2020 1624 by Cindy Burgos SARAH Huertas  Outcome: Ongoing  Note: Continue to monitor at this time  7/17/2020 0447 by Melissa Wray RN  Outcome: Ongoing  Goal: Complications related to the disease process, condition or treatment will be avoided or minimized  Description: Complications related to the disease process, condition or treatment will be avoided or minimized  7/17/2020 1624 by Shakira Ryan RN  Outcome: Ongoing  7/17/2020 0447 by Melissa Wray RN  Outcome: Ongoing     Problem: Falls - Risk of:  Goal: Will remain free from falls  Description: Will remain free from falls  7/17/2020 1624 by Shakira Ryan RN  Outcome: Ongoing  Note: Pt remained free from falls this shift. Call light within reach. Siderails up times 2, non skid footwear, and bed in lowest position   7/17/2020 0447 by Melissa Wray RN  Outcome: Ongoing  Note: Bed maintained in lowest position. Continued hourly visual checks by staff. Call light within reach and used appropriately. Staff assists with toilet use as needed. Goal: Absence of physical injury  Description: Absence of physical injury  7/17/2020 1624 by Shakira Ryan RN  Outcome: Ongoing  Note: Continue hourly rounding and increased visual checks with pt. Encourage use of pt call light. Pt is free from injury through shift   7/17/2020 0447 by Melissa Wray RN  Outcome: Ongoing  Note: Fall assessment performed and appropriate measures implemented. Room freed from clutter. Bed in lowest position with wheels locked. Call light in place. ID band in place. Problem: Nutrition  Goal: Optimal nutrition therapy  7/17/2020 1624 by Shakira Ryan RN  Outcome: Ongoing  Note: Encourage oral intake as diet allows to promote optimal healing. 7/17/2020 0447 by Melissa Wray RN  Outcome: Ongoing     Problem: Musculor/Skeletal Functional Status  Goal: Highest potential functional level  Outcome: Ongoing  Note: Continue to monitor. Allow pt to increase activity as tolerated.    Goal: Absence of falls  Outcome: Ongoing  Note: Pt remained free from falls this shift. Call light within reach.  Siderails up times 2, non skid footwear, and bed in lowest position

## 2020-07-17 NOTE — PROGRESS NOTES
Patient's blood sugar on morning labs was 66. This RN checked blood sugar at bedside with glucometer and blood sugar was 58. Dr. Saw Jama contacted at this time. Orders received.

## 2020-07-17 NOTE — PROGRESS NOTES
[address] [facility].    PROGRESS NOTE             7/17/2020    8:51 AM    Name:   Russ Cohen  MRN:     [id number]     Acct:      [de-identified]   Room:   2010/2010-01  IP Day:  4  Admit Date:  7/12/2020  8:20 PM    PCP:  Orin Yang DO  Code Status:  Full Code    Subjective:     C/C:   Chief Complaint   Patient presents with    Altered Mental Status    Hypertension     Interval History Status: improved. Patient was seen and examined at bedside. Nurse report no overnight events. Patient was getting bedside renal ultrasound. Patient appears to be doing well. Patient is also esmolol drip. Brief History:     See H&P    Review of Systems:     Review of Systems   Constitutional: Negative for fever. HENT: Negative for sore throat. Respiratory: Negative for shortness of breath and wheezing. Cardiovascular: Negative for chest pain, palpitations and leg swelling. Gastrointestinal: Negative for abdominal pain, constipation, diarrhea, nausea and vomiting. Neurological: Positive for headaches. Medications: Allergies:     Allergies   Allergen Reactions    Diclofenac-Misoprostol Other (See Comments)     Per Dr Tiffanie Polo - patient has GI intolerance to oral formulations       Current Meds:   Scheduled Meds:    amoxicillin-clavulanate  1 tablet Oral 2 times per day    amLODIPine  5 mg Oral Daily    spironolactone  25 mg Oral Daily    And    hydroCHLOROthiazide  25 mg Oral Daily    sodium chloride flush  10 mL Intravenous 2 times per day    sodium chloride flush  10 mL Intravenous 2 times per day    [Held by provider] enoxaparin  30 mg Subcutaneous Daily    famotidine (PEPCID) injection  20 mg Intravenous Daily    clopidogrel  75 mg Oral Daily    metoprolol tartrate  50 mg Oral BID    cloNIDine  1 patch Transdermal Weekly    ezetimibe  10 mg Oral Nightly     Continuous Infusions:    dextrose      esmolol Stopped (20 1448)     PRN Meds: glucose, dextrose, glucagon (rDNA), dextrose, sodium chloride flush, sodium chloride flush, acetaminophen, metoprolol, hydrALAZINE, aspirin    Data:     Past Medical History:   has a past medical history of Arthritis, Cancer ([facility] [address] CKD (chronic kidney disease) stage 4, GFR 15-29 ml/min (HCC), COPD (chronic obstructive pulmonary disease) ([facility] [address] CVA (cerebral vascular accident) ([facility] [address] Gout, Hyperlipidemia, Hypertension, Left renal atrophy, and Other abnormality of urination(788.69). Social History:   reports that she quit smoking about 21 months ago. She smoked 1.00 pack per day. She has never used smokeless tobacco. She reports that she does not drink alcohol or use drugs. Family History:   Family History   Problem Relation Age of Onset    Heart Disease Brother     Other Mother         polycythemia    Heart Disease Father     Heart Disease Sister         enlarged heart    Cancer Son         prostate       Vitals:  BP (!) 128/35   Pulse 73   Temp 98.2 °F (36.8 °C) (Axillary)   Resp 15   Ht 5' 5\" (1.651 m)   Wt 179 lb (81.2 kg)   SpO2 93%   BMI 29.79 kg/m²   Temp (24hrs), Av.8 °F (36.6 °C), Min:97 °F (36.1 °C), Max:98.3 °F (36.8 °C)    Recent Labs     20  0520 20  0556   POCGLU 58* 125*       I/O(24Hr):     Intake/Output Summary (Last 24 hours) at 2020 0851  Last data filed at 2020 0400  Gross per 24 hour   Intake 530.85 ml   Output 2600 ml   Net -2069.15 ml       Labs:    [unfilled]    Lab Results   Component Value Date/Time    SPECIAL NOT REPORTED 07/15/2020 09:19 AM     Lab Results   Component Value Date/Time    CULTURE NORMAL RESPIRATORY BENJI LIGHT GROWTH 07/15/2020 09:19 AM       [unfilled]    Radiology:    Ct Head Wo Contrast    Result Date: 2020  EXAMINATION: CT OF THE HEAD WITHOUT CONTRAST  2020 8:42 pm TECHNIQUE: CT of the head was performed without the administration of intravenous contrast. Dose modulation, iterative reconstruction, and/or weight based adjustment of the mA/kV was utilized to reduce the radiation dose to as low as reasonably achievable. COMPARISON: 12/19/2018 HISTORY: ORDERING SYSTEM PROVIDED HISTORY: AMS TECHNOLOGIST PROVIDED HISTORY: AMS Reason for Exam: AMS, unable to speak, patient is unable to stop shaking her legs. patient is unable to follow any type of instructions or answer any questions. Acuity: Acute Type of Exam: Initial FINDINGS: BRAIN/VENTRICLES: There is no acute intracranial hemorrhage, mass effect or midline shift. No abnormal extra-axial fluid collection. The gray-white differentiation is maintained without evidence of an acute infarct. There is no evidence of hydrocephalus. Generalized involutional changes and moderate chronic small ischemic disease. Intracranial vascular calcifications. Question small chronic lacune infarct identified within the region of the right paramedian dane. ORBITS: The visualized portion of the orbits demonstrate no acute abnormality. SINUSES: The visualized paranasal sinuses and mastoid air cells demonstrate no acute abnormality. SOFT TISSUES/SKULL:  No acute abnormality of the visualized skull or soft tissues. No acute intracranial abnormality. Chronic small ischemic disease and age related change. Critical results were called by Dr. Esther Gonzalez to Dr Juan Alberto Enriquez on 7/12/2020 at 20:52. Us Renal Complete    Result Date: 7/13/2020  EXAMINATION: RETROPERITONEAL ULTRASOUND OF THE KIDNEYS 7/13/2020 COMPARISON: 04/19/2019 and prior HISTORY: ORDERING SYSTEM PROVIDED HISTORY: TIMO TECHNOLOGIST PROVIDED HISTORY: TIMO FINDINGS: Kidneys: The right kidney measures 10.2 x 5.8 x 5.2 cm in length and the left kidney measures 8.4 x 4.5 x 4.6 cm in length. Study limited by body habitus and overlying bowel gas. Simple-appearing cyst on the right measuring 1.3 x 1.2 x 1.6 cm noted. There is no hydronephrosis or abnormal echogenicity.  Left kidney is somewhat limited in evaluation without evidence of hydronephrosis. There is mild cortical thinning. Limited study secondary to body habitus and overlying bowel gas. Right kidney appears grossly unremarkable in appearance. Left kidney is limited but grossly unremarkable. There is mild cortical thinning. Ir Princess Alan Device Plmt/replace/removal    Result Date: 7/14/2020  PROCEDURE: ULTRASOUND GUIDED VASCULAR ACCESS. FLUOROSCOPY GUIDED PICC PLACEMENT 7/14/2020. HISTORY: ORDERING SYSTEM PROVIDED HISTORY: PICC line TECHNOLOGIST PROVIDED HISTORY: Patient in ICU, on vent, sedated. In need of IV access for drips, sedation, Please place ASAP. Spouse's name Reji Tolbert, home No. [phone]. Spouse POA, only one to make decisions for patient. PICC line SEDATION: None FLUOROSCOPY DOSE AND TYPE OR TIME AND EXPOSURES: DAP 88 cGy cm squared TECHNIQUE: Informed consent was obtained after a detailed explanation of the procedure including risks, benefits, and alternatives. Universal protocol was observed. The right arm was prepped and draped in sterile fashion using maximum sterile barrier technique. Local anesthesia was achieved with lidocaine. A micropuncture needle was used to access the right basilic vein using ultrasound guidance. An ultrasound image demonstrating patency of the vein with needle tip located within it. An image was obtained and stored in PACs. A 0.018 guidewire was used to place a peel-a-way sheath and a 5 [employer] [place] power injectable dual-lumen PICC was advanced with fluoroscopic guidance with the tip at the cavo-atrial junction. The catheter flushed easily and there was a good blood return. The catheter was secured to the skin. The patient tolerated the procedure well and there were no immediate complications. FINDINGS: Fluoroscopic image demonstrates the tip of the catheter at the cavo-atrial junction. Successful ultrasound and fluoroscopy guided right basilic vein 5 [place] power injectable dual-lumen PICC placement. Ready for use. Xr Chest Portable    Result Date: 7/16/2020  EXAMINATION: ONE XRAY VIEW OF THE CHEST 7/16/2020 5:26 am COMPARISON: 07/15/2020 HISTORY: ORDERING SYSTEM PROVIDED HISTORY: ETT placement TECHNOLOGIST PROVIDED HISTORY: ETT placement Reason for Exam: ETT placement Acuity: Unknown Type of Exam: Ongoing Additional signs and symptoms: ETT placement Relevant Medical/Surgical History: ETT placement FINDINGS: Right upper extremity PICC in good position. Low ET tube projecting 1.3 cm from the rianna. The tube should be withdrawn 2-3 cm for better positioning. Enteric tube passes beneath the diaphragm but the tip is not seen. Shallow inspiration. Cardiomegaly. Perihilar congestion and patchy peripheral airspace disease possibly edema or pneumonia. Low ET tube projects 1.3 cm from the rianna. Tube should be withdrawn 2-3 cm for better positioning. Shallow inspiration magnifies central congestion. Patchy peripheral airspace disease possibly from pneumonia. The findings were sent to the Radiology Results [address] at 8:34 am on 7/16/2020to be communicated to a licensed caregiver. Xr Chest Portable    Result Date: 7/15/2020  EXAMINATION: ONE XRAY VIEW OF THE CHEST 7/15/2020 6:31 am COMPARISON: Chest radiograph performed 07/14/2020. HISTORY: ORDERING SYSTEM PROVIDED HISTORY: ETT placement TECHNOLOGIST PROVIDED HISTORY: ETT placement Reason for Exam: on vent Acuity: Acute Type of Exam: Subsequent/Follow-up FINDINGS: There is mild chronic pulmonary change. There is no sara consolidation or effusion. There is no pneumothorax. The mediastinal structures are stable. There is an endotracheal tube with the tip in the midtrachea. There is a gastric tube and the tip is not visualized. The extrathoracic soft tissues are unremarkable. There is a right-sided PICC line with the tip in the distal SVC. Chronic pulmonary change without definite acute cardiopulmonary process.  Support tubes as described above. Xr Chest Portable    Result Date: 7/14/2020  EXAMINATION: ONE XRAY VIEW OF THE CHEST 7/14/2020 6:52 am COMPARISON: 07/13/2020 HISTORY: ORDERING SYSTEM PROVIDED HISTORY: ETT placement TECHNOLOGIST PROVIDED HISTORY: ETT placement Reason for Exam: Vent protocol. H/O COPD. Acuity: Unknown Type of Exam: Unknown Additional signs and symptoms: Vent protocol. H/O COPD. FINDINGS: Endotracheal tube extends to the mid trachea. Nasogastric tube extends below the diaphragm. Subtle asymmetric left perihilar opacity is again demonstrated. No pleural effusion. No pneumothorax. Cardiac and mediastinal silhouettes are similar to prior. Left axillary clips. No significant interval change in subtle left perihilar airspace disease as compared to prior. Follow-up to resolution recommended. Xr Chest Portable    Result Date: 7/13/2020  EXAMINATION: ONE XRAY VIEW OF THE CHEST 7/13/2020 6:04 am COMPARISON: 07/12/2020 HISTORY: ORDERING SYSTEM PROVIDED HISTORY: ETT placement TECHNOLOGIST PROVIDED HISTORY: ETT placement Reason for Exam: ETT placement Acuity: Unknown Type of Exam: Ongoing Additional signs and symptoms: ETT placement Relevant Medical/Surgical History: ETT placement FINDINGS: Endotracheal tube and NG tube remain in place. The heart and mediastinal structures are stable. There is a questionable infiltrate in the left mid lung laterally. This may relate to previous treatment for presumed left breast disease. The lungs are otherwise clear. Orthopedic hardware overlies the lower cervical spine. Surgical clips are seen in the left axilla. Questionable infiltrate left mid lung. Xr Chest Portable    Result Date: 7/12/2020  EXAMINATION: ONE XRAY VIEW OF THE CHEST 7/12/2020 10:32 pm COMPARISON: Prior study at 9:13 p.m.  HISTORY: ORDERING SYSTEM PROVIDED HISTORY: ET tube TECHNOLOGIST PROVIDED HISTORY: ET tube Reason for Exam: ET tube placement Acuity: Acute Type of Exam: Initial Additional signs and symptoms: ET tube placement Relevant Medical/Surgical History: ET tube placement FINDINGS: Interval intubation. Enteric tube has also been placed with both tubes appropriately positioned. The heart is enlarged. Persistent ground-glass opacification in the left lower lung zone. Right lung appears clear. No significant skeletal finding. Supportive devices are positioned appropriately. Persistent airspace changes at the left lung base. Xr Chest Portable    Result Date: 7/12/2020  EXAMINATION: ONE XRAY VIEW OF THE CHEST 7/12/2020 9:21 pm COMPARISON: 12/19/2019 radiograph HISTORY: ORDERING SYSTEM PROVIDED HISTORY: cough TECHNOLOGIST PROVIDED HISTORY: cough Reason for Exam: cough Acuity: Acute Type of Exam: Initial FINDINGS: The heart is enlarged. Mediastinum and pulmonary vascular markings are normal.  Right lung is clear. Stable mild ground-glass opacities at the left costophrenic sulcus. No significant skeletal finding. Asymmetric ground-glass opacification at the left lung base. Pattern may represent edema or small pleural effusion. Developing pneumonitis can not be excluded. Mri Brain Wo Contrast    Result Date: 7/16/2020  EXAMINATION: MRI OF THE BRAIN WITHOUT CONTRAST  7/16/2020 3:19 pm TECHNIQUE: Multiplanar multisequence MRI of the brain was performed without the administration of intravenous contrast. COMPARISON: CT head 07/12/2020 HISTORY: ORDERING SYSTEM PROVIDED HISTORY: altered mental status TECHNOLOGIST PROVIDED HISTORY: altered mental status Reason for Exam: Pt admitted with AMS and not following any instructions, was extubated this AM, still having some AMS but following instructions. FINDINGS: INTRACRANIAL STRUCTURES/VENTRICLES: Diffuse parenchymal volume loss is redemonstrated. There is no diffusion restriction to suggest acute infarct. Mild chronic white matter microvascular ischemic changes are present.   There is bilateral parietal cortical and subcortical white matter T2/FLAIR hyperintensity, right greater than left. No mass effect or midline shift. No evidence of an acute intracranial hemorrhage. The ventricles and sulci are normal in size and configuration. The sellar/suprasellar regions appear unremarkable. The normal signal voids within the major intracranial vessels appear maintained. ORBITS: Orbital structures are unremarkable. SINUSES: Minimal frothy secretions in the sphenoid sinuses. Bilateral mastoid effusions. These findings may relate to recent intubation. BONES/SOFT TISSUES: The bone marrow signal intensity appears normal. The soft tissues demonstrate no acute abnormality. 1. Bilateral parietal cortical and subcortical signal abnormality most consistent with posterior reversible encephalopathy syndrome. 2. No acute infarct or hemorrhage. 3. Mild chronic white matter microvascular ischemic changes. Physical Examination:        Physical Exam  Constitutional:       Appearance: Normal appearance. HENT:      Mouth/Throat:      Mouth: Mucous membranes are dry. Eyes:      Conjunctiva/sclera: Conjunctivae normal.   Cardiovascular:      Rate and Rhythm: Normal rate and regular rhythm. Heart sounds: Normal heart sounds. No murmur. Pulmonary:      Effort: No respiratory distress. Breath sounds: Normal breath sounds. No wheezing. Abdominal:      General: Bowel sounds are normal.      Palpations: Abdomen is soft. Tenderness: There is no abdominal tenderness. Neurological:      Mental Status: She is alert and oriented to person, place, and time.    Psychiatric:         Mood and Affect: Mood normal.         Behavior: Behavior normal.           Assessment:        Primary Problem  Acute encephalopathy    Active Hospital Problems    Diagnosis Date Noted    Acute respiratory failure ([facility] [address] [J96.00] 07/13/2020    Acute encephalopathy [G93.40] 07/13/2020    Chronic kidney disease (CKD), stage III (moderate) ([address] [N18.3] 07/13/2020    Seizure ([facility] [R56.9] 07/13/2020    Altered mental status [R41.82]     Hypertensive emergency [I16.1] 12/19/2019    Aspiration pneumonia ([facility] [J69.0] 04/24/2019       Plan:        1. Acute hypoxic respiratory failure 2/2  Hypertensive emergency, resolved  -Patient was successfully extubated  -Advance diet as tolerated     2. Posterior reversible encephalopathy syndrome  -MRI: Posterior reversible encephalopathy syndrome secondary to hypertension  -Carotid Duplex (05/27)  - Left artery 50-59 stenosis; Right < 50% stenosis  -CT head on remarkable for any intracranial abnormality  -EKG normal sinus rhythm  -UA shows no sign of infection     3. Aspiration pneumonia  -Chest x-ray: Patchy peripheral airspace disease possibly from pneumonia  - Pulmonology -D/C Unasyn   -Continue Augmentin 1 tablet twice a day for 3 days (end date 07/19/20)  -Pro-Feliciano- 0.11, Sed rate- 23 (H), CRP- 8.6 (H). - Respiratory viral panel, Legionella, mycoplasma pending     4. Seizure-like activity 2/2 hypertensive emergency  -  Neurology: Discontinue  Keppra 250 mg BID continue holding off and to elliptic unless patient has further seizures  - EEG: Disorganized and slow background triphasic waves suggestive of mild/moderate encephalopathy non-etiology  -MRI: Posterior reversible encephalopathy syndrome secondary to hypertension     5. Hypertensive emergency   -Cardiology: Discontinue esmolol drip, begin starting oral HTN meds  -Continue Lopressor 5 mg IV every 6 hrs  -Clonidine patch once a week    -Serial troponin and EKG   -Echo- LV EF-75%     6. History of carotid endarterectomy  -Lipitor 20 mg daily  -Plavix 75 mg daily  -At the 10 be 10 mg daily  -Lopressor 50 mg twice daily   -Carotid Duplex (05/27): Left 50-59% ;  Right <50%  -Seen Dr. Velez is patient's vascular surgeon on 6/8/2020     6. CKD stage 3; GFR 34  -Creatinine at 1.43 (H,, trending up 1.36); BL at 1.4  -Patient is not on Lasix  -BNP ordered  -Renal ultrasound

## 2020-07-17 NOTE — PROGRESS NOTES
Asked to evaluate for disposition recommendations. Recommendation to follow post therapy evaluations.

## 2020-07-17 NOTE — DISCHARGE SUMMARY
04/23/2012    HGB 9.5 07/17/2020    HCT 30.2 07/17/2020    MCV 84.0 07/17/2020    MCH 26.4 07/17/2020    MCHC 31.4 07/17/2020    RDW 16.4 07/17/2020     07/17/2020     04/23/2012     CMP:    Lab Results   Component Value Date    GLUCOSE 66 07/17/2020    GLUCOSE 118 04/23/2012     07/17/2020    K 3.6 07/17/2020     07/17/2020    CO2 19 07/17/2020    BUN 14 07/17/2020    CREATININE 1.58 07/17/2020    ANIONGAP 16 07/17/2020    ALKPHOS 123 07/12/2020    ALT 7 07/12/2020    AST 14 07/12/2020    BILITOT 0.41 07/12/2020    LABALBU 3.9 07/12/2020    LABALBU 4.1 04/23/2012    ALBUMIN NOT REPORTED 07/12/2020    LABGLOM 32 07/17/2020    GFRAA 38 07/17/2020    GFR      07/17/2020    GFR NOT REPORTED 07/17/2020    PROT 7.2 07/12/2020    CALCIUM 8.7 07/17/2020     U/A:    Lab Results   Component Value Date    COLORU YELLOW 07/12/2020    TURBIDITY CLEAR 07/12/2020    SPECGRAV 1.011 07/12/2020    HGBUR MOD 07/12/2020    PHUR 6.5 07/12/2020    PROTEINU 2+ 07/12/2020    GLUCOSEU NEGATIVE 07/12/2020    KETUA NEGATIVE 07/12/2020    BILIRUBINUR NEGATIVE 07/12/2020    UROBILINOGEN Normal 07/12/2020    NITRU NEGATIVE 07/12/2020    LEUKOCYTESUR NEGATIVE 07/12/2020        sputum culture:     Radiology:    Ct Head Wo Contrast    Result Date: 7/12/2020  EXAMINATION: CT OF THE HEAD WITHOUT CONTRAST  7/12/2020 8:42 pm TECHNIQUE: CT of the head was performed without the administration of intravenous contrast. Dose modulation, iterative reconstruction, and/or weight based adjustment of the mA/kV was utilized to reduce the radiation dose to as low as reasonably achievable. COMPARISON: 12/19/2018 HISTORY: ORDERING SYSTEM PROVIDED HISTORY: AMS TECHNOLOGIST PROVIDED HISTORY: AMS Reason for Exam: AMS, unable to speak, patient is unable to stop shaking her legs. patient is unable to follow any type of instructions or answer any questions.  Acuity: Acute Type of Exam: Initial FINDINGS: BRAIN/VENTRICLES: There is no acute HISTORY: PICC line TECHNOLOGIST PROVIDED HISTORY: Patient in ICU, on vent, sedated. In need of IV access for drips, sedation, Please place ASAP. Spouse's name Flakito Byrne, home No. [phone]. Spouse POA, only one to make decisions for patient. PICC line SEDATION: None FLUOROSCOPY DOSE AND TYPE OR TIME AND EXPOSURES: DAP 88 cGy cm squared TECHNIQUE: Informed consent was obtained after a detailed explanation of the procedure including risks, benefits, and alternatives. Universal protocol was observed. The right arm was prepped and draped in sterile fashion using maximum sterile barrier technique. Local anesthesia was achieved with lidocaine. A micropuncture needle was used to access the right basilic vein using ultrasound guidance. An ultrasound image demonstrating patency of the vein with needle tip located within it. An image was obtained and stored in PACs. A 0.018 guidewire was used to place a peel-a-way sheath and a 5 [employer] Leeann power injectable dual-lumen PICC was advanced with fluoroscopic guidance with the tip at the cavo-atrial junction. The catheter flushed easily and there was a good blood return. The catheter was secured to the skin. The patient tolerated the procedure well and there were no immediate complications. FINDINGS: Fluoroscopic image demonstrates the tip of the catheter at the cavo-atrial junction. Successful ultrasound and fluoroscopy guided right basilic vein 5 Polish power injectable dual-lumen PICC placement. Ready for use. Xr Chest Portable    Result Date: 7/16/2020  EXAMINATION: ONE XRAY VIEW OF THE CHEST 7/16/2020 5:26 am COMPARISON: 07/15/2020 HISTORY: ORDERING SYSTEM PROVIDED HISTORY: ETT placement TECHNOLOGIST PROVIDED HISTORY: ETT placement Reason for Exam: ETT placement Acuity: Unknown Type of Exam: Ongoing Additional signs and symptoms: ETT placement Relevant Medical/Surgical History: ETT placement FINDINGS: Right upper extremity PICC in good position.   Low ET tube projecting 1.3 cm from the rianna. The tube should be withdrawn 2-3 cm for better positioning. Enteric tube passes beneath the diaphragm but the tip is not seen. Shallow inspiration. Cardiomegaly. Perihilar congestion and patchy peripheral airspace disease possibly edema or pneumonia. Low ET tube projects 1.3 cm from the rianna. Tube should be withdrawn 2-3 cm for better positioning. Shallow inspiration magnifies central congestion. Patchy peripheral airspace disease possibly from pneumonia. The findings were sent to the Radiology Results [address] at 8:34 am on 7/16/2020to be communicated to a licensed caregiver. Xr Chest Portable    Result Date: 7/15/2020  EXAMINATION: ONE XRAY VIEW OF THE CHEST 7/15/2020 6:31 am COMPARISON: Chest radiograph performed 07/14/2020. HISTORY: ORDERING SYSTEM PROVIDED HISTORY: ETT placement TECHNOLOGIST PROVIDED HISTORY: ETT placement Reason for Exam: on vent Acuity: Acute Type of Exam: Subsequent/Follow-up FINDINGS: There is mild chronic pulmonary change. There is no sara consolidation or effusion. There is no pneumothorax. The mediastinal structures are stable. There is an endotracheal tube with the tip in the midtrachea. There is a gastric tube and the tip is not visualized. The extrathoracic soft tissues are unremarkable. There is a right-sided PICC line with the tip in the distal SVC. Chronic pulmonary change without definite acute cardiopulmonary process. Support tubes as described above. Xr Chest Portable    Result Date: 7/14/2020  EXAMINATION: ONE XRAY VIEW OF THE CHEST 7/14/2020 6:52 am COMPARISON: 07/13/2020 HISTORY: ORDERING SYSTEM PROVIDED HISTORY: ETT placement TECHNOLOGIST PROVIDED HISTORY: ETT placement Reason for Exam: Vent protocol. H/O COPD. Acuity: Unknown Type of Exam: Unknown Additional signs and symptoms: Vent protocol. H/O COPD. FINDINGS: Endotracheal tube extends to the mid trachea.   Nasogastric tube extends below the diaphragm. Subtle asymmetric left perihilar opacity is again demonstrated. No pleural effusion. No pneumothorax. Cardiac and mediastinal silhouettes are similar to prior. Left axillary clips. No significant interval change in subtle left perihilar airspace disease as compared to prior. Follow-up to resolution recommended. Xr Chest Portable    Result Date: 7/13/2020  EXAMINATION: ONE XRAY VIEW OF THE CHEST 7/13/2020 6:04 am COMPARISON: 07/12/2020 HISTORY: ORDERING SYSTEM PROVIDED HISTORY: ETT placement TECHNOLOGIST PROVIDED HISTORY: ETT placement Reason for Exam: ETT placement Acuity: Unknown Type of Exam: Ongoing Additional signs and symptoms: ETT placement Relevant Medical/Surgical History: ETT placement FINDINGS: Endotracheal tube and NG tube remain in place. The heart and mediastinal structures are stable. There is a questionable infiltrate in the left mid lung laterally. This may relate to previous treatment for presumed left breast disease. The lungs are otherwise clear. Orthopedic hardware overlies the lower cervical spine. Surgical clips are seen in the left axilla. Questionable infiltrate left mid lung. Xr Chest Portable    Result Date: 7/12/2020  EXAMINATION: ONE XRAY VIEW OF THE CHEST 7/12/2020 10:32 pm COMPARISON: Prior study at 9:13 p.m. HISTORY: ORDERING SYSTEM PROVIDED HISTORY: ET tube TECHNOLOGIST PROVIDED HISTORY: ET tube Reason for Exam: ET tube placement Acuity: Acute Type of Exam: Initial Additional signs and symptoms: ET tube placement Relevant Medical/Surgical History: ET tube placement FINDINGS: Interval intubation. Enteric tube has also been placed with both tubes appropriately positioned. The heart is enlarged. Persistent ground-glass opacification in the left lower lung zone. Right lung appears clear. No significant skeletal finding. Supportive devices are positioned appropriately. Persistent airspace changes at the left lung base.      Xr Chest Portable    Result Date: 7/12/2020  EXAMINATION: ONE XRAY VIEW OF THE CHEST 7/12/2020 9:21 pm COMPARISON: 12/19/2019 radiograph HISTORY: ORDERING SYSTEM PROVIDED HISTORY: cough TECHNOLOGIST PROVIDED HISTORY: cough Reason for Exam: cough Acuity: Acute Type of Exam: Initial FINDINGS: The heart is enlarged. Mediastinum and pulmonary vascular markings are normal.  Right lung is clear. Stable mild ground-glass opacities at the left costophrenic sulcus. No significant skeletal finding. Asymmetric ground-glass opacification at the left lung base. Pattern may represent edema or small pleural effusion. Developing pneumonitis can not be excluded. Mri Brain Wo Contrast    Result Date: 7/16/2020  EXAMINATION: MRI OF THE BRAIN WITHOUT CONTRAST  7/16/2020 3:19 pm TECHNIQUE: Multiplanar multisequence MRI of the brain was performed without the administration of intravenous contrast. COMPARISON: CT head 07/12/2020 HISTORY: ORDERING SYSTEM PROVIDED HISTORY: altered mental status TECHNOLOGIST PROVIDED HISTORY: altered mental status Reason for Exam: Pt admitted with AMS and not following any instructions, was extubated this AM, still having some AMS but following instructions. FINDINGS: INTRACRANIAL STRUCTURES/VENTRICLES: Diffuse parenchymal volume loss is redemonstrated. There is no diffusion restriction to suggest acute infarct. Mild chronic white matter microvascular ischemic changes are present. There is bilateral parietal cortical and subcortical white matter T2/FLAIR hyperintensity, right greater than left. No mass effect or midline shift. No evidence of an acute intracranial hemorrhage. The ventricles and sulci are normal in size and configuration. The sellar/suprasellar regions appear unremarkable. The normal signal voids within the major intracranial vessels appear maintained. ORBITS: Orbital structures are unremarkable. SINUSES: Minimal frothy secretions in the sphenoid sinuses.   Bilateral mastoid effusions. These findings may relate to recent intubation. BONES/SOFT TISSUES: The bone marrow signal intensity appears normal. The soft tissues demonstrate no acute abnormality. 1. Bilateral parietal cortical and subcortical signal abnormality most consistent with posterior reversible encephalopathy syndrome. 2. No acute infarct or hemorrhage. 3. Mild chronic white matter microvascular ischemic changes. Consultations:    Consults:     Final Specialist Recommendations/Findings:   PHARMACY TO DOSE VANCOMYCIN  IP CONSULT TO INTERNAL MEDICINE  IP CONSULT TO PULMONOLOGY  IP CONSULT TO NEUROLOGY  IP CONSULT TO DIETITIAN  IP CONSULT TO CARDIOLOGY  IP CONSULT TO PHYSICAL MEDICINE REHAB      The patient was seen and examined on day of discharge and this discharge summary is in conjunction with any daily progress note from day of discharge. Discharge plan:       Disposition: ARU    Physician Follow Up:     Marybel Crooks [address]  [phone]             Requiring Further Evaluation/Follow Up POST HOSPITALIZATION/Incidental Findings:     Diet: regular diet    Activity: As tolerated    Instructions to Patient:   Patient will continue to be monitored in the ARU for medical management. Recommend patient follow-up with primary care provider 1 week after discharge. Discussed medication compliance.         Discharge Medications:      Medication List      STOP taking these medications    amiodarone 200 MG tablet  Commonly known as:  CORDARONE     furosemide 40 MG tablet  Commonly known as:  LASIX     midodrine 5 MG tablet  Commonly known as:  PROAMATINE     spironolactone-hydroCHLOROthiazide 25-25 MG per tablet  Commonly known as:  ALDACTAZIDE     warfarin 2 MG tablet  Commonly known as:  COUMADIN     ZyrTEC Allergy 10 MG tablet  Generic drug:  cetirizine        ASK your doctor about these medications    allopurinol 100 MG tablet  Commonly known as:  ZYLOPRIM     benzonatate 200 MG capsule  Commonly known as:  TESSALON     calcium carbonate-vitamin D 600-400 MG-UNIT Tabs per tab  Commonly known as:  CALTRATE     cloNIDine 0.1 MG/24HR Ptwk  Commonly known as:  CATAPRES     clopidogrel 75 MG tablet  Commonly known as:  Plavix  Take 1 tablet by mouth daily     ezetimibe 10 MG tablet  Commonly known as:  ZETIA     gabapentin 100 MG capsule  Commonly known as:  NEURONTIN     metoprolol 100 MG tablet  Commonly known as:  LOPRESSOR     omeprazole 20 MG delayed release capsule  Commonly known as:  PRILOSEC  TAKE 1 CAPSULE DAILY            Time Spent on discharge is  35 mins in patient examination, evaluation, counseling as well as medication reconciliation, prescriptions for required medications, discharge plan and follow up. Electronically signed by   Gilles Eisenmenger, MD  7/17/2020  1:24 PM      Thank you Dr. Felicitas Mancia DO for the opportunity to be involved in this patient's care.

## 2020-07-18 LAB
ABSOLUTE EOS #: 0.3 K/UL (ref 0–0.4)
ABSOLUTE IMMATURE GRANULOCYTE: ABNORMAL K/UL (ref 0–0.3)
ABSOLUTE LYMPH #: 1.2 K/UL (ref 1–4.8)
ABSOLUTE MONO #: 0.5 K/UL (ref 0.1–1.3)
ANION GAP SERPL CALCULATED.3IONS-SCNC: 12 MMOL/L (ref 9–17)
BASOPHILS # BLD: 1 % (ref 0–2)
BASOPHILS ABSOLUTE: 0 K/UL (ref 0–0.2)
BUN BLDV-MCNC: 13 MG/DL (ref 8–23)
BUN/CREAT BLD: ABNORMAL (ref 9–20)
CALCIUM SERPL-MCNC: 8.8 MG/DL (ref 8.6–10.4)
CHLORIDE BLD-SCNC: 102 MMOL/L (ref 98–107)
CO2: 24 MMOL/L (ref 20–31)
CREAT SERPL-MCNC: 1.45 MG/DL (ref 0.5–0.9)
DIFFERENTIAL TYPE: ABNORMAL
EOSINOPHILS RELATIVE PERCENT: 5 % (ref 0–4)
GFR AFRICAN AMERICAN: 42 ML/MIN
GFR NON-AFRICAN AMERICAN: 35 ML/MIN
GFR SERPL CREATININE-BSD FRML MDRD: ABNORMAL ML/MIN/{1.73_M2}
GFR SERPL CREATININE-BSD FRML MDRD: ABNORMAL ML/MIN/{1.73_M2}
GLUCOSE BLD-MCNC: 101 MG/DL (ref 65–105)
GLUCOSE BLD-MCNC: 115 MG/DL (ref 65–105)
GLUCOSE BLD-MCNC: 89 MG/DL (ref 70–99)
GLUCOSE BLD-MCNC: 90 MG/DL (ref 65–105)
GLUCOSE BLD-MCNC: 95 MG/DL (ref 65–105)
GLUCOSE BLD-MCNC: 97 MG/DL (ref 65–105)
HCT VFR BLD CALC: 29.7 % (ref 36–46)
HEMOGLOBIN: 9.7 G/DL (ref 12–16)
IMMATURE GRANULOCYTES: ABNORMAL %
LYMPHOCYTES # BLD: 19 % (ref 24–44)
MCH RBC QN AUTO: 27 PG (ref 26–34)
MCHC RBC AUTO-ENTMCNC: 32.8 G/DL (ref 31–37)
MCV RBC AUTO: 82.4 FL (ref 80–100)
MONOCYTES # BLD: 9 % (ref 1–7)
NRBC AUTOMATED: ABNORMAL PER 100 WBC
PDW BLD-RTO: 15.9 % (ref 11.5–14.9)
PLATELET # BLD: 212 K/UL (ref 150–450)
PLATELET ESTIMATE: ABNORMAL
PMV BLD AUTO: 8 FL (ref 6–12)
POTASSIUM SERPL-SCNC: 3.1 MMOL/L (ref 3.7–5.3)
RBC # BLD: 3.61 M/UL (ref 4–5.2)
RBC # BLD: ABNORMAL 10*6/UL
SEG NEUTROPHILS: 66 % (ref 36–66)
SEGMENTED NEUTROPHILS ABSOLUTE COUNT: 4 K/UL (ref 1.3–9.1)
SODIUM BLD-SCNC: 138 MMOL/L (ref 135–144)
WBC # BLD: 6 K/UL (ref 3.5–11)
WBC # BLD: ABNORMAL 10*3/UL

## 2020-07-18 PROCEDURE — 6370000000 HC RX 637 (ALT 250 FOR IP): Performed by: INTERNAL MEDICINE

## 2020-07-18 PROCEDURE — 85025 COMPLETE CBC W/AUTO DIFF WBC: CPT

## 2020-07-18 PROCEDURE — 82947 ASSAY GLUCOSE BLOOD QUANT: CPT

## 2020-07-18 PROCEDURE — 6360000002 HC RX W HCPCS: Performed by: STUDENT IN AN ORGANIZED HEALTH CARE EDUCATION/TRAINING PROGRAM

## 2020-07-18 PROCEDURE — 99232 SBSQ HOSP IP/OBS MODERATE 35: CPT | Performed by: INTERNAL MEDICINE

## 2020-07-18 PROCEDURE — 97112 NEUROMUSCULAR REEDUCATION: CPT

## 2020-07-18 PROCEDURE — 97530 THERAPEUTIC ACTIVITIES: CPT

## 2020-07-18 PROCEDURE — 2580000003 HC RX 258: Performed by: INTERNAL MEDICINE

## 2020-07-18 PROCEDURE — 97110 THERAPEUTIC EXERCISES: CPT

## 2020-07-18 PROCEDURE — 80048 BASIC METABOLIC PNL TOTAL CA: CPT

## 2020-07-18 PROCEDURE — 2580000003 HC RX 258: Performed by: RADIOLOGY

## 2020-07-18 PROCEDURE — 6370000000 HC RX 637 (ALT 250 FOR IP): Performed by: STUDENT IN AN ORGANIZED HEALTH CARE EDUCATION/TRAINING PROGRAM

## 2020-07-18 PROCEDURE — 36415 COLL VENOUS BLD VENIPUNCTURE: CPT

## 2020-07-18 PROCEDURE — 6360000002 HC RX W HCPCS: Performed by: INTERNAL MEDICINE

## 2020-07-18 PROCEDURE — 99232 SBSQ HOSP IP/OBS MODERATE 35: CPT | Performed by: PSYCHIATRY & NEUROLOGY

## 2020-07-18 PROCEDURE — 2580000003 HC RX 258: Performed by: STUDENT IN AN ORGANIZED HEALTH CARE EDUCATION/TRAINING PROGRAM

## 2020-07-18 PROCEDURE — 2060000000 HC ICU INTERMEDIATE R&B

## 2020-07-18 PROCEDURE — 2500000003 HC RX 250 WO HCPCS: Performed by: STUDENT IN AN ORGANIZED HEALTH CARE EDUCATION/TRAINING PROGRAM

## 2020-07-18 RX ORDER — POTASSIUM CHLORIDE 7.45 MG/ML
10 INJECTION INTRAVENOUS PRN
Status: DISCONTINUED | OUTPATIENT
Start: 2020-07-18 | End: 2020-07-20 | Stop reason: HOSPADM

## 2020-07-18 RX ORDER — POTASSIUM CHLORIDE 20 MEQ/1
20 TABLET, EXTENDED RELEASE ORAL ONCE
Status: COMPLETED | OUTPATIENT
Start: 2020-07-18 | End: 2020-07-18

## 2020-07-18 RX ORDER — DOCUSATE SODIUM 100 MG/1
100 CAPSULE, LIQUID FILLED ORAL DAILY
Status: DISCONTINUED | OUTPATIENT
Start: 2020-07-18 | End: 2020-07-19

## 2020-07-18 RX ADMIN — Medication 10 ML: at 10:30

## 2020-07-18 RX ADMIN — METOPROLOL TARTRATE 50 MG: 50 TABLET, FILM COATED ORAL at 21:14

## 2020-07-18 RX ADMIN — AMLODIPINE BESYLATE 5 MG: 5 TABLET ORAL at 10:20

## 2020-07-18 RX ADMIN — METOPROLOL TARTRATE 5 MG: 1 INJECTION, SOLUTION INTRAVENOUS at 02:48

## 2020-07-18 RX ADMIN — AMOXICILLIN AND CLAVULANATE POTASSIUM 1 TABLET: 875; 125 TABLET, FILM COATED ORAL at 21:14

## 2020-07-18 RX ADMIN — HYDROCHLOROTHIAZIDE 25 MG: 25 TABLET ORAL at 10:20

## 2020-07-18 RX ADMIN — Medication 10 ML: at 02:48

## 2020-07-18 RX ADMIN — Medication 10 ML: at 21:23

## 2020-07-18 RX ADMIN — CLOPIDOGREL BISULFATE 75 MG: 75 TABLET ORAL at 10:20

## 2020-07-18 RX ADMIN — METOPROLOL TARTRATE 50 MG: 50 TABLET, FILM COATED ORAL at 10:20

## 2020-07-18 RX ADMIN — Medication 10 ML: at 21:15

## 2020-07-18 RX ADMIN — AMOXICILLIN AND CLAVULANATE POTASSIUM 1 TABLET: 875; 125 TABLET, FILM COATED ORAL at 12:02

## 2020-07-18 RX ADMIN — FAMOTIDINE 20 MG: 20 TABLET ORAL at 10:20

## 2020-07-18 RX ADMIN — HYDRALAZINE HYDROCHLORIDE 10 MG: 20 INJECTION INTRAMUSCULAR; INTRAVENOUS at 03:16

## 2020-07-18 RX ADMIN — POTASSIUM CHLORIDE 20 MEQ: 1500 TABLET, EXTENDED RELEASE ORAL at 10:20

## 2020-07-18 RX ADMIN — Medication 10 ML: at 10:22

## 2020-07-18 RX ADMIN — SODIUM CHLORIDE 100 MG: 9 INJECTION, SOLUTION INTRAVENOUS at 13:06

## 2020-07-18 RX ADMIN — SPIRONOLACTONE 25 MG: 25 TABLET, FILM COATED ORAL at 10:20

## 2020-07-18 RX ADMIN — Medication 10 ML: at 03:17

## 2020-07-18 RX ADMIN — DOCUSATE SODIUM 100 MG: 100 CAPSULE, LIQUID FILLED ORAL at 10:20

## 2020-07-18 ASSESSMENT — ENCOUNTER SYMPTOMS
WHEEZING: 0
VOMITING: 0
SHORTNESS OF BREATH: 0
ABDOMINAL PAIN: 0
DIARRHEA: 0
CONSTIPATION: 0
NAUSEA: 0

## 2020-07-18 ASSESSMENT — PAIN SCALES - GENERAL: PAINLEVEL_OUTOF10: 0

## 2020-07-18 NOTE — PROGRESS NOTES
Patient hemoglobin is stable  We will start on Lovenox  Continue with IV iron  Awaiting placement to AR U  Renal artery Doppler, concerning for bilateral renal artery stenosis less than 60%

## 2020-07-18 NOTE — PROGRESS NOTES
[facility] CARDIOLOGY Progress Note    7/18/2020 7:35 AM      Subjective:  Ms. Lashay Sterling is feeling better and wants to go home. Patient denies any chest pain or shortness of breath or palpitations or lightheadedness or dizziness. Review of systems:  No fever or chills. No headaches.              LABS:     Recent Results (from the past 24 hour(s))   POC Glucose Fingerstick    Collection Time: 07/17/20  9:07 AM   Result Value Ref Range    POC Glucose 72 65 - 105 mg/dL   Basic Metabolic Prof    Collection Time: 07/18/20  5:00 AM   Result Value Ref Range    Glucose 89 70 - 99 mg/dL    BUN 13 8 - 23 mg/dL    CREATININE 1.45 (H) 0.50 - 0.90 mg/dL    Bun/Cre Ratio NOT REPORTED 9 - 20    Calcium 8.8 8.6 - 10.4 mg/dL    Sodium 138 135 - 144 mmol/L    Potassium 3.1 (L) 3.7 - 5.3 mmol/L    Chloride 102 98 - 107 mmol/L    CO2 24 20 - 31 mmol/L    Anion Gap 12 9 - 17 mmol/L    GFR Non-African American 35 (L) >60 mL/min    GFR  42 (L) >60 mL/min    GFR Comment          GFR Staging NOT REPORTED    CBC with DIFF    Collection Time: 07/18/20  5:00 AM   Result Value Ref Range    WBC 6.0 3.5 - 11.0 k/uL    RBC 3.61 (L) 4.0 - 5.2 m/uL    Hemoglobin 9.7 (L) 12.0 - 16.0 g/dL    Hematocrit 29.7 (L) 36 - 46 %    MCV 82.4 80 - 100 fL    MCH 27.0 26 - 34 pg    MCHC 32.8 31 - 37 g/dL    RDW 15.9 (H) 11.5 - 14.9 %    Platelets 409 784 - 031 k/uL    MPV 8.0 6.0 - 12.0 fL    NRBC Automated NOT REPORTED per 100 WBC    Differential Type NOT REPORTED     Seg Neutrophils 66 36 - 66 %    Lymphocytes 19 (L) 24 - 44 %    Monocytes 9 (H) 1 - 7 %    Eosinophils % 5 (H) 0 - 4 %    Basophils 1 0 - 2 %    Immature Granulocytes NOT REPORTED 0 %    Segs Absolute 4.00 1.3 - 9.1 k/uL    Absolute Lymph # 1.20 1.0 - 4.8 k/uL    Absolute Mono # 0.50 0.1 - 1.3 k/uL    Absolute Eos # 0.30 0.0 - 0.4 k/uL    Basophils Absolute 0.00 0.0 - 0.2 k/uL    Absolute Immature Granulocyte NOT REPORTED 0.00 - 0.30 k/uL    WBC Morphology NOT REPORTED RBC Morphology NOT REPORTED     Platelet Estimate NOT REPORTED    POC Glucose Fingerstick    Collection Time: 20  5:09 AM   Result Value Ref Range    POC Glucose 101 65 - 105 mg/dL   POC Glucose Fingerstick    Collection Time: 20  7:13 AM   Result Value Ref Range    POC Glucose 90 65 - 105 mg/dL       Pulse Ox: SpO2  Av.3 %  Min: 92 %  Max: 96 %    Supplemental O2: O2 Flow Rate (L/min): 2 L/min     Current Meds:    famotidine  20 mg Oral Daily    amoxicillin-clavulanate  1 tablet Oral 2 times per day    amLODIPine  5 mg Oral Daily    spironolactone  25 mg Oral Daily    And    hydroCHLOROthiazide  25 mg Oral Daily    sodium chloride flush  10 mL Intravenous 2 times per day    sodium chloride flush  10 mL Intravenous 2 times per day    [Held by provider] enoxaparin  30 mg Subcutaneous Daily    clopidogrel  75 mg Oral Daily    metoprolol tartrate  50 mg Oral BID    cloNIDine  1 patch Transdermal Weekly    ezetimibe  10 mg Oral Nightly              VITAL SIGNS:    BP (!) 163/62   Pulse 84   Temp 97.9 °F (36.6 °C) (Oral)   Resp 18   Ht 5' 5\" (1.651 m)   Wt 174 lb 2.6 oz (79 kg)   SpO2 95%   BMI 28.98 kg/m²  2 L/min      Admit Weight:  190 lb (86.2 kg)    Last 3 weights: Wt Readings from Last 3 Encounters:   20 174 lb 2.6 oz (79 kg)   19 192 lb 3.9 oz (87.2 kg)   19 171 lb 3.2 oz (77.7 kg)       BMI: Body mass index is 28.98 kg/m². INPUT/OUTPUT:          Intake/Output Summary (Last 24 hours) at 2020 0727  Last data filed at 2020 0557  Gross per 24 hour   Intake 200 ml   Output 1350 ml   Net -1150 ml         Telemetry shows sinus rhythm. EXAM:     General appearance: awake, alert. Pleasant. Neck: No JVD or thyromegaly  Chest: clearerbilaterally. No tenderness. No rhonchi or wheezing. Cardiac: Regular rate and rhythm. No S3 gallop or rubs. Abdomen: soft, non-tender.   Extremities: no cyanosis, no clubbing, no calf tenderness, no leg edema.  Skin:  warm and dry. Neuro:  Able to move all 4 extremities. Potassium 3.1. ASSESSMENT:    Accelerated hypertension - improved currently.  History of labile blood pressures.     Abnormal EKG.  Cannot exclude underlying coronary artery disease.  No active angina pectoris.     Hyperdynamic LVEF 75%, mild TR on 2 D ECHO July 2020.     Acute respiratory failure requiring oral intubation and mechanical ventilation, extubated 7/16/2020. Hypokalemia with potassium 3.1 this a.m.     History of carotid artery disease.     Altered mental status with headache prompting hospitalization.  Improved mental status currently.     Other problems as charted. REC/PLAN:    Stable overall cardiac wise. Improved blood pressures. Replace potassium and maintain level around 4.0 range. Continue on all current cardiac medications. Hopefully discharge soon. Discussed with the charge nurse. Electronically signed by Isabel Hardy MD, [address] [facility]        PLEASE NOTE:  This progress note was completed using a voice transcription system. Every effort was made to ensure accuracy. However, inadvertent computerized transcription errors may be present.

## 2020-07-18 NOTE — PROGRESS NOTES
61 09/24/2012     No results found for: LDLCALC  No results found for: LABVLDL  No results found for: LABA1C  No results found for: EAG  Lab Results   Component Value Date    [id number] 6269 (H) 04/21/2019      Neurological work up:  CT head negative for acute changes   CTA head and neck  MRI brain     2 D echo     Assessment and Recommendations:   Posterior reversible encephalopathy syndrome secondary to hypertension  Other comorbid conditions include aspiration pneumonia, acute hypoxic respiratory failure status post intubation      The patient continues to have waxing and waning encephalopathy. Okay to be discharged to skilled nursing facility from neurological standpoint. Outpatient follow-up in next 4 to 6 weeks. We will sign off. Please call with questions. Thank you for the consult. Darrin Peter MD  Neurology    This note is created with the assistance of a speech-recognition program. While intending to generate a document that actually reflects the content of the visit, the document can still have some errors including those of syntax and sound a- like substitutions which may escape proofreading. In such instances, actual meaning can be extrapolated by contextual derivation.

## 2020-07-18 NOTE — PROGRESS NOTES
Pulmonary Progress Note  [facility]      Patient - Tony Long,  Age - 79 y.o.    - 1940      Room Number - 2096/2096-01   N -  [id number]   Acct # - [de-identified]  Date of Admission -  2020  8:20 PM    Consulting MD Ally Mahoney. Ayan 62, DO     SUBJECTIVE   Patient appears to be in fair spirits. Easily awakens from a nap.  present. OBJECTIVE   VITALS    height is 5' 5\" (1.651 m) and weight is 174 lb 2.6 oz (79 kg). Her oral temperature is 97.9 °F (36.6 °C). Her blood pressure is 126/47 (abnormal) and her pulse is 89. Her respiration is 18 and oxygen saturation is 94%. Body mass index is 28.98 kg/m². Temperature Range: Temp: 97.9 °F (36.6 °C) Temp  Av.7 °F (36.5 °C)  Min: 97.2 °F (36.2 °C)  Max: 97.9 °F (36.6 °C)  BP Range:  Systolic (10JBW), ZYX:440 , Min:126 , VCV:751     Diastolic (95IWZ), UBW:67, Min:47, Max:79    Pulse Range: Pulse  Av.1  Min: 67  Max: 89  Respiration Range: Resp  Av.8  Min: 16  Max: 18  Current Pulse Ox[de-identified]  SpO2: 94 %  24HR Pulse Ox Range:  SpO2  Av.8 %  Min: 94 %  Max: 96 %  Oxygen Amount and Delivery: O2 Flow Rate (L/min): 2 L/min    Wt Readings from Last 3 Encounters:   20 174 lb 2.6 oz (79 kg)   19 192 lb 3.9 oz (87.2 kg)   19 171 lb 3.2 oz (77.7 kg)       I/O (24 Hours)    Intake/Output Summary (Last 24 hours) at 2020 1112  Last data filed at 2020 0557  Gross per 24 hour   Intake 200 ml   Output 1350 ml   Net -1150 ml       EXAM     General Appearance  Awake, alert, oriented, in no acute distress  HEENT - normocephalic, atraumatic. Neck - Supple,  trachea midline   Lungs -clear posteriorly no crackles rales or wheezes  Heart Exam:PMI normal. No lifts, heaves, or thrills. RRR. No murmurs, clicks, gallops, or rubs  Abdomen Exam: Abdomen soft, non-tender.  BS normal. No masses,   Extremity Exam: no edema    MEDS      docusate sodium  100 mg Oral Daily    famotidine  20 mg Oral Daily    amoxicillin-clavulanate  1 tablet Oral 2 times per day    amLODIPine  5 mg Oral Daily    spironolactone  25 mg Oral Daily    And    hydroCHLOROthiazide  25 mg Oral Daily    sodium chloride flush  10 mL Intravenous 2 times per day    sodium chloride flush  10 mL Intravenous 2 times per day    [Held by provider] enoxaparin  30 mg Subcutaneous Daily    clopidogrel  75 mg Oral Daily    metoprolol tartrate  50 mg Oral BID    cloNIDine  1 patch Transdermal Weekly    ezetimibe  10 mg Oral Nightly      dextrose       potassium chloride, glucose, dextrose, glucagon (rDNA), dextrose, sodium chloride flush, sodium chloride flush, acetaminophen, metoprolol, hydrALAZINE, aspirin    LABS   CBC   Recent Labs     07/18/20  0500   WBC 6.0   HGB 9.7*   HCT 29.7*   MCV 82.4        BMP:   Lab Results   Component Value Date     07/18/2020    K 3.1 07/18/2020     07/18/2020    CO2 24 07/18/2020    BUN 13 07/18/2020    LABALBU 3.9 07/12/2020    LABALBU 4.1 04/23/2012    CREATININE 1.45 07/18/2020    CALCIUM 8.8 07/18/2020    GFRAA 42 07/18/2020    LABGLOM 35 07/18/2020     ABGs:  Lab Results   Component Value Date    PHART 7.359 07/16/2020    PO2ART 99.5 07/16/2020    BBB8VJX 37.3 07/16/2020      Lab Results   Component Value Date    MODE CPAP 07/16/2020     Ionized Calcium:  No results found for: IONCA  Magnesium:    Lab Results   Component Value Date    MG 2.0 07/31/2019     Phosphorus:    Lab Results   Component Value Date    PHOS 4.6 07/31/2019        LIVER PROFILE No results for input(s): AST, ALT, LIPASE, BILIDIR, BILITOT, ALKPHOS in the last 72 hours. Invalid input(s): AMYLASE,  ALB  INR No results for input(s): INR in the last 72 hours.   PTT   Lab Results   Component Value Date    APTT 27.4 07/12/2020         RADIOLOGY     (See actual reports for details)    ASSESSMENT/PLAN     Patient Active Problem List   Diagnosis    Breast cancer ([facility] [facility]    Renal cyst    Lumbar degenerative disc disease    Arthropathy of lumbar facet joint    Failed back syndrome of lumbar spine    Lumbar spondylosis    Sacroiliitis (HCC)    Status post lumbar spinal fusion    TIA (transient ischemic attack)    Anemia    Dyspnea    COPD with acute exacerbation (HCC)    Acute kidney injury superimposed on CKD (HCC)    Aspiration pneumonia (HCC)    CKD (chronic kidney disease) stage 4, GFR 15-29 ml/min (HCC)    Left renal atrophy    Severe malnutrition (HCC)    Acute cystitis without hematuria    Disorientation    Hypertensive emergency    Hypertension    Essential hypertension    TIMO (acute kidney injury) ([facility] [address] [facility]    Acute respiratory failure (HCC)    Acute encephalopathy    Chronic kidney disease (CKD), stage III (moderate) (HCC)    Seizure (HCC)    Altered mental status     #1. Resolved acute respiratory failure  #2. Improved blood pressure control    Discharge plans per other services at this time consider Augmentin through July 19, 2020.     Electronically signed by Cody Dunham MD on 7/18/2020 at 11:12 AM

## 2020-07-18 NOTE — PROGRESS NOTES
Physical Therapy  [address] [facility]    Physical Therapy Progress Note    Date: 20  Patient Name: Amisha Carrillo       Room:   MRN: [id number]   Account: [de-identified]   : 1940  (76 y.o.)   Gender: female     Discharge Recommendations   Patient would benefit from continued therapy after discharge  Other: TBD - may need manual w/c pending d/c location    Referring Practitioner: Dr. Berny West  Diagnosis: Hypertensive emergency, Acute encephalopathy  Restrictions/Precautions: General Precautions, Fall Risk  Implants present? : Metal implants(R JAILENE, L TKA, neck and back sx)  Other position/activity restrictions: up with assistance   Past Medical History:  has a past medical history of Arthritis, Cancer ([facility] [address] CKD (chronic kidney disease) stage 4, GFR 15-29 ml/min ([facility] [address] COPD (chronic obstructive pulmonary disease) ([address] CVA (cerebral vascular accident) ([facility] [address] Gout, Hyperlipidemia, Hypertension, Left renal atrophy, and Other abnormality of urination(788.69). Past Surgical History:   has a past surgical history that includes Tonsillectomy; Varicose vein surgery; Hammer toe surgery; Hysterectomy; Carotid endarterectomy; Appendectomy; joint replacement; bladder suspension; joint replacement; Breast surgery (Left); Colonoscopy; back surgery; Upper gastrointestinal endoscopy (N/A, 2018); and Colonoscopy (N/A, 2018). Additional Pertinent Hx: exubated 20, HTN, CVA, COPD, breast CA, negative MRI brain for acute infarct or hemorrhage    Overall Orientation Status: Impaired  Orientation Level: Oriented to person, Disoriented to situation, Disoriented to time, Disoriented to place  Restrictions/Precautions  Restrictions/Precautions: General Precautions; Fall Risk  Required Braces or Orthoses?: No  Implants present? : Metal implants(R JAILENE, L TKA, neck and back sx)  Position Activity Restriction  Other position/activity restrictions: up with assistance    Subjective: pt reports up all night due to \" worring and family problems, im going home today \"   Comments: nursing approves treatment ,reports BP lower than recent readings but no concers , pt agreeable to treatment     Vital Signs  Patient Currently in Pain: Denies        Oxygen Therapy  O2 Device: None (Room air)  Patient Observation  Observations: expressive aphasia,slowed processing time       Bed Mobility:   Bed Mobility  Supine to Sit: Moderate assistance(right hand rail head of bed elevated )  Sit to Supine: Moderate assistance  Scooting: Moderate assistance  Comment: pt instructed in technique and sequencing , hand over hand left UE 50%  Bed mobility  Scooting: Moderate assistance    Transfers:  Sit to Stand: Moderate Assistance(PTA anterior to pt for safety )  Stand to sit: Minimal Assistance      Stairs/Curb  Stairs?: No      Posture: Fair  Sitting - Static: Fair  Sitting - Dynamic: Fair;-  Standing - Static: Poor  Standing - Dynamic: (JAMIE)  Comments: High fall risk, standing balance assessed with RW. Stood twice for ~15-20 seconds with 2 assist. Pt unable to advance LE to take side steps. Other exercises?: Yes  Other exercises 1: seated edge of bed x 10 minutes 1-2 UE support SBA/CGA(pt demonstrates inital right lean and consister right head )  Other exercises 2: seated dynamic reach bilateral UE to left side (instruction for left UE reach and left visual tracking )  Other exercises 3: seated bilateral LE edge of bed (reciprical pattern , tactile left LE )  Other Activities  Comment: pt demonstarates difficulty side stepping right  at edge of bed          Activity Tolerance: Patient limited by cognitive status; Patient limited by endurance; Patient limited by fatigue  PT Equipment Recommendations  Other: TBD - may need manual w/c pending d/c location       Assessment  Activity Tolerance: Patient limited by cognitive status; Patient limited by endurance; Patient limited by fatigue   Body structures, Functions, Activity limitations: Decreased functional mobility ; Decreased ADL status; Decreased strength;Decreased safe awareness;Decreased cognition;Decreased balance;Decreased endurance;Decreased posture  Specific instructions for Next Treatment: progress standing tolerance, sitting balance, core, therex  Prognosis: Fair  Discharge Recommendations: Patient would benefit from continued therapy after discharge     Type of devices: All fall risk precautions in place;Call light within reach;Gait belt;Patient at risk for falls;Nurse notified; Bed alarm in place; Left in bed  Restraints  Initially in place: No     Plan  Times per week: 5-6x/week  Current Treatment Recommendations: Strengthening, Balance Training, Functional Mobility Training, Transfer Training, Endurance Training, Gait Training, Equipment Evaluation, Education, & procurement, Patient/Caregiver Education & Training, Safety Education & Training    Patient Education  New Education Provided:    Learner:patient  Method: explanation       Outcome: needs reinforcement     Goals  Short term goals  Time Frame for Short term goals: 3-4 days  Short term goal 1: Pt to demonstrate mod x1 bed mobility. Short term goal 2: Pt to improve sit<->stand transfers min to mod x2. Short term goal 3: Pt to improve standing tolerance with improved posture to at least 1 minute, 2 assist.  Short term goal 4: Pt to improve posture to GOOD. Short term goal 5: Pt to improve sitting balance to [facility] with improved trunk control and core strength. Short term goal 6: Pt to improve BLE strength by 1/2 MMG. Short term goal 7: Pt to progress to taking steps at bedside 10' min to mod x2.     PT Individual Minutes  Time In: 5447  Time Out: 3162  Minutes: 38    Electronically signed by Reg Monten[place]ro PTA on 7/18/20 at 11:55 AM EDT         07/18/20 1154   PT Individual Minutes   Time In 0828   Time Out 0906   Minutes 45

## 2020-07-19 LAB
ABSOLUTE EOS #: 0.3 K/UL (ref 0–0.4)
ABSOLUTE IMMATURE GRANULOCYTE: ABNORMAL K/UL (ref 0–0.3)
ABSOLUTE LYMPH #: 1.1 K/UL (ref 1–4.8)
ABSOLUTE MONO #: 0.5 K/UL (ref 0.1–1.3)
ALDOSTERONE COMMENT: NORMAL
ALDOSTERONE: 3.1 NG/DL
ANION GAP SERPL CALCULATED.3IONS-SCNC: 11 MMOL/L (ref 9–17)
BASOPHILS # BLD: 1 % (ref 0–2)
BASOPHILS ABSOLUTE: 0.1 K/UL (ref 0–0.2)
BUN BLDV-MCNC: 10 MG/DL (ref 8–23)
BUN/CREAT BLD: ABNORMAL (ref 9–20)
CALCIUM SERPL-MCNC: 9.1 MG/DL (ref 8.6–10.4)
CHLORIDE BLD-SCNC: 101 MMOL/L (ref 98–107)
CO2: 24 MMOL/L (ref 20–31)
CREAT SERPL-MCNC: 1.17 MG/DL (ref 0.5–0.9)
DIFFERENTIAL TYPE: ABNORMAL
EOSINOPHILS RELATIVE PERCENT: 5 % (ref 0–4)
GFR AFRICAN AMERICAN: 54 ML/MIN
GFR NON-AFRICAN AMERICAN: 45 ML/MIN
GFR SERPL CREATININE-BSD FRML MDRD: ABNORMAL ML/MIN/{1.73_M2}
GFR SERPL CREATININE-BSD FRML MDRD: ABNORMAL ML/MIN/{1.73_M2}
GLUCOSE BLD-MCNC: 106 MG/DL (ref 65–105)
GLUCOSE BLD-MCNC: 84 MG/DL (ref 70–99)
GLUCOSE BLD-MCNC: 86 MG/DL (ref 65–105)
GLUCOSE BLD-MCNC: 90 MG/DL (ref 65–105)
GLUCOSE BLD-MCNC: 94 MG/DL (ref 65–105)
HCT VFR BLD CALC: 33.3 % (ref 36–46)
HEMOGLOBIN: 10.5 G/DL (ref 12–16)
IMMATURE GRANULOCYTES: ABNORMAL %
LYMPHOCYTES # BLD: 19 % (ref 24–44)
MCH RBC QN AUTO: 26.5 PG (ref 26–34)
MCHC RBC AUTO-ENTMCNC: 31.5 G/DL (ref 31–37)
MCV RBC AUTO: 84.2 FL (ref 80–100)
MONOCYTES # BLD: 9 % (ref 1–7)
NRBC AUTOMATED: ABNORMAL PER 100 WBC
PDW BLD-RTO: 16.4 % (ref 11.5–14.9)
PLATELET # BLD: 217 K/UL (ref 150–450)
PLATELET ESTIMATE: ABNORMAL
PMV BLD AUTO: 7.5 FL (ref 6–12)
POTASSIUM SERPL-SCNC: 3.5 MMOL/L (ref 3.7–5.3)
POTASSIUM SERPL-SCNC: 4.2 MMOL/L (ref 3.7–5.3)
RBC # BLD: 3.96 M/UL (ref 4–5.2)
RBC # BLD: ABNORMAL 10*6/UL
SEG NEUTROPHILS: 66 % (ref 36–66)
SEGMENTED NEUTROPHILS ABSOLUTE COUNT: 3.9 K/UL (ref 1.3–9.1)
SODIUM BLD-SCNC: 136 MMOL/L (ref 135–144)
WBC # BLD: 5.9 K/UL (ref 3.5–11)
WBC # BLD: ABNORMAL 10*3/UL

## 2020-07-19 PROCEDURE — 82947 ASSAY GLUCOSE BLOOD QUANT: CPT

## 2020-07-19 PROCEDURE — 6360000002 HC RX W HCPCS: Performed by: INTERNAL MEDICINE

## 2020-07-19 PROCEDURE — 6370000000 HC RX 637 (ALT 250 FOR IP): Performed by: INTERNAL MEDICINE

## 2020-07-19 PROCEDURE — 97110 THERAPEUTIC EXERCISES: CPT

## 2020-07-19 PROCEDURE — 99233 SBSQ HOSP IP/OBS HIGH 50: CPT | Performed by: INTERNAL MEDICINE

## 2020-07-19 PROCEDURE — 2580000003 HC RX 258: Performed by: RADIOLOGY

## 2020-07-19 PROCEDURE — 6360000002 HC RX W HCPCS: Performed by: STUDENT IN AN ORGANIZED HEALTH CARE EDUCATION/TRAINING PROGRAM

## 2020-07-19 PROCEDURE — 6370000000 HC RX 637 (ALT 250 FOR IP): Performed by: STUDENT IN AN ORGANIZED HEALTH CARE EDUCATION/TRAINING PROGRAM

## 2020-07-19 PROCEDURE — 84132 ASSAY OF SERUM POTASSIUM: CPT

## 2020-07-19 PROCEDURE — 36415 COLL VENOUS BLD VENIPUNCTURE: CPT

## 2020-07-19 PROCEDURE — 2580000003 HC RX 258: Performed by: INTERNAL MEDICINE

## 2020-07-19 PROCEDURE — 80048 BASIC METABOLIC PNL TOTAL CA: CPT

## 2020-07-19 PROCEDURE — 85025 COMPLETE CBC W/AUTO DIFF WBC: CPT

## 2020-07-19 PROCEDURE — 2060000000 HC ICU INTERMEDIATE R&B

## 2020-07-19 PROCEDURE — 2580000003 HC RX 258: Performed by: STUDENT IN AN ORGANIZED HEALTH CARE EDUCATION/TRAINING PROGRAM

## 2020-07-19 RX ORDER — AMLODIPINE BESYLATE 10 MG/1
10 TABLET ORAL DAILY
Status: DISCONTINUED | OUTPATIENT
Start: 2020-07-20 | End: 2020-07-20 | Stop reason: HOSPADM

## 2020-07-19 RX ORDER — LACTOBACILLUS RHAMNOSUS GG 10B CELL
1 CAPSULE ORAL
Status: DISCONTINUED | OUTPATIENT
Start: 2020-07-19 | End: 2020-07-20 | Stop reason: HOSPADM

## 2020-07-19 RX ADMIN — Medication 1 CAPSULE: at 17:10

## 2020-07-19 RX ADMIN — HYDROCHLOROTHIAZIDE 25 MG: 25 TABLET ORAL at 08:05

## 2020-07-19 RX ADMIN — Medication 10 ML: at 08:09

## 2020-07-19 RX ADMIN — METOPROLOL TARTRATE 50 MG: 50 TABLET, FILM COATED ORAL at 22:06

## 2020-07-19 RX ADMIN — AMOXICILLIN AND CLAVULANATE POTASSIUM 1 TABLET: 875; 125 TABLET, FILM COATED ORAL at 08:09

## 2020-07-19 RX ADMIN — AMLODIPINE BESYLATE 5 MG: 5 TABLET ORAL at 08:05

## 2020-07-19 RX ADMIN — POTASSIUM CHLORIDE 10 MEQ: 7.46 INJECTION, SOLUTION INTRAVENOUS at 06:00

## 2020-07-19 RX ADMIN — METOPROLOL TARTRATE 50 MG: 50 TABLET, FILM COATED ORAL at 08:05

## 2020-07-19 RX ADMIN — POTASSIUM CHLORIDE 10 MEQ: 7.46 INJECTION, SOLUTION INTRAVENOUS at 08:05

## 2020-07-19 RX ADMIN — CLOPIDOGREL BISULFATE 75 MG: 75 TABLET ORAL at 08:05

## 2020-07-19 RX ADMIN — FAMOTIDINE 20 MG: 20 TABLET ORAL at 08:05

## 2020-07-19 RX ADMIN — SODIUM CHLORIDE 100 MG: 9 INJECTION, SOLUTION INTRAVENOUS at 13:46

## 2020-07-19 RX ADMIN — Medication 1 CAPSULE: at 13:46

## 2020-07-19 RX ADMIN — Medication 10 ML: at 22:09

## 2020-07-19 RX ADMIN — POTASSIUM CHLORIDE 10 MEQ: 7.46 INJECTION, SOLUTION INTRAVENOUS at 12:41

## 2020-07-19 RX ADMIN — SPIRONOLACTONE 25 MG: 25 TABLET, FILM COATED ORAL at 08:05

## 2020-07-19 RX ADMIN — DOCUSATE SODIUM 100 MG: 100 CAPSULE, LIQUID FILLED ORAL at 08:05

## 2020-07-19 RX ADMIN — ENOXAPARIN SODIUM 30 MG: 30 INJECTION SUBCUTANEOUS at 08:05

## 2020-07-19 RX ADMIN — POTASSIUM CHLORIDE 10 MEQ: 7.46 INJECTION, SOLUTION INTRAVENOUS at 10:59

## 2020-07-19 RX ADMIN — EZETIMIBE 10 MG: 10 TABLET ORAL at 22:07

## 2020-07-19 RX ADMIN — AMOXICILLIN AND CLAVULANATE POTASSIUM 1 TABLET: 875; 125 TABLET, FILM COATED ORAL at 22:08

## 2020-07-19 ASSESSMENT — ENCOUNTER SYMPTOMS
NAUSEA: 0
ABDOMINAL PAIN: 0
CONSTIPATION: 0
VOMITING: 0
WHEEZING: 0
SHORTNESS OF BREATH: 0
DIARRHEA: 0

## 2020-07-19 ASSESSMENT — PAIN SCALES - GENERAL
PAINLEVEL_OUTOF10: 0

## 2020-07-19 NOTE — PROGRESS NOTES
[address] [facility].    PROGRESS NOTE             7/19/2020    10:26 AM    Name:   Merline Birchwood  MRN:     [id number]     Acct:      [de-identified]   Room:   2096/2096-01  IP Day:  6  Admit Date:  7/12/2020  8:20 PM    PCP:  Christopher Robles DO  Code Status:  Full Code    Subjective:     C/C:   Chief Complaint   Patient presents with    Altered Mental Status    Hypertension      Interval History Status: improved. Patient was seen and examined at bedside. Per patient and nurse report no events overnight. Patient's K-3.5 (L) and will receive 40 mEq of potassium  patient continues to be pleasantly confused and pre-CERT is pending transfer patient to ARU. Brief History:     See H&P    Review of Systems:     Review of Systems   Constitutional: Negative for fatigue and fever. Respiratory: Negative for shortness of breath and wheezing. Cardiovascular: Negative for chest pain and palpitations. Gastrointestinal: Negative for abdominal pain, constipation, diarrhea, nausea and vomiting. Genitourinary: Negative for difficulty urinating. Medications: Allergies:     Allergies   Allergen Reactions    Diclofenac-Misoprostol Other (See Comments)     Per Dr Andrew Tripp - patient has GI intolerance to oral formulations       Current Meds:   Scheduled Meds:    docusate sodium  100 mg Oral Daily    iron sucrose  100 mg Intravenous Q24H    famotidine  20 mg Oral Daily    amoxicillin-clavulanate  1 tablet Oral 2 times per day    amLODIPine  5 mg Oral Daily    spironolactone  25 mg Oral Daily    And    hydroCHLOROthiazide  25 mg Oral Daily    sodium chloride flush  10 mL Intravenous 2 times per day    sodium chloride flush  10 mL Intravenous 2 times per day    enoxaparin  30 mg Subcutaneous Daily    clopidogrel  75 mg Oral Daily    metoprolol tartrate  50 mg Oral BID    cloNIDine  1 patch Transdermal Weekly    ezetimibe  10 mg Oral Nightly     Continuous Infusions:    dextrose       PRN Meds: potassium chloride, glucose, dextrose, glucagon (rDNA), dextrose, sodium chloride flush, sodium chloride flush, acetaminophen, metoprolol, hydrALAZINE, aspirin    Data:     Past Medical History:   has a past medical history of Arthritis, Cancer ([facility] CKD (chronic kidney disease) stage 4, GFR 15-29 ml/min (HCC), COPD (chronic obstructive pulmonary disease) ([facility] [address] CVA (cerebral vascular accident) ([facility] [address] Gout, Hyperlipidemia, Hypertension, Left renal atrophy, and Other abnormality of urination(788.69). Social History:   reports that she quit smoking about 22 months ago. She smoked 1.00 pack per day. She has never used smokeless tobacco. She reports that she does not drink alcohol or use drugs. Family History:   Family History   Problem Relation Age of Onset    Heart Disease Brother     Other Mother         polycythemia    Heart Disease Father     Heart Disease Sister         enlarged heart    Cancer Son         prostate       Vitals:  BP (!) 175/69   Pulse 73   Temp 98.3 °F (36.8 °C) (Axillary)   Resp 18   Ht 5' 5\" (1.651 m)   Wt 174 lb 3.2 oz (79 kg)   SpO2 97%   BMI 28.99 kg/m²   Temp (24hrs), Av.3 °F (36.8 °C), Min:97.9 °F (36.6 °C), Max:98.6 °F (37 °C)    Recent Labs     20  1128 20  1606 20  0659   POCGLU 115* 95 97 86       I/O(24Hr):     Intake/Output Summary (Last 24 hours) at 2020 1026  Last data filed at 2020 0641  Gross per 24 hour   Intake 250 ml   Output 3400 ml   Net -3150 ml       Labs:    [unfilled]    Lab Results   Component Value Date/Time    SPECIAL NOT REPORTED 07/15/2020 09:19 AM     Lab Results   Component Value Date/Time    CULTURE NORMAL RESPIRATORY BENJI LIGHT GROWTH 07/15/2020 09:19 AM       [unfilled]    Radiology:    Ct Head Wo Contrast    Result Date: 2020  EXAMINATION: CT OF THE HEAD WITHOUT CONTRAST  2020 8:42 pm TECHNIQUE: CT of the head was performed without the administration of intravenous contrast. Dose modulation, iterative reconstruction, and/or weight based adjustment of the mA/kV was utilized to reduce the radiation dose to as low as reasonably achievable. COMPARISON: 12/19/2018 HISTORY: ORDERING SYSTEM PROVIDED HISTORY: AMS TECHNOLOGIST PROVIDED HISTORY: AMS Reason for Exam: AMS, unable to speak, patient is unable to stop shaking her legs. patient is unable to follow any type of instructions or answer any questions. Acuity: Acute Type of Exam: Initial FINDINGS: BRAIN/VENTRICLES: There is no acute intracranial hemorrhage, mass effect or midline shift. No abnormal extra-axial fluid collection. The gray-white differentiation is maintained without evidence of an acute infarct. There is no evidence of hydrocephalus. Generalized involutional changes and moderate chronic small ischemic disease. Intracranial vascular calcifications. Question small chronic lacune infarct identified within the region of the right paramedian dane. ORBITS: The visualized portion of the orbits demonstrate no acute abnormality. SINUSES: The visualized paranasal sinuses and mastoid air cells demonstrate no acute abnormality. SOFT TISSUES/SKULL:  No acute abnormality of the visualized skull or soft tissues. No acute intracranial abnormality. Chronic small ischemic disease and age related change. Critical results were called by Dr. Lucian Romero to Dr Jen Rubio on 7/12/2020 at 20:52. Us Renal Complete    Result Date: 7/13/2020  EXAMINATION: RETROPERITONEAL ULTRASOUND OF THE KIDNEYS 7/13/2020 COMPARISON: 04/19/2019 and prior HISTORY: ORDERING SYSTEM PROVIDED HISTORY: TIMO TECHNOLOGIST PROVIDED HISTORY: TIMO FINDINGS: Kidneys: The right kidney measures 10.2 x 5.8 x 5.2 cm in length and the left kidney measures 8.4 x 4.5 x 4.6 cm in length. Study limited by body habitus and overlying bowel gas. Simple-appearing cyst on the right measuring 1.3 x 1.2 x 1.6 cm noted.   There is no hydronephrosis or abnormal echogenicity. Left kidney is somewhat limited in evaluation without evidence of hydronephrosis. There is mild cortical thinning. Limited study secondary to body habitus and overlying bowel gas. Right kidney appears grossly unremarkable in appearance. Left kidney is limited but grossly unremarkable. There is mild cortical thinning. Ir Von Flow Device Plmt/replace/removal    Result Date: 7/14/2020  PROCEDURE: ULTRASOUND GUIDED VASCULAR ACCESS. FLUOROSCOPY GUIDED PICC PLACEMENT 7/14/2020. HISTORY: ORDERING SYSTEM PROVIDED HISTORY: PICC line TECHNOLOGIST PROVIDED HISTORY: Patient in ICU, on vent, sedated. In need of IV access for drips, sedation, Please place ASAP. Spouse's name Silvia Kearney, home No. [phone]. Spouse POA, only one to make decisions for patient. PICC line SEDATION: None FLUOROSCOPY DOSE AND TYPE OR TIME AND EXPOSURES: DAP 88 cGy cm squared TECHNIQUE: Informed consent was obtained after a detailed explanation of the procedure including risks, benefits, and alternatives. Universal protocol was observed. The right arm was prepped and draped in sterile fashion using maximum sterile barrier technique. Local anesthesia was achieved with lidocaine. A micropuncture needle was used to access the right basilic vein using ultrasound guidance. An ultrasound image demonstrating patency of the vein with needle tip located within it. An image was obtained and stored in PACs. A 0.018 guidewire was used to place a peel-a-way sheath and a 5 [employer] power injectable dual-lumen PICC was advanced with fluoroscopic guidance with the tip at the cavo-atrial junction. The catheter flushed easily and there was a good blood return. The catheter was secured to the skin. The patient tolerated the procedure well and there were no immediate complications. FINDINGS: Fluoroscopic image demonstrates the tip of the catheter at the cavo-atrial junction.      Successful ultrasound and Chronic pulmonary change without definite acute cardiopulmonary process. Support tubes as described above. Xr Chest Portable    Result Date: 7/14/2020  EXAMINATION: ONE XRAY VIEW OF THE CHEST 7/14/2020 6:52 am COMPARISON: 07/13/2020 HISTORY: ORDERING SYSTEM PROVIDED HISTORY: ETT placement TECHNOLOGIST PROVIDED HISTORY: ETT placement Reason for Exam: Vent protocol. H/O COPD. Acuity: Unknown Type of Exam: Unknown Additional signs and symptoms: Vent protocol. H/O COPD. FINDINGS: Endotracheal tube extends to the mid trachea. Nasogastric tube extends below the diaphragm. Subtle asymmetric left perihilar opacity is again demonstrated. No pleural effusion. No pneumothorax. Cardiac and mediastinal silhouettes are similar to prior. Left axillary clips. No significant interval change in subtle left perihilar airspace disease as compared to prior. Follow-up to resolution recommended. Xr Chest Portable    Result Date: 7/13/2020  EXAMINATION: ONE XRAY VIEW OF THE CHEST 7/13/2020 6:04 am COMPARISON: 07/12/2020 HISTORY: ORDERING SYSTEM PROVIDED HISTORY: ETT placement TECHNOLOGIST PROVIDED HISTORY: ETT placement Reason for Exam: ETT placement Acuity: Unknown Type of Exam: Ongoing Additional signs and symptoms: ETT placement Relevant Medical/Surgical History: ETT placement FINDINGS: Endotracheal tube and NG tube remain in place. The heart and mediastinal structures are stable. There is a questionable infiltrate in the left mid lung laterally. This may relate to previous treatment for presumed left breast disease. The lungs are otherwise clear. Orthopedic hardware overlies the lower cervical spine. Surgical clips are seen in the left axilla. Questionable infiltrate left mid lung. Xr Chest Portable    Result Date: 7/12/2020  EXAMINATION: ONE XRAY VIEW OF THE CHEST 7/12/2020 10:32 pm COMPARISON: Prior study at 9:13 p.m.  HISTORY: ORDERING SYSTEM PROVIDED HISTORY: ET tube TECHNOLOGIST PROVIDED HISTORY: ET tube Reason for Exam: ET tube placement Acuity: Acute Type of Exam: Initial Additional signs and symptoms: ET tube placement Relevant Medical/Surgical History: ET tube placement FINDINGS: Interval intubation. Enteric tube has also been placed with both tubes appropriately positioned. The heart is enlarged. Persistent ground-glass opacification in the left lower lung zone. Right lung appears clear. No significant skeletal finding. Supportive devices are positioned appropriately. Persistent airspace changes at the left lung base. Xr Chest Portable    Result Date: 7/12/2020  EXAMINATION: ONE XRAY VIEW OF THE CHEST 7/12/2020 9:21 pm COMPARISON: 12/19/2019 radiograph HISTORY: ORDERING SYSTEM PROVIDED HISTORY: cough TECHNOLOGIST PROVIDED HISTORY: cough Reason for Exam: cough Acuity: Acute Type of Exam: Initial FINDINGS: The heart is enlarged. Mediastinum and pulmonary vascular markings are normal.  Right lung is clear. Stable mild ground-glass opacities at the left costophrenic sulcus. No significant skeletal finding. Asymmetric ground-glass opacification at the left lung base. Pattern may represent edema or small pleural effusion. Developing pneumonitis can not be excluded.      Vl Renal Arterial Duplex Complete    Result Date: 7/18/2020    [facility]  Vascular Renal Procedure   Patient Name   Fahad Funk  Date of Study           07/17/2020                 S   Date of Birth  1940  Gender                  Female   Age            79 year(s)  Race                       Room Number    2096        Height:                 64.96 inch, 165 cm   Corporate ID # [id number]    Weight:                 159 pounds, 72 kg   Patient Acct # [de-identified]   BSA:        1.79 m^2    BMI:      26.45 kg/m^2   MR #           [id number]      Sonographer             Popeye Andrews   Accession #    [id number]  Interpreting Physician  Kapil Santamaria   Referring                  Referring +--------------------------------+---+----+------------+-------------------+ ! Aorta Infra Renal               !145!1.34!2.12        !2.2                ! +--------------------------------+---+----+------------+-------------------+ Renal Duplex Measurements +--------------------------------++-----+----+----+----++---+----+----+----+ ! Renal Artery A                  !!Right! ! Left!    !!   !    !    !    ! +--------------------------------++-----+----+----+----++---+----+----+----+ ! Location                        ! !PSV  ! EDV ! RI  !RAR !!PSV! EDV ! RI  !RAR ! +--------------------------------++-----+----+----+----++---+----+----+----+ ! Ostial Renal                    !!415  !38.7!0.91!2.88!!374!0   !1   !2.6 ! +--------------------------------++-----+----+----+----++---+----+----+----+ ! Prox Renal                      !!514  !2.97!0.99!3.57!!295!14.6!0.95!2.05! +--------------------------------++-----+----+----+----++---+----+----+----+ ! Dist Renal                      !!43.3 !10.2!0.76!0.3 !!   !    !    !    ! +--------------------------------++-----+----+----+----++---+----+----+----+ Right Miscellaneous Measurements   - The average kidney length is 6.24 cm. Mri Brain Wo Contrast    Result Date: 7/16/2020  EXAMINATION: MRI OF THE BRAIN WITHOUT CONTRAST  7/16/2020 3:19 pm TECHNIQUE: Multiplanar multisequence MRI of the brain was performed without the administration of intravenous contrast. COMPARISON: CT head 07/12/2020 HISTORY: ORDERING SYSTEM PROVIDED HISTORY: altered mental status TECHNOLOGIST PROVIDED HISTORY: altered mental status Reason for Exam: Pt admitted with AMS and not following any instructions, was extubated this AM, still having some AMS but following instructions. FINDINGS: INTRACRANIAL STRUCTURES/VENTRICLES: Diffuse parenchymal volume loss is redemonstrated. There is no diffusion restriction to suggest acute infarct. Mild chronic white matter microvascular ischemic changes are present.   There is bilateral parietal cortical and subcortical white matter T2/FLAIR hyperintensity, right greater than left. No mass effect or midline shift. No evidence of an acute intracranial hemorrhage. The ventricles and sulci are normal in size and configuration. The sellar/suprasellar regions appear unremarkable. The normal signal voids within the major intracranial vessels appear maintained. ORBITS: Orbital structures are unremarkable. SINUSES: Minimal frothy secretions in the sphenoid sinuses. Bilateral mastoid effusions. These findings may relate to recent intubation. BONES/SOFT TISSUES: The bone marrow signal intensity appears normal. The soft tissues demonstrate no acute abnormality. 1. Bilateral parietal cortical and subcortical signal abnormality most consistent with posterior reversible encephalopathy syndrome. 2. No acute infarct or hemorrhage. 3. Mild chronic white matter microvascular ischemic changes. Physical Examination:        Physical Exam  Constitutional:       Comments: Confused   HENT:      Mouth/Throat:      Mouth: Mucous membranes are moist.   Eyes:      Conjunctiva/sclera: Conjunctivae normal.   Cardiovascular:      Rate and Rhythm: Normal rate and regular rhythm. Pulses: Normal pulses. Heart sounds: No murmur. Pulmonary:      Effort: Pulmonary effort is normal.      Breath sounds: Normal breath sounds. No wheezing. Abdominal:      General: Bowel sounds are normal.      Palpations: Abdomen is soft. Tenderness: There is no abdominal tenderness. Musculoskeletal:      Right lower leg: No edema. Left lower leg: No edema. Neurological:      Mental Status: She is alert and oriented to person, place, and time.            Assessment:        Primary Problem  Acute encephalopathy    Active Hospital Problems    Diagnosis Date Noted    [facility] [J96.00] 07/13/2020    Acute encephalopathy [G93.40] 07/13/2020    Chronic kidney disease (CKD), stage III (moderate) ([facility] [address] [N18.3] 07/13/2020    [facility] ([facility] [R56.9] 07/13/2020    Altered mental status [R41.82]     Hypertensive emergency [I16.1] 12/19/2019    Aspiration pneumonia ([facility] [J69.0] 04/24/2019       Plan:        1. Acute hypoxic respiratory failure 2/2  Hypertensive emergency, resolved  -Patient was successfully extubated  -Advance diet as tolerated     2. Posterior reversible encephalopathy syndrome  -MRI: Posterior reversible encephalopathy syndrome secondary to hypertension  -Carotid Duplex (05/27)  - Left artery 50-59 stenosis; Right < 50% stenosis  -CT head on remarkable for any intracranial abnormality  -EKG normal sinus rhythm  -UA shows no sign of infection     3. Aspiration pneumonia  -Chest x-ray: Patchy peripheral airspace disease possibly from pneumonia  -Continue Augmentin 1 tablet twice a day for 3 days (end date 07/19/20)  -Pro-Feliciano- 0.11, Sed rate- 23 (H), CRP- 8.6 (H). - Respiratory viral panel and mycoplasma negative     4. Seizure-like activity 2/2 hypertensive emergency  -  Neurology: Discontinue  Keppra 250 mg BID continue holding off and to elliptic unless patient has further seizures  - EEG: Disorganized and slow background triphasic waves suggestive of mild/moderate encephalopathy non-etiology  -MRI: Posterior reversible encephalopathy syndrome secondary to hypertension     5. Hypertensive emergency, resolved  -Restarted oral home HTN meds  -Continue Lopressor 5 mg IV every 6 hrs  -Serial troponin and EKG   -Echo- LV EF-75%     6. History of carotid endarterectomy  -Lipitor 20 mg daily  -Plavix 75 mg daily  -At the 10 be 10 mg daily  -Lopressor 50 mg twice daily   -Carotid Duplex (05/27): Left 50-59% ;  Right <50%  -Seen Dr. Velez is patient's vascular surgeon on 6/8/2020     6. CKD stage 3; GFR 34  -Creatinine at 1.17 Improving trending down 1.47); BL at 1.4  -BNP: 12,814 (H trending up from [address] 07/12/20)  -Patient is not on Lasix  -Renal artery Doppler, concerning for bilateral renal artery stenosis less than 60%  -Continue to monitor I's and O's per shift     7.  Iron deficiency anemia  -Restarted IV Venofer 100 mg for 3 days  -Labs: H/H- 10.5/33.3 improving (L, but trending up)   - Iron studies FE-23 (L), TIBC-161 (L), iron binding capacity-1 4 (L), H/H- 8.7/27.5 (L, and trending down)  -FOBT- pending  -Continue to monitor H&H     8.  Secondary hypertension possibly due to renal artery stenosis ruled out  -Renal artery Doppler, concerning for bilateral renal artery stenosis less than 60%  -Aldosterone and renin levels pending  -Avoid ACE or ARBs for HTN management     DVT PPX: Enoxaparin  GI P PPX: Famotidine  Dispo: ARU Gilles Eisenmenger, MD  7/19/2020  10:26 AM

## 2020-07-19 NOTE — PROGRESS NOTES
edema    MEDS      [START ON 7/20/2020] enoxaparin  40 mg Subcutaneous Daily    docusate sodium  100 mg Oral Daily    iron sucrose  100 mg Intravenous Q24H    famotidine  20 mg Oral Daily    amoxicillin-clavulanate  1 tablet Oral 2 times per day    amLODIPine  5 mg Oral Daily    spironolactone  25 mg Oral Daily    And    hydroCHLOROthiazide  25 mg Oral Daily    sodium chloride flush  10 mL Intravenous 2 times per day    sodium chloride flush  10 mL Intravenous 2 times per day    clopidogrel  75 mg Oral Daily    metoprolol tartrate  50 mg Oral BID    cloNIDine  1 patch Transdermal Weekly    ezetimibe  10 mg Oral Nightly      dextrose       potassium chloride, glucose, dextrose, glucagon (rDNA), dextrose, sodium chloride flush, sodium chloride flush, acetaminophen, metoprolol, hydrALAZINE, aspirin    LABS   CBC   Recent Labs     07/19/20  0541   WBC 5.9   HGB 10.5*   HCT 33.3*   MCV 84.2        BMP:   Lab Results   Component Value Date     07/19/2020    K 3.5 07/19/2020     07/19/2020    CO2 24 07/19/2020    BUN 10 07/19/2020    LABALBU 3.9 07/12/2020    LABALBU 4.1 04/23/2012    CREATININE 1.17 07/19/2020    CALCIUM 9.1 07/19/2020    GFRAA 54 07/19/2020    LABGLOM 45 07/19/2020     ABGs:  Lab Results   Component Value Date    PHART 7.359 07/16/2020    PO2ART 99.5 07/16/2020    JIG4UOD 37.3 07/16/2020      Lab Results   Component Value Date    MODE CPAP 07/16/2020     Ionized Calcium:  No results found for: IONCA  Magnesium:    Lab Results   Component Value Date    MG 2.0 07/31/2019     Phosphorus:    Lab Results   Component Value Date    PHOS 4.6 07/31/2019        LIVER PROFILE No results for input(s): AST, ALT, LIPASE, BILIDIR, BILITOT, ALKPHOS in the last 72 hours. Invalid input(s): AMYLASE,  ALB  INR No results for input(s): INR in the last 72 hours.   PTT   Lab Results   Component Value Date    APTT 27.4 07/12/2020         RADIOLOGY     (See actual reports for details)    ASSESSMENT/PLAN     Patient Active Problem List   Diagnosis    Breast cancer ([address]    Renal cyst    Lumbar degenerative disc disease    Arthropathy of lumbar facet joint    Failed back syndrome of lumbar spine    Lumbar spondylosis    Sacroiliitis ([address]    Status post lumbar spinal fusion    TIA (transient ischemic attack)    Anemia    Dyspnea    COPD with acute exacerbation ([address]    Acute kidney injury superimposed on CKD (HCC)    Aspiration pneumonia (HCC)    CKD (chronic kidney disease) stage 4, GFR 15-29 ml/min (HCC)    Left renal atrophy    Severe malnutrition ([address]    Acute cystitis without hematuria    Disorientation    Hypertensive emergency    Hypertension    Essential hypertension    TIMO (acute kidney injury) ([address]    Acute respiratory failure (HCC)    Acute encephalopathy    Chronic kidney disease (CKD), stage III (moderate) (HCC)    [facility]    Altered mental status     #1 resolved acute respiratory failure, extubated on July 16. #2 improved blood pressure control  3. Aspiration pneumonia. We will continue Augmentin through today.   Discharge plans per other services      Electronically signed by Waldemar Camp MD on 7/19/2020 at 11:08 AM

## 2020-07-19 NOTE — PROGRESS NOTES
[facility] CARDIOLOGY Progress Note    7/19/2020 7:18 AM      Subjective:  Ms. Ajay Owens   Is sleeping soundly and did not wake up during my examination. Unable to obtain any history. Review of systems:   unobtainable.              LABS:     Recent Results (from the past 24 hour(s))   POC Glucose Fingerstick    Collection Time: 07/18/20 11:28 AM   Result Value Ref Range    POC Glucose 115 (H) 65 - 105 mg/dL   POC Glucose Fingerstick    Collection Time: 07/18/20  4:06 PM   Result Value Ref Range    POC Glucose 95 65 - 105 mg/dL   POC Glucose Fingerstick    Collection Time: 07/18/20  8:22 PM   Result Value Ref Range    POC Glucose 97 65 - 105 mg/dL   Basic Metabolic Prof    Collection Time: 07/19/20  5:41 AM   Result Value Ref Range    Glucose 84 70 - 99 mg/dL    BUN 10 8 - 23 mg/dL    CREATININE 1.17 (H) 0.50 - 0.90 mg/dL    Bun/Cre Ratio NOT REPORTED 9 - 20    Calcium 9.1 8.6 - 10.4 mg/dL    Sodium 136 135 - 144 mmol/L    Potassium 3.5 (L) 3.7 - 5.3 mmol/L    Chloride 101 98 - 107 mmol/L    CO2 24 20 - 31 mmol/L    Anion Gap 11 9 - 17 mmol/L    GFR Non-African American 45 (L) >60 mL/min    GFR  54 (L) >60 mL/min    GFR Comment          GFR Staging NOT REPORTED    CBC with DIFF    Collection Time: 07/19/20  5:41 AM   Result Value Ref Range    WBC 5.9 3.5 - 11.0 k/uL    RBC 3.96 (L) 4.0 - 5.2 m/uL    Hemoglobin 10.5 (L) 12.0 - 16.0 g/dL    Hematocrit 33.3 (L) 36 - 46 %    MCV 84.2 80 - 100 fL    MCH 26.5 26 - 34 pg    MCHC 31.5 31 - 37 g/dL    RDW 16.4 (H) 11.5 - 14.9 %    Platelets 183 799 - 919 k/uL    MPV 7.5 6.0 - 12.0 fL    NRBC Automated NOT REPORTED per 100 WBC    Differential Type NOT REPORTED     Seg Neutrophils 66 36 - 66 %    Lymphocytes 19 (L) 24 - 44 %    Monocytes 9 (H) 1 - 7 %    Eosinophils % 5 (H) 0 - 4 %    Basophils 1 0 - 2 %    Immature Granulocytes NOT REPORTED 0 %    Segs Absolute 3.90 1.3 - 9.1 k/uL    Absolute Lymph # 1.10 1.0 - 4.8 k/uL    Absolute Mono # 0.50 0.1 - 1.3 k/uL    Absolute Eos # 0.30 0.0 - 0.4 k/uL    Basophils Absolute 0.10 0.0 - 0.2 k/uL    Absolute Immature Granulocyte NOT REPORTED 0.00 - 0.30 k/uL    WBC Morphology NOT REPORTED     RBC Morphology NOT REPORTED     Platelet Estimate NOT REPORTED        Pulse Ox: SpO2  Av %  Min: 94 %  Max: 98 %    Supplemental O2: O2 Flow Rate (L/min): 2 L/min     Current Meds:    docusate sodium  100 mg Oral Daily    iron sucrose  100 mg Intravenous Q24H    famotidine  20 mg Oral Daily    amoxicillin-clavulanate  1 tablet Oral 2 times per day    amLODIPine  5 mg Oral Daily    spironolactone  25 mg Oral Daily    And    hydroCHLOROthiazide  25 mg Oral Daily    sodium chloride flush  10 mL Intravenous 2 times per day    sodium chloride flush  10 mL Intravenous 2 times per day    enoxaparin  30 mg Subcutaneous Daily    clopidogrel  75 mg Oral Daily    metoprolol tartrate  50 mg Oral BID    cloNIDine  1 patch Transdermal Weekly    ezetimibe  10 mg Oral Nightly              VITAL SIGNS:    BP (!) 146/72   Pulse 90   Temp 98.6 °F (37 °C)   Resp 18   Ht 5' 5\" (1.651 m)   Wt 174 lb 3.2 oz (79 kg)   SpO2 98%   BMI 28.99 kg/m²  2 L/min      Admit Weight:  190 lb (86.2 kg)    Last 3 weights: Wt Readings from Last 3 Encounters:   20 174 lb 3.2 oz (79 kg)   19 192 lb 3.9 oz (87.2 kg)   19 171 lb 3.2 oz (77.7 kg)       BMI: Body mass index is 28.99 kg/m². INPUT/OUTPUT:          Intake/Output Summary (Last 24 hours) at 2020 0718  Last data filed at 2020 0641  Gross per 24 hour   Intake 250 ml   Output 3400 ml   Net -3150 ml         Telemetry shows Sinus rhythm. EXAM:     General appearance:  Sleeping soundly and looks comfortable. Neck: No JVD. Chest: clear bilaterally. No rhonchi or wheezing. Cardiac: Regular rate and rhythm. No S3 gallop or rubs. Extremities: no leg edema. Skin:  warm and dry.         ASSESSMENT:    Accelerated hypertension - improved currently.  History of labile blood pressures.     Abnormal EKG.  Cannot exclude underlying coronary artery disease.  No active angina pectoris.     Hyperdynamic LVEF 75%, mild TR on 2 D ECHO July 2020.     Acute respiratory failure requiring oral intubation and mechanical ventilation, extubated 7/16/2020.     Hypokalemia with potassium 3.5 this a.m.     History of carotid artery disease.     Altered mental status with headache prompting hospitalization.  Improved mental status currently.     Other problems as charted. REC/PLAN:    Stable overall cardiac-wise. Blood pressure better controlled. Replace Potassium and recheck level as ordered. Keep level 4-4.5 range. Continue on all current cardiac medications. No plans for any further cardiac workup at this time. Discussed with the charge nurse. Electronically signed by Aj Dimas MD, [facility]        PLEASE NOTE:  This progress note was completed using a voice transcription system. Every effort was made to ensure accuracy. However, inadvertent computerized transcription errors may be present.

## 2020-07-19 NOTE — PROGRESS NOTES
Hammer toe surgery; Hysterectomy; Carotid endarterectomy; Appendectomy; joint replacement; bladder suspension; joint replacement; Breast surgery (Left); Colonoscopy; back surgery; Upper gastrointestinal endoscopy (N/A, 7/17/2018); and Colonoscopy (N/A, 7/17/2018). Restrictions  Restrictions/Precautions  Restrictions/Precautions: General Precautions, Fall Risk  Required Braces or Orthoses?: No  Implants present? : Metal implants(R JAILENE, L TKA, neck and back sx)  Position Activity Restriction  Other position/activity restrictions: up with assistance  Subjective   General  Additional Pertinent Hx: exubated 7/16/20, HTN, CVA, COPD, breast CA, negative MRI brain for acute infarct or hemorrhage  Family / Caregiver Present: Yes( Gene)  Referring Practitioner: Deidra Bernal MD   Subjective  Subjective: no comments  Pain Screening  Patient Currently in Pain: Denies  Pain Assessment  Pain Assessment: 0-10  Pain Level: 0  Patient's Stated Pain Goal: No pain  Vital Signs  Patient Currently in Pain: Denies  Oxygen Therapy  O2 Device: None (Room air)  Patient Observation  Observations: expressive aphasia,slowed processing time       Orientation  Orientation  Overall Orientation Status: Impaired  Orientation Level: Oriented to person;Disoriented to situation;Disoriented to time;Disoriented to place  Cognition      Objective   Bed mobility  Scooting: Moderate assistance  Transfers  Sit to Stand: Moderate Assistance(PTA anterior to pt for safety )  Stand to sit: Minimal Assistance  Ambulation  Ambulation?: No  Stairs/Curb  Stairs?: No     Balance  Posture: Fair  Sitting - Static: Fair  Sitting - Dynamic: Fair;-  Standing - Static: Poor  Standing - Dynamic: (JAMIE)  Comments: High fall risk, standing balance assessed with RW. Stood twice for ~15-20 seconds with 2 assist. Pt unable to advance LE to take side steps.   Other exercises  Other exercises?: Yes  Other exercises 1: seated edge of bed x 10 minutes 1-2 UE support SBA/CGA(pt demonstrates inital right lean and consister right head )  Other exercises 2: seated dynamic reach bilateral UE to left side (instruction for left UE reach and left visual tracking )  Other exercises 3: seated bilateral LE edge of bed (reciprical pattern , tactile left LE )  Other exercises 4: sit to stands x 5 15 -30 sec each   AROM RLE (degrees)  RLE AROM: WFL  AROM LLE (degrees)  LLE AROM : WFL  Strength RLE  Strength RLE: WFL  Comment: Grossly 3+/5  Strength LLE  Strength LLE: WFL  Comment: Grossly 3+/5  Comment: pt demonstarates difficulty side stepping right  at edge of bed                G-Code     OutComes Score                                                     AM-PAC Score             Goals  Short term goals  Time Frame for Short term goals: 3-4 days  Short term goal 1: Pt to demonstrate mod x1 bed mobility. Short term goal 2: Pt to improve sit<->stand transfers min to mod x2. Short term goal 3: Pt to improve standing tolerance with improved posture to at least 1 minute, 2 assist.  Short term goal 4: Pt to improve posture to GOOD. Short term goal 5: Pt to improve sitting balance to [facility] with improved trunk control and core strength. Short term goal 6: Pt to improve BLE strength by 1/2 MMG. Short term goal 7: Pt to progress to taking steps at bedside 10' min to mod x2. Patient Goals   Patient goals : To go home    Plan    Plan  Times per week: 5-6x/week  Specific instructions for Next Treatment: progress standing tolerance, sitting balance, core, therex  Current Treatment Recommendations: Strengthening, Balance Training, Functional Mobility Training, Transfer Training, Endurance Training, Gait Training, Equipment Evaluation, Education, & procurement, Patient/Caregiver Education & Training, Safety Education & Training  Safety Devices  Type of devices:  All fall risk precautions in place, Call light within reach, Gait belt, Patient at risk for falls, Nurse notified, Bed alarm in place, Left in bed  Restraints  Initially in place: No     Therapy Time   Individual Concurrent Group Co-treatment   Time In (P) 1130         Time Out (P) 1200         Minutes (P) 30                 Adelaida Nazario, PTA

## 2020-07-19 NOTE — FLOWSHEET NOTE
Writer spoke with patient on the telephone; patient hopes to be discharged today; declined prayer     07/19/20 0957   Encounter Summary   Services provided to: Patient   Referral/Consult From: Rounding   Continue Visiting   (7/19/20)   Complexity of Encounter Low   Length of Encounter 15 minutes   Spiritual Assessment Completed Yes   Spiritual/Mandaeism   Type Spiritual support   Assessment Approachable; Hopeful;Coping   Intervention Sustaining presence/ Ministry of presence; Discussed illness/injury and it's impact   Outcome Expressed gratitude;Expressed feelings/needs/concerns;Coping; Hopeful;Receptive

## 2020-07-19 NOTE — PLAN OF CARE
Problem: Skin Integrity:  Goal: Will show no infection signs and symptoms  Description: Will show no infection signs and symptoms  7/18/2020 2328 by Reyes Coelho RN  Outcome: Met This Shift  7/18/2020 1842 by Raymond Akhtar RN  Outcome: Ongoing  Goal: Absence of new skin breakdown  Description: Absence of new skin breakdown  7/18/2020 2328 by Reyes Coelho RN  Outcome: Met This Shift  7/18/2020 1842 by Raymond Akhtar RN  Outcome: Ongoing     Problem: Urinary Elimination:  Goal: Signs and symptoms of infection will decrease  Description: Signs and symptoms of infection will decrease  Outcome: Met This Shift  Goal: Complications related to the disease process, condition or treatment will be avoided or minimized  Description: Complications related to the disease process, condition or treatment will be avoided or minimized  Outcome: Met This Shift     Problem: Falls - Risk of:  Goal: Will remain free from falls  Description: Will remain free from falls  7/18/2020 2328 by Reyes Coelho RN  Outcome: Met This Shift  7/18/2020 1842 by Raymond Akhtar RN  Outcome: Ongoing  Goal: Absence of physical injury  Description: Absence of physical injury  Outcome: Met This Shift

## 2020-07-19 NOTE — CONSULTS
Physical Medicine & Rehabilitation    CHART REVIEW      Admitting Physician: Antoine Ortega MD    Primary Care Provider: Mirian Whitley DO     Reason for Consult:  Acute Inpatient Rehabilitation    Chief Complaint: Change in mental status    History of Present Illness:  Referring Provider is requesting an evaluation for appropriate placement upon discharge from acute care. Ms. Vidal Chavarria is a 79 y.o. female who was admitted to [facility] on 7/12/2020 with Altered Mental Status and Hypertension    71-year-old male with history of hypertension admitted with altered mental status. In ER he became unresponsive and required intubation. Internal medicine-acute hypoxic restaurant failure secondary to hypertensive emergency, was eventually extubated, advancing diet, diagnosed with posterior reversible encephalopathy on MRI,, aspiration pneumonia off Unasyn, continue Augmentin seen by pulmonary seizure-like activity, on Keppra initially then discontinued, monitor, blood pressure medications being adjusted chronic kidney disease, renal ultrasound, secondary hypertension due to possible renal artery stenosis awaiting renal arterial duplex    Pulmonary- trending up most likely second diuretics, complete Augmentin    Cardiology- pressure medication adjusted, continue Plavix and Zetia    Neurology VT ES syndrome secondary hypertension, intermittent encephalopathy    Renal artery duplex   Right:    Renal artery stenosis <60%    Atrophic kidney        Left:    Renal artery stenosis <60%    limited study unable to visualize kidney. MRI  Impression:          1. Bilateral parietal cortical and subcortical signal abnormality most   consistent with posterior reversible encephalopathy syndrome. 2. No acute infarct or hemorrhage. 3. Mild chronic white matter microvascular ischemic changes     Renal ultrasound  Impression:          Limited study secondary to body habitus and overlying bowel gas.      Right kidney appears COPD (chronic obstructive pulmonary disease) (HCC)     CVA (cerebral vascular accident) ([facility] [address]     mini    Gout     Hyperlipidemia     Hypertension     Left renal atrophy     Other abnormality of urination(788.69)      blood disorder needs platelets prior to procedures       Past Surgical History:        Procedure Laterality Date    APPENDECTOMY      BACK SURGERY      fusion with rods and screws    BLADDER SUSPENSION      BREAST SURGERY Left     lumpectomy x2    CAROTID ENDARTERECTOMY      COLONOSCOPY      COLONOSCOPY N/A 7/17/2018    COLONOSCOPY POLYPECTOMY performed by Roberto Duron MD at [facility]      knee    JOINT REPLACEMENT      right hip    TONSILLECTOMY      UPPER GASTROINTESTINAL ENDOSCOPY N/A 7/17/2018    EGD BIOPSY performed by Roberto Duron MD at [address] [facility]          Allergies:     Allergies   Allergen Reactions    Diclofenac-Misoprostol Other (See Comments)     Per Dr Laurie Rai - patient has GI intolerance to oral formulations        Current Medications:   Current Facility-Administered Medications: potassium chloride 10 mEq/100 mL IVPB (Peripheral Line), 10 mEq, Intravenous, PRN  docusate sodium (COLACE) capsule 100 mg, 100 mg, Oral, Daily  iron sucrose (VENOFER) 100 mg in sodium chloride 0.9 % 100 mL IVPB, 100 mg, Intravenous, Q24H  glucose (GLUTOSE) 40 % oral gel 15 g, 15 g, Oral, PRN  dextrose 50 % IV solution, 12.5 g, Intravenous, PRN  glucagon (rDNA) injection 1 mg, 1 mg, Intramuscular, PRN  dextrose 5 % solution, 100 mL/hr, Intravenous, PRN  famotidine (PEPCID) tablet 20 mg, 20 mg, Oral, Daily  amoxicillin-clavulanate (AUGMENTIN) 875-125 MG per tablet 1 tablet, 1 tablet, Oral, 2 times per day  amLODIPine (NORVASC) tablet 5 mg, 5 mg, Oral, Daily  spironolactone (ALDACTONE) tablet 25 mg, 25 mg, Oral, Daily **AND** hydroCHLOROthiazide (HYDRODIURIL) tablet 25 mg, 25 mg, Oral, Daily  sodium chloride flush 0.9 % injection 10 mL, 10 mL, Intravenous, 2 times per day  sodium chloride flush 0.9 % injection 10 mL, 10 mL, Intravenous, PRN  sodium chloride flush 0.9 % injection 10 mL, 10 mL, Intravenous, 2 times per day  sodium chloride flush 0.9 % injection 10 mL, 10 mL, Intravenous, PRN  acetaminophen (TYLENOL) tablet 650 mg, 650 mg, Oral, Q4H PRN  enoxaparin (LOVENOX) injection 30 mg, 30 mg, Subcutaneous, Daily  clopidogrel (PLAVIX) tablet 75 mg, 75 mg, Oral, Daily  metoprolol tartrate (LOPRESSOR) tablet 50 mg, 50 mg, Oral, BID  cloNIDine (CATAPRES) 0.1 MG/24HR 1 patch, 1 patch, Transdermal, Weekly  ezetimibe (ZETIA) tablet 10 mg, 10 mg, Oral, Nightly  metoprolol (LOPRESSOR) injection 5 mg, 5 mg, Intravenous, Q6H PRN  hydrALAZINE (APRESOLINE) injection 10 mg, 10 mg, Intravenous, Q4H PRN  aspirin suppository 300 mg, 300 mg, Rectal, Q6H PRN    Social History:  Social History     Socioeconomic History    Marital status:      Spouse name: Not on file    Number of children: Not on file    Years of education: Not on file    Highest education level: Not on file   Occupational History    Occupation: Retired   Social Needs    Financial resource strain: Not on file    Food insecurity     Worry: Not on file     Inability: Not on file   Vidyo needs     Medical: Not on file     Non-medical: Not on file   Tobacco Use    Smoking status: Former Smoker     Packs/day: 1.00     Last attempt to quit: 2018     Years since quittin.8    Smokeless tobacco: Never Used   Substance and Sexual Activity    Alcohol use: No    Drug use: No    Sexual activity: Not on file   Lifestyle    Physical activity     Days per week: Not on file     Minutes per session: Not on file    Stress: Not on file   Relationships    Social connections     Talks on phone: Not on file     Gets together: Not on file     Attends Rastafarian service: Not on file     Active member of club or organization: Not on file     Attends meetings of clubs or organizations: Not on file     Relationship status: Not on file    Intimate partner violence     Fear of current or ex partner: Not on file     Emotionally abused: Not on file     Physically abused: Not on file     Forced sexual activity: Not on file   Other Topics Concern    Not on file   Social History Narrative    Not on file     Type of Home: House  Home Layout: Able to Live on Main level with bedroom/bathroom, Two level  Home Access: Ramped entrance  Bathroom Shower/Tub: Walk-in shower, Shower chair with back  Bathroom Toilet: Standard(with toilet raiser for taller height)  Bathroom Equipment: Shower chair, Grab bars in shower, Hand-held shower, Toilet raiser, Grab bars around toilet  Bathroom Accessibility: Accessible, Walker accessible  Home Equipment: Rolling walker, 4 wheeled walker, Cane, Sock aid, Long-handled shoehorn, Reacher, Hospital bed  Receives Help From: Family(spouse)  ADL Assistance: Independent  Homemaking Assistance: Needs assistance(spouse does cooking, cleaning, laundry)  Homemaking Responsibilities: No( does shopping)  Ambulation Assistance: Independent(uses rollator)  Transfer Assistance: Needs assistance(spouse assists with bed mobility)  Active : No    Family History:       Problem Relation Age of Onset    Heart Disease Brother     Other Mother         polycythemia    Heart Disease Father     Heart Disease Sister         enlarged heart    Cancer Son         prostate           BP (!) 175/69   Pulse 73   Temp 98.3 °F (36.8 °C) (Axillary)   Resp 18   Ht 5' 5\" (1.651 m)   Wt 174 lb 3.2 oz (79 kg)   SpO2 97%   BMI 28.99 kg/m²       Diagnostics:  CBC   Lab Results   Component Value Date    WBC 5.9 07/19/2020    RBC 3.96 07/19/2020    RBC 3.78 04/23/2012    HGB 10.5 07/19/2020    HCT 33.3 07/19/2020    MCV 84.2 07/19/2020    RDW 16.4 07/19/2020     07/19/2020     04/23/2012     BMP    Lab Results   Component Value Date     07/19/2020    K 3.5 07/19/2020     07/19/2020    CO2 24 07/19/2020    BUN 10 07/19/2020     Uric Acid  No components found for: URIC  VITAMIN B12 No components found for: B12  PT/INR  No results found for: PTINR    Radiology:     Impression: Ms. Katlin Ashley is a 79 y.o. male with a history of Acute encephalopathy    1. Posterior reversible encephalopathy secondary to hypertensive emergency-aspirin Plavix  2. Pneumonia-Augmentin  3. Hypertension/hyperlipidemia-Norvasc, Catapres, Zetia on the Metroprolol, Aldactone hydrochlorothiazide  4. Chronic kidney disease  5. COPD  6. Anemia-iron    Recommendations:  1. Diagnosis: OH ES encephalopathy  2. Therapy: Dependent ADLs, nonambulatory, recommend speech  3. Medical  Necessity: As above  4. Support: Clarify, spouse  5. Rehab recommendation: Initial PT/OT notes-low level- would recommend SNF, however await updated therapy notes-if making improvement may benefit from acute inpatient rehabilitation as was independent previously and if has support. Recommend speech  6. DVT proph: Lovenox     Please call with questions. Lydia Moody MD          This note is created with the assistance of a speech recognition program.  While intending to generate a document that actually reflects the content of the visit, the document can still have some errors including those of syntax and sound a like substitutions which may escape proof reading.   In such instances, actual meaning can be extrapolated by contextual diversion

## 2020-07-20 ENCOUNTER — APPOINTMENT (OUTPATIENT)
Dept: CT IMAGING | Age: 80
DRG: 304 | End: 2020-07-20
Payer: MEDICARE

## 2020-07-20 ENCOUNTER — HOSPITAL ENCOUNTER (INPATIENT)
Age: 80
LOS: 7 days | Discharge: HOME | DRG: 070 | End: 2020-07-27
Attending: PHYSICAL MEDICINE & REHABILITATION | Admitting: PHYSICAL MEDICINE & REHABILITATION
Payer: MEDICARE

## 2020-07-20 VITALS
DIASTOLIC BLOOD PRESSURE: 57 MMHG | WEIGHT: 166.67 LBS | RESPIRATION RATE: 18 BRPM | TEMPERATURE: 98.2 F | HEIGHT: 65 IN | OXYGEN SATURATION: 97 % | HEART RATE: 70 BPM | SYSTOLIC BLOOD PRESSURE: 166 MMHG | BODY MASS INDEX: 27.77 KG/M2

## 2020-07-20 PROBLEM — G93.40 ENCEPHALOPATHY: Status: ACTIVE | Noted: 2020-07-20

## 2020-07-20 LAB
ABSOLUTE EOS #: 0.4 K/UL (ref 0–0.4)
ABSOLUTE IMMATURE GRANULOCYTE: ABNORMAL K/UL (ref 0–0.3)
ABSOLUTE LYMPH #: 1.1 K/UL (ref 1–4.8)
ABSOLUTE MONO #: 0.5 K/UL (ref 0.1–1.3)
ANION GAP SERPL CALCULATED.3IONS-SCNC: 12 MMOL/L (ref 9–17)
BASOPHILS # BLD: 1 % (ref 0–2)
BASOPHILS ABSOLUTE: 0.1 K/UL (ref 0–0.2)
BUN BLDV-MCNC: 10 MG/DL (ref 8–23)
BUN/CREAT BLD: ABNORMAL (ref 9–20)
CALCIUM SERPL-MCNC: 9.3 MG/DL (ref 8.6–10.4)
CHLORIDE BLD-SCNC: 102 MMOL/L (ref 98–107)
CO2: 22 MMOL/L (ref 20–31)
CREAT SERPL-MCNC: 1.09 MG/DL (ref 0.5–0.9)
DIFFERENTIAL TYPE: ABNORMAL
EOSINOPHILS RELATIVE PERCENT: 7 % (ref 0–4)
GFR AFRICAN AMERICAN: 59 ML/MIN
GFR NON-AFRICAN AMERICAN: 48 ML/MIN
GFR SERPL CREATININE-BSD FRML MDRD: ABNORMAL ML/MIN/{1.73_M2}
GFR SERPL CREATININE-BSD FRML MDRD: ABNORMAL ML/MIN/{1.73_M2}
GLUCOSE BLD-MCNC: 80 MG/DL (ref 65–105)
GLUCOSE BLD-MCNC: 88 MG/DL (ref 65–105)
GLUCOSE BLD-MCNC: 93 MG/DL (ref 70–99)
GLUCOSE BLD-MCNC: 95 MG/DL (ref 65–105)
HCT VFR BLD CALC: 32.4 % (ref 36–46)
HEMOGLOBIN: 10.3 G/DL (ref 12–16)
IMMATURE GRANULOCYTES: ABNORMAL %
LYMPHOCYTES # BLD: 19 % (ref 24–44)
MCH RBC QN AUTO: 26.6 PG (ref 26–34)
MCHC RBC AUTO-ENTMCNC: 31.7 G/DL (ref 31–37)
MCV RBC AUTO: 83.8 FL (ref 80–100)
MONOCYTES # BLD: 8 % (ref 1–7)
NRBC AUTOMATED: ABNORMAL PER 100 WBC
PDW BLD-RTO: 16.7 % (ref 11.5–14.9)
PLATELET # BLD: 217 K/UL (ref 150–450)
PLATELET ESTIMATE: ABNORMAL
PMV BLD AUTO: 7.5 FL (ref 6–12)
POTASSIUM SERPL-SCNC: 3.6 MMOL/L (ref 3.7–5.3)
RBC # BLD: 3.86 M/UL (ref 4–5.2)
RBC # BLD: ABNORMAL 10*6/UL
RENIN ACTIVITY: 0.5 NG/ML/HR
RENIN COMMENT: NORMAL
SEG NEUTROPHILS: 65 % (ref 36–66)
SEGMENTED NEUTROPHILS ABSOLUTE COUNT: 3.8 K/UL (ref 1.3–9.1)
SODIUM BLD-SCNC: 136 MMOL/L (ref 135–144)
WBC # BLD: 5.8 K/UL (ref 3.5–11)
WBC # BLD: ABNORMAL 10*3/UL

## 2020-07-20 PROCEDURE — 97110 THERAPEUTIC EXERCISES: CPT

## 2020-07-20 PROCEDURE — 74175 CTA ABDOMEN W/CONTRAST: CPT

## 2020-07-20 PROCEDURE — 1180000000 HC REHAB R&B

## 2020-07-20 PROCEDURE — 97535 SELF CARE MNGMENT TRAINING: CPT

## 2020-07-20 PROCEDURE — 6370000000 HC RX 637 (ALT 250 FOR IP): Performed by: INTERNAL MEDICINE

## 2020-07-20 PROCEDURE — 36415 COLL VENOUS BLD VENIPUNCTURE: CPT

## 2020-07-20 PROCEDURE — 6360000004 HC RX CONTRAST MEDICATION: Performed by: STUDENT IN AN ORGANIZED HEALTH CARE EDUCATION/TRAINING PROGRAM

## 2020-07-20 PROCEDURE — 97530 THERAPEUTIC ACTIVITIES: CPT

## 2020-07-20 PROCEDURE — 6360000002 HC RX W HCPCS: Performed by: INTERNAL MEDICINE

## 2020-07-20 PROCEDURE — 82947 ASSAY GLUCOSE BLOOD QUANT: CPT

## 2020-07-20 PROCEDURE — 80048 BASIC METABOLIC PNL TOTAL CA: CPT

## 2020-07-20 PROCEDURE — 2580000003 HC RX 258: Performed by: RADIOLOGY

## 2020-07-20 PROCEDURE — 85025 COMPLETE CBC W/AUTO DIFF WBC: CPT

## 2020-07-20 PROCEDURE — 6370000000 HC RX 637 (ALT 250 FOR IP): Performed by: STUDENT IN AN ORGANIZED HEALTH CARE EDUCATION/TRAINING PROGRAM

## 2020-07-20 PROCEDURE — 99233 SBSQ HOSP IP/OBS HIGH 50: CPT | Performed by: INTERNAL MEDICINE

## 2020-07-20 PROCEDURE — 97116 GAIT TRAINING THERAPY: CPT

## 2020-07-20 PROCEDURE — 92523 SPEECH SOUND LANG COMPREHEN: CPT

## 2020-07-20 PROCEDURE — 2580000003 HC RX 258: Performed by: STUDENT IN AN ORGANIZED HEALTH CARE EDUCATION/TRAINING PROGRAM

## 2020-07-20 RX ORDER — CLOPIDOGREL BISULFATE 75 MG/1
75 TABLET ORAL DAILY
Status: DISCONTINUED | OUTPATIENT
Start: 2020-07-20 | End: 2020-07-27 | Stop reason: HOSPADM

## 2020-07-20 RX ORDER — HYDROCHLOROTHIAZIDE 25 MG/1
25 TABLET ORAL DAILY
Status: DISCONTINUED | OUTPATIENT
Start: 2020-07-20 | End: 2020-07-27 | Stop reason: HOSPADM

## 2020-07-20 RX ORDER — CLONIDINE 0.1 MG/24H
1 PATCH, EXTENDED RELEASE TRANSDERMAL WEEKLY
Status: DISCONTINUED | OUTPATIENT
Start: 2020-07-20 | End: 2020-07-23

## 2020-07-20 RX ORDER — GABAPENTIN 300 MG/1
300 CAPSULE ORAL NIGHTLY
Status: CANCELLED | OUTPATIENT
Start: 2020-07-20

## 2020-07-20 RX ORDER — ALLOPURINOL 100 MG/1
100 TABLET ORAL 2 TIMES DAILY
Status: DISCONTINUED | OUTPATIENT
Start: 2020-07-20 | End: 2020-07-27 | Stop reason: HOSPADM

## 2020-07-20 RX ORDER — AMLODIPINE BESYLATE 10 MG/1
10 TABLET ORAL DAILY
Status: DISCONTINUED | OUTPATIENT
Start: 2020-07-21 | End: 2020-07-27 | Stop reason: HOSPADM

## 2020-07-20 RX ORDER — ASPIRIN 300 MG/1
300 SUPPOSITORY RECTAL EVERY 6 HOURS PRN
Status: DISCONTINUED | OUTPATIENT
Start: 2020-07-20 | End: 2020-07-20 | Stop reason: ALTCHOICE

## 2020-07-20 RX ORDER — BENZONATATE 100 MG/1
200 CAPSULE ORAL DAILY
Status: DISCONTINUED | OUTPATIENT
Start: 2020-07-20 | End: 2020-07-27 | Stop reason: HOSPADM

## 2020-07-20 RX ORDER — AMLODIPINE BESYLATE 10 MG/1
10 TABLET ORAL DAILY
Status: CANCELLED | OUTPATIENT
Start: 2020-07-21

## 2020-07-20 RX ORDER — LACTOBACILLUS RHAMNOSUS GG 10B CELL
1 CAPSULE ORAL
Status: CANCELLED | OUTPATIENT
Start: 2020-07-21

## 2020-07-20 RX ORDER — EZETIMIBE 10 MG/1
10 TABLET ORAL NIGHTLY
Status: DISCONTINUED | OUTPATIENT
Start: 2020-07-20 | End: 2020-07-27 | Stop reason: HOSPADM

## 2020-07-20 RX ORDER — BENZONATATE 100 MG/1
200 CAPSULE ORAL DAILY
Status: CANCELLED | OUTPATIENT
Start: 2020-07-20

## 2020-07-20 RX ORDER — SODIUM CHLORIDE 0.9 % (FLUSH) 0.9 %
10 SYRINGE (ML) INJECTION PRN
Status: DISCONTINUED | OUTPATIENT
Start: 2020-07-20 | End: 2020-07-20 | Stop reason: HOSPADM

## 2020-07-20 RX ORDER — 0.9 % SODIUM CHLORIDE 0.9 %
80 INTRAVENOUS SOLUTION INTRAVENOUS ONCE
Status: COMPLETED | OUTPATIENT
Start: 2020-07-20 | End: 2020-07-20

## 2020-07-20 RX ORDER — SPIRONOLACTONE 25 MG/1
25 TABLET ORAL DAILY
Status: DISCONTINUED | OUTPATIENT
Start: 2020-07-20 | End: 2020-07-27 | Stop reason: HOSPADM

## 2020-07-20 RX ORDER — ACETAMINOPHEN 325 MG/1
650 TABLET ORAL EVERY 4 HOURS PRN
Status: DISCONTINUED | OUTPATIENT
Start: 2020-07-20 | End: 2020-07-27 | Stop reason: HOSPADM

## 2020-07-20 RX ORDER — LACTOBACILLUS RHAMNOSUS GG 10B CELL
1 CAPSULE ORAL
Status: DISCONTINUED | OUTPATIENT
Start: 2020-07-21 | End: 2020-07-27 | Stop reason: HOSPADM

## 2020-07-20 RX ORDER — ALLOPURINOL 100 MG/1
100 TABLET ORAL 2 TIMES DAILY
Status: CANCELLED | OUTPATIENT
Start: 2020-07-20

## 2020-07-20 RX ORDER — SENNA PLUS 8.6 MG/1
1 TABLET ORAL DAILY PRN
Status: DISCONTINUED | OUTPATIENT
Start: 2020-07-20 | End: 2020-07-27 | Stop reason: HOSPADM

## 2020-07-20 RX ORDER — BISACODYL 10 MG
10 SUPPOSITORY, RECTAL RECTAL DAILY PRN
Status: DISCONTINUED | OUTPATIENT
Start: 2020-07-20 | End: 2020-07-27 | Stop reason: HOSPADM

## 2020-07-20 RX ORDER — METOPROLOL TARTRATE 50 MG/1
50 TABLET, FILM COATED ORAL 2 TIMES DAILY
Status: DISCONTINUED | OUTPATIENT
Start: 2020-07-20 | End: 2020-07-27 | Stop reason: HOSPADM

## 2020-07-20 RX ORDER — POLYETHYLENE GLYCOL 3350 17 G/17G
17 POWDER, FOR SOLUTION ORAL DAILY PRN
Status: DISCONTINUED | OUTPATIENT
Start: 2020-07-20 | End: 2020-07-27 | Stop reason: HOSPADM

## 2020-07-20 RX ORDER — GABAPENTIN 300 MG/1
300 CAPSULE ORAL NIGHTLY
Status: DISCONTINUED | OUTPATIENT
Start: 2020-07-20 | End: 2020-07-27 | Stop reason: HOSPADM

## 2020-07-20 RX ADMIN — Medication 1 CAPSULE: at 16:53

## 2020-07-20 RX ADMIN — AMLODIPINE BESYLATE 10 MG: 10 TABLET ORAL at 08:33

## 2020-07-20 RX ADMIN — HYDROCHLOROTHIAZIDE 25 MG: 25 TABLET ORAL at 08:33

## 2020-07-20 RX ADMIN — FAMOTIDINE 20 MG: 20 TABLET ORAL at 08:33

## 2020-07-20 RX ADMIN — METOPROLOL TARTRATE 50 MG: 50 TABLET, FILM COATED ORAL at 08:33

## 2020-07-20 RX ADMIN — ENOXAPARIN SODIUM 40 MG: 40 INJECTION SUBCUTANEOUS at 08:33

## 2020-07-20 RX ADMIN — Medication 1 CAPSULE: at 08:33

## 2020-07-20 RX ADMIN — ALLOPURINOL 100 MG: 100 TABLET ORAL at 20:53

## 2020-07-20 RX ADMIN — GABAPENTIN 300 MG: 300 CAPSULE ORAL at 20:53

## 2020-07-20 RX ADMIN — SPIRONOLACTONE 25 MG: 25 TABLET, FILM COATED ORAL at 08:33

## 2020-07-20 RX ADMIN — METOPROLOL TARTRATE 50 MG: 50 TABLET, FILM COATED ORAL at 20:53

## 2020-07-20 RX ADMIN — Medication 10 ML: at 08:33

## 2020-07-20 RX ADMIN — CLOPIDOGREL BISULFATE 75 MG: 75 TABLET ORAL at 08:33

## 2020-07-20 RX ADMIN — Medication 1 CAPSULE: at 12:17

## 2020-07-20 RX ADMIN — BENZONATATE 200 MG: 100 CAPSULE ORAL at 20:53

## 2020-07-20 RX ADMIN — SODIUM CHLORIDE 80 ML: 9 INJECTION, SOLUTION INTRAVENOUS at 13:05

## 2020-07-20 RX ADMIN — IOVERSOL 100 ML: 741 INJECTION INTRA-ARTERIAL; INTRAVENOUS at 13:05

## 2020-07-20 RX ADMIN — Medication 10 ML: at 13:05

## 2020-07-20 RX ADMIN — EZETIMIBE 10 MG: 10 TABLET ORAL at 20:53

## 2020-07-20 NOTE — PROGRESS NOTES
The patient was interviewed and examined by me, she has a right-sided carotid bruit and no abdominal bruit was palpable. Her blood pressure control is improved with multiple agents. There is a strong possibility that she might have underlying significant renal artery stenosis. Would would suggest MRA renal artery before discharge.   Please refer to the notes by the resident which will be cosigned by me

## 2020-07-20 NOTE — PROGRESS NOTES
Patient is sleeping soundly. Did not wake her up. Discussed with the charge nurse who told me that patient will be transferred to acute rehab unit today. Blood pressure 166/57. Pulse 70 bpm.  O2 saturation 97%. Chest fair air entry bilaterally. A/P: Hypertension, other problems as charted. Okay to transfer to rehab unit. Continue on all current cardiac medications. Discussed with the charge nurse. Thanks.     Marietta Nicolas MD, [address] [facility]

## 2020-07-20 NOTE — PLAN OF CARE
Problem: Skin Integrity:  Goal: Will show no infection signs and symptoms  Description: Will show no infection signs and symptoms  7/20/2020 1558 by Jomar Rubio RN  Outcome: Ongoing     Problem: Skin Integrity:  Goal: Absence of new skin breakdown  Description: Absence of new skin breakdown  7/20/2020 1558 by Jomar Rubio RN  Outcome: Ongoing     Problem: Urinary Elimination:  Goal: Signs and symptoms of infection will decrease  Description: Signs and symptoms of infection will decrease  7/20/2020 1558 by Jomar Rubio RN  Outcome: Ongoing     Problem: Urinary Elimination:  Goal: Complications related to the disease process, condition or treatment will be avoided or minimized  Description: Complications related to the disease process, condition or treatment will be avoided or minimized  7/20/2020 1558 by Jomar Rubio RN  Outcome: Ongoing     Problem: Falls - Risk of:  Goal: Will remain free from falls  Description: Will remain free from falls  7/20/2020 1558 by Jomar Rubio RN  Outcome: Ongoing     Problem: Falls - Risk of:  Goal: Absence of physical injury  Description: Absence of physical injury  7/20/2020 1558 by Jomar Rubio RN  Outcome: Ongoing     Problem: Nutrition  Goal: Optimal nutrition therapy  7/20/2020 1558 by Jomar Rubio RN  Outcome: Ongoing     Problem: Musculor/Skeletal Functional Status  Goal: Highest potential functional level  7/20/2020 1558 by Jomar Rubio RN  Outcome: Ongoing     Problem: Musculor/Skeletal Functional Status  Goal: Absence of falls  7/20/2020 1558 by Jomar Rubio RN  Outcome: Ongoing       Patient remains free of falls. Up with staff assist with therapy today. Tolerates current diet. NO new skin breakdown noted.

## 2020-07-20 NOTE — CARE COORDINATION
DISCHARGE PLANNING NOTE:    LSW following for discharge to ARU. Per PM&R consult, it looks like pt is appropriate. [facility] services. CTA done today showed:   1.  No aortic aneurysm.  Maximum aortic size 2.5 x 2.7 cm but demonstrating    moderate soft plaque upper abdominal aorta with ulceration without dissection.         2.  Atrophic-appearing left kidney.  Small left renal artery with moderate    plaque at the origin.         3.  Mild calcified plaque origin of the right renal artery.         4.  Other findings as above. Will continue to follow for additional discharge needs.     Electronically signed by Raymond Rodarte RN on 7/20/2020 at 2:36 PM

## 2020-07-20 NOTE — PLAN OF CARE
Problem: Skin Integrity:  Goal: Absence of new skin breakdown  Description: Absence of new skin breakdown  7/20/2020 0415 by Doloris Mingle Colon  Outcome: Ongoing   Able to turn self, no new skin breakdown noted.   Problem: Urinary Elimination:  Goal: Signs and symptoms of infection will decrease  Description: Signs and symptoms of infection will decrease  7/20/2020 0415 by Doloris Minfange Willian  Outcome: Ongoing    Cool d/c'd & pt has urinated 400ml since removal  Problem: Musculor/Skeletal Functional Status  Goal: Highest potential functional level  7/20/2020 0415 by Doloris Minfange Colon  Outcome: Ongoing   Marked weakness noted, merlin sierra used for ambulation to chair/BR

## 2020-07-20 NOTE — CARE COORDINATION
KRISTI spoke with Estela earlier on this date and she did say that this patient can admit this evening. Prior to her going the DC/readmit will need to be completed, the patient will need the anticoagulation continued, and the nurse will need to call report to [phone] somewhere around 6:00 pm.  KRISTI did inform Qi Landers RN, Clinical lead of this information. No other issues at this time.

## 2020-07-20 NOTE — PROGRESS NOTES
Limits  Hearing  Hearing: Within functional limits           Objective: Auditory Comprehension  Comprehension: Exceptions  Basic Questions: Mild  Complex Questions: Severe  Two Step Basic Commands: Moderate  Conversation: Severe         Expression  Primary Mode of Expression: Verbal    Verbal Expression  Verbal Expression: Exceptions to functional limits  Convergent: Severe  Divergent: Severe         Motor Speech  Motor Speech: Within Functional Limits         Cognition:      Orientation  Overall Orientation Status: Impaired  Orientation Level: Disoriented to place; Disoriented to time;Disoriented to situation  Attention  Attention: Exceptions to [facility]  Arousal/Alertness: Delayed responses to stimuli  Divided Attention: Mild  Sustained Attention: Mild  Memory  Memory: Unable to assess  Problem Solving  Problem Solving: Unable to assess  Abstract Reasoning  Abstract Reasoning: Unable to assess  Safety/Judgement  Safety/Judgement: Exceptions to [facility]  Insight: Severe    Flexibility of Thought: Severe      Education:  Patient Education Response: Verbalizes understanding;Needs reinforcement          Therapy Time:   Individual Concurrent Group Co-treatment   Time In [address] [place] [address]         Time Out 0955         Minutes [address] [place] [address] 231 M. A.CCC/SLP    7/20/2020 10:03 AM

## 2020-07-20 NOTE — PROGRESS NOTES
Spoke with PAM Daniels, to request pt's level of support upon d/c per Dr Teresa Madera note. Favian Tapia states pt's spouse is able to provide 24/7 upon d/c.  Favian Tapia also states pt is medically ready for ARU. Writer requested d/c readmit be completed, DVT prophylaxis be continued, and report be called to [phone]. Admission Assessment completed and Dr Neelima Medel notified.

## 2020-07-20 NOTE — PROGRESS NOTES
[address]  Acute Inpatient Rehab Preadmission Assessment    Patient Name: Queenie Jarvis        MRN: [id number]    : 1940  (79 y.o.)  Gender: female     Admitted from:   [address] [place] [address]  [][facility]   []Outside Admission - Location:                                 [x]Initial         []Updated    Date of Onset / admission to the acute hospital:  20    Impairment group:  2.1 NTBD    Did patient have surgery? [x]No  []Yes:      Physicians: Sang Aguilar, [address] [place] [address] for clinical complications: Moderate    Co-morbidities:  Pneumonia, HTN, HLD, COPD, CKD, Anemia    Financial Information  Primary insurance:  [x]Medicare [] Medicare HMO  []Commercial insurance    []Medicaid   [] Medicaid HMO []Workers Compensation   []Personal Pay    Secondary Insurance:  []Medicare [] Medicare HMO  [x]Commercial insurance    []Medicaid   []Workers Compensation []None    Precautions:   []Cardiac Precautions    []Total hip precautions    []Weight Bearing status:  [x]Safety Precautions/Concerns  [x]Visually impaired:  Wears glasses for distance     [x]Hard of Hearing:  Hard of hearing/hearing concerns, No hearing aid     Isolation Precautions:         []Yes  [x]No  If Yes:  [] Droplet  []Contact []Airborne     []VRE     []MRSA     []C-diff         [] TB       [] Other:        Physiatrist:  [x]        []   Dr. Dayne Bajwa  []   Dr. Flower Leonard    Patients Occupation: Retired    Reviewed Lab and Diagnostic reports from Current Admission: Yes    Patients Prior Functional  Level: Prior Function  Receives Help From: Hurray!)  ADL Assistance: Independent  Homemaking Assistance: Needs assistance(spouse does cooking, cleaning, laundry)  Ambulation Assistance: Independent(uses rollator)  Transfer Assistance: Needs assistance(spouse assists with bed mobility)  Additional Comments: Spouse is retired and available to assist .  Per Pt's spouse Gene who was present at end of evaluation, Pt was IND with all self-care and functional mobility (with use of 4WW) 3-4 months ago. However, Pt's spouse has been providing additional assist for the last 3-4 months due to Pt's chronic back pain. History of current illness:  36-year-old male with history of hypertension admitted with altered mental status. In ER he became unresponsive and required intubation.     Internal medicine-acute hypoxic restaurant failure secondary to hypertensive emergency, was eventually extubated, advancing diet, diagnosed with posterior reversible encephalopathy on MRI,, aspiration pneumonia off Unasyn, continue Augmentin seen by pulmonary seizure-like activity, on Keppra initially then discontinued, monitor, blood pressure medications being adjusted chronic kidney disease, renal ultrasound, secondary hypertension due to possible renal artery stenosis awaiting renal arterial duplex     Pulmonary- trending up most likely second diuretics, complete Augmentin     Cardiology- pressure medication adjusted, continue Plavix and Zetia     Neurology DE ES syndrome secondary hypertension, intermittent encephalopathy    Current functional status for upper extremity ADLs:  UE Bathing: Moderate assistance  UE Dressing:  Moderate assistance    Current functional status for lower extremity ADLs:  LE Bathing: Maximum assistance(can A c thighs from seated position)  LE Dressing: Maximum assistance(footies)    Current functional status for bed, chair, wheelchair transfers:  Transfers  Sit to Stand: Minimal Assistance, 2 Person Assistance(max vc's for hand placement)  Stand to sit: Minimal Assistance, 2 Person Assistance(lacking eccentric control, max vc's for reaching back)  Bed to Chair: 2 Person Assistance, Minimal assistance(standing with RW, max vc's for sequencing)  Stand Pivot Transfers: Minimal Assistance, 2 Person Assistance(step by step instructions with tactile assist for moving RW)  Comment: Pt can follow 1 step command, delayed responses and easily confused, max vc's for step by step instructions    Current functional status for toilet transfers:  Minimal Assistance       Current functional status for locomotion:  Ambulation  Ambulation?: Yes  Ambulation 1  Surface: level tile  Device: Rolling Walker  Assistance: 2 Person assistance, Minimal assistance  Quality of Gait: short step length, narrow LUIS, standing steady, easily confused on direction to walk  Gait Deviations: Slow Renu, Decreased step length, Decreased step height, Shuffles  Distance: 2ft  Comments: vc's for step by step instruction and tactile assist with moving rolling walker    Current functional status for comprehension: Maximal Assistance    Current functional status for expression: Maximal Assistance    Current functional status for social interaction: Maximal Assistance    Current functional status for problem solving:  TBD    Current functional status for memory:  TBD    Current Deficits R/T Impairment: Impaired Functional Mobility and Decreased ADLs    Required Therapy:   [x] Physical Therapy  [x] Occupational Therapy   [x] Speech Therapy    Additional Services:  [x]   [x] Recreational Therapy  [x] Nutrition  [] Dialysis  [] Other:     Rehab Justification:  Needs 3 hrs therapy per day or 15 hours per week:  Yes  Identified Rehab Nursing needs: Yes  Intense Interdisciplinary need:  Yes  Need for 24 hr physician supervision:  Yes  Measurable improved quality of life:  Yes  Willingness to participate:  Yes  Medical Necessity:  Yes  Patient able to tolerate care proposed:  Yes    Expected Discharge Destination/Functional Level:  Home with assist  Expected length of time to achieve that level of improvement: 1-2 weeks  Expected Post Discharge Treatments: Home with possible Home Care    Other information relevant to the care needs:  n/a    Acute Inpatient Rehabilitation Disclosure Statement provided to patient. Patient verbalized understanding.   Copy placed on patient's light chart. I have reviewed and concur with the findings and results of the pre-admission screening assessment completed by the Inpatient Rehabilitation Admissions Coordinator.

## 2020-07-20 NOTE — PLAN OF CARE
Alert et oriented most of the day. Becomes forgetful at times regarding year/month. Knows that Rosy is president. Is appropriate with speech. Became agitataed this afternoon and was adamant about going home rather than to Acute Rehab for therapy upon discharge. Discussed safety risks at length with she and her  and explained the risk of falling and incurring injury is not only to herself, but also to her . She continued to want to go home. Senior Resident came down and talked to patient and spouse about the same things we had discussed and he was able to sway her into staying and going to acute rehab for strengthening to give her the bst chance of injury prevention. Patient had been awake most of day at that point. Upon entering for shift report, patient was asleep and it is wondered if fatigue may have contributed to her agitation about going home. Is very weak. Was able to assist her to the chair for dinner, but she was reaching and couldn't stand upright. Bed alarm must be on because she will try to get out of bed. No falls or injury this shift. Poor appetite. Has had diarrhea x3-4 today, loose light brown. Patient is on Augmentin. Colace was dc'd and Lactobacillus was started. Dr. Jing Conde indicated that if diarrhea continues through night we will send stool for c diff on Monday.

## 2020-07-20 NOTE — PROGRESS NOTES
Physical Medicine & Rehabilitation  F/u Chart review note    7/20/2020 1:53 PM     CC: Ambulatory and ADL dysfunction due to     Brief history    79-year-old female admitted 7/12 with altered mental status-has history of hypertension. Required intubation. Noted to have acute hypoxic respiratory failure second to hypertensive emergency-diagnosed with posterior reversible encephalopathy, was treated with Unasyn for aspiration pneumonia and now on Augmentin. Blood pressure medication adjusted-renal ultrasound completed. Per internal medicine would like MRA renal artery-pending. Continues with intermittent encephalopathy. Rehabilitation:   PT: 7/20     Restrictions/Precautions: General Precautions, Fall Risk  Implants present? : Metal implants  Other position/activity restrictions: up with assistance   Transfers  Sit to Stand: Minimal Assistance, 2 Person Assistance(max vc's for hand placement)  Stand to sit: Minimal Assistance, 2 Person Assistance(lacking eccentric control, max vc's for reaching back)  Bed to Chair: 2 Person Assistance, Minimal assistance(standing with RW, max vc's for sequencing)  Stand Pivot Transfers: Minimal Assistance, 2 Person Assistance(step by step instructions with tactile assist for moving RW)  Comment: Pt can follow 1 step command, delayed responses and easily confused, max vc's for step by step instructions        OT:7/17   ADL  Feeding: Dependent/Total  Grooming: Dependent/Total  UE Bathing: Dependent/Total  LE Bathing: Dependent/Total  UE Dressing: Dependent/Total  LE Dressing: Dependent/Total  Toileting: Dependent/Total  Additional Comments: Pt demonstrates significantly impaired AROM limiting Pt's ability to participate in self-care tasks. Pt also demonstrated impaired attention to task, delayed processing, and impaired sequencing. ADL scores based on skilled observations and clinical reasoning unless otherwise noted.        ST:  7/20    Diagnosis: Pt. demonstrates moderately impaired comprehension and expression and severely impaired cognition. Pt. demonstrates very long response latency and reduced insight to deficits as she denies any difficulties with language or cognition. Further ST is recommended during hospital stay and also upon d/c to bring function to a safe level for home d/c.           Objective:  BP (!) 166/57   Pulse 70   Temp 98.2 °F (36.8 °C) (Axillary)   Resp 18   Ht 5' 5\" (1.651 m)   Wt 166 lb 10.7 oz (75.6 kg)   SpO2 97%   BMI 27.73 kg/m²  I Body mass index is 27.73 kg/m².  I   Wt Readings from Last 1 Encounters:   20 166 lb 10.7 oz (75.6 kg)      Temp (24hrs), Av.6 °F (36.4 °C), Min:97.2 °F (36.2 °C), Max:98.2 °F (36.8 °C)               Medications   Scheduled Meds:   enoxaparin  40 mg Subcutaneous Daily    amLODIPine  10 mg Oral Daily    lactobacillus  1 capsule Oral TID WC    famotidine  20 mg Oral Daily    spironolactone  25 mg Oral Daily    And    hydroCHLOROthiazide  25 mg Oral Daily    sodium chloride flush  10 mL Intravenous 2 times per day    sodium chloride flush  10 mL Intravenous 2 times per day    clopidogrel  75 mg Oral Daily    metoprolol tartrate  50 mg Oral BID    cloNIDine  1 patch Transdermal Weekly    ezetimibe  10 mg Oral Nightly     Continuous Infusions:   dextrose       PRN Meds:.sodium chloride flush, potassium chloride, glucose, dextrose, glucagon (rDNA), dextrose, sodium chloride flush, sodium chloride flush, acetaminophen, metoprolol, hydrALAZINE, aspirin     Diagnostics:     CBC:   Recent Labs     20  0500 20  0541 20  0524   WBC 6.0 5.9 5.8   RBC 3.61* 3.96* 3.86*   HGB 9.7* 10.5* 10.3*   HCT 29.7* 33.3* 32.4*   MCV 82.4 84.2 83.8   RDW 15.9* 16.4* 16.7*    217 217     BMP:   Recent Labs     20  0500 20  0541 20  1625 20  0524    136  --  136   K 3.1* 3.5* 4.2 3.6*    101  --  102   CO2 24 24  --  22   BUN 13 10  --  10   CREATININE 1.45* 1.17*  -- 1.09*     BNP: No results for input(s): BNP in the last 72 hours. PT/INR: No results for input(s): PROTIME, INR in the last 72 hours. APTT: No results for input(s): APTT in the last 72 hours. CARDIAC ENZYMES: No results for input(s): CKMB, CKMBINDEX, TROPONINT in the last 72 hours. Invalid input(s): CKTOTAL;3  FASTING LIPID PANEL:  Lab Results   Component Value Date    CHOL 106 03/17/2019    HDL 45 03/17/2019    TRIG 55 03/17/2019     LIVER PROFILE: No results for input(s): AST, ALT, ALB, BILIDIR, BILITOT, ALKPHOS in the last 72 hours. I/O (24Hr): Intake/Output Summary (Last 24 hours) at 7/20/2020 1353  Last data filed at 7/20/2020 0515  Gross per 24 hour   Intake 299 ml   Output 2125 ml   Net -1826 ml       Glu last 24 hour  Recent Labs     07/19/20  1647 07/19/20  1912 07/20/20  0709 07/20/20  1124   POCGLU 90 106* 80 95       No results for input(s): CLARITYU, COLORU, PHUR, SPECGRAV, PROTEINU, RBCUA, BLOODU, BACTERIA, NITRU, WBCUA, LEUKOCYTESUR, YEAST, GLUCOSEU, BILIRUBINUR in the last 72 hours.      Lives With: Spouse  Type of Home: House  Home Layout: Able to Live on Main level with bedroom/bathroom, Two level  Home Access: Ramped entrance  Bathroom Shower/Tub: Walk-in shower, Shower chair with back  Bathroom Toilet: Standard(with toilet raiser for taller height)  Bathroom Equipment: Shower chair, Grab bars in shower, Hand-held shower, Toilet raiser, Grab bars around toilet  Bathroom Accessibility: Accessible, Walker accessible  Home Equipment: Rolling walker, 4 wheeled walker, Cane, Sock aid, Long-handled shoehorn, [address] bed  [employer] Help From: Family(spouse)  ADL Assistance: Independent  Homemaking Assistance: Needs assistance(spouse does cooking, cleaning, laundry)  Homemaking Responsibilities: No( does shopping)  Ambulation Assistance: Independent(uses rollator)  Transfer Assistance: Needs assistance(spouse assists with bed mobility)  Active : No      Impression: Ms. Russ Cohen is a 79 y.o. male with a history of Acute encephalopathy     1. Posterior reversible encephalopathy secondary to hypertensive emergency-aspirin Plavix  2. Pneumonia-Augmentin  3. Hypertension/hyperlipidemia-Norvasc, Catapres, Zetia on the Metroprolol, Aldactone hydrochlorothiazide, MRA of renal arteries pending  4. Chronic kidney disease  5. COPD  6. Anemia-iron     Recommendations:  1. Diagnosis: PRES encephalopathy  2. Therapy: Dependent ADLs 7/17-await updated OT notes,, has made some improvement PT-ambulated 2 ft  2 person min assist, max verbal cues, has speech needs  3. Medical  Necessity: As above  4. Support: Clarify, spouse  5. Rehab recommendation:  Making slow improvements, would recommend acute inpatient rehabilitation when medically ready if patient has 24/7 support on eventual discharge. 6. DVT proph: Maximiliano Bo MD       This note is created with the assistance of a speech recognition program.  While intending to generate a document that actually reflects the content of the visit, the document can still have some errors including those of syntax and sound a like substitutions which may escape proof reading.   In such instances, actual meaning can be extrapolated by contextual diversion

## 2020-07-20 NOTE — PROGRESS NOTES
Comprehensive Nutrition Assessment    Type and Reason for Visit:  Reassess    Nutrition Recommendations/Plan: 1. Continue Full Liquid diet and advance as medically feasible. 2. Start Ensure high protein chocolate twice daily. Nutrition Assessment:  Patient remains on Full Liquid diet, consuming 26-50% of meals. Patient reprots she does not like this diet, agreed to try Ensure High Protein. Continue current diet and start Ensure high protein twice daily. Malnutrition Assessment:  Malnutrition Status:  Insufficient data    Context:  Acute Illness     Findings of the 6 clinical characteristics of malnutrition:  Energy Intake:  Unable to assess  Weight Loss:  Unable to assess(intubated- no family present)     Body Fat Loss:  Unable to assess     Muscle Mass Loss:  Unable to assess    Fluid Accumulation:  No significant fluid accumulation     Strength:  Not Performed    Estimated Daily Nutrient Needs:  Energy (kcal):  1354 kcals based on [facility] 2003b using current wt 69.6 kg; Weight Used for Energy Requirements:  Admission     Protein (g):  114 gm protein based on 2 gm/kg IBW; Weight Used for Protein Requirements:  Ideal          Nutrition Related Findings:  vomited PTA with possible aspiration pneumonia. Wounds:  None       Current Nutrition Therapies:    DIET FULL LIQUID; Anthropometric Measures:  · Height: 5' 5\" (165.1 cm)  · Current Body Weight: 166 lb 10.7 oz (75.6 kg)   · Admission Body Weight: 160 lb (72.6 kg)    · Ideal Body Weight: 125 lbs; % Ideal Body Weight     · BMI: 27.7  · BMI Categories: Overweight (BMI 25.0-29. 9)       Nutrition Diagnosis:   · Inadequate oral intake related to cognitive or neurological impairment(plan MRI) as evidenced by intake 26-50%      Nutrition Interventions:   Food and/or Nutrient Delivery:  Continue Current Diet, Start Oral Nutrition Supplement  Nutrition Education/Counseling:  Education not indicated   Coordination of Nutrition Care:  Continued Inpatient Monitoring    Goals:  Intiate PO intake or nutrition support.        Nutrition Monitoring and Evaluation:   Food/Nutrient Intake Outcomes:  Diet Advancement/Tolerance, Food and Nutrient Intake, Supplement Intake  Physical Signs/Symptoms Outcomes:  Biochemical Data, Fluid Status or Edema, Nutrition Focused Physical Findings, Weight     Discharge Planning:    Continue Oral Nutrition Supplement, Continue current diet       Some areas of assessment may be incomplete due to COVID-19 precautions    Geovani Ferguson RD, LD  Office phone [phone]

## 2020-07-20 NOTE — PROGRESS NOTES
[facility]: SCOTT WOODARD   INPATIENT OCCUPATIONAL THERAPY  PROGRESS NOTE  Date: 2020  Patient Name: Amisha Carrillo      Room:   MRN: [id number]    : 1940  (79 y.o.) Gender: female     Discharge Recommendations: The patient would benefit from an intensive level of therapy after discharge from the facility. They should be able to tolerate 3-hours of Combined OT/PT/ST over 5 days/week or at least 900 minutes of  Combined Therapy over 7 days. Equipment Needed: (TBD)    Referring Practitioner: Dr. Berny West  Diagnosis: Hypertensive emergency, Acute encephalopathy  General  Chart Reviewed: Yes, Progress Notes  Patient assessed for rehabilitation services?: Yes  Family / Caregiver Present: No  Referring Practitioner: Dr. Berny West  Diagnosis: Hypertensive emergency, Acute encephalopathy    Restrictions  Restrictions/Precautions: General Precautions, Fall Risk  Implants present? : Metal implants  Other position/activity restrictions: up with assistance  Required Braces or Orthoses?: No      Subjective  Subjective: \"I'm going home  Comments: Reports no pain, 'feeling better'. She as able to reposition to EOB c Ashleigh and transfer out of bed to recliner using RW this date with Minx2, heavy cuing, tactile and verbal; was using [place] prior. She appears pleasantly confused, is following only one step commands, demos STM deficits and difficulty dividing attention with multistep commands. Her insight to need for rehab is hindered by her cogntion at this time. She says multiple times she is going home but that is recepetive to education regarding to need for rehab/ARU opportunity and even following up states 'will have her  bring in conditioner for her hair. \"  Patient Currently in Pain: Denies  Overall Orientation Status: Impaired  Orientation Level: Oriented to person;Disoriented to place; Disoriented to time;Disoriented to situation  Patient Observation  Observations: expressive aphasia,slowed Moderate assistance  LE Bathing: Maximum assistance(can A c thighs from seated position)  UE Dressing: Moderate assistance  LE Dressing: Maximum assistance(footies)  Toileting: Dependent/Total  Additional Comments: Pt following one step commands for ADL tasks. Altough she is dmeoing improved stand tolerance and balance, her cognition limits her ability to follow commands and complete higher level tasks that would require standing. While washing her face she forgot the towel was to dry her face and required cuing for functional use of objects. Able odoff footies seated EOB c heel toe method and heavy VC for intiation, she attempts to don L footie using cross leg method, min difficulty stabilizing LE, and unaware she did not thread all toes into sock, and attempted to resume pulling sock over foot, poor response to VC requring physical A for task completion. Transfers  Stand Pivot Transfers: 2 Person assistance;Minimal assistance  Sit to stand: 2 Person assistance;Minimal assistance(inconsistently receptive to [address] for sequencing)  Stand to sit: 2 Person assistance; Moderate assistance(premature sitting, P response to [facility] attempts/VC)  Transfer Comments: Max VCs and tactile cues for posture  Type of ROM/Therapeutic Exercise  Comment: Attempted to have patient toss linen into setup linen container for GM, problem solving, fxl AROM; pt dmeo dificulty c AROM motor planning demoing difficulty extending elbows and undertossing, when requested to demo AROM with no function involved pt able to demo shoulder/elbow flexion/extension        Additional Activities: ARU needs/expecations/scheduling; likely poor carryover 2* current cognition         Positioning  Adaptations: pull away initiated on bedside chair; recliner to encourage safety c seated balance, VC/tactile cuing for initation of and of tasks       Assessment  Performance deficits / Impairments: Decreased functional mobility ; Decreased ADL status; Decreased strength;Decreased safe awareness;Decreased cognition;Decreased ROM; Decreased endurance;Decreased sensation;Decreased balance;Decreased fine motor control;Decreased coordination;Decreased posture  Assessment: improved ability to complete transfer and fxl tasks altough requires heavy cuing and assistance with all tasks; demos poor carryover with [place]/cuing at this time  Prognosis: Fair  Discharge Recommendations: Patient would benefit from continued therapy after discharge  Activity Tolerance: Treatment limited secondary to decreased cognition;Patient Tolerated treatment well  Safety Devices in place: Yes  Type of devices: Call light within reach;Gait belt;Patient at risk for falls;Nurse notified; Left in chair;Positioning belt  Equipment Recommendations  Equipment Needed: (TBD)          Patient Education:   ARU, safety, transfer training  Learner:patient  Method: demonstration and explanation       Outcome: needs reinforcement     Plan  Safety Devices  Safety Devices in place: Yes  Type of devices: Call light within reach, Gait belt, Patient at risk for falls, Nurse notified, Left in chair, Positioning belt  Plan  Times per week: 4-6  Times per day: Daily  Current Treatment Recommendations: Strengthening, Balance Training, Functional Mobility Training, Endurance Training, Wheelchair Mobility Training, Cognitive Reorientation, Pain Management, Safety Education & Training, Patient/Caregiver Education & Training, Equipment Evaluation, Education, & procurement, Positioning, Self-Care / ADL, Home Management Training      Goals  Short term goals  Time Frame for Short term goals: By discharge  Short term goal 1: Pt will perform bed mobility with Mod A to sit EOB for 10+ minutes unsupported while engaging in functional activity of choice/self-care. Short term goal 2: Pt will follow one-step commands 75% of the time while engaging in self-care.   Short term goal 3: Pt will perform self-feeding with Min A and Fair attention to task.  Short term goal 4: Pt will perform grooming tasks with Mod A and Fair attention to task. Short term goal 5: Pt will perform sit to stand transfers with Mod A and use of Anna Bearded to promote increased participation in self-care.     OT Individual Minutes  Time In: 1005  Time Out: 1937  Minutes: 39      Electronically signed by SATURNINO Loya on 7/20/20 at 11:31 AM EDT

## 2020-07-20 NOTE — PROGRESS NOTES
Co-treat with Мария Parker. Pt having difficulty following 2 step commands. Pt demonstrates able to follow 1 step commands however needs redirection back to 1 step command at times. Easily distracted. Per RN [place] pt appears to have difficulty with short term memory. Vital Signs  Patient Currently in Pain: Denies              Bed Mobility:   Rolling: Stand by assistance  Supine to Sit: Stand by assistance  Sit to Supine: Unable to assess(pt left in bedside chair with pull away chair alarm)  Scooting: Minimal assistance(to Edge of Bed)  Comment: pt struggles with 2 step command, vc's kept simple to 1 step command, pleasantly confused      Transfers:  Sit to Stand: Minimal Assistance;2 Person Assistance(max vc's for hand placement)  Stand to sit: Minimal Assistance;2 Person Assistance(lacking eccentric control, max vc's for reaching back)  Bed to Chair: 2 Person Assistance;Minimal assistance(standing with RW, max vc's for sequencing)              Ambulation 1  Surface: level tile  Device: Rolling Walker  Assistance: 2 Person assistance;Minimal assistance  Quality of Gait: short step length, narrow LUIS, standing steady, easily confused on direction to walk  Gait Deviations: Slow Renu;Decreased step length;Decreased step height;Shuffles  Distance: 2ft  Comments: vc's for step by step instruction and tactile assist with moving rolling walker        Stairs/Curb  Stairs?: No(not ready this date)               Posture: Fair  Sitting - Static: Good;-  Sitting - Dynamic: Fair  Standing - Static: Fair  Standing - Dynamic: Poor;+  Comments: Seated Edge of Bed, and Standing assessed with Rolling walker     Other exercises?: Yes  Other exercises 1: Seated Edge of Bed 10-15 min.   Other exercises 2: Seated Dynamic reaching low Out of Base of Support  Other exercises 3: Supine bilat LE exercise, heelslide x10, ankle pumps x10  Other exercises 4: Sit to Stand with RW x2  Other exercises 5: Static Standing ~45 sec (pt abruptly sat down Edge of Bed) education to reach back before sitting, pt reports \"just needed to sit\"           Activity Tolerance: Patient limited by cognitive status  Activity Tolerance: Repetitive 1 step commands to complete tasks, pt needs frequent reminder of task at hand. PT Equipment Recommendations  Other: TBD - may need manual w/c pending d/c location       Assessment  Activity Tolerance: Patient limited by cognitive status   Body structures, Functions, Activity limitations: Decreased functional mobility ; Decreased ADL status; Decreased strength;Decreased safe awareness;Decreased cognition;Decreased balance;Decreased endurance;Decreased posture  Prognosis: Fair  Discharge Recommendations: Patient would benefit from continued therapy after discharge     Type of devices: Call light within reach; Left in chair;Chair alarm in place;Gait belt;Nurse notified(SARAH Barahona)  Restraints  Initially in place: No     Plan  Times per week: 5-6x/week  Current Treatment Recommendations: Strengthening, Balance Training, Functional Mobility Training, Transfer Training, Endurance Training, Gait Training, Equipment Evaluation, Education, & procurement, Patient/Caregiver Education & Training, Safety Education & Training    Patient Education  New Education Provided: The need for Rehab before going home  Learner:caregiver (SARAH Barahona) and patient  Method: explanation       Outcome: needs reinforcement     Goals  Short term goals  Time Frame for Short term goals: 3-4 days  Short term goal 1: Pt to demonstrate mod x1 bed mobility. Short term goal 2: Pt to improve sit<->stand transfers min to mod x2. Short term goal 3: Pt to improve standing tolerance with improved posture to at least 1 minute, 2 assist.  Short term goal 4: Pt to improve posture to GOOD. Short term goal 5: Pt to improve sitting balance to [facility] with improved trunk control and core strength. Short term goal 6: Pt to improve BLE strength by 1/2 MMG.   Short term goal 7: Pt to progress to taking steps at bedside 10' min to mod x2.     PT Individual Minutes  Time In: 9801  Time Out: 1893  Minutes: 39    Electronically signed by Sunita Moore PTA on 7/20/20 at 11:28 AM EDT

## 2020-07-21 LAB
ANION GAP SERPL CALCULATED.3IONS-SCNC: 11 MMOL/L (ref 9–17)
BUN BLDV-MCNC: 9 MG/DL (ref 8–23)
BUN/CREAT BLD: ABNORMAL (ref 9–20)
CALCIUM SERPL-MCNC: 9.4 MG/DL (ref 8.6–10.4)
CHLORIDE BLD-SCNC: 101 MMOL/L (ref 98–107)
CO2: 25 MMOL/L (ref 20–31)
CREAT SERPL-MCNC: 1.1 MG/DL (ref 0.5–0.9)
GFR AFRICAN AMERICAN: 58 ML/MIN
GFR NON-AFRICAN AMERICAN: 48 ML/MIN
GFR SERPL CREATININE-BSD FRML MDRD: ABNORMAL ML/MIN/{1.73_M2}
GFR SERPL CREATININE-BSD FRML MDRD: ABNORMAL ML/MIN/{1.73_M2}
GLUCOSE BLD-MCNC: 101 MG/DL (ref 65–105)
GLUCOSE BLD-MCNC: 143 MG/DL (ref 65–105)
GLUCOSE BLD-MCNC: 80 MG/DL (ref 70–99)
GLUCOSE BLD-MCNC: 81 MG/DL (ref 65–105)
GLUCOSE BLD-MCNC: 90 MG/DL (ref 65–105)
HCT VFR BLD CALC: 32.7 % (ref 36–46)
HEMOGLOBIN: 10.5 G/DL (ref 12–16)
MCH RBC QN AUTO: 26.9 PG (ref 26–34)
MCHC RBC AUTO-ENTMCNC: 32.1 G/DL (ref 31–37)
MCV RBC AUTO: 83.7 FL (ref 80–100)
NRBC AUTOMATED: ABNORMAL PER 100 WBC
PDW BLD-RTO: 16.6 % (ref 11.5–14.9)
PLATELET # BLD: 231 K/UL (ref 150–450)
PMV BLD AUTO: 8 FL (ref 6–12)
POTASSIUM SERPL-SCNC: 3.7 MMOL/L (ref 3.7–5.3)
RBC # BLD: 3.91 M/UL (ref 4–5.2)
SODIUM BLD-SCNC: 137 MMOL/L (ref 135–144)
WBC # BLD: 8.5 K/UL (ref 3.5–11)

## 2020-07-21 PROCEDURE — 97530 THERAPEUTIC ACTIVITIES: CPT

## 2020-07-21 PROCEDURE — 97129 THER IVNTJ 1ST 15 MIN: CPT

## 2020-07-21 PROCEDURE — 99233 SBSQ HOSP IP/OBS HIGH 50: CPT | Performed by: PHYSICAL MEDICINE & REHABILITATION

## 2020-07-21 PROCEDURE — 6360000002 HC RX W HCPCS: Performed by: STUDENT IN AN ORGANIZED HEALTH CARE EDUCATION/TRAINING PROGRAM

## 2020-07-21 PROCEDURE — 36415 COLL VENOUS BLD VENIPUNCTURE: CPT

## 2020-07-21 PROCEDURE — 97535 SELF CARE MNGMENT TRAINING: CPT

## 2020-07-21 PROCEDURE — 97130 THER IVNTJ EA ADDL 15 MIN: CPT

## 2020-07-21 PROCEDURE — 80048 BASIC METABOLIC PNL TOTAL CA: CPT

## 2020-07-21 PROCEDURE — 97116 GAIT TRAINING THERAPY: CPT

## 2020-07-21 PROCEDURE — 6370000000 HC RX 637 (ALT 250 FOR IP): Performed by: STUDENT IN AN ORGANIZED HEALTH CARE EDUCATION/TRAINING PROGRAM

## 2020-07-21 PROCEDURE — 97166 OT EVAL MOD COMPLEX 45 MIN: CPT

## 2020-07-21 PROCEDURE — 85027 COMPLETE CBC AUTOMATED: CPT

## 2020-07-21 PROCEDURE — 1180000000 HC REHAB R&B

## 2020-07-21 PROCEDURE — 6370000000 HC RX 637 (ALT 250 FOR IP): Performed by: PHYSICAL MEDICINE & REHABILITATION

## 2020-07-21 PROCEDURE — 82947 ASSAY GLUCOSE BLOOD QUANT: CPT

## 2020-07-21 PROCEDURE — 92523 SPEECH SOUND LANG COMPREHEN: CPT

## 2020-07-21 PROCEDURE — 97110 THERAPEUTIC EXERCISES: CPT

## 2020-07-21 PROCEDURE — 97162 PT EVAL MOD COMPLEX 30 MIN: CPT

## 2020-07-21 RX ORDER — AMOXICILLIN AND CLAVULANATE POTASSIUM 875; 125 MG/1; MG/1
1 TABLET, FILM COATED ORAL EVERY 12 HOURS SCHEDULED
Status: COMPLETED | OUTPATIENT
Start: 2020-07-21 | End: 2020-07-23

## 2020-07-21 RX ADMIN — METOPROLOL TARTRATE 50 MG: 50 TABLET, FILM COATED ORAL at 22:27

## 2020-07-21 RX ADMIN — Medication 1 CAPSULE: at 07:32

## 2020-07-21 RX ADMIN — AMLODIPINE BESYLATE 10 MG: 10 TABLET ORAL at 07:32

## 2020-07-21 RX ADMIN — AMOXICILLIN AND CLAVULANATE POTASSIUM 1 TABLET: 875; 125 TABLET, FILM COATED ORAL at 22:42

## 2020-07-21 RX ADMIN — SPIRONOLACTONE 25 MG: 25 TABLET, FILM COATED ORAL at 07:32

## 2020-07-21 RX ADMIN — ENOXAPARIN SODIUM 40 MG: 40 INJECTION SUBCUTANEOUS at 07:31

## 2020-07-21 RX ADMIN — Medication 1 TABLET: at 22:42

## 2020-07-21 RX ADMIN — GABAPENTIN 300 MG: 300 CAPSULE ORAL at 22:26

## 2020-07-21 RX ADMIN — CLOPIDOGREL BISULFATE 75 MG: 75 TABLET ORAL at 07:31

## 2020-07-21 RX ADMIN — ALLOPURINOL 100 MG: 100 TABLET ORAL at 07:32

## 2020-07-21 RX ADMIN — ALLOPURINOL 100 MG: 100 TABLET ORAL at 22:26

## 2020-07-21 RX ADMIN — Medication 1 CAPSULE: at 11:28

## 2020-07-21 RX ADMIN — BENZONATATE 200 MG: 100 CAPSULE ORAL at 11:27

## 2020-07-21 RX ADMIN — METOPROLOL TARTRATE 50 MG: 50 TABLET, FILM COATED ORAL at 07:32

## 2020-07-21 RX ADMIN — Medication 1 CAPSULE: at 17:23

## 2020-07-21 RX ADMIN — Medication 1 TABLET: at 07:33

## 2020-07-21 RX ADMIN — EZETIMIBE 10 MG: 10 TABLET ORAL at 22:26

## 2020-07-21 RX ADMIN — HYDROCHLOROTHIAZIDE 25 MG: 25 TABLET ORAL at 07:32

## 2020-07-21 ASSESSMENT — PAIN DESCRIPTION - PAIN TYPE: TYPE: CHRONIC PAIN

## 2020-07-21 ASSESSMENT — 9 HOLE PEG TEST
TEST_RESULT: IMPAIRED
TESTTIME_SECONDS: 60
TEST_RESULT: IMPAIRED
TESTTIME_SECONDS: 38

## 2020-07-21 ASSESSMENT — PAIN DESCRIPTION - LOCATION: LOCATION: HAND

## 2020-07-21 ASSESSMENT — PAIN DESCRIPTION - PROGRESSION: CLINICAL_PROGRESSION: NOT CHANGED

## 2020-07-21 ASSESSMENT — PAIN - FUNCTIONAL ASSESSMENT: PAIN_FUNCTIONAL_ASSESSMENT: PREVENTS OR INTERFERES SOME ACTIVE ACTIVITIES AND ADLS

## 2020-07-21 ASSESSMENT — PAIN DESCRIPTION - FREQUENCY: FREQUENCY: CONTINUOUS

## 2020-07-21 ASSESSMENT — PAIN SCALES - GENERAL: PAINLEVEL_OUTOF10: 6

## 2020-07-21 ASSESSMENT — PAIN DESCRIPTION - ONSET: ONSET: ON-GOING

## 2020-07-21 ASSESSMENT — PAIN DESCRIPTION - DESCRIPTORS: DESCRIPTORS: ACHING

## 2020-07-21 ASSESSMENT — PAIN DESCRIPTION - ORIENTATION: ORIENTATION: LEFT;RIGHT

## 2020-07-21 NOTE — PROGRESS NOTES
Physical Therapy  [address] [facility]   Acute Rehabilitation Physical Therapy Progress Note    Date: 20  Patient Name: Ally Collins       Room: 6915/0094-18  MRN: [id number]   Account: [de-identified]   : 1940  (76 y.o.) Gender: female     Referring Practitioner: Dr. Ced John  Diagnosis: Acute CVA  Past Medical History:  has a past medical history of Arthritis, Cancer ([facility] [address] CKD (chronic kidney disease) stage 4, GFR 15-29 ml/min ([facility] [address] COPD (chronic obstructive pulmonary disease) ([facility] [address] CVA (cerebral vascular accident) ([facility] [address] Gout, Hyperlipidemia, Hypertension, Left renal atrophy, and Other abnormality of urination(788.69). Past Surgical History:   has a past surgical history that includes Tonsillectomy; Varicose vein surgery; Hammer toe surgery; Hysterectomy; Carotid endarterectomy; Appendectomy; joint replacement; bladder suspension; joint replacement; Breast surgery (Left); Colonoscopy; back surgery; Upper gastrointestinal endoscopy (N/A, 2018); and Colonoscopy (N/A, 2018). Additional Pertinent Hx: 43-year-old female with history of hypertension admitted with altered mental status. In ER ahe became unresponsive and required intubation. acute hypoxic respiratory failure secondary to hypertensive emergency, was eventually extubated, advancing diet, diagnosed with posterior reversible encephalopathy on MRI. [facility] Restrictions/Precautions  Restrictions/Precautions: Fall Risk;General Precautions  Required Braces or Orthoses?: No  Implants present? : Metal implants(R JAILENE, L TKA, neck and back surgery)  Position Activity Restriction  Other position/activity restrictions: up with assistance    Subjective: Pt agreeable to PT at this time. Comments: Pleasant and cooperative. Bed Mobility  Sit to Supine: Moderate assistance(Encouragement to move LEs, trunk assist)  Scooting:  Moderate assistance(hips to EOB)  Comment: HOB ~30*, rail, 2 pillows    Transfers:  Sit to Stand: Moderate Assistance(Cues for hand placement)  Stand to sit: Minimal Assistance(Cues for hand placement, good return)  Bed to Chair: Moderate assistance(no device, directional cues)  Stand pivot transfers: Moderate assistance(no device, directional cues)  Comment: Rolling walker, except where noted    Ambulation 1  Surface: level tile  Device: Rolling Walker  Other Apparatus: Wheelchair follow  Assistance: Minimal assistance  Quality of Gait: slow celeste, forward head/trunk, flexed hips, narrow LUIS, continuous cueing. faith SOLARES   Gait Deviations: Slow Celeste;Decreased step height;Decreased step length;Deviated path  Distance: 30'  Comments: Pt requiring continuous cueing to improve posture. Pt reports she walked better at [place], has deteriorated since. Stairs/Curb  Stairs?: No    EXERCISES    Other exercises 2: Seated B LE exercises, 10-15x each, 1#    Activity Tolerance: Patient Tolerated treatment well     PT Equipment Recommendations  Other: TBD    Current Treatment Recommendations: Strengthening, Balance Training, Functional Mobility Training, Transfer Training, Endurance Training, Gait Training, Equipment Evaluation, Education, & procurement, Patient/Caregiver Education & Training, Safety Education & Training, Wheelchair Mobility Training, Home Exercise Program, Pain Management    Conditions Requiring Skilled Therapeutic Intervention  Body structures, Functions, Activity limitations: Decreased functional mobility ; Decreased ADL status; Decreased strength;Decreased safe awareness;Decreased cognition;Decreased balance;Decreased endurance;Decreased posture; Increased pain  REQUIRES PT FOLLOW UP: Yes  Discharge Recommendations: 24 hour supervision or assist;Patient would benefit from continued therapy after discharge    Goals  Short term goals  Time Frame for Short term goals: 5-7 days  Short term goal 1: Pt to demonstrate CGA/SBA for bed mobility.   Short term goal 2: Pt to demonstrate min x1 transfers. Short term goal 3: Pt to amb 50' min x1 with improved gait mechanics and posture. Short term goal 4: pt to tolerate standing 1-2 minutes with 1 assist and device. Short term goal 5: Pt to propel w/c 50'-75' SBA. Short term goal 6: Pt to improve seated posture to GOOD. Short term goal 7: Pt to improve sitting balance to GOOD. Long term goals  Time Frame for Long term goals : by D/C  Long term goal 1: Pt to demonstrate bed mobility wtih supervision. Long term goal 2: Pt to demonstrate SBA for transfers from varied surfaces. Long term goal 3: Pt to amb 76' SBA/CGA with device with improve gait mechanics and posture, on varied terrain including ramp. Long term goal 4: Pt to propel w/c 100' including turns and varied terrain Mod I.  Long term goal 5: Pt to improve BLE strength by 1/2 MMG. Long term goal 6: Pt to tolerate standing 4-5 minutes with device, 1 assist if needed. Long term goal 7: Pt to perform 5xSTS in 60 seconds or less for improved strength/endurance. Long term goal 8: Pt to improve standing balance to FAIR+ to reduce fall risk.   Long term goal 9: Pt to demo 1 platform step with RW min x1.      07/21/20 1502   PT Individual Minutes   Time In 8944   Time Out 1535   Minutes 50     Electronically signed by Margi Givens PTA on 7/21/20 at 4:00 PM EDT

## 2020-07-21 NOTE — PROGRESS NOTES
Etelvina Bautista, SARAH    Registered Nurse    Case Management    Progress Notes    Signed    Date of Service:  2020  4:03 PM          Related encounter: ED to Hosp-Admission (Discharged) from 2020 in [address]              Show:Clear all  [x]Manual[x]Template[x]Copied    Added by:  [x]Estela Hammond RN    []Jennifer for details  [address]  Acute Inpatient Rehab Preadmission Assessment     Patient Name: Keo Cristobal        MRN:   [id number]    : 1940  (79 y.o.)  Gender: female      [place]   [place] [facility]  [place] [facility]   []? [place] [address]   []? Outside Admission - Location:                                 [x]? Initial         []? Updated     Date of Onset / admission to the acute hospital:  20     Impairment group:  2.1 NTBD     Did patient have surgery? [x]? No  []? Yes:       Physicians: Vasquez Grey     Risk for clinical complications: Moderate     Co-morbidities:  Pneumonia, HTN, HLD, COPD, CKD, Anemia     Financial Information  Primary insurance:  [x]? Medicare     []? Medicare HMO      []? [employer]    []? Medicaid      []? Medicaid HMO       []? Workers Compensation        []? Personal Pay     Secondary Insurance:  []? Medicare     []? Medicare HMO      [x]? [employer]    []? Medicaid      []? Workers Compensation      []? None     Precautions:   []? Cardiac Precautions             []?Total hip precautions            []? Weight Bearing status:  [x]? Safety Precautions/Concerns  [x]? Visually impaired:  Wears glasses for distance                   [x]? Hard of Hearing:  Hard of hearing/hearing concerns, No hearing aid      Isolation Precautions:         []? Yes              [x]? No  If Yes:   []? Droplet  []? Contact           []? Airborne     []? VRE     []? MRSA        []? C-diff         []? TB                  []? Other:                     Physiatrist:  [x]?      []? Dr. Liza Fraga  []?    Dr. Kraig Martin     Patients Occupation: Retired     Reviewed Lab and Diagnostic reports from Current Admission: Yes     Patients Prior Functional  Level: Prior Function  Receives Help From: Family(spouse)  ADL Assistance: Independent  Homemaking Assistance: Needs assistance(spouse does cooking, cleaning, laundry)  Ambulation Assistance: Independent(uses rollator)  Transfer Assistance: Needs assistance(spouse assists with bed mobility)  Additional Comments: Spouse is retired and available to assist 24/7. Per Pt's spouse Gene who was present at end of evaluation, Pt was IND with all self-care and functional mobility (with use of 4WW) 3-4 months ago. However, Pt's spouse has been providing additional assist for the last 3-4 months due to Pt's chronic back pain.     History of current illness:  67-year-old male with history of hypertension admitted with altered mental status.  In ER he became unresponsive and required intubation.     Internal medicine-acute hypoxic restaurant failure secondary to hypertensive emergency, was eventually extubated, advancing diet, diagnosed with posterior reversible encephalopathy on MRI,, aspiration pneumonia off Unasyn, continue Augmentin seen by pulmonary seizure-like activity, on Keppra initially then discontinued, monitor, blood pressure medications being adjusted chronic kidney disease, renal ultrasound, secondary hypertension due to possible renal artery stenosis awaiting renal arterial duplex     Pulmonary- trending up most likely second diuretics, complete Augmentin     Cardiology- pressure medication adjusted, continue Plavix and Zetia     Neurology [place] ES syndrome secondary hypertension, intermittent encephalopathy     Current functional status for upper extremity ADLs:  UE Bathing: Moderate assistance  UE Dressing:  Moderate assistance     Current functional status for lower extremity ADLs:  LE Bathing: Maximum assistance(can A c thighs from seated position)  LE Dressing: Maximum Yes  Need for 24 hr physician supervision:  Yes  Measurable improved quality of life:  Yes  Willingness to participate:  Yes  Medical Necessity:  Yes  Patient able to tolerate care proposed: Yes     Expected Discharge Destination/Functional Level:  Home with assist  Expected length of time to achieve that level of improvement: 1-2 weeks  Expected Post Discharge Treatments: Home with possible Home Care     Other information relevant to the care needs:  n/a     Acute Inpatient Rehabilitation Disclosure Statement provided to patient. Patient verbalized understanding.   Copy placed on patient's light chart.     I have reviewed and concur with the findings and results of the pre-admission screening assessment completed by the Inpatient Rehabilitation Admissions Coordinator.                  Cosigned by:  Dinesh Stoddard MD at 7/20/2020  4:14 PM    Revision History

## 2020-07-21 NOTE — PROGRESS NOTES
Speech Language Pathology  Facility/Department: [address] [place] [address]  Initial Speech/Language/Cognitive Assessment    NAME: Tian Klein  : 1940   MRN: [id number]  ADMISSION DATE: 2020  ADMITTING DIAGNOSIS: has Breast cancer ([address] Renal cyst; Lumbar degenerative disc disease; Arthropathy of lumbar facet joint; Failed back syndrome of lumbar spine; Lumbar spondylosis; Sacroiliitis ([address] Status post lumbar spinal fusion; TIA (transient ischemic attack); Anemia; Dyspnea; COPD with acute exacerbation ([address] Acute kidney injury superimposed on CKD ([address] Aspiration pneumonia (HCC); CKD (chronic kidney disease) stage 4, GFR 15-29 ml/min ([address] Left renal atrophy; Severe malnutrition ([address] Acute cystitis without hematuria; Disorientation; Hypertensive emergency; Hypertension; Essential hypertension; TIMO (acute kidney injury) ([address] Acute respiratory failure ([address] Acute encephalopathy; Chronic kidney disease (CKD), stage III (moderate) ([address] Seizure ([address] Altered mental status; and Encephalopathy on their problem list.     Date of Eval: 2020   Evaluating Therapist: OSEAS Middleton    RECENT RESULTS  CT OF HEAD/MRI: MRI Brain (): Impression    1. Bilateral parietal cortical and subcortical signal abnormality most    consistent with posterior reversible encephalopathy syndrome. 2. No acute infarct or hemorrhage. 3. Mild chronic white matter microvascular ischemic changes.         Primary Complaint: Per chart:   Carla Haas a 79 y.o. female who was admitted to [facility] Altered Mental Status and Hypertension     50-year-old male with history of hypertension admitted with altered mental status.  In ER he became unresponsive and required intubation.     Internal medicine-acute hypoxic restaurant failure secondary to hypertensive emergency, was eventually extubated, advancing diet, diagnosed with posterior reversible encephalopathy on MRI,, aspiration pneumonia off Unasyn, continue Augmentin seen by pulmonary seizure-like activity, on Keppra initially then discontinued, monitor, blood pressure medications being adjusted chronic kidney disease, renal ultrasound, secondary hypertension due to possible renal artery stenosis awaiting renal arterial duplex     Pulmonary- trending up most likely second diuretics, complete Augmentin     Cardiology- pressure medication adjusted, continue Plavix and Zetia     Neurology CT ES syndrome secondary hypertension, intermittent encephalopathy    Pain:   Denies    Assessment:  Cognitive Diagnosis: Moderately impaired comprehension and expression. Severely impaired cognition. Aphasia Diagnosis: No aphasia present. Speech Diagnosis: No dysarthria/apraxia present. Diagnosis: Pt. presents with moderately impaired comprehension and expression characterized by deficits in ability to answer yes/no questions and open-ended questions, divergent naming, and convergent naming. Pt. presents with severely impaired cognition characterized by impaired short-term and working memory, verbal reasoning, and problem solving. Pt. demonstrates very long response latency and reduced insight to deficits as she denies any difficulties with language or cognition. Further ST is recommended during hospital stay and also upon d/c to bring function to a safe level for home d/c. Recommendations:  Requires SLP Intervention: Yes             Plan:   Goals:  Short-term Goals  Goal 1: Complete thought organization tasks (e.g., convergent and divergent naming) with 80%  Goal 2: Increase functional problem solving/verbal reasoning to 80%  Goal 3: Answer yes/no questions and open-ended questions with 80%  Goal 4: Increase Pt. education re: compensatory strategies to assist in recall  Goal 5: Pt. will recall 3-4 units of information with 80%   Patient/family involved in developing goals and treatment plan: Yes, Pt.      Subjective:   Previous level of function and limitations: Per OT note, pt. Spouse is retired and has been providing assistance with ADLs for last 4 months d/t pt. General  Chart Reviewed: Yes  Patient assessed for rehabilitation services?: Yes  Family / Caregiver Present: No  Subjective  Subjective: Pt. reporting when first admitted to hospital, cognition was \"off\". However, currently, Pt. Reporting no language or cognitive deficits. Vision  Vision: Within Functional Limits  Vision Exceptions: Wears glasses for distance  Hearing  Hearing: Within functional limits  Hearing Exceptions: Hard of hearing/hearing concerns; No hearing aid           Objective:     Oral/Motor  Oral Motor: Within functional limits    Auditory Comprehension  Comprehension: Exceptions  Yes/No Questions: Moderate  Basic Questions: Mild  Complex Questions: Severe  Two Step Basic Commands: Moderate(latent response time)  Conversation: Severe  Interfering Components: Working memory;Processing speed  Effective Techniques: Slowed speech; Extra processing time; Increased volume;Repetition         Expression  Primary Mode of Expression: Verbal    Verbal Expression  Verbal Expression: Exceptions to functional limits  Convergent: Severe  Divergent: Severe  Interfering Components: Impaired thought organization  Effective Techniques: Decreased rate;Provide extra time         Motor Speech  Motor Speech: Within Functional Limits    Pragmatics/Social Functioning  Pragmatics: Within functional limits    Cognition:      Orientation  Overall Orientation Status: Impaired  Orientation Level: Oriented X4  Attention  Attention: Exceptions to [facility]  Arousal/Alertness: Delayed responses to stimuli  Divided Attention: Mild  Sustained Attention: Mild  Memory  Memory: Exceptions to [facility]  Short-term Memory: Severe  Working Memory: Severe  Problem Solving  Problem Solving: Exceptions to [facility]  Complex Functional Tasks: Severe  Simple Functional Tasks:  Moderate  Verbal Reasoning Skills: Severe  Abstract Reasoning  Abstract Reasoning: Exceptions to [facility]  Convergent Thinking: Severe  Divergent Thinking: Severe  Safety/Judgement  Safety/Judgement: Exceptions to [facility]  Complex Functional Tasks:  Moderate  Insight: Severe        Prognosis:  Individuals consulted  Consulted and agree with results and recommendations: Patient    Education:  Patient Education: Yes  Patient Education Response: Verbalizes understanding;Needs reinforcement          Therapy Time:   Individual Concurrent Group Co-treatment   Time In 6261         Time Out 1200         Minutes 26               Emilee Mora M.A.CFY-SLP  7/21/2020 9:29 AM

## 2020-07-21 NOTE — PROGRESS NOTES
Physical Therapy    Facility/Department: [facility] ACUTE REHAB  Initial Assessment    NAME: Katlin Ashley  : 1940  MRN: [id number]    Date of Service: 2020    Discharge Recommendations:  24 hour supervision or assist, Patient would benefit from continued therapy after discharge   PT Equipment Recommendations  Other: TBD    Assessment   Body structures, Functions, Activity limitations: Decreased functional mobility ; Decreased ADL status; Decreased strength;Decreased safe awareness;Decreased cognition;Decreased balance;Decreased endurance;Decreased posture; Increased pain  Assessment: Pt a 1 assist for all mobility, w/c follow due to endurance. Pt is progressing well since writer first met patient on hospital floor. Pt will benefit from intense therapy. Treatment Diagnosis: Impaired functional mobility 2* emcephalopathy  Specific instructions for Next Treatment: progress standing balance, strengthening, gait, posture and gait mechanics  Prognosis: Good  Decision Making: Medium Complexity  History: patient had acute mental status changes x1 day and was not answering questions appropriately therefore was brought in by . In the ER patient complained of having numbness and tingling in the left upper and lower extremity concerning for stroke. Patient soon after became unresponsive and had to be intubated for airway protection. Blood pressure was also elevated with systolic being in the 111M  Exam: ROM, MMT, bed mobility, transfers, amb, balance, endurance  Clinical Presentation: Pt alert, pleasant, cooperative for PT  Barriers to Learning: cognition, safety awareness  REQUIRES PT FOLLOW UP: Yes  Activity Tolerance  Activity Tolerance: Patient Tolerated treatment well  Activity Tolerance: Occasional reminders of task at hand       Patient Diagnosis(es): There were no encounter diagnoses.      has a past medical history of Arthritis, Cancer ([facility] CKD (chronic kidney disease) stage 4, GFR 15-29 ml/min ([facility] [address] COPD Descriptors: Aching  Pain Frequency: Continuous  Pain Onset: On-going  Clinical Progression: Not changed  Functional Pain Assessment: Prevents or interferes some active activities and ADLs  Non-Pharmaceutical Pain Intervention(s): Ambulation/Increased Activity; Distraction;Repositioned; Rest  Response to Pain Intervention: Patient Satisfied  Vital Signs  Patient Currently in Pain: Yes  Oxygen Therapy  O2 Device: None (Room air)  Patient Observation  Observations: slowed processing time occasionally, improved aphasia since writer saw pt 7/17/20. Orientation  Orientation  Overall Orientation Status: Within Normal Limits  Social/Functional History  Social/Functional History  Lives With: Spouse  Type of Home: House  Home Layout: Two level, Performs ADL's on one level, Able to Live on Main level with bedroom/bathroom  Home Access: Ramped entrance  Bathroom Shower/Tub: Walk-in shower, Shower chair with back, Doors  Bathroom Toilet: Standard(with toilet raiser for taller height)  Bathroom Equipment: Shower chair, Grab bars in shower, Hand-held shower, Toilet raiser, Grab bars around toilet  Bathroom Accessibility: Accessible, Walker accessible  Home Equipment: Rolling walker, Cane, Reacher, [employer] aid, 4 wheeled walker(transport chair)  Receives Help From: Family  ADL Assistance: Independent  Homemaking Assistance: Needs assistance(shares duties with spouse)  Homemaking Responsibilities: Yes  Ambulation Assistance: Independent(with cane)  Transfer Assistance: Independent  Active : Yes(Spouse drives majority of the time)  Occupation: Retired  Type of occupation: Homemaker  Additional Comments: Pt is a questionable historian with no family present to verify above information this date. During OT evaluation on 7/17/20, Pt's spouse was present and reported that 3-4 months ago, Pt was IND with all self-care and functional mobility with cane. Pt was assisting with homemaking chores.  However for the past 3-4 months, Pt has required assist with self-care, transfers, and mobility due to chronic back pain. Cognition        Objective          AROM RLE (degrees)  RLE AROM: WFL  AROM LLE (degrees)  LLE AROM : WFL  AROM RUE (degrees)  RUE General AROM: See OT  AROM LUE (degrees)  LUE General AROM: See OT  Strength RLE  Strength RLE: WFL  Comment: Grossly 3+ to 4-/5  Strength LLE  Strength LLE: WFL  Comment: Grossly 3+ to 4-/5  Strength RUE  Comment: See OT  Strength LUE  Comment: See OT  Coordination  Coordination and Movement description: B EVY of feet WNL, slight dysmetria with L UE for finger to nose test  Sensation  Overall Sensation Status: WFL(denies)  Bed mobility  Bridging: Stand by assistance(bridge x5)  Rolling to Left: Minimal assistance  Rolling to Right: Minimal assistance  Supine to Sit: Moderate assistance(at trunk)  Sit to Supine: Stand by assistance(cues for positioning)  Scooting: Moderate assistance;Minimal assistance  Comment: HOB flat. Cues for positioning in bed including bridging. Slowed movements. Waffle mattress on and inflated. Transfers  Sit to Stand: Moderate Assistance;Maximum Assistance  Stand to sit: Moderate Assistance;Maximum Assistance  Bed to Chair: Moderate assistance  Comment: mod to max x1 with RW. Repetitive cueing for hand placement with fair return demo. Pt performed 8 STS throughout evaluation. Pt has poor eccentric control, demo'd proper stand to sit transfer. Ambulation  Ambulation?: Yes  Ambulation 1  Surface: level tile  Device: Rolling Walker  Other Apparatus: Wheelchair follow  Assistance: Moderate assistance  Quality of Gait: slow celeste, forward head/trunk, flexed hips, narrow LUIS, continuous cueing  Gait Deviations: Slow Celeste;Decreased step height;Decreased step length;Deviated path  Distance: 25', 2MWT: 40'  Comments: W/c follow 2* fatigue. Pt requiring continuous cueing to improve posture.   Stairs/Curb  Stairs?: No  Wheelchair Activities  Wheelchair Type: Standard  Wheelchair amb 50' min x1 with improved gait mechanics and posture. Short term goal 4: pt to tolerate standing 1-2 minutes with 1 assist and device. Short term goal 5: Pt to propel w/c 50'-75' SBA. Short term goal 6: Pt to improve seated posture to GOOD. Short term goal 7: Pt to improve sitting balance to GOOD. Long term goals  Time Frame for Long term goals : by D/C  Long term goal 1: Pt to demonstrate bed mobility wtih supervision. Long term goal 2: Pt to demonstrate SBA for transfers from varied surfaces. Long term goal 3: Pt to amb 76' SBA/CGA with device with improve gait mechanics and posture, on varied terrain including ramp. Long term goal 4: Pt to propel w/c 100' including turns and varied terrain Mod I.  Long term goal 5: Pt to improve BLE strength by 1/2 MMG. Long term goal 6: Pt to tolerate standing 4-5 minutes with device, 1 assist if needed. Long term goal 7: Pt to perform 5xSTS in 60 seconds or less for improved strength/endurance. Long term goal 8: Pt to improve standing balance to FAIR+ to reduce fall risk. Long term goal 9: Pt to demo 1 platform step with RW min x1. Patient Goals   Patient goals :  To stand long enough to cook a meal/go home       Therapy Time   Individual Concurrent Group Co-treatment   Time In 1030         Time Out 1115         Minutes 45         Timed Code Treatment Minutes: [address] Misty Crain, PT

## 2020-07-21 NOTE — PROGRESS NOTES
Comprehensive Nutrition Assessment    Type and Reason for Visit:  Consult(Eval and treat)    Nutrition Recommendations/Plan: Continue Full liquid diet. Advance diet when appropriate. Modify oral supplement to Ensure Enlive 3x/day. Nutrition Assessment:  Patient admission is related to encephalopathy. Patient recently discharged from acute medical care for aspiration pneumonia. Patient has have altered mental status related to encephalopathy and was started on full liquid diet after vent extubation. Patient remains on full liquid diet and Ensure High Protein 2x/day. Patient will be evaluated for swallowing ability before diet is advanced. Monitor nutrition progression. Change oral supplement to Ensure Enlive 3x/day. Malnutrition Assessment:  Malnutrition Status: At risk for malnutrition (Comment)    Context:  Acute Illness         Estimated Daily Nutrient Needs:  Energy (kcal):  1475 kcal based on [employer] (1.2 stress fractor); Weight Used for Energy Requirements:  Current     Protein (g):  74-80 gm based on 1.3-1.4 gm/kg; Weight Used for Protein Requirements:  Ideal          Nutrition Related Findings:  Trace BLE edema. Encephalopathy. Wounds:  None       Current Nutrition Therapies:    Dietary Nutrition Supplements: Low Calorie High Protein Supplement  DIET FULL LIQUID; Anthropometric Measures:  · Height: 5' 5\" (165.1 cm)  · Current Body Weight: 166 lb (75.3 kg)   · Ideal Body Weight: 125 lbs; % Ideal Body Weight 132.8 %   · BMI: 27.6  · BMI Categories: Overweight (BMI 25.0-29. 9)       Nutrition Diagnosis:   · Inadequate protein-energy intake related to inadequate protein-energy intake, cognitive or neurological impairment as evidenced by intake 51-75%, intake 26-50%(full liquid diet, encephalopathy)      Nutrition Interventions:   Food and/or Nutrient Delivery:  Continue Current Diet, Modify Oral Nutrition Supplement  Nutrition Education/Counseling:  Education not indicated Coordination of Nutrition Care:  Continued Inpatient Monitoring    Goals:  PO intakes are greater than 75% at meals       Nutrition Monitoring and Evaluation:   Food/Nutrient Intake Outcomes:  Food and Nutrient Intake, Supplement Intake  Physical Signs/Symptoms Outcomes:  Chewing or Swallowing, Biochemical Data, Skin, Weight, GI Status, Nausea or Vomiting     Discharge Planning: Too soon to determine     Some areas of assessment maybe incomplete due to COVID-19 precautions.     Adelaida SCOTT, RVICKIE, L.D,  Clinical Dietitian  Office # [phone]

## 2020-07-21 NOTE — PROGRESS NOTES
[facility]: SCOTT WOODARD   Acute Rehabilitation OT Evaluation  Date: 20  Patient Name: Blanca García       Room: 5808/1582-68  MRN: [id number]  Account: [de-identified]   : 1940  (79 y.o.) Gender: female     Referring Practitioner: Dr. Macarena Contreras  Diagnosis: Acute CVA       Treatment Diagnosis: Impaired self-care status. Past Medical History:  has a past medical history of Arthritis, Cancer ([facility] [address] CKD (chronic kidney disease) stage 4, GFR 15-29 ml/min (HCC), COPD (chronic obstructive pulmonary disease) ([facility] [address] CVA (cerebral vascular accident) ([facility] [address] Gout, Hyperlipidemia, Hypertension, Left renal atrophy, and Other abnormality of urination(788.69). Past Surgical History:   has a past surgical history that includes Tonsillectomy; Varicose vein surgery; Hammer toe surgery; Hysterectomy; Carotid endarterectomy; Appendectomy; joint replacement; bladder suspension; joint replacement; Breast surgery (Left); Colonoscopy; back surgery; Upper gastrointestinal endoscopy (N/A, 2018); and Colonoscopy (N/A, 2018). Restrictions  Restrictions/Precautions: Fall Risk, General Precautions  Implants present? : Metal implants(R JAILENE, L TKA, neck and back surgery)  Other position/activity restrictions: up with assistance  Required Braces or Orthoses?: No    Vitals  Temp: 97.4 °F (36.3 °C)  Pulse: 77  Resp: 16  BP: (!) 187/94  Height: 5' 4.96\" (165 cm)  Weight: 166 lb (75.3 kg)  BMI (Calculated): 27.7  Oxygen Therapy  SpO2: 97 %  O2 Device: None (Room air)  Level of Consciousness: Alert    Subjective  Subjective: \"I couldn't stand to do the dishes because of my back. ..you know, I was due to get a spinal stimulator put in, but that's on hold right now\" Pt reports a decline in ADL/IADL function recently due to back pain. Surgery for spinal stimulator is currently on hold due to Pt's medical status.   Orientation  Overall Orientation Status: Impaired  Orientation Level: Oriented to person, Oriented to place, Oriented to situation, Disoriented to time  Vision  Vision: Impaired  Vision Exceptions: Wears glasses for distance  Hearing  Hearing: Within functional limits  Vision - Basic Assessment  Prior Vision: Wears glasses for distance only  Social/Functional History  Lives With: Spouse  Type of Home: House  Home Layout: Two level, Performs ADL's on one level, Able to Live on Main level with bedroom/bathroom  Home Access: Ramped entrance  Bathroom Shower/Tub: Walk-in shower, Shower chair with back  Bathroom Toilet: Standard(with toilet raiser for taller height)  Bathroom Equipment: Shower chair, Grab bars in shower, Hand-held shower, Toilet raiser, Grab bars around toilet  Home Equipment: Rolling walker, Cane, Reacher, [address] aid(transport chair)  Receives Help From: Family  ADL Assistance: Independent  Homemaking Assistance: Needs assistance(shares duties with spouse)  Homemaking Responsibilities: Yes  Ambulation Assistance: Independent(with cane)  Transfer Assistance: Independent  Active : Yes(Spouse drives majority of the time)  Occupation: Retired  Type of occupation: Homemaker  Additional Comments: Pt is a questionable historian with no family present to verify above information this date. During OT evaluation on 7/17/20, Pt's spouse was present and reported that 3-4 months ago, Pt was IND with all self-care and functional mobility with cane. Pt was assisting with homemaking chores. However for the past 3-4 months, Pt has required assist with self-care, transfers, and mobility due to chronic back pain. Objective  Vision - Basic Assessment  Prior Vision: Wears glasses for distance only   Cognition  Overall Cognitive Status: Impaired  Arousal/Alertness: Delayed responses to stimuli  Attention Span: Attends with cues to redirect  Memory: Decreased recall of recent events, Decreased short term memory  Following Commands:  Follows one step commands with increased time  Safety Judgement: Decreased awareness of need assistance       Balance  Sitting Balance: Contact guard assistance  Standing Balance: Minimal assistance  Standing Balance  Time: 1-2 minutes x 4  Activity: self-care tasks     Bed mobility  Rolling to Right: Minimal assistance  Supine to Sit: Moderate assistance  Scooting: Moderate assistance  Comment: Pt seated in chair at end of session     Transfers  Stand Pivot Transfers: Maximum assistance(Max A EOB>w/c; Mod A w/c<>toilet with GB)  Sit to stand: Maximum assistance  Stand to sit: Moderate assistance  Toilet Transfers  Toilet - Technique: Stand pivot, To right, To left  Equipment Used: Grab bars  Toilet Transfer: Moderate assistance  Toilet Transfers Comments: Mod VCs for hand placement and safety  Shower Transfers  Shower - Transfer From: Wheelchair  Shower - Transfer Type: To and From  Shower - Transfer To: Transfer tub bench  Shower - Technique: Stand pivot, To left, To right  Shower Transfers: Moderate assistance  Shower Transfers Comments: Mod VCs for hand placement  Functional Activity Tolerance  Functional Activity Tolerance: Tolerates 30 min exercise with multiple rests   Activity Tolerance: Patient Tolerated treatment well         ADL     ADL  Feeding: Supervision;Setup; Increased time to complete;Dentures  Grooming: Moderate assistance;Setup; Increased time to complete(A grooming hair d/t tangles; SBA oral care seated at sink)  UE Bathing: Stand by assistance;Setup;Verbal cueing; Increased time to complete(seated on TTB in shower)  LE Bathing: Moderate assistance;Setup;Verbal cueing; Increased time to complete(A buttocks & perineal area; Min A standing)  UE Dressing: Minimal assistance;Setup;Verbal cueing; Increased time to complete(A OH to don OH shirt)  LE Dressing: Maximum assistance;Setup;Verbal cueing; Increased time to complete(SBA thread BLE into brief; A all other tasks)  Toileting: Dependent/Total  Additional Comments: OT facilitated Pt engagement in showering tasks including bathing, dressing, toileting, and grooming tasks. Mod VCs for sequencing, attention to task, and object use. OT scores     Eating  Assistance Needed: Supervision or touching assistance  CARE Score: 4  Discharge Goal: Independent  Oral Hygiene  Assistance Needed: Supervision or touching assistance  CARE Score: 4  Discharge Goal: Independent  Toileting Hygiene  Assistance Needed: Dependent  CARE Score: 1  Discharge Goal: Supervision or touching assistance  Shower/Bathe Self  Assistance Needed: Partial/moderate assistance  CARE Score: 3  Discharge Goal: Supervision or touching assistance  Upper Body Dressing  Assistance Needed: Partial/moderate assistance  CARE Score: 3  Discharge Goal: Supervision or touching assistance  Lower Body Dressing  Assistance Needed: Substantial/maximal assistance  CARE Score: 2  Discharge Goal: Supervision or touching assistance  Putting On/Taking Off Footwear  Assistance Needed: Dependent  CARE Score: 1  Discharge Goal: Supervision or touching assistance  Toilet Transfer  Assistance Needed: Partial/moderate assistance  CARE Score: 3  Discharge Goal: Supervision or touching assistance      Goals  Patient Goals   Patient goals : \"To get so I can do my jobs. ..you know, take care of my home. 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 I don't want everything dumped on him (spouse) because he's too nice a man, and he does it, but it's hard for him. \"  Short term goals  Time Frame for Short term goals: One week  Short term goal 1: Pt will perform toileting tasks and LBD with Mod A. Short term goal 2: Pt and Pt's caregiver will V/D Good understanding of AE/DME/modified techniques for increased IND with self-care and mobility. Short term goal 3: Pt will stand for 4+ minutes with 0-1 UE support, CGA, and no LOB while engaging in functional activity of choice. Short term goal 4: Pt will perform functional transfers and mobility with Min A, least restrictive mobility device, and Fair safety.   Short term goal 5: Pt will actively participate in 30+ minutes of therapeutic exercise/functional activities to promote increased IND with self-care and mobility. Long term goals  Time Frame for Long term goals : By discharge  Long term goal 1: Pt will perform BADLs with SUP and Fair safety. Long term goal 2: Pt will perform functional mobility and transfers during self-care with SUP, least restrictive mobility device, and Fair safety. Long term goal 3: Pt will perform simple meal prep/light housekeeping with SUP and Fair safety. Long term goal 4: Pt will stand for 8+ minutes with 0-1 UE support, SUP, and no LOB while engaging in functional activity of choice. Long term goal 5: Pt and Pt's caregiver will V/D Good understanding of Fall Prevention Strategies for increased IND with self-care and mobility. Long term goals 6: Pt will perform self-care with improved bilateral FMC/GMC as supported by a 7 second improvement in 9 hole peg score [facility]) and 5 block improvement on box and block assessment ([address] [place] [address]). Assessment  Performance deficits / Impairments: Decreased functional mobility , Decreased ADL status, Decreased strength, Decreased safe awareness, Decreased cognition, Decreased ROM, Decreased endurance, Decreased sensation, Decreased balance, Decreased fine motor control, Decreased coordination, Decreased posture, Decreased high-level IADLs  Treatment Diagnosis: Impaired self-care status.   Prognosis: Good  Decision Making: Medium Complexity  REQUIRES OT FOLLOW UP: Yes  Discharge Recommendations: 24 hour supervision or assist, Home with Home health OT  Plan  Times per week: 5-7  Times per day: Twice a day  Current Treatment Recommendations: Self-Care / ADL, Home Management Training, Strengthening, Balance Training, Functional Mobility Training, Endurance Training, Wheelchair Mobility Training, Cognitive Reorientation, Safety Education & Training, Patient/Caregiver Education & Training, Equipment Evaluation, Education, & procurement, Cognitive/Perceptual Training       Equipment Recommendations  Equipment Needed: (TBD)     07/21/20 0905 07/21/20 1310   OT Individual Minutes   Time In 0983 0986   Time Out [id number]   Minutes 78 35   Time Code Minutes    Timed Code Treatment Minutes 63 Minutes 35 Minutes     Electronically signed by Marquis Power OT on 7/21/20 at 3:44 PM EDT

## 2020-07-21 NOTE — FLOWSHEET NOTE
Left a message with \"ambika \" at [facility] [place] calling the medicine consult to Dr Rafita Dunn

## 2020-07-21 NOTE — H&P
Physical Medicine & Rehabilitation History and Physical  [facility] Acute Rehabilitation Unit     Primary care provider: Lolis Benites DO     Chief Complaint and Reason for Rehabilitation Admission:   ADL and Mobility deficits secondary to Encephalopathy. History of Present Illness:  Rambo Benavidez  is a 79 y.o. right-handed     female admitted to the [address] [place] [address] unit on 7/20/2020. She was originally admitted to [facility] on 7/12/2020 for AMS. Diagnostic studies confirmed PRES attributed to uncontrolled hypertension. She was mechanically ventilated for acute respiratory failure until her extubation 7/16/2020. Aspiration pneumonia was treated with IV Unasyn then transitioned to 10 days of Augmentin. Cardiology was consulted for hypertension which was initially treated with IV esmolol drip and prn hydralazine. Neurology was consulted for encephalopathy and seizure-like activity. EEG showed no evidence of seizures. For Neurology follow up in clinic 4-6 weeks. She is currently requiring assistance for self-care activities and mobility prompting this admission. Premorbid function:  Independent with ADLs    Current Function:  PT:  Restrictions/Precautions: Fall Risk, General Precautions  Implants present? : Metal implants(R JAILENE, L TKA, neck and back surgery)  Other position/activity restrictions: up with assistance   Transfers  Sit to Stand: Moderate Assistance, Maximum Assistance  Stand to sit: Moderate Assistance, Maximum Assistance  Bed to Chair: Moderate assistance  Comment: mod to max x1 with RW. Repetitive cueing for hand placement with fair return demo. Pt performed 8 STS throughout evaluation. Pt has poor eccentric control, demo'd proper stand to sit transfer. Ambulation 1  Surface: level tile  Device: Rolling Walker  Other Apparatus: Wheelchair follow  Assistance:  Moderate assistance  Quality of Gait: slow celeste, forward head/trunk, flexed hips, narrow LUIS, continuous cueing  Gait Deviations: Slow Celeste, Decreased step height, Decreased step length, Deviated path  Distance: 25', 2MWT: 40'  Comments: W/c follow 2* fatigue. Pt requiring continuous cueing to improve posture. Transfers  Sit to Stand: Moderate Assistance, Maximum Assistance  Stand to sit: Moderate Assistance, Maximum Assistance  Bed to Chair: Moderate assistance  Comment: mod to max x1 with RW. Repetitive cueing for hand placement with fair return demo. Pt performed 8 STS throughout evaluation. Pt has poor eccentric control, demo'd proper stand to sit transfer. Ambulation  Ambulation?: Yes  Ambulation 1  Surface: level tile  Device: Rolling Walker  Other Apparatus: Wheelchair follow  Assistance: Moderate assistance  Quality of Gait: slow celeste, forward head/trunk, flexed hips, narrow LUIS, continuous cueing  Gait Deviations: Slow Celeste, Decreased step height, Decreased step length, Deviated path  Distance: 25', 2MWT: 40'  Comments: W/c follow 2* fatigue. Pt requiring continuous cueing to improve posture. Surface: level tile  Ambulation 1  Surface: level tile  Device: Rolling Walker  Other Apparatus: Wheelchair follow  Assistance: Moderate assistance  Quality of Gait: slow celeste, forward head/trunk, flexed hips, narrow LUIS, continuous cueing  Gait Deviations: Slow Celeste, Decreased step height, Decreased step length, Deviated path  Distance: 25', 2MWT: 40'  Comments: W/c follow 2* fatigue. Pt requiring continuous cueing to improve posture. OT:                          Bed mobility  Bridging: Stand by assistance(bridge x5)  Rolling to Left: Minimal assistance  Rolling to Right: Minimal assistance  Supine to Sit: Moderate assistance(at trunk)  Sit to Supine: Stand by assistance(cues for positioning)  Scooting: Moderate assistance, Minimal assistance  Comment: HOB flat. Cues for positioning in bed including bridging. Slowed movements.  Waffle mattress on and inflated. Transfers  Stand Pivot Transfers: Maximum assistance(Max A EOB>w/c; Mod A w/c<>toilet with GB)  Sit to stand: Maximum assistance  Stand to sit: Moderate assistance                 SPEECH:  Cognitive Diagnosis: Moderately impaired comprehension and expression. Severely impaired cognition. Aphasia Diagnosis: No aphasia present. Speech Diagnosis: No dysarthria/apraxia present. Diagnosis: Pt. presents with moderately impaired comprehension and expression characterized by deficits in ability to answer yes/no questions and open-ended questions, divergent naming, and convergent naming. Pt. presents with severely impaired cognition characterized by impaired short-term and working memory, verbal reasoning, and problem solving. Pt. demonstrates very long response latency and reduced insight to deficits as she denies any difficulties with language or cognition. Further ST is recommended during hospital stay and also upon d/c to bring function to a safe level for home d/c.      Past Medical History:      Diagnosis Date    Arthritis     Cancer [facility])     breast cancer, left    CKD (chronic kidney disease) stage 4, GFR 15-29 ml/min (HCC)     COPD (chronic obstructive pulmonary disease) (HCC)     CVA (cerebral vascular accident) ([address]     mini    Gout     Hyperlipidemia     Hypertension     Left renal atrophy     Other abnormality of urination(788.69)      blood disorder needs platelets prior to procedures       Past Surgical History:      Procedure Laterality Date    APPENDECTOMY      BACK SURGERY      fusion with rods and screws    BLADDER SUSPENSION      BREAST SURGERY Left     lumpectomy x2    CAROTID ENDARTERECTOMY      COLONOSCOPY      COLONOSCOPY N/A 7/17/2018    COLONOSCOPY POLYPECTOMY performed by Jane Pyle MD at [address] [place] [address]      JOINT REPLACEMENT      knee    JOINT REPLACEMENT      right hip    TONSILLECTOMY      UPPER GASTROINTESTINAL ENDOSCOPY N/A 7/17/2018    EGD BIOPSY performed by Loan Muñoz MD at [address] [place] [address]         Allergies:    Diclofenac-misoprostol    Medications   Scheduled Meds:   amoxicillin-clavulanate  1 tablet Oral 2 times per day    enoxaparin  40 mg Subcutaneous Daily    allopurinol  100 mg Oral BID    amLODIPine  10 mg Oral Daily    benzonatate  200 mg Oral Daily    calcium carbonate-vitamin D  1 tablet Oral BID    gabapentin  300 mg Oral Nightly    lactobacillus  1 capsule Oral TID WC    cloNIDine  1 patch Transdermal Weekly    clopidogrel  75 mg Oral Daily    ezetimibe  10 mg Oral Nightly    metoprolol tartrate  50 mg Oral BID    spironolactone  25 mg Oral Daily    And    hydroCHLOROthiazide  25 mg Oral Daily     Continuous Infusions:  PRN Meds:.acetaminophen, senna, bisacodyl, polyethylene glycol     Diagnostics:     Ct Head Wo Contrast     Result Date: 7/12/2020  EXAMINATION: CT OF THE HEAD WITHOUT CONTRAST  7/12/2020 8:42 pm TECHNIQUE: CT of the head was performed without the administration of intravenous contrast. Dose modulation, iterative reconstruction, and/or weight based adjustment of the mA/kV was utilized to reduce the radiation dose to as low as reasonably achievable. COMPARISON: 12/19/2018 HISTORY: ORDERING SYSTEM PROVIDED HISTORY: AMS TECHNOLOGIST PROVIDED HISTORY: AMS Reason for Exam: AMS, unable to speak, patient is unable to stop shaking her legs. patient is unable to follow any type of instructions or answer any questions. Acuity: Acute Type of Exam: Initial FINDINGS: BRAIN/VENTRICLES: There is no acute intracranial hemorrhage, mass effect or midline shift. No abnormal extra-axial fluid collection. The gray-white differentiation is maintained without evidence of an acute infarct. There is no evidence of hydrocephalus. Generalized involutional changes and moderate chronic small ischemic disease. Intracranial vascular calcifications.  Question small chronic lacune infarct identified within the region of the right paramedian dane. ORBITS: The visualized portion of the orbits demonstrate no acute abnormality. SINUSES: The visualized paranasal sinuses and mastoid air cells demonstrate no acute abnormality. SOFT TISSUES/SKULL:  No acute abnormality of the visualized skull or soft tissues.      No acute intracranial abnormality. Chronic small ischemic disease and age related change. Critical results were called by Dr. Tish Parks to Dr Barb Nicole on 7/12/2020 at 20:52.      Us Renal Complete     Result Date: 7/13/2020  EXAMINATION: RETROPERITONEAL ULTRASOUND OF THE KIDNEYS 7/13/2020 COMPARISON: 04/19/2019 and prior HISTORY: ORDERING SYSTEM PROVIDED HISTORY: TIMO TECHNOLOGIST PROVIDED HISTORY: TIMO FINDINGS: Kidneys: The right kidney measures 10.2 x 5.8 x 5.2 cm in length and the left kidney measures 8.4 x 4.5 x 4.6 cm in length. Study limited by body habitus and overlying bowel gas. Simple-appearing cyst on the right measuring 1.3 x 1.2 x 1.6 cm noted. There is no hydronephrosis or abnormal echogenicity. Left kidney is somewhat limited in evaluation without evidence of hydronephrosis. There is mild cortical thinning.      Limited study secondary to body habitus and overlying bowel gas. Right kidney appears grossly unremarkable in appearance. Left kidney is limited but grossly unremarkable. There is mild cortical thinning. Mri Brain Wo Contrast     Result Date: 7/16/2020  EXAMINATION: MRI OF THE BRAIN WITHOUT CONTRAST  7/16/2020 3:19 pm TECHNIQUE: Multiplanar multisequence MRI of the brain was performed without the administration of intravenous contrast. COMPARISON: CT head 07/12/2020 HISTORY: ORDERING SYSTEM PROVIDED HISTORY: altered mental status TECHNOLOGIST PROVIDED HISTORY: altered mental status Reason for Exam: Pt admitted with AMS and not following any instructions, was extubated this AM, still having some AMS but following instructions.  FINDINGS: INTRACRANIAL STRUCTURES/VENTRICLES: Diffuse parenchymal volume loss is redemonstrated. There is no diffusion restriction to suggest acute infarct. Mild chronic white matter microvascular ischemic changes are present. There is bilateral parietal cortical and subcortical white matter T2/FLAIR hyperintensity, right greater than left. No mass effect or midline shift. No evidence of an acute intracranial hemorrhage. The ventricles and sulci are normal in size and configuration. The sellar/suprasellar regions appear unremarkable. The normal signal voids within the major intracranial vessels appear maintained. ORBITS: Orbital structures are unremarkable. SINUSES: Minimal frothy secretions in the sphenoid sinuses. Bilateral mastoid effusions. These findings may relate to recent intubation. BONES/SOFT TISSUES: The bone marrow signal intensity appears normal. The soft tissues demonstrate no acute abnormality.      1. Bilateral parietal cortical and subcortical signal abnormality most consistent with posterior reversible encephalopathy syndrome. 2. No acute infarct or hemorrhage. 3. Mild chronic white matter microvascular ischemic changes. Xr Chest Portable     Result Date: 7/16/2020  EXAMINATION: ONE XRAY VIEW OF THE CHEST 7/16/2020 5:26 am COMPARISON: 07/15/2020 HISTORY: ORDERING SYSTEM PROVIDED HISTORY: ETT placement TECHNOLOGIST PROVIDED HISTORY: ETT placement Reason for Exam: ETT placement Acuity: Unknown Type of Exam: Ongoing Additional signs and symptoms: ETT placement Relevant Medical/Surgical History: ETT placement FINDINGS: Right upper extremity PICC in good position. Low ET tube projecting 1.3 cm from the rianna. The tube should be withdrawn 2-3 cm for better positioning. Enteric tube passes beneath the diaphragm but the tip is not seen. Shallow inspiration. Cardiomegaly. Perihilar congestion and patchy peripheral airspace disease possibly edema or pneumonia.      Low ET tube projects 1.3 cm from the rianna. Tube should be withdrawn 2-3 cm for better positioning. Shallow inspiration magnifies central congestion. Patchy peripheral airspace disease possibly from pneumonia. The findings were sent to the Radiology Results [address] at 8:34 am on 7/16/2020to be communicated to a licensed caregiver. EEG 7/14/2020: Interpretation   This EEG was abnormal due to disorganized and slow background in   polymorphic delta and theta frequency.  Occasional runs of   generalized periodic discharges with triphasic morphology were   seen. Clinical correlation   This EEG was abnormal. Disorganized and slow background and   triphasic waves suggested mild to moderate encephalopathy of non   specific etiology. Renal US 7/13/2020  Limited study secondary to body habitus and overlying bowel gas.         Right kidney appears grossly unremarkable in appearance.         Left kidney is limited but grossly unremarkable.  There is mild cortical    thinning. CBC:   Recent Labs     07/19/20  0541 07/20/20  0524 07/21/20  0646   WBC 5.9 5.8 8.5   RBC 3.96* 3.86* 3.91*   HGB 10.5* 10.3* 10.5*   HCT 33.3* 32.4* 32.7*   MCV 84.2 83.8 83.7   RDW 16.4* 16.7* 16.6*    217 231     BMP:   Recent Labs     07/19/20  0541 07/19/20  1625 07/20/20  0524 07/21/20  0646     --  136 137   K 3.5* 4.2 3.6* 3.7     --  102 101   CO2 24  --  22 25   BUN 10  --  10 9   CREATININE 1.17*  --  1.09* 1.10*   GLUCOSE 84  --  93 80     HbA1c: No results found for: LABA1C  BNP: No results for input(s): BNP in the last 72 hours. PT/INR: No results for input(s): PROTIME, INR in the last 72 hours. APTT: No results for input(s): APTT in the last 72 hours. CARDIAC ENZYMES: No results for input(s): CKMB, CKMBINDEX, TROPONINT in the last 72 hours.     Invalid input(s): CKTOTAL;3  FASTING LIPID PANEL:  Lab Results   Component Value Date    CHOL 106 03/17/2019    HDL 45 03/17/2019    TRIG 55 03/17/2019     LIVER PROFILE: No results for input(s): AST, ALT, ALB, BILIDIR, BILITOT, ALKPHOS in the last 72 hours. Social History:  Dwelling House - 2 story. Steps to enter:  Ramp,  Bed/bathroom level:  1.   Lives with: Spouse  Devices: Rollator  Activity level:  normal activities of daily living  Social History     Socioeconomic History    Marital status:      Spouse name: Not on file    Number of children: Not on file    Years of education: Not on file    Highest education level: Not on file   Occupational History    Occupation: Retired   Social Needs    Financial resource strain: Not on file    Food insecurity     Worry: Not on file     Inability: Not on file   [employer] needs     Medical: Not on file     Non-medical: Not on file   Tobacco Use    Smoking status: Former Smoker     Packs/day: 1.00     Last attempt to quit: 2018     Years since quittin.8    Smokeless tobacco: Never Used   Substance and Sexual Activity    Alcohol use: No    Drug use: No    Sexual activity: Not on file   Lifestyle    Physical activity     Days per week: Not on file     Minutes per session: Not on file    Stress: Not on file   Relationships    Social connections     Talks on phone: Not on file     Gets together: Not on file     Attends Restorationist service: Not on file     Active member of club or organization: Not on file     Attends meetings of clubs or organizations: Not on file     Relationship status: Not on file    Intimate partner violence     Fear of current or ex partner: Not on file     Emotionally abused: Not on file     Physically abused: Not on file     Forced sexual activity: Not on file   Other Topics Concern    Not on file   Social History Narrative    Not on file       Family History:       Problem Relation Age of Onset    Heart Disease Brother     Other Mother         polycythemia    Heart Disease Father     Heart Disease Sister         enlarged heart    Cancer Son         prostate       Review of Systems:  CONSTITUTIONAL:  Denies fevers, chills, sweats or fatigue. EYES:  Denies diplopia, blind spots, blurring. HEENT:  Denies hearing loss, trouble chewing or swallowing. RESPIRATORY:  No wheezing, coughing, shortness of breath. CARDIOVASCULAR:  Denies chest pain, palpitations, lightheadedness. GASTROINTESTINAL:  Denies heartburn, nausea, constipation, diarrhea, abdominal pain. GENITOURINARY:  No urgency, frequency, incontinence, dysuria. ENDOCRINE:  Denies hot or cold intolerance. MUSCULOSKELETAL:  Denies focal joint pain, back pain, neck pain. NEUROLOGICAL:  Denies focal numbness, tingling, balance loss, headache. BEHAVIOR/PSYCH:  Denies depression, anxiety, insomnia. SKIN:  No ulcers, rash, bruises. Physical Exam:  BP (!) 187/94   Pulse 77   Temp 97.4 °F (36.3 °C) (Oral)   Resp 16   Ht 5' 4.96\" (1.65 m)   Wt 166 lb (75.3 kg)   SpO2 97%   BMI 27.66 kg/m²     GEN: Well developed, well nourished, in NAD  HEENT:  NCAT. PERRL. EOMI. Mucous membranes pink and moist.   PULM:  Clear to ausculation. No rales or rhonchi. Respirations WNL and unlabored. CV:  Regular rate rhythm. No murmurs or gallops. GI:  Abdomen soft. Nontender. Non-distended. BS + and equal.    NEUROLOGICAL: A&O x3. Immediate recall 3/3, delayed recall 3/3. Knows location, correct month, knows President but not previous president, serial 7s x 2, unable to spell world backwards. Sensation intact to light touch. DTRs 2+. MSK:  Functional ROM all extremities. Motor testing 4+/5 key muscles all extremities. SKIN: Warm dry and intact. Good turgor. EXTREMITIES:  No calf tenderness to palpation. No edema BLEs. Mckayla Messing PSYCH: Mood WNL. Appropriately interactive. Affect WNL. Principal Diagnosis/plan:  The patient is a 79y.o. year old with ADL and Mobility deficits secondary to encephalopathy secondary to acute hypertensive emergency. She will require close medical monitoring for the comorbidities listed below. She will benefit from intensive interdisciplinary therapies and rehab nursing care and is appropriate for inpatient rehabilitation. The post admission physician evaluation (JEREMY) is consistent with the pre-admission assessment. See above findings to reflect the elements required in the JEREMY. Patient's admitting condition is consistent with the findings of the preadmission assessment by the rehabilitation admissions coordinator. Diagnoses/plan:    1. PRES Encephalopathy:  PT/OT/SLP for gait, mobility, strengthening, endurance, ADLs, cognition, and self care. Tylenol prn headache  2. Acute Respiratory Failure: Resolved  3. HTN/hyperlipidemia/CAD: on amlodipine, clonidine patch, HCTZ, metoprolol tartrate, spironolactone, ezetimibe, plavix. IM note from acute recommends workup for possible renal artery stenosis - renal US done 7/13. 4. CKD III: Creatinine stable. 5. Aspiration Pneumonia: Completed Unasyn. Completing 10 days of antibiotics with Augmentin for next 2 days - as discussed today with Dr. Dennis Vail and pharmacist.  6. Gout history: on allopurinol  7. Neuropathy: on gabapentin  8. DVT Prophylaxis:  low molecular weight heparin, SCD's while in bed and JARED's  9. Dr. Ramiro Yeh for medical management      Estimated Length of Stay:  1-2 weeks. Prognosis  fair    Goals    Home at Supervision  Supervision at Discharge: Berto Araiza MD     This note is created with the assistance of a speech recognition program.  While intending to generate a document that actually reflects the content of the visit, the document can still have some errors including those of syntax and sound a like substitutions which may escape proof reading. In such instances, actual meaning can be extrapolated by contextual diversion.

## 2020-07-21 NOTE — PROGRESS NOTES
Speech Language Pathology  Speech Language Pathology  [facility]    Cognitive Treatment Note    Date: 7/21/2020  Patients Name: Tony Long  MRN: [id number]  Diagnosis:   Patient Active Problem List   Diagnosis Code    Breast cancer [facility]) C50.919    Renal cyst N28.1    Lumbar degenerative disc disease M51.36    Arthropathy of lumbar facet joint M47.816    Failed back syndrome of lumbar spine M96.1    Lumbar spondylosis M47.816    Sacroiliitis ([address] M46.1    Status post lumbar spinal fusion Z98.1    TIA (transient ischemic attack) G45.9    Anemia D64.9    Dyspnea R06.00    COPD with acute exacerbation ([address] J44.1    Acute kidney injury superimposed on CKD ([address] N17.9, N18.9    Aspiration pneumonia ([address] J69.0    CKD (chronic kidney disease) stage 4, GFR 15-29 ml/min ([address] N18.4    Left renal atrophy N26.1    Severe malnutrition ([address] E43    Acute cystitis without hematuria N30.00    Disorientation R41.0    Hypertensive emergency I16.1    Hypertension I10    Essential hypertension I10    TIMO (acute kidney injury) ([address] N17.9    Acute respiratory failure ([address] J96.00    Acute encephalopathy G93.40    Chronic kidney disease (CKD), stage III (moderate) (HCC) N18.3    Seizure ([address] R56.9    Altered mental status R41.82    Encephalopathy G93.40       Pain: 5/10  (hands)    Cognitive Treatment    Treatment time:  5743-4623    Subjective: [x] Alert [x] Cooperative     [] Confused     [] Agitated    [] Lethargic    Objective/Assessment:  Attention: sustained throughout. Orientation: 67% (not oriented to year or month)    Recall: Pt. Sometimes repeating self. Pt. Very conversational and recalled specific information ST gave re: herself earlier in session. Problem Solving/Reasoning: correct sentence inconsistencies- 60%, 80% c cues. Convergent categorization- 90%, 100% c cues. Name 5 items in category- 80%, 100% c cues. Other: Pt.  Often demonstrating anomia in conversation when attempting to tell ST about her family. Long response latency for word finding, pt. Eventually able to recall words she was trying to think of. Plan:  [x] Continue ST services    [] Discharge from ST:      Discharge recommendations: [] Inpatient Rehab   [] [place]   [] Outpatient Therapy  [] Follow up at trauma clinic   [] Other:       Treatment completed by: Ana María Golden. WENDY/SLP

## 2020-07-21 NOTE — CARE COORDINATION
Pt completed the burns depression scale and scored a 4  which places the pt in the mild depression category. There were no reports of suicidal/homicidal ideation or plans at the time of the assessment. there is no  history suicidal/homicidal attempts or thought. There is no history of mental health treatment. There is no history of drug or alcohol abuse.  There is no history of drug or alcohol treatment in the past.

## 2020-07-22 LAB
GLUCOSE BLD-MCNC: 117 MG/DL (ref 65–105)
GLUCOSE BLD-MCNC: 117 MG/DL (ref 65–105)
GLUCOSE BLD-MCNC: 85 MG/DL (ref 65–105)
GLUCOSE BLD-MCNC: 94 MG/DL (ref 65–105)

## 2020-07-22 PROCEDURE — 97116 GAIT TRAINING THERAPY: CPT

## 2020-07-22 PROCEDURE — 6370000000 HC RX 637 (ALT 250 FOR IP): Performed by: STUDENT IN AN ORGANIZED HEALTH CARE EDUCATION/TRAINING PROGRAM

## 2020-07-22 PROCEDURE — 6370000000 HC RX 637 (ALT 250 FOR IP): Performed by: PHYSICAL MEDICINE & REHABILITATION

## 2020-07-22 PROCEDURE — 97542 WHEELCHAIR MNGMENT TRAINING: CPT

## 2020-07-22 PROCEDURE — 97530 THERAPEUTIC ACTIVITIES: CPT

## 2020-07-22 PROCEDURE — 1180000000 HC REHAB R&B

## 2020-07-22 PROCEDURE — 99232 SBSQ HOSP IP/OBS MODERATE 35: CPT | Performed by: PHYSICAL MEDICINE & REHABILITATION

## 2020-07-22 PROCEDURE — 6360000002 HC RX W HCPCS: Performed by: STUDENT IN AN ORGANIZED HEALTH CARE EDUCATION/TRAINING PROGRAM

## 2020-07-22 PROCEDURE — 82947 ASSAY GLUCOSE BLOOD QUANT: CPT

## 2020-07-22 PROCEDURE — 97535 SELF CARE MNGMENT TRAINING: CPT

## 2020-07-22 PROCEDURE — 92610 EVALUATE SWALLOWING FUNCTION: CPT

## 2020-07-22 PROCEDURE — 97130 THER IVNTJ EA ADDL 15 MIN: CPT

## 2020-07-22 PROCEDURE — 97110 THERAPEUTIC EXERCISES: CPT

## 2020-07-22 PROCEDURE — 97129 THER IVNTJ 1ST 15 MIN: CPT

## 2020-07-22 RX ADMIN — ENOXAPARIN SODIUM 40 MG: 40 INJECTION SUBCUTANEOUS at 10:04

## 2020-07-22 RX ADMIN — Medication 1 CAPSULE: at 10:04

## 2020-07-22 RX ADMIN — ALLOPURINOL 100 MG: 100 TABLET ORAL at 10:04

## 2020-07-22 RX ADMIN — Medication 1 TABLET: at 22:00

## 2020-07-22 RX ADMIN — AMLODIPINE BESYLATE 10 MG: 10 TABLET ORAL at 10:04

## 2020-07-22 RX ADMIN — CLOPIDOGREL BISULFATE 75 MG: 75 TABLET ORAL at 10:04

## 2020-07-22 RX ADMIN — AMOXICILLIN AND CLAVULANATE POTASSIUM 1 TABLET: 875; 125 TABLET, FILM COATED ORAL at 10:05

## 2020-07-22 RX ADMIN — Medication 1 CAPSULE: at 18:10

## 2020-07-22 RX ADMIN — AMOXICILLIN AND CLAVULANATE POTASSIUM 1 TABLET: 875; 125 TABLET, FILM COATED ORAL at 22:00

## 2020-07-22 RX ADMIN — Medication 1 TABLET: at 10:05

## 2020-07-22 RX ADMIN — BENZONATATE 200 MG: 100 CAPSULE ORAL at 10:05

## 2020-07-22 RX ADMIN — METOPROLOL TARTRATE 50 MG: 50 TABLET, FILM COATED ORAL at 21:58

## 2020-07-22 RX ADMIN — EZETIMIBE 10 MG: 10 TABLET ORAL at 21:58

## 2020-07-22 RX ADMIN — HYDROCHLOROTHIAZIDE 25 MG: 25 TABLET ORAL at 10:04

## 2020-07-22 RX ADMIN — SPIRONOLACTONE 25 MG: 25 TABLET, FILM COATED ORAL at 10:04

## 2020-07-22 RX ADMIN — ALLOPURINOL 100 MG: 100 TABLET ORAL at 21:59

## 2020-07-22 RX ADMIN — GABAPENTIN 300 MG: 300 CAPSULE ORAL at 21:59

## 2020-07-22 RX ADMIN — METOPROLOL TARTRATE 50 MG: 50 TABLET, FILM COATED ORAL at 10:04

## 2020-07-22 ASSESSMENT — PAIN DESCRIPTION - FREQUENCY: FREQUENCY: CONTINUOUS

## 2020-07-22 ASSESSMENT — PAIN DESCRIPTION - PROGRESSION
CLINICAL_PROGRESSION: NOT CHANGED
CLINICAL_PROGRESSION: NOT CHANGED

## 2020-07-22 ASSESSMENT — PAIN DESCRIPTION - ORIENTATION: ORIENTATION: LEFT;RIGHT

## 2020-07-22 ASSESSMENT — PAIN DESCRIPTION - DESCRIPTORS: DESCRIPTORS: ACHING;SORE

## 2020-07-22 ASSESSMENT — PAIN SCALES - WONG BAKER: WONGBAKER_NUMERICALRESPONSE: 6

## 2020-07-22 ASSESSMENT — PAIN DESCRIPTION - ONSET: ONSET: ON-GOING

## 2020-07-22 ASSESSMENT — PAIN DESCRIPTION - LOCATION: LOCATION: PELVIS

## 2020-07-22 ASSESSMENT — PAIN DESCRIPTION - PAIN TYPE: TYPE: ACUTE PAIN

## 2020-07-22 NOTE — PROGRESS NOTES
Physical Medicine & Rehabilitation  Progress Note      Subjective:      79year-old female with encephalopathy (PRES). Patient is well, and has had no acute complaints or problems. ROS:  Denies fevers, chills, sweats. No chest pain, palpitations, lightheadedness. Denies coughing, wheezing or shortness of breath. Denies abdominal pain, nausea, diarrhea or constipation. No new areas of joint pain. Denies new areas of numbness or weakness. Denies new anxiety or depression issues. No new skin problems. Rehabilitation:   Progressing in therapies. PT:  Restrictions/Precautions: Fall Risk, General Precautions  Implants present? : Metal implants(R JAILENE, L TKA, neck and back surgery)  Other position/activity restrictions: up with assistance   Transfers  Sit to Stand: Moderate Assistance(Cues for hand placement)  Stand to sit: Minimal Assistance(Cues for hand placement, good return)  Bed to Chair: Moderate assistance(no device, directional cues)  Stand Pivot Transfers: Moderate Assistance(no device, directional cues)  Comment: Rolling walker, except where noted  Ambulation 1  Surface: level tile  Device: Rolling Walker  Other Apparatus: Wheelchair follow  Assistance: Minimal assistance  Quality of Gait: slow renu, forward head/trunk, flexed hips, narrow LUIS, continuous cueing. faith SOLARES   Gait Deviations: Slow Renu, Decreased step height, Decreased step length, Deviated path  Distance: 30'  Comments: Pt requiring continuous cueing to improve posture. Pt reports she walked better at [place], has deteriorated since. Transfers  Sit to Stand: Moderate Assistance(Cues for hand placement)  Stand to sit: Minimal Assistance(Cues for hand placement, good return)  Bed to Chair: Moderate assistance(no device, directional cues)  Stand Pivot Transfers:  Moderate Assistance(no device, directional cues)  Comment: Rolling walker, except where noted  Ambulation  Ambulation?: Yes  Ambulation 1  Surface: level tile  Device: Rolling Walker  Other Apparatus: Wheelchair follow  Assistance: Minimal assistance  Quality of Gait: slow celeste, forward head/trunk, flexed hips, narrow LUIS, continuous cueing. veers R.   Gait Deviations: Slow Celeste, Decreased step height, Decreased step length, Deviated path  Distance: 30'  Comments: Pt requiring continuous cueing to improve posture. Pt reports she walked better at [place], has deteriorated since. Surface: level tile  Ambulation 1  Surface: level tile  Device: Rolling Walker  Other Apparatus: Wheelchair follow  Assistance: Minimal assistance  Quality of Gait: slow celeste, forward head/trunk, flexed hips, narrow LUIS, continuous cueing. veers R.   Gait Deviations: Slow Celeste, Decreased step height, Decreased step length, Deviated path  Distance: 30'  Comments: Pt requiring continuous cueing to improve posture. Pt reports she walked better at [place], has deteriorated since. OT:  ADL  Feeding: Supervision, Setup, Increased time to complete, Dentures  Grooming: Moderate assistance, Setup, Increased time to complete(A grooming hair d/t tangles; SBA oral care seated at sink)  UE Bathing: Stand by assistance, Setup, Verbal cueing, Increased time to complete(seated on TTB in shower)  LE Bathing: Moderate assistance, Setup, Verbal cueing, Increased time to complete(A buttocks & perineal area; Min A standing)  UE Dressing: Minimal assistance, Setup, Verbal cueing, Increased time to complete(A OH to don OH shirt)  LE Dressing: Maximum assistance, Setup, Verbal cueing, Increased time to complete(SBA thread BLE into brief; A all other tasks)  Toileting: Dependent/Total  Additional Comments: OT facilitated Pt engagement in showering tasks including bathing, dressing, toileting, and grooming tasks. Mod VCs for sequencing, attention to task, and object use.          Balance  Sitting Balance: Contact guard assistance  Standing Balance: Minimal assistance   Standing Balance  Time: 1-2 minutes x 4  Activity: self-care tasks        Bed mobility  Bridging: Stand by assistance(bridge x5)  Rolling to Left: Minimal assistance  Rolling to Right: Minimal assistance  Supine to Sit: Moderate assistance(at trunk)  Sit to Supine: Stand by assistance(cues for positioning)  Scooting: Moderate assistance(hips to EOB)  Comment: HOB flat. Cues for positioning in bed including bridging. Slowed movements. Waffle mattress on and inflated. Transfers  Stand Pivot Transfers: Maximum assistance(Max A EOB>w/c; Mod A w/c<>toilet with GB)  Sit to stand: Maximum assistance  Stand to sit: Moderate assistance   Toilet Transfers  Toilet - Technique: Stand pivot, To right, To left  Equipment Used: Grab bars  Toilet Transfer: Moderate assistance  Toilet Transfers Comments: Mod VCs for hand placement and safety     Shower Transfers  Shower - Transfer From: Wheelchair  Shower - Transfer Type: To and From  Shower - Transfer To: Transfer tub bench  Shower - Technique: Stand pivot, To left, To right  Shower Transfers: Moderate assistance  Shower Transfers Comments: Mod VCs for hand placement       SPEECH:  COGNITIVE -   Subjective: [x]? Alert     [x]? Cooperative     []? Confused     []? Agitated    []? Lethargic     Objective/Assessment:  Attention: sustained throughout.     Orientation: 84% (not oriented to room)     Recall:  n/a       Problem Solving/Reasoning: correct sentence inconsistencies- 60%, 90% c cues. Word deductions- 80%, 100% c cues. Name 7 items in category- 86%, 100% c cues.        Other:   Pt. Sometimes needed repetition of questions. Bedside swallow assessment also completed, refer to separate report. SWALLOW-  Impression  Dysphagia Diagnosis: Swallow function appears grossly intact  Dysphagia Impression : Pt. demonstrated no overt s/s aspiration with consistencies administered. Adequate mastication and oral clearing of dry solids.   Dysphagia Outcome Severity Scale: Level 7: Normal in all situations Recommended Diet and Intervention  Diet Solids Recommendation: Regular  Liquid Consistency Recommendation: Thin    Objective:  BP (!) 162/60   Pulse 77   Temp 97.2 °F (36.2 °C) (Oral)   Resp 19   Ht 5' 5\" (1.651 m)   Wt 166 lb (75.3 kg)   SpO2 97%   BMI 27.62 kg/m²       GEN: Well developed, well nourished, in NAD  HEENT:  NCAT. PERRL. EOMI. Mucous membranes pink and moist.   PULM:  Clear to ausculation. No rales or rhonchi. Respirations WNL and unlabored. CV:  Regular rate rhythm. No murmurs or gallops. GI:  Abdomen soft. Nontender. Non-distended. BS + and equal.    NEUROLOGICAL: A&O x3. Sensation intact to light touch. MSK:  Functional ROM all extremities. Motor testing 4+/5 key muscles all extremities. SKIN: Warm dry and intact. Good turgor. EXTREMITIES:  No calf tenderness to palpation. No edema BLEs. N[place] Philip PSYCH: Mood WNL. Appropriately interactive. Affect WNL. Diagnostics:     CBC:   Recent Labs     07/20/20  0524 07/21/20  0646   WBC 5.8 8.5   RBC 3.86* 3.91*   HGB 10.3* 10.5*   HCT 32.4* 32.7*   MCV 83.8 83.7   RDW 16.7* 16.6*    231     BMP:   Recent Labs     07/19/20  1625 07/20/20  0524 07/21/20  0646   NA  --  136 137   K 4.2 3.6* 3.7   CL  --  102 101   CO2  --  22 25   BUN  --  10 9   CREATININE  --  1.09* 1.10*   GLUCOSE  --  93 80     BNP: No results for input(s): BNP in the last 72 hours. PT/INR: No results for input(s): PROTIME, INR in the last 72 hours. APTT: No results for input(s): APTT in the last 72 hours. CARDIAC ENZYMES: No results for input(s): CKMB, CKMBINDEX, TROPONINT in the last 72 hours. Invalid input(s): CKTOTAL;3  FASTING LIPID PANEL:  Lab Results   Component Value Date    CHOL 106 03/17/2019    HDL 45 03/17/2019    TRIG 55 03/17/2019     LIVER PROFILE: No results for input(s): AST, ALT, ALB, BILIDIR, BILITOT, ALKPHOS in the last 72 hours.      Current Medications:   Current Facility-Administered Medications: amoxicillin-clavulanate (AUGMENTIN) 875-125 MG per tablet 1 tablet, 1 tablet, Oral, 2 times per day  enoxaparin (LOVENOX) injection 40 mg, 40 mg, Subcutaneous, Daily  allopurinol (ZYLOPRIM) tablet 100 mg, 100 mg, Oral, BID  amLODIPine (NORVASC) tablet 10 mg, 10 mg, Oral, Daily  benzonatate (TESSALON) capsule 200 mg, 200 mg, Oral, Daily  calcium carbonate-vitamin D (CALTRATE) 600-400 MG-UNIT per tab 1 tablet, 1 tablet, Oral, BID  gabapentin (NEURONTIN) capsule 300 mg, 300 mg, Oral, Nightly  lactobacillus (CULTURELLE) capsule 1 capsule, 1 capsule, Oral, TID WC  acetaminophen (TYLENOL) tablet 650 mg, 650 mg, Oral, Q4H PRN  cloNIDine (CATAPRES) 0.1 MG/24HR 1 patch, 1 patch, Transdermal, Weekly  clopidogrel (PLAVIX) tablet 75 mg, 75 mg, Oral, Daily  ezetimibe (ZETIA) tablet 10 mg, 10 mg, Oral, Nightly  metoprolol tartrate (LOPRESSOR) tablet 50 mg, 50 mg, Oral, BID  spironolactone (ALDACTONE) tablet 25 mg, 25 mg, Oral, Daily **AND** hydroCHLOROthiazide (HYDRODIURIL) tablet 25 mg, 25 mg, Oral, Daily  senna (SENOKOT) tablet 8.6 mg, 1 tablet, Oral, Daily PRN  bisacodyl (DULCOLAX) suppository 10 mg, 10 mg, Rectal, Daily PRN  polyethylene glycol (GLYCOLAX) packet 17 g, 17 g, Oral, Daily PRN      Impression/Plan:   Impaired ADLs, gait, and mobility due to:      1. PRES Encephalopathy:  PT/OT/SLP for gait, mobility, strengthening, endurance, ADLs, cognition, and self care. Tylenol prn headache. Cognition improving. 2. Acute Respiratory Failure: Resolved  3. HTN/hyperlipidemia/CAD: on amlodipine, clonidine patch, HCTZ, metoprolol tartrate, spironolactone, ezetimibe, plavix. IM note from acute recommends workup for possible renal artery stenosis - renal US done 7/13. HTN is persistent. 4. CKD III: Creatinine stable. 5. Aspiration Pneumonia: Completed Unasyn. Completing 10 days of antibiotics with Augmentin through 7/23. 6. Gout history: on allopurinol  7. Neuropathy: on gabapentin  8.  DVT Prophylaxis:  low molecular weight heparin, SCD's while in bed and

## 2020-07-22 NOTE — PROGRESS NOTES
Speech Language Pathology  Speech Language Pathology  [facility]    Cognitive Treatment Note    Date: 7/22/2020  Patients Name: Kamila Aceves  MRN: [id number]  Diagnosis:   Patient Active Problem List   Diagnosis Code    Breast cancer [facility]) C50.919    Renal cyst N28.1    Lumbar degenerative disc disease M51.36    Arthropathy of lumbar facet joint M47.816    Failed back syndrome of lumbar spine M96.1    Lumbar spondylosis M47.816    Sacroiliitis ([address] M46.1    Status post lumbar spinal fusion Z98.1    TIA (transient ischemic attack) G45.9    Anemia D64.9    Dyspnea R06.00    COPD with acute exacerbation ([address] J44.1    Acute kidney injury superimposed on CKD ([address] N17.9, N18.9    Aspiration pneumonia ([address] J69.0    CKD (chronic kidney disease) stage 4, GFR 15-29 ml/min ([address] N18.4    Left renal atrophy N26.1    Severe malnutrition ([address] E43    Acute cystitis without hematuria N30.00    Disorientation R41.0    Hypertensive emergency I16.1    Hypertension I10    Essential hypertension I10    TIMO (acute kidney injury) ([address] N17.9    Acute respiratory failure ([address] J96.00    Acute encephalopathy G93.40    Chronic kidney disease (CKD), stage III (moderate) ([address] N18.3    Seizure ([address] R56.9    Altered mental status R41.82    Encephalopathy G93.40       Pain: pt. denies    Cognitive Treatment    Treatment time:  [id number]    Subjective: [x] Alert [x] Cooperative     [] Confused     [] Agitated    [] Lethargic    Objective/Assessment:  Attention: sustained throughout. Orientation: n/a    Recall:  Picture detail recall- 55%. 4 word attribute inclusion- 20%, 100% c cues. Problem Solving/Reasoning: Problem solving pictures (identify problem and determine solution)- 85%. Name 8 items in category- 56%, 100% c cues. .       Other:   Pt. Sometimes needed repetition of questions. Pt. , Gene, present.      Plan:  [x] Continue ST services    [] Discharge from ST:      Discharge recommendations: [] Inpatient Rehab   [] [place]   [] Outpatient Therapy  [] Follow up at trauma clinic   [] Other:       Treatment completed by: Porfirio Arriola A.CCC/SLP

## 2020-07-22 NOTE — PLAN OF CARE
Problem: Falls - Risk of:  Goal: Will remain free from falls  Description: Will remain free from falls  Outcome: Ongoing  No falls or injury this shift. Bed is maintained at the lowest level, brakes engaged, call light and assistive devices in reach. Room is clutter free, adequate lighting for safe transfers and ambulation. Assistance provided for transfers and toileting. Problem: Skin Integrity:  Goal: Will show no infection signs and symptoms  Description: Will show no infection signs and symptoms  Outcome: Ongoing  Skin assessment completed this shift. Nutrition and Hydration status assessed with adequate intake. Jayson Score as charted. Pressure Relief Overlay remains intact and inflated to patient's bed throughout the shift. Bilateral heels remain elevated on pillows throughout the shift. Patient tolerates repositioning by staff at least every 2 hours. Patient able to reposition self for comfort and to prevent breakdown. Patient verbalizes understanding of pressure ulcer prevention measures. Skin integrity maintained. No new skin breakdown noted. Skin to high risk pressure areas including coccyx and heels are clear. Mercedes / Incontinence care provided as needed throughout the shift. Aloe Vesta Moisture Barrier ointment applied to buttocks as a preventative measure. Problem: Pain:  Goal: Pain level will decrease  Description: Pain level will decrease  Outcome: Ongoing  Interventions:  -Control environmental factors  -Medicate for pain prior to treatment/therapy  -Assess pain using appropriate pain scale tool  -Administer analgesic as ordered  -Handle areas of discomfort gently  -Assess and document results of pain interventions  -Initiate appropriate non-invasive pain relief measures  -Turn and reposition patient as appropriate for comfort    Evaluations:  Pain assessment completed. Pt. able to rest.  Pt. denies pain this shift. Pt. denies need for oral analgesic.   Verbalizes understanding of nonpharmacologic strategies that provide comfort. Pt. repositioned for comfort. Nonverbal cues indicate absence of pain.       Problem: Neurological  Goal: Maximum potential motor/sensory/cognitive function  Outcome: Ongoing     Problem: Nutrition  Goal: Optimal nutrition therapy  Outcome: Ongoing

## 2020-07-22 NOTE — PLAN OF CARE
Individualized Plan of [facility]    Rehabilitation physician: Dr Macrina Agudelo Date: 7/20/2020     Rehabilitation Diagnosis: Encephalopathy [G93.40]     Rehabilitation impairments: self care, mobility and cognitive function    Factors facilitating achievement of predicted outcomes: Family support, Motivated, Cooperative and Pleasant  Barriers to the achievement of predicted outcomes: Cognitive deficit, Decreased endurance and Medication managment    Patient Goals: Improve independence with mobility, Increase overall strength and endurance, Increase independence with activities of daily living, Improve cognition, Increase self-awareness, Increase safety awareness, Assure adequate nutritional option for discharge, Continence of bowel and bladder and Provide appropriate patient and family education      NURSING:  Nursing goals for Yandy Ball while on the rehabilitation unit will include:  Continence of bowel and bladder, Adequate number of bowel movements, Urinate with no urinary retention >300ml in bladder, Maintain O2 SATs at an acceptable level during stay, Absence of skin breakdown while on the rehabilitation unit, Avoidance of any hospital acquired infections, Freedom from injury during hospitalization and Complete education with patient/family with understanding demonstrated regarding disease process and resultant impairment     In order to achieve these goals, nursing interventions may include bowel/bladder training, education for medical assistive devices, medication education, O2 saturation management, energy conservation, stress management techniques, fall prevention, alarms protocol, seating and positioning, skin/wound care, pressure relief instruction, dressing changes, infection protection, DVT prophylaxis, assistance with safe transfers , and/or assistance with bathroom activities and hygiene.        PHYSICAL THERAPY:  Goals:        Short term goals  Time Frame for Short term goals: 5-7 days  Short term goal 1: Pt to demonstrate CGA/SBA for bed mobility. Short term goal 2: Pt to demonstrate min x1 transfers. Short term goal 3: Pt to amb 50' min x1 with improved gait mechanics and posture. Short term goal 4: pt to tolerate standing 1-2 minutes with 1 assist and device. Short term goal 5: Pt to propel w/c 50'-75' SBA. Short term goal 6: Pt to improve seated posture to GOOD. Short term goal 7: Pt to improve sitting balance to GOOD. Long term goals  Time Frame for Long term goals : by D/C  Long term goal 1: Pt to demonstrate bed mobility wtih supervision. Long term goal 2: Pt to demonstrate SBA for transfers from varied surfaces. Long term goal 3: Pt to amb 76' SBA/CGA with device with improve gait mechanics and posture, on varied terrain including ramp. Long term goal 4: Pt to propel w/c 100' including turns and varied terrain Mod I.  Long term goal 5: Pt to improve BLE strength by 1/2 MMG. Long term goal 6: Pt to tolerate standing 4-5 minutes with device, 1 assist if needed. Long term goal 7: Pt to perform 5xSTS in 60 seconds or less for improved strength/endurance. Long term goal 8: Pt to improve standing balance to FAIR+ to reduce fall risk. Long term goal 9: Pt to demo 1 platform step with RW min x1. Plan of Care: Pt to be seen by physical therapy services 1 Hour 30 Minutes per day at least 5 out of 7 days per week     Anticipated interventions may include therapeutic exercises, gait training, neuromuscular re-ed, transfer training, community reintegration, bed mobility, w/c mobility and training. OCCUPATIONAL THERAPY:  Goals:             Short term goals  Time Frame for Short term goals: One week  Short term goal 1: Pt will perform toileting tasks and LBD with Mod A. Short term goal 2: Pt and Pt's caregiver will V/D Good understanding of AE/DME/modified techniques for increased IND with self-care and mobility.   Short term goal 3: Pt will stand for 4+ minutes with 0-1 UE support, CGA, and no LOB while engaging in functional activity of choice. Short term goal 4: Pt will perform functional transfers and mobility with Min A, least restrictive mobility device, and Fair safety. Short term goal 5: Pt will actively participate in 30+ minutes of therapeutic exercise/functional activities to promote increased IND with self-care and mobility. Long term goals  Time Frame for Long term goals : By discharge  Long term goal 1: Pt will perform BADLs with SUP and Fair safety. Long term goal 2: Pt will perform functional mobility and transfers during self-care with SUP, least restrictive mobility device, and Fair safety. Long term goal 3: Pt will perform simple meal prep/light housekeeping with SUP and Fair safety. Long term goal 4: Pt will stand for 8+ minutes with 0-1 UE support, SUP, and no LOB while engaging in functional activity of choice. Long term goal 5: Pt and Pt's caregiver will V/D Good understanding of Fall Prevention Strategies for increased IND with self-care and mobility. Long term goals 6: Pt will perform self-care with improved bilateral FMC/GMC as supported by a 7 second improvement in 9 hole peg score [facility]) and 5 block improvement on box and block assessment ([address] [place] [address]). Plan of Care: Patient to be seen by occupational therapy services 1 Hour 30 Minutes per day at least 5 out of 7 days per week     Anticipated interventions may include ADL and IADL retraining, strengthening, safety education and training, patient/caregiver education and training, equipment evaluation/ training/procurement, neuromuscular reeducation, wheelchair mobility training. SPEECH THERAPY:   Goals:             Short-term Goals  Goal 1: Complete thought organization tasks (e.g., convergent and divergent naming) with 80%  Goal 2: Increase functional problem solving/verbal reasoning to 80%  Goal 3: Answer yes/no questions and open-ended questions with 80%  Goal 4:  Increase Pt. education re: compensatory strategies to assist in recall  Goal 5: Pt. will recall 3-4 units of information with 80%        Plan of Care: Pt to be seen by speech therapy services 1 Hour per day at least 5 out of 7 days per week    Anticipated interventions may include speech/language/communication therapy, cognitive training, group therapy, education, and/or dysphagia therapy based on the above goals. CASE MANAGEMENT:  Goals:   Assist patient/family with discharge planning, patient/family counseling,  and coordination with insurance during the inpatient rehabilitation stay. Other members of the multidisciplinary rehabilitation team that will be involved in the patient's plan of care include recreational therapy, dietary, respiratory therapy, and neuropsychology. Medical issues being managed closely and that require 24 hour availability of a physician:  Bowel/Bladder function, Infection protection, DVT prophylaxis, Fall precautions, Fluid/Electrolyte balance, Nutritional status and History of heart disease                                           Physician anticipated functional outcomes: Improved independence with functional measures   Estimated length of stay for this admission 1-2 weeks  Medical Prognosis: Good  Anticipated disposition: Home. The potential to achieve the above medical and rehabilitative goals is good. This plan of care has been developed with the assistance and input of the multidisciplinary rehabilitation team.  The plan was reviewed with the patient on 7/22/2020. The patient has had the opportunity to provide input to the therapy team.    I have reviewed this Individualized Plan of Care and agree with its contents. Above documentation has been expanded, modified, adjusted to reflect the findings of my evaluations and goals for the patient.     Physician:  Electronically signed by Randy Langley MD on 7/22/20 at 12:04 PM EDT

## 2020-07-22 NOTE — PROGRESS NOTES
Speech Language Pathology  Facility/Department: [address] [place] [address]   CLINICAL BEDSIDE SWALLOW EVALUATION    NAME: Tony Long  : 1940  MRN: [id number]    ADMISSION DATE: 2020  ADMITTING DIAGNOSIS: has Breast cancer ([address] Renal cyst; Lumbar degenerative disc disease; Arthropathy of lumbar facet joint; Failed back syndrome of lumbar spine; Lumbar spondylosis; Sacroiliitis ([address] Status post lumbar spinal fusion; TIA (transient ischemic attack); Anemia; Dyspnea; COPD with acute exacerbation ([address] Acute kidney injury superimposed on CKD ([address] Aspiration pneumonia (HCC); CKD (chronic kidney disease) stage 4, GFR 15-29 ml/min ([address] Left renal atrophy; Severe malnutrition ([address] Acute cystitis without hematuria; Disorientation; Hypertensive emergency; Hypertension; Essential hypertension; TIMO (acute kidney injury) ([address] Acute respiratory failure ([address] Acute encephalopathy; Chronic kidney disease (CKD), stage III (moderate) ([address] Seizure ([address] Altered mental status; and Encephalopathy on their problem list.    Recent Chest Xray/CT of Chest:   n/a    Date of Eval: 2020  Evaluating Therapist: Manuel Christianson    Current Diet level:  Current Liquid Diet : Full      Primary Complaint   Pt. Has been on full liquid diet since she was extubated last week. Pain:  Pain Assessment  Pt. denies    Reason for Referral  Tony Long was referred for a bedside swallow evaluation to assess the efficiency of her swallow function, identify signs and symptoms of aspiration and make recommendations regarding safe dietary consistencies, effective compensatory strategies, and safe eating environment. Impression  Dysphagia Diagnosis: Swallow function appears grossly intact  Dysphagia Impression : Pt. demonstrated no overt s/s aspiration with consistencies administered. Adequate mastication and oral clearing of dry solids.   Dysphagia Outcome Severity Scale: Level 7: Normal in all situations     Treatment Plan  Requires SLP Intervention: No             Recommended Diet and Intervention  Diet Solids Recommendation: Regular  Liquid Consistency Recommendation: Thin             Compensatory Swallowing Strategies  Compensatory Swallowing Strategies: Small bites/sips;Upright as possible for all oral intake;Eat/Feed slowly      General  Behavior/Cognition: Alert; Cooperative  Respiratory Status: Room air  O2 Device: None (Room air)  Communication Observation: Functional  Follows Directions: Simple  Dentition: Dentures bottom; Dentures top  Patient Positioning: Upright in chair  Baseline Vocal Quality: Normal  Consistencies Administered: Reg solid; Thin - straw           Vision/Hearing  Vision  Vision: Within Functional Limits  Hearing  Hearing: Within functional limits    Oral Motor Deficits   no deficits noted. Oral Phase Dysfunction  Oral Phase  Oral Phase: WNL     Indicators of Pharyngeal Phase Dysfunction   Pharyngeal Phase  Pharyngeal Phase: WNL    Education  Patient Education Response: Verbalizes understanding             Therapy Time  SLP Individual Minutes  Time In: 0420  Time Out: 6671  Minutes: 8          Shauna Arreola A.CCC/SLP    7/22/2020 10:12 AM

## 2020-07-22 NOTE — PROGRESS NOTES
Comments: Pleasant and cooperative. Vital Signs  Pulse: 77  Patient Currently in Pain: Yes  Pain Assessment: Faces  Morocho-Baker Pain Rating: Hurts even more(Initially states 9, but points to 6 on facial scale)  Pain Type: Acute pain(states started around 6 months ago. )  Pain Location: Pelvis  Pain Orientation: Left;Right  Pain Descriptors: Aching; Sore  Pain Frequency: Continuous  Pain Onset: On-going  Clinical Progression: Not changed  Non-Pharmaceutical Pain Intervention(s): Ambulation/Increased Activity; Rest;Repositioned  Response to Pain Intervention: None;Patient Satisfied    Oxygen Therapy  SpO2: 97 %  O2 Device: None (Room air)    Bed Mobility  Bridging: Stand by assistance  Rolling: Stand by assistance;Rolling Right;Rolling Left  Supine to Sit: Moderate assistance(Trunk assist, with cueing for UE self-assist/little return)  Sit to Supine: Contact guard assistance(Guarding LEs against gravity)  Scooting: Stand by assistance(hips to edge of mat)  Comment: Mat, wedge, 2 pillow    Transfers:  Sit to Stand: Moderate Assistance(Cues for hand placement)  Stand to sit: Minimal Assistance(Cues for hand placement, good return)  Bed to Chair: Moderate assistance(no device, directional cues)  Stand pivot transfers: Moderate assistance(no device, directional cues)  Comment: Rolling walker, except where noted    Ambulation 1  Surface: level tile  Device: Rolling Walker  Other Apparatus: Wheelchair follow  Assistance: Minimal assistance  Quality of Gait: Forward head/trunk, flexed hips, narrow LUIS with some scissoring observed. Paula SOLARES   Gait Deviations: Slow Renu;Decreased step height;Decreased step length;Deviated path  Distance: 30' AM, 40' PM  Comments: Pt requiring continuous cueing to improve posture, increase reliance on UEs, move withing walker's base of support. Pt reports she walked better at [place], has deteriorated since, feels weakness \"in my butt, it just wants to give out. \" Stairs/Curb  Stairs?: No    Wheelchair Propulsion 1  Propulsion: Manual  Level: Level Tile  Method: LUE;RUE  Level of Assistance: Minimal assistance  Description/ Details: Education on use of brakes with good demonstration; requires visual cue for location on cue to lock brakes; pt independently swings legrests into position. Straight path, 180* turn with education on opposing UE force for tighter turn. Pt states she doesn't \"want to get too good at this; I don't want to be in one for long. \"  Distance: 60'    BALANCE Posture: Poor(Standing)  Sitting - Static: Good(edge of mat, no UE or back support)  Sitting - Dynamic: Good;-(edge of mat, no UE or back support)  Standing - Static: Fair;-(with UE support)  Standing - Dynamic: Fair;-(with UE support)    EXERCISES    Other exercises 1: Sit to stand, 8x(parallel bars/rolling walker, minimum/contact guard assist)  Other exercises 2: Seated Bilat. LE exercises, 10-15x each, 1#/lime (minimal) resistance band(Denies soreness from 1# use in previous day's Tx.)  Other exercises 3: Step-taps, 4\" step, in parallel bars, contact guard  Other exercises 4: Supine Bilat. LE exercises, 15x each, active ROM  Other exercises 5: Standing Bilat. LE exercises, 10x each, active ROM(glute med weakness observed with L single LE stance; Pt relying on R forearm support on bar at times. Seated rest breaks between each exercise.  Postural cues. )    Activity Tolerance: Patient Tolerated treatment well     PT Equipment Recommendations  Other: TBD    Patient Education  New Education Provided:  Rolling walker safety  Learner:patient and significant other  Method: demonstration and explanation       Outcome: verbalized concerns and needs reinforcement     Current Treatment Recommendations: Strengthening, Balance Training, Functional Mobility Training, Transfer Training, Endurance Training, Gait Training, Equipment Evaluation, Education, & procurement, Patient/Caregiver Education & Training, Safety Education & Training, Wheelchair Mobility Training, Home Exercise Program, Pain Management    Conditions Requiring Skilled Therapeutic Intervention  Body structures, Functions, Activity limitations: Decreased functional mobility ; Decreased ADL status; Decreased strength;Decreased safe awareness;Decreased cognition;Decreased balance;Decreased endurance;Decreased posture; Increased pain  Assessment: Maximum cues required for posture, safety during ambulation with little/fleeting return. REQUIRES PT FOLLOW UP: Yes  Discharge Recommendations: 24 hour supervision or assist;Patient would benefit from continued therapy after discharge    Goals  Short term goals  Time Frame for Short term goals: 5-7 days  Short term goal 1: Pt to demonstrate CGA/SBA for bed mobility. Short term goal 2: Pt to demonstrate min x1 transfers. Short term goal 3: Pt to amb 50' min x1 with improved gait mechanics and posture. Short term goal 4: pt to tolerate standing 1-2 minutes with 1 assist and device. Short term goal 5: Pt to propel w/c 50'-75' SBA. Short term goal 6: Pt to improve seated posture to GOOD. Short term goal 7: Pt to improve sitting balance to GOOD. Long term goals  Time Frame for Long term goals : by D/C  Long term goal 1: Pt to demonstrate bed mobility wtih supervision. Long term goal 2: Pt to demonstrate SBA for transfers from varied surfaces. Long term goal 3: Pt to amb 76' SBA/CGA with device with improve gait mechanics and posture, on varied terrain including ramp. Long term goal 4: Pt to propel w/c 100' including turns and varied terrain Mod I.  Long term goal 5: Pt to improve BLE strength by 1/2 MMG. Long term goal 6: Pt to tolerate standing 4-5 minutes with device, 1 assist if needed. Long term goal 7: Pt to perform 5xSTS in 60 seconds or less for improved strength/endurance. Long term goal 8: Pt to improve standing balance to FAIR+ to reduce fall risk.   Long term goal 9: Pt to demo 1 platform step with RW min x1.      07/22/20 0840 07/22/20 1234   PT Individual Minutes   Time In [id number]   Time Out [id number]   Minutes 51 45     Electronically signed by Denise Alvarez PTA on 7/22/20 at 7:06 PM EDT

## 2020-07-22 NOTE — PROGRESS NOTES
[facility]: [facility]   ACUTE REHABILITATION OCCUPATIONAL THERAPY  DAILY NOTE    Date: 20  Patient Name: Harriet Perez      Room: 2718/5442-22    MRN: [id number]   : 1940  (79 y.o.)  Gender: female   Referring Practitioner: Dr. Braeden Evans  Diagnosis: Acute CVA       Restrictions  Restrictions/Precautions: Fall Risk, General Precautions  Implants present? : Metal implants(R JAILENE, L TKA, neck and back surgery)  Other position/activity restrictions: up with assistance  Required Braces or Orthoses?: No    Subjective  Subjective: \"That felt so good\" Pt is highly motivated to engage in bathing tasks this date. Patient Currently in Pain: Denies  Restrictions/Precautions: Fall Risk;General Precautions        Pain Assessment  Clinical Progression: Not changed  Response to Pain Intervention: None, Patient Satisfied    Objective  Cognition  Overall Cognitive Status: Impaired  Arousal/Alertness: Delayed responses to stimuli  Attention Span: Attends with cues to redirect  Memory: Decreased recall of recent events;Decreased short term memory  Following Commands: Follows one step commands with increased time  Safety Judgement: Decreased awareness of need for safety  Awareness of Errors: Decreased awareness of errors  Insights: Decreased awareness of deficits  Sequencing and Organization: Assistance required to implement solutions;Assistance required to generate solutions;Assistance required to identify errors made  Perception  Overall Perceptual Status: Impaired  Initiation: Cues to initiate tasks  Motor Planning: Cues to use objects appropriately  Balance  Sitting Balance: Contact guard assistance  Standing Balance: Contact guard assistance  Bed mobility  Bridging: Stand by assistance  Supine to Sit: Moderate assistance  Sit to Supine: Moderate assistance  Scooting: Stand by assistance(hips to edge of mat)  Transfers  Sit to stand: Moderate assistance  Stand to sit: Minimal assistance  Transfer Comments:  Mod VCs for hand placement and safety during transfers     Functional Mobility  Functional - Mobility Device: Rolling Walker  Activity: To/from bathroom(from EOB to toilet)  Assist Level: Minimal assistance  Functional Mobility Comments: Min A for RW management and safety  Toilet Transfers  Toilet - Technique: Ambulating  Equipment Used: Grab bars  Toilet Transfer: Moderate assistance  Toilet Transfers Comments: w/RW  Shower Transfers  Shower - Transfer From: [facility] - Transfer Type: To and From  Shower - Transfer To: Transfer tub bench  Shower - Technique: Ambulating  Shower Transfers: Moderate assistance  Shower Transfers Comments: transfer to w/c from TTB due to fatigue                             ADL  Feeding: Setup; Increased time to complete  Grooming: Moderate assistance;Setup; Increased time to complete(A grooming hair d/t tangles; SBA oral care seated at sink)  UE Bathing: Stand by assistance;Setup;Verbal cueing; Increased time to complete(seated on TTB in shower)  LE Bathing: Minimal assistance;Setup;Verbal cueing; Increased time to complete(A buttocks; CGA standing)  UE Dressing: Minimal assistance;Setup;Verbal cueing; Increased time to complete(A OH to don OH shirt)  LE Dressing: Moderate assistance;Setup; Increased time to complete(A thread LLE into pants & pull pants over hips; A don socks)  Toileting: Maximum assistance;Setup; Increased time to complete  Additional Comments: OT facilitated Pt engagement in showering tasks this date including bathing, dressing, toileting, and grooming tasks. Pt demonstrated increased IND with LBD and LBB. Please see above for details. PM session: OT facilitated Pt engagement in functional task with use of reacher. Pt picked up tennis balls from floor with use of reacher to provide education regarding use of reacher in functional task as well promote [address] and [address]. Pt demonstrated Fair use of reacher. OT provided Min VCs for use of reacher and problem solving.  OT facilitated Pt engagement in [facility] task of grasping/releasing clothespins to promote increased hand strength and ROM at R shoulder. Continue OT POC to maximize Pt engagement in meaningful occupations. Assessment  Performance deficits / Impairments: Decreased functional mobility ; Decreased ADL status; Decreased strength;Decreased safe awareness;Decreased cognition;Decreased ROM; Decreased endurance;Decreased sensation;Decreased balance;Decreased fine motor control;Decreased coordination;Decreased posture;Decreased high-level IADLs  Prognosis: Good  Discharge Recommendations: 24 hour supervision or assist;Home with Home health OT  Activity Tolerance: Patient Tolerated treatment well  Safety Devices in place: Yes  Type of devices: Call light within reach;Gait belt;Patient at risk for falls; Left in chair;Nurse notified          Patient Education: OT POC, safety with self-care, safety with functional transfers  Patient Goals   Patient goals : \"To get so I can do my jobs. ..you know, take care of my home. 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 I don't want everything dumped on him (spouse) because he's too nice a man, and he does it, but it's hard for him. \"  Learner:patient  Method: demonstration and explanation       Outcome: needs reinforcement and asked questions        Plan  Plan  Times per week: 5-7  Times per day: Twice a day  Current Treatment Recommendations: Self-Care / ADL, Home Management Training, Strengthening, Balance Training, Functional Mobility Training, Endurance Training, Wheelchair Mobility Training, Cognitive Reorientation, Safety Education & Training, Patient/Caregiver Education & Training, Equipment Evaluation, Education, & procurement, Cognitive/Perceptual Training  Patient Goals   Patient goals : \"To get so I can do my jobs. ..you know, take care of my home. 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  I don't want everything dumped on him (spouse) because he's too nice a man, and he does it, but it's hard for him. \"  Short term goals  Time Frame for Short term goals: One week  Short term goal 1: Pt will perform toileting tasks and LBD with Mod A. Short term goal 2: Pt and Pt's caregiver will V/D Good understanding of AE/DME/modified techniques for increased IND with self-care and mobility. Short term goal 3: Pt will stand for 4+ minutes with 0-1 UE support, CGA, and no LOB while engaging in functional activity of choice. Short term goal 4: Pt will perform functional transfers and mobility with Min A, least restrictive mobility device, and Fair safety. Short term goal 5: Pt will actively participate in 30+ minutes of therapeutic exercise/functional activities to promote increased IND with self-care and mobility. Long term goals  Time Frame for Long term goals : By discharge  Long term goal 1: Pt will perform BADLs with SUP and Fair safety. Long term goal 2: Pt will perform functional mobility and transfers during self-care with SUP, least restrictive mobility device, and Fair safety. Long term goal 3: Pt will perform simple meal prep/light housekeeping with SUP and Fair safety. Long term goal 4: Pt will stand for 8+ minutes with 0-1 UE support, SUP, and no LOB while engaging in functional activity of choice. Long term goal 5: Pt and Pt's caregiver will V/D Good understanding of Fall Prevention Strategies for increased IND with self-care and mobility. Long term goals 6: Pt will perform self-care with improved bilateral FMC/GMC as supported by a 7 second improvement in 9 hole peg score [facility]) and 5 block improvement on box and block assessment ([address] [place] [address]).        07/22/20 1028 07/22/20 1359   OT Individual Minutes   Time In [id number]   Time Out [id number]   Minutes 64 36   Time Code Minutes    Timed Code Treatment Minutes 64 Minutes 36 Minutes       Electronically signed by Kalyani Dean OT on 7/22/20 at 3:34 PM EDT

## 2020-07-22 NOTE — PATIENT CARE CONFERENCE
[facility]: SCOTT WOODARD   ACUTE REHABILITATION  TEAM CONFERENCE NOTE  Date: 20  Patient Name: Marian Pineda       Room: 3390/9716-83  MRN: [id number]       : 1940  (79 y.o.)     Gender: female   Referring Practitioner: Dani London MD      Encephalopathy [G93.40]  Diagnosis: Acute CVA     NURSING  Bladder  Incontintent  Bowel   Continent  Intervention    Both Bowel & Bladder Program         Wounds/Incisions/Ulcers: No skin issues identified  Medication Education Program: Patient currently unable to manage medications and family being educated  Pain: Pt c/o pain with urination. Fall Risk:  Falling star program initiated    PHYSICAL THERAPY  Bed mobility  Bridging: Stand by assistance  Rolling to Left: Minimal assistance  Rolling to Right: Minimal assistance  Supine to Sit: Moderate assistance(at trunk)  Sit to Supine: Stand by assistance(cues for positioning)  Scooting: Stand by assistance(hips to edge of mat)  Comment: HOB flat. Transfers:  Sit to Stand: Moderate Assistance(Cues for hand placement)  Stand to sit: Minimal Assistance(Cues for hand placement, good return)  Bed to Chair: Moderate assistance(no device, directional cues)  Comment: pt demonstarates difficulty side stepping right  at edge of bed      Ambulation 1  Surface: level tile  Device: Rolling Walker  Other Apparatus: Wheelchair follow  Assistance: Minimal assistance  Quality of Gait: Forward head/trunk, flexed hips, narrow LUIS with some scissoring observed. Paula RProsper   Gait Deviations: Slow Renu;Decreased step height;Decreased step length;Deviated path  Distance: 30' AM, 40' PM  Comments: Pt requiring continuous cueing to improve posture, increase reliance on UEs, move withing walker's base of support. Pt reports she walked better at [place], has deteriorated since, feels weakness \"in my butt, it just wants to give out. \"        Reason if not Attempted: Not attempted due to medical condition or safety concerns  CARE Score: 88  Discharge Goal: Supervision or touching assistance    Walk   Walk 10 Feet  Dependent  Wheelchair follow 1   Walk 50 feet with 2 Turns      88   Walk 150 Feet      88   Walking 10 feet Uneven Surface      88     Steps  1 Step (Curb)       88   4 Steps       9   12 Steps       88     Wheelchair Ability   Wheel 50 Feet - 2 turns  Partial/moderate assistance  Bilat. UEs  3  Wheel 150 Feet       88    Other: TBD    Maximum cues required for posture, safety during ambulation with little/fleeting return. Goals  Time Frame for Short term goals: 5-7 days  Short term goal 1: Pt to demonstrate CGA/SBA for bed mobility. Short term goal 2: Pt to demonstrate min x1 transfers. Short term goal 3: Pt to amb 50' min x1 with improved gait mechanics and posture. Short term goal 4: pt to tolerate standing 1-2 minutes with 1 assist and device. Short term goal 5: Pt to propel w/c 50'-75' SBA. Short term goal 6: Pt to improve seated posture to GOOD. Short term goal 7: Pt to improve sitting balance to GOOD. OCCUPATIONAL THERAPY  SELF CARE    Eating   4  Supervision or touching assistance     Setup; Increased time to complete   Oral Hygiene   4  Assistance Needed: Supervision or touching assistance     Moderate assistance;Setup; Increased time to complete(A grooming hair d/t tangles; SBA oral care seated at sink)   Shower/Bathe Self   3  Assistance Needed: Partial/moderate assistance      UE Bathing: Stand by assistance;Setup;Verbal cueing; Increased time to complete(seated on TTB in shower)  LE Bathing: Minimal assistance;Setup;Verbal cueing; Increased time to complete(A buttocks; CGA standing)   Upper Body Dressing   3  Assistance Needed: Partial/moderate assistance     Minimal assistance;Setup;Verbal cueing; Increased time to complete(A OH to don OH shirt)   Lower Body Dressing   2  Assistance Needed: Substantial/maximal assistance     Putting On/Taking Off Footwear   1  Assistance Needed: Dependent note for details. SOCIAL WORK ASSESSMENT  Assessment:spouse   Pre-Admission Status:  Lives With: Spouse  Type of Home: House  Home Layout: Two level, Performs ADL's on one level, Able to Live on Main level with bedroom/bathroom  Home Access: Ramped entrance  Bathroom Shower/Tub: Walk-in shower, Shower chair with back, Doors  Bathroom Toilet: Standard(with toilet raiser for taller height)  Bathroom Equipment: Shower chair, Grab bars in shower, Hand-held shower, Toilet raiser, Grab bars around toilet  Bathroom Accessibility: Accessible, Walker accessible  Home Equipment: Rolling walker, Cane, Reacher, [employer] aid, 4 wheeled walker(transport chair)  Receives Help From: Family  ADL Assistance: Independent  Homemaking Assistance: Needs assistance(shares duties with spouse)  Homemaking Responsibilities: Yes  Ambulation Assistance: Independent(with cane)  Transfer Assistance: Independent  Active : Yes(Spouse drives majority of the time)  Occupation: Retired  Type of occupation: Homemaker  Additional Comments: Pt is a questionable historian with no family present to verify above information this date. During OT evaluation on 7/17/20, Pt's spouse was present and reported that 3-4 months ago, Pt was IND with all self-care and functional mobility with cane. Pt was assisting with homemaking chores. However for the past 3-4 months, Pt has required assist with self-care, transfers, and mobility due to chronic back pain. Family Education: Family Education initiated and Ongoing    Risk for Readmission: Moderate 19% - 27%   Readmission Risk              Risk of Unplanned Readmission:        22         Critical Items: None       Problem / Barrier Intervention / Plan  Results   Impaired functional Mobility   Bed mobility, transfer training and gait training with assistive device.     Impaired ability to care for self related to CVA  Training in modified care techniques and use of adaptive devices to support safe self care simple meal prep/light housekeeping with SUP and Fair safety. Long term goal 4: Pt will stand for 8+ minutes with 0-1 UE support, SUP, and no LOB while engaging in functional activity of choice. Long term goal 5: Pt and Pt's caregiver will V/D Good understanding of Fall Prevention Strategies for increased IND with self-care and mobility. Long term goals 6: Pt will perform self-care with improved bilateral FMC/GMC as supported by a 7 second improvement in 9 hole peg score [facility]) and 5 block improvement on box and block assessment ([address] [place] [address]). ST:  Mod I expression and comprehension, supervision level comprehension and expression    Team Members Present at Conference:  :  Barbra Jon  LSW  Occupational Therapist: Maida Conde [address] PT  Speech Therapist: Cassidy WOODYCCC/SLP  Nurse:  Kendall Jaquez RN   Dietary/Nutrition: Ely Power RD LD  Pastoral Care: Bacilio Land  CMG:   Bettina Allan RN     I approve the established interdisciplinary plan of care as documented within the medical record of Rambo Benavidez.     Steve Downey MD

## 2020-07-22 NOTE — PROGRESS NOTES
Speech Language Pathology  Speech Language Pathology  [facility]    Cognitive Treatment Note    Date: 7/22/2020  Patients Name: Sara Mchugh  MRN: [id number]  Diagnosis:   Patient Active Problem List   Diagnosis Code    Breast cancer [facility]) C50.919    Renal cyst N28.1    Lumbar degenerative disc disease M51.36    Arthropathy of lumbar facet joint M47.816    Failed back syndrome of lumbar spine M96.1    Lumbar spondylosis M47.816    Sacroiliitis ([address] M46.1    Status post lumbar spinal fusion Z98.1    TIA (transient ischemic attack) G45.9    Anemia D64.9    Dyspnea R06.00    COPD with acute exacerbation ([address] J44.1    Acute kidney injury superimposed on CKD ([address] N17.9, N18.9    Aspiration pneumonia ([address] J69.0    CKD (chronic kidney disease) stage 4, GFR 15-29 ml/min ([address] N18.4    Left renal atrophy N26.1    Severe malnutrition ([address] E43    Acute cystitis without hematuria N30.00    Disorientation R41.0    Hypertensive emergency I16.1    Hypertension I10    Essential hypertension I10    TIMO (acute kidney injury) ([address] N17.9    Acute respiratory failure ([address] J96.00    Acute encephalopathy G93.40    Chronic kidney disease (CKD), stage III (moderate) ([address] N18.3    Seizure ([address] R56.9    Altered mental status R41.82    Encephalopathy G93.40       Pain: pt. denies    Cognitive Treatment    Treatment time:  9915-5915    Subjective: [x] Alert [x] Cooperative     [] Confused     [] Agitated    [] Lethargic    Objective/Assessment:  Attention: sustained throughout. Orientation: 84% (not oriented to room)    Recall:  n/a      Problem Solving/Reasoning: correct sentence inconsistencies- 60%, 90% c cues. Word deductions- 80%, 100% c cues. Name 7 items in category- 86%, 100% c cues. Other:   Pt. Sometimes needed repetition of questions. Bedside swallow assessment also completed, refer to separate report.        Plan:  [x] Continue ST services    [] Discharge from ST: Discharge recommendations: [] Inpatient Rehab   [] [place]   [] Outpatient Therapy  [] Follow up at trauma clinic   [] Other:       Treatment completed by: Terra Cramer A.CCC/SLP

## 2020-07-23 LAB
GLUCOSE BLD-MCNC: 108 MG/DL (ref 65–105)
GLUCOSE BLD-MCNC: 124 MG/DL (ref 65–105)
GLUCOSE BLD-MCNC: 138 MG/DL (ref 65–105)
GLUCOSE BLD-MCNC: 93 MG/DL (ref 65–105)

## 2020-07-23 PROCEDURE — 1180000000 HC REHAB R&B

## 2020-07-23 PROCEDURE — 6370000000 HC RX 637 (ALT 250 FOR IP): Performed by: PHYSICAL MEDICINE & REHABILITATION

## 2020-07-23 PROCEDURE — 97110 THERAPEUTIC EXERCISES: CPT

## 2020-07-23 PROCEDURE — 97116 GAIT TRAINING THERAPY: CPT

## 2020-07-23 PROCEDURE — 6370000000 HC RX 637 (ALT 250 FOR IP): Performed by: STUDENT IN AN ORGANIZED HEALTH CARE EDUCATION/TRAINING PROGRAM

## 2020-07-23 PROCEDURE — 97530 THERAPEUTIC ACTIVITIES: CPT

## 2020-07-23 PROCEDURE — 97542 WHEELCHAIR MNGMENT TRAINING: CPT

## 2020-07-23 PROCEDURE — 82947 ASSAY GLUCOSE BLOOD QUANT: CPT

## 2020-07-23 PROCEDURE — 97535 SELF CARE MNGMENT TRAINING: CPT

## 2020-07-23 PROCEDURE — 97130 THER IVNTJ EA ADDL 15 MIN: CPT

## 2020-07-23 PROCEDURE — 99232 SBSQ HOSP IP/OBS MODERATE 35: CPT | Performed by: PHYSICAL MEDICINE & REHABILITATION

## 2020-07-23 PROCEDURE — 6360000002 HC RX W HCPCS: Performed by: STUDENT IN AN ORGANIZED HEALTH CARE EDUCATION/TRAINING PROGRAM

## 2020-07-23 PROCEDURE — 97129 THER IVNTJ 1ST 15 MIN: CPT

## 2020-07-23 RX ORDER — CLONIDINE 0.2 MG/24H
1 PATCH, EXTENDED RELEASE TRANSDERMAL WEEKLY
Status: DISCONTINUED | OUTPATIENT
Start: 2020-07-23 | End: 2020-07-27 | Stop reason: HOSPADM

## 2020-07-23 RX ADMIN — SPIRONOLACTONE 25 MG: 25 TABLET, FILM COATED ORAL at 08:20

## 2020-07-23 RX ADMIN — Medication 1 CAPSULE: at 17:24

## 2020-07-23 RX ADMIN — AMOXICILLIN AND CLAVULANATE POTASSIUM 1 TABLET: 875; 125 TABLET, FILM COATED ORAL at 08:22

## 2020-07-23 RX ADMIN — ALLOPURINOL 100 MG: 100 TABLET ORAL at 20:42

## 2020-07-23 RX ADMIN — Medication 1 CAPSULE: at 08:20

## 2020-07-23 RX ADMIN — METOPROLOL TARTRATE 50 MG: 50 TABLET, FILM COATED ORAL at 20:40

## 2020-07-23 RX ADMIN — EZETIMIBE 10 MG: 10 TABLET ORAL at 20:40

## 2020-07-23 RX ADMIN — HYDROCHLOROTHIAZIDE 25 MG: 25 TABLET ORAL at 08:20

## 2020-07-23 RX ADMIN — GABAPENTIN 300 MG: 300 CAPSULE ORAL at 20:40

## 2020-07-23 RX ADMIN — Medication 1 TABLET: at 08:21

## 2020-07-23 RX ADMIN — AMLODIPINE BESYLATE 10 MG: 10 TABLET ORAL at 08:19

## 2020-07-23 RX ADMIN — CLOPIDOGREL BISULFATE 75 MG: 75 TABLET ORAL at 08:20

## 2020-07-23 RX ADMIN — METOPROLOL TARTRATE 50 MG: 50 TABLET, FILM COATED ORAL at 08:19

## 2020-07-23 RX ADMIN — BENZONATATE 200 MG: 100 CAPSULE ORAL at 08:21

## 2020-07-23 RX ADMIN — Medication 1 CAPSULE: at 11:26

## 2020-07-23 RX ADMIN — ENOXAPARIN SODIUM 40 MG: 40 INJECTION SUBCUTANEOUS at 08:20

## 2020-07-23 RX ADMIN — ALLOPURINOL 100 MG: 100 TABLET ORAL at 08:20

## 2020-07-23 RX ADMIN — Medication 1 TABLET: at 20:41

## 2020-07-23 ASSESSMENT — PAIN DESCRIPTION - PAIN TYPE: TYPE: CHRONIC PAIN

## 2020-07-23 ASSESSMENT — PAIN DESCRIPTION - LOCATION: LOCATION: HAND

## 2020-07-23 ASSESSMENT — PAIN DESCRIPTION - ORIENTATION: ORIENTATION: RIGHT;LEFT

## 2020-07-23 ASSESSMENT — PAIN SCALES - GENERAL: PAINLEVEL_OUTOF10: 6

## 2020-07-23 ASSESSMENT — PAIN DESCRIPTION - PROGRESSION: CLINICAL_PROGRESSION: NOT CHANGED

## 2020-07-23 NOTE — PLAN OF CARE
Problem: Falls - Risk of:  Goal: Will remain free from falls  Description: Will remain free from falls  Outcome: Ongoing  Goal: Absence of physical injury  Description: Absence of physical injury  Outcome: Ongoing     Problem: Skin Integrity:  Goal: Will show no infection signs and symptoms  Description: Will show no infection signs and symptoms  Outcome: Ongoing  Goal: Absence of new skin breakdown  Description: Absence of new skin breakdown  Outcome: Ongoing     Problem: Pain:  Goal: Pain level will decrease  Description: Pain level will decrease  Outcome: Ongoing  Goal: Control of acute pain  Description: Control of acute pain  Outcome: Ongoing  Goal: Control of chronic pain  Description: Control of chronic pain  Outcome: Ongoing     Problem: Neurological  Goal: Maximum potential motor/sensory/cognitive function  Outcome: Ongoing     Problem: Nutrition  Goal: Optimal nutrition therapy  Outcome: Ongoing

## 2020-07-23 NOTE — PROGRESS NOTES
Seated rest breaks between amb d/t fatigue. Transfers  Sit to Stand: Moderate Assistance(Cues for hand placement)  Stand to sit: Minimal Assistance(Cues for hand placement, good return)  Bed to Chair: Moderate assistance(no device, directional cues)  Stand Pivot Transfers: Moderate Assistance(no device, directional cues)  Comment: Rolling walker, except where noted  Ambulation  Ambulation?: Yes  Ambulation 1  Surface: level tile  Device: Rolling Walker  Other Apparatus: Wheelchair follow  Assistance: Minimal assistance  Quality of Gait: Forward head/trunk, flexed hips, narrow LUIS with some scissoring observed. Veers R.   Gait Deviations: Slow Renu, Decreased step height, Decreased step length, Deviated path  Distance: 25'x1, 36'x1 and 30'x1  Comments: Pt requiring continuous cueing to improve posture, increase reliance on UEs, move withing walker's base of support. Seated rest breaks between amb d/t fatigue. Surface: level tile  Ambulation 1  Surface: level tile  Device: Rolling Walker  Other Apparatus: Wheelchair follow  Assistance: Minimal assistance  Quality of Gait: Forward head/trunk, flexed hips, narrow LUIS with some scissoring observed. Veers R.   Gait Deviations: Slow Renu, Decreased step height, Decreased step length, Deviated path  Distance: 25'x1, 36'x1 and 30'x1  Comments: Pt requiring continuous cueing to improve posture, increase reliance on UEs, move withing walker's base of support. Seated rest breaks between amb d/t fatigue. OT:  ADL  Feeding: Setup, Increased time to complete  Grooming:  Moderate assistance, Setup, Increased time to complete(A grooming hair d/t tangles; SBA oral care seated at sink)  UE Bathing: Stand by assistance, Setup, Verbal cueing, Increased time to complete(seated on TTB in shower)  LE Bathing: Minimal assistance, Setup, Verbal cueing, Increased time to complete(A buttocks; CGA standing)  UE Dressing: Minimal assistance, Setup, Verbal cueing, Increased time to complete(A OH to don OH shirt)  LE Dressing: Moderate assistance, Setup, Increased time to complete(A thread LLE into pants & pull pants over hips; A don socks)  Toileting: Maximum assistance, Setup, Increased time to complete  Additional Comments: OT facilitated Pt engagement in showering tasks this date including bathing, dressing, toileting, and grooming tasks. Pt demonstrated increased IND with LBD and LBB. Please see above for details. Balance  Sitting Balance: Contact guard assistance  Standing Balance: Contact guard assistance   Standing Balance  Time: 1-2 minutes x 4  Activity: self-care tasks  Functional Mobility  Functional - Mobility Device: Rolling Walker  Activity: To/from bathroom(from EOB to toilet)  Assist Level: Minimal assistance  Functional Mobility Comments: Min A for RW management and safety     Bed mobility  Bridging: Stand by assistance  Rolling to Left: Minimal assistance  Rolling to Right: Minimal assistance  Supine to Sit: Moderate assistance  Sit to Supine: Moderate assistance  Scooting: Stand by assistance(hips to edge of mat)  Comment: HOB flat. Cues for positioning in bed including bridging. Slowed movements. Waffle mattress on and inflated. Transfers  Stand Pivot Transfers: Maximum assistance(Max A EOB>w/c; Mod A w/c<>toilet with GB)  Sit to stand: Moderate assistance  Stand to sit: Minimal assistance  Transfer Comments: Mod VCs for hand placement and safety during transfers   Toilet Transfers  Toilet - Technique: Ambulating  Equipment Used: Grab bars  Toilet Transfer: Moderate assistance  Toilet Transfers Comments: w/RW     Shower Transfers  Shower - Transfer From: Olga Chowdary - Transfer Type: To and From  Shower - Transfer To: Transfer tub bench  Shower - Technique: Ambulating  Shower Transfers: Moderate assistance  Shower Transfers Comments: transfer to w/c from TTB due to fatigue       SPEECH:  Subjective: [x]? Alert     [x]? Cooperative     []? Confused     []? Agitated    []? Lethargic     Objective/Assessment:  Attention: sustained throughout.     Orientation: n/a     Recall:  Picture detail recall- 60%, 70% c cues.      Problem Solving/Reasoning:  Word deductions- 33%, 100% c cues. Name 8 items in category- 50%, 100% c cues.        Other:   Pt. Sometimes needed repetition of questions. Pt. Lacks insight into her deficits as she frequent states she is ready to go home today and needs to \"clean my whole house before my company comes next week\", despite the fact she is a min A x1 to transfer from wheelchair to bed and is dependent for almost all ADLs. Objective:  BP (!) 194/70   Pulse 82   Temp 98.5 °F (36.9 °C) (Oral)   Resp 20   Ht 5' 5\" (1.651 m)   Wt 166 lb (75.3 kg)   SpO2 97%   BMI 27.62 kg/m²       GEN: Well developed, well nourished, in NAD  HEENT:  NCAT.  PERRL.  EOMI.  Mucous membranes pink and moist.   PULM:  Clear to ausculation. No rales or rhonchi. Respirations WNL and unlabored. CV:  Regular rate rhythm.  No murmurs or gallops. GI:  Abdomen soft. Nontender. Non-distended.  BS + and equal.    NEUROLOGICAL: A&O x3 with poor insight and poor carryover/memory.  Observed attempting to transfer herself from chair to bed despite nursing educating her on requiring assist. Sensation intact to light touch. MSK:  Functional ROM all extremities. Motor testing 4+/5 key muscles all extremities.    SKIN: Warm dry and intact. Good turgor. EXTREMITIES:  No calf tenderness to palpation. No edema BLEs. PSYCH: Mood WNL. Appropriately interactive. Affect WNL. Diagnostics:     CBC:   Recent Labs     07/21/20  0646   WBC 8.5   RBC 3.91*   HGB 10.5*   HCT 32.7*   MCV 83.7   RDW 16.6*        BMP:   Recent Labs     07/21/20  0646      K 3.7      CO2 25   BUN 9   CREATININE 1.10*   GLUCOSE 80     BNP: No results for input(s): BNP in the last 72 hours. PT/INR: No results for input(s): PROTIME, INR in the last 72 hours.   APTT: No results for input(s): APTT in the last 72 hours. CARDIAC ENZYMES: No results for input(s): CKMB, CKMBINDEX, TROPONINT in the last 72 hours. Invalid input(s): CKTOTAL;3  FASTING LIPID PANEL:  Lab Results   Component Value Date    CHOL 106 03/17/2019    HDL 45 03/17/2019    TRIG 55 03/17/2019     LIVER PROFILE: No results for input(s): AST, ALT, ALB, BILIDIR, BILITOT, ALKPHOS in the last 72 hours. Current Medications:   Current Facility-Administered Medications: enoxaparin (LOVENOX) injection 40 mg, 40 mg, Subcutaneous, Daily  allopurinol (ZYLOPRIM) tablet 100 mg, 100 mg, Oral, BID  amLODIPine (NORVASC) tablet 10 mg, 10 mg, Oral, Daily  benzonatate (TESSALON) capsule 200 mg, 200 mg, Oral, Daily  calcium carbonate-vitamin D (CALTRATE) 600-400 MG-UNIT per tab 1 tablet, 1 tablet, Oral, BID  gabapentin (NEURONTIN) capsule 300 mg, 300 mg, Oral, Nightly  lactobacillus (CULTURELLE) capsule 1 capsule, 1 capsule, Oral, TID WC  acetaminophen (TYLENOL) tablet 650 mg, 650 mg, Oral, Q4H PRN  cloNIDine (CATAPRES) 0.1 MG/24HR 1 patch, 1 patch, Transdermal, Weekly  clopidogrel (PLAVIX) tablet 75 mg, 75 mg, Oral, Daily  ezetimibe (ZETIA) tablet 10 mg, 10 mg, Oral, Nightly  metoprolol tartrate (LOPRESSOR) tablet 50 mg, 50 mg, Oral, BID  spironolactone (ALDACTONE) tablet 25 mg, 25 mg, Oral, Daily **AND** hydroCHLOROthiazide (HYDRODIURIL) tablet 25 mg, 25 mg, Oral, Daily  senna (SENOKOT) tablet 8.6 mg, 1 tablet, Oral, Daily PRN  bisacodyl (DULCOLAX) suppository 10 mg, 10 mg, Rectal, Daily PRN  polyethylene glycol (GLYCOLAX) packet 17 g, 17 g, Oral, Daily PRN      Impression/Plan:   Impaired ADLs, gait, and mobility due to:      1. PRES Encephalopathy:  PT/OT/SLP for gait, mobility, strengthening, endurance, ADLs, cognition, and self care. Tylenol prn headache. Cognition improving slowly but with poor carryover and poor insight. Updated  who will contact  regarding discharge plan and patient's concerns.   2. Acute Respiratory Failure: Resolved  3. HTN/hyperlipidemia/CAD: on amlodipine, clonidine patch, HCTZ, metoprolol tartrate, spironolactone, ezetimibe, plavix. IM note from acute recommends workup for possible renal artery stenosis - renal US done 7/13. HTN is persistent - will increase clonidine dose today. 4. CKD III: Creatinine stable. 5. Aspiration Pneumonia: Completed Unasyn. Completing 10 days of antibiotics today with Augmentin. 6. Gout history: on allopurinol  7. Neuropathy: on gabapentin  8. DVT Prophylaxis:  low molecular weight heparin, SCD's while in bed and JARED's  9. Dr. Karen Chavarria medical management    Electronically signed by Roddy Jasso MD on 7/23/2020 at 10:37 AM      This note is created with the assistance of a speech recognition program.  While intending to generate a document that actually reflects the content of the visit, the document can still have some errors including those of syntax and sound a like substitutions which may escape proof reading. In such instances, actual meaning can be extrapolated by contextual diversion.

## 2020-07-23 NOTE — PROGRESS NOTES
[address] [facility]    ACUTE REHABILITATION OCCUPATIONAL THERAPY  DAILY NOTE    Date: 20  Patient Name: Wali Burr      Room: 1485/9826-99    MRN: [id number]   : 1940  (79 y.o.)  Gender: female   Referring Practitioner: Dr. Sirisha Hamilton  Diagnosis: Acute CVA     Restrictions  Restrictions/Precautions: Fall Risk, General Precautions  Implants present? : Metal implants(R JAILENE, L TKA, neck and back surgery)  Other position/activity restrictions: up with assistance  Required Braces or Orthoses?: No    Subjective  Subjective: \"Oh, I can't stay here that long. I have family coming in a few days before that\"   Comments: Pt pleasant and cooperative. Patient Currently in Pain: Yes  Pain Level: 6  Pain Location: Hand  Pain Orientation: Right;Left  Restrictions/Precautions: Fall Risk;General Precautions  Overall Orientation Status: Impaired  Orientation Level: Oriented to person;Oriented to place; Disoriented to situation;Oriented to time     Pain Assessment  Pain Assessment: 0-10  Pain Level: 6  Pain Type: Chronic pain  Pain Location: Hand  Pain Orientation: Right, Left    Objective  Cognition  Overall Cognitive Status: Impaired  Arousal/Alertness: Delayed responses to stimuli  Attention Span: Attends with cues to redirect  Memory: Decreased recall of recent events;Decreased short term memory  Following Commands:  Follows one step commands with increased time  Safety Judgement: Decreased awareness of need for safety  Awareness of Errors: Decreased awareness of errors  Insights: Decreased awareness of deficits  Sequencing and Organization: Assistance required to implement solutions;Assistance required to generate solutions;Assistance required to identify errors made  Perception  Overall Perceptual Status: Impaired  Initiation: Cues to initiate tasks  Motor Planning: Cues to use objects appropriately  Balance  Sitting Balance: Contact guard assistance(seated at edge of bed. )  Standing Balance: Minimal assistance(contact guard-minimal assistance. )  Bed mobility  Supine to Sit: Moderate assistance  Transfers  Stand Pivot Transfers: Moderate assistance(EOB>w/c towards R side. )  Sit to stand: Moderate assistance  Stand to sit: Minimal assistance  Transfer Comments: min/Mod VCs for hand placement and safety during transfers  Standing Balance  Time: AM: ~1 min x 2 PM: <1 min x2  Activity: AM: Lower body dressing PM: stand pivot transfers. Comment: Verbal and tactile cues for proper hand placement for safety. Type of ROM/Therapeutic Exercise  Type of ROM/Therapeutic Exercise: Free weights(1-2#, x15 reps. )  Comment: Pt engaged in upper body ex's to improve strength and endurance for functional tasks/transfers. Rest breaks prn. cueing for proper technique with fair carryover. Exercises  Scapular Protraction: x  Scapular Retraction: x  Shoulder Flexion: x  Shoulder Extension: x  Horizontal ABduction: x  Horizontal ADduction: x  Elbow Flexion: x  Elbow Extension: x  Wrist Flexion: x  Wrist Extension: x  Grasp/Release: encouraged and reinforced use of blue resistive sponge to increase grasp strength and encourage grasp/release pattern. Additional Activities: Other  Additional Activities: Pt engaged in ADL boards at tabletop to address B hand fine motor coordination skills as well as sequencing skills for improvement with functional care tasks. ADL boards included lacing/tying and buttoning/unbuttoning. Pt able to complete both with increased time. ADL  Feeding: Setup; Increased time to complete(per report. )  Grooming: Moderate assistance;Setup; Increased time to complete(hair grooming A d/t increased tangles, SBA for other tasks. )  UE Bathing: Stand by assistance;Setup;Verbal cueing; Increased time to complete(seated sinkside. )  LE Bathing: None(pt declined )  UE Dressing: Minimal assistance;Setup;Verbal cueing; Increased time to complete  LE Dressing: Moderate assistance;Setup; Increased time to functional activity of choice. Short term goal 4: Pt will perform functional transfers and mobility with Min A, least restrictive mobility device, and Fair safety. Short term goal 5: Pt will actively participate in 30+ minutes of therapeutic exercise/functional activities to promote increased IND with self-care and mobility. Long term goals  Time Frame for Long term goals : By discharge  Long term goal 1: Pt will perform BADLs with SUP and Fair safety. Long term goal 2: Pt will perform functional mobility and transfers during self-care with SUP, least restrictive mobility device, and Fair safety. Long term goal 3: Pt will perform simple meal prep/light housekeeping with SUP and Fair safety. Long term goal 4: Pt will stand for 8+ minutes with 0-1 UE support, SUP, and no LOB while engaging in functional activity of choice. Long term goal 5: Pt and Pt's caregiver will V/D Good understanding of Fall Prevention Strategies for increased IND with self-care and mobility. Long term goals 6: Pt will perform self-care with improved bilateral FMC/GMC as supported by a 7 second improvement in 9 hole peg score [facility]) and 5 block improvement on box and block assessment ([address] [place] [address]).         07/23/20 1134 07/23/20 1630   OT Individual Minutes   Time In 1045 1341   Time Out 1130 1412   Minutes 45 31     Electronically signed by VIRGILIO Forte on 7/23/20 at 4:51 PM EDT

## 2020-07-23 NOTE — PROGRESS NOTES
Physical Therapy  [address] [facility]   Acute Rehabilitation Physical Therapy Progress Note    Date: 20  Patient Name: Jaylyn Andrade       Room: 0849/3082-02  MRN: [id number]   Account: [de-identified]   : 1940  (76 y.o.) Gender: female     Referring Practitioner: Dr. Kaylie Sexton  Diagnosis: Acute CVA  Past Medical History:  has a past medical history of Arthritis, Cancer ([facility] [address] CKD (chronic kidney disease) stage 4, GFR 15-29 ml/min ([facility] [address] COPD (chronic obstructive pulmonary disease) ([facility] [address] CVA (cerebral vascular accident) ([facility] [address] Gout, Hyperlipidemia, Hypertension, Left renal atrophy, and Other abnormality of urination(788.69). Past Surgical History:   has a past surgical history that includes Tonsillectomy; Varicose vein surgery; Hammer toe surgery; Hysterectomy; Carotid endarterectomy; Appendectomy; joint replacement; bladder suspension; joint replacement; Breast surgery (Left); Colonoscopy; back surgery; Upper gastrointestinal endoscopy (N/A, 2018); and Colonoscopy (N/A, 2018). Additional Pertinent Hx: 67-year-old female with history of hypertension admitted with altered mental status. In ER ahe became unresponsive and required intubation. acute hypoxic respiratory failure secondary to hypertensive emergency, was eventually extubated, advancing diet, diagnosed with posterior reversible encephalopathy on MRI. [facility] Overall Orientation Status: Within Normal Limits  Orientation Level: Oriented X4(answer Ox4, only able to follow 1 step commands)  Restrictions/Precautions  Restrictions/Precautions: Fall Risk;General Precautions  Required Braces or Orthoses?: No  Implants present? : Metal implants(R JAILENE, L TKA, neck and back surgery)  Position Activity Restriction  Other position/activity restrictions: up with assistance    Subjective: Pt reports pain \"where I pee at. My  has a bladder infection and I think I may have one\".  Pt reports arthritis pain in B hands. PM: Pt is pleasant and agreeable to PT. Comments: Pleasant and cooperative. Vital Signs  BP Location: Right Arm  Level of Consciousness: Alert  Patient Currently in Pain: Denies  Pain Assessment: 0-10  Morocho-Baker Pain Rating: (Initially states 9, but points to 6 on facial scale)  Clinical Progression: Not changed  Response to Pain Intervention: None;Patient Satisfied     Oxygen Therapy  O2 Device: None (Room air)  Patient Observation  Observations: slowed processing time occasionally, improved aphasia since writer saw pt 7/17/20. Bed Mobility:   Bed Mobility  Bridging: Stand by assistance  Rolling: Stand by assistance;Rolling Right;Rolling Left  Supine to Sit: Moderate assistance(Trunk assist, with cueing for UE self-assist/little return)  Sit to Supine: Contact guard assistance(Guarding LEs against gravity)  Scooting: Stand by assistance(hips to edge of mat)  Comment: mat, wedge, 3 pillows. Transfers:  Sit to Stand: Moderate Assistance;Minimal Assistance(Cues for hand placement)  Stand to sit: Minimal Assistance(Cues for hand placement, good return)  Bed to Chair: Moderate assistance(no device, directional cues)   Comments:Rolling walker, except where noted           Ambulation 1  Surface: level tile  Device: Rolling Walker  Other Apparatus: Wheelchair follow  Assistance: Minimal assistance  Quality of Gait: Forward head/trunk, flexed hips, narrow LUIS with some scissoring observed. Paula RProsper   Gait Deviations: Slow Renu;Decreased step height;Decreased step length;Deviated path  Distance: 25'x1, 36'x1 and 30'x1  Comments: Pt requiring continuous cueing to improve posture, increase reliance on UEs, move withing walker's base of support. Seated rest breaks between amb d/t fatigue.          Stairs/Curb  Stairs?: No              Wheelchair Activities  Wheelchair Type: Standard  Wheelchair Cushion: Pressure Relieving  Propulsion: Yes  Propulsion 1  Propulsion: Manual  Level: Level Treatment Recommendations: Strengthening, Balance Training, Functional Mobility Training, Transfer Training, Endurance Training, Gait Training, Equipment Evaluation, Education, & procurement, Patient/Caregiver Education & Training, Safety Education & Training, Wheelchair Mobility Training, Home Exercise Program, Pain Management    Conditions Requiring Skilled Therapeutic Intervention  Body structures, Functions, Activity limitations: Decreased functional mobility ; Decreased ADL status; Decreased strength;Decreased safe awareness;Decreased cognition;Decreased balance;Decreased endurance;Decreased posture; Increased pain  Assessment: Maximum cues required for posture, safety during ambulation with little/fleeting return. Treatment Diagnosis: Impaired functional mobility 2* emcephalopathy  Prognosis: Good  Decision Making: Medium Complexity  History: patient had acute mental status changes x1 day and was not answering questions appropriately therefore was brought in by . In the ER patient complained of having numbness and tingling in the left upper and lower extremity concerning for stroke. Patient soon after became unresponsive and had to be intubated for airway protection. Blood pressure was also elevated with systolic being in the 521R  Exam: ROM, MMT, bed mobility, transfers, amb, balance, endurance  Clinical Presentation: Pt alert, pleasant, cooperative for PT  Barriers to Learning: cognition, safety awareness  REQUIRES PT FOLLOW UP: Yes  Treatment Initiated : gait, transfers, w/c mob  Discharge Recommendations: 24 hour supervision or assist;Patient would benefit from continued therapy after discharge    Goals  Short term goals  Time Frame for Short term goals: 5-7 days  Short term goal 1: Pt to demonstrate CGA/SBA for bed mobility. Short term goal 2: Pt to demonstrate min x1 transfers. Short term goal 3: Pt to amb 50' min x1 with improved gait mechanics and posture.   Short term goal 4: pt to tolerate

## 2020-07-23 NOTE — PROGRESS NOTES
Speech Language Pathology  Speech Language Pathology  [facility]    Cognitive Treatment Note    Date: 7/23/2020  Patients Name: Marian Pineda  MRN: [id number]  Diagnosis:   Patient Active Problem List   Diagnosis Code    Breast cancer [facility]) C50.919    Renal cyst N28.1    Lumbar degenerative disc disease M51.36    Arthropathy of lumbar facet joint M47.816    Failed back syndrome of lumbar spine M96.1    Lumbar spondylosis M47.816    Sacroiliitis ([address] M46.1    Status post lumbar spinal fusion Z98.1    TIA (transient ischemic attack) G45.9    Anemia D64.9    Dyspnea R06.00    COPD with acute exacerbation ([address] J44.1    Acute kidney injury superimposed on CKD ([address] N17.9, N18.9    Aspiration pneumonia ([address] J69.0    CKD (chronic kidney disease) stage 4, GFR 15-29 ml/min ([address] N18.4    Left renal atrophy N26.1    Severe malnutrition ([address] E43    Acute cystitis without hematuria N30.00    Disorientation R41.0    Hypertensive emergency I16.1    Hypertension I10    Essential hypertension I10    TIMO (acute kidney injury) ([address] N17.9    Acute respiratory failure ([address] J96.00    Acute encephalopathy G93.40    Chronic kidney disease (CKD), stage III (moderate) ([address] N18.3    Seizure ([address] R56.9    Altered mental status R41.82    Encephalopathy G93.40       Pain: pt. denies    Cognitive Treatment    Treatment time:  3878-5975    Subjective: [x] Alert [x] Cooperative     [] Confused     [] Agitated    [] Lethargic    Objective/Assessment:  Attention: sustained throughout. Orientation: 100%    Recall:  Paragraph recall- 14%, 100% c cues. Problem Solving/Reasoning:  State item in category beginning c given letter- 100%. Missing equipment- 44%, 69% c cues. Other:   Pt. Sometimes needed repetition of questions. Pt.  present and reports he would be ready to take pt. Home at her current level as \"I have been taking care of her since Jan anyway. \"  ST encouraged pt.  To attend OT ADL and PT sessions tomorrow for family training. Pt.  educ. Re: recommendations of 24 hour supervision d/t cognitive concerns. Pt. Eating canteloupe during session, demonstrating tolerance without issues. Plan:  [x] Continue ST services    [] Discharge from ST:      Discharge recommendations: [] Inpatient Rehab   [] [place]   [] Outpatient Therapy  [] Follow up at trauma clinic   [] Other:       Treatment completed by: Sydnie Acevedo. WENDY/SLP

## 2020-07-23 NOTE — PROGRESS NOTES
Speech Language Pathology  Speech Language Pathology  [facility]    Cognitive Treatment Note    Date: 7/23/2020  Patients Name: Lora Silva  MRN: [id number]  Diagnosis:   Patient Active Problem List   Diagnosis Code    Breast cancer [facility]) C50.919    Renal cyst N28.1    Lumbar degenerative disc disease M51.36    Arthropathy of lumbar facet joint M47.816    Failed back syndrome of lumbar spine M96.1    Lumbar spondylosis M47.816    Sacroiliitis ([address] M46.1    Status post lumbar spinal fusion Z98.1    TIA (transient ischemic attack) G45.9    Anemia D64.9    Dyspnea R06.00    COPD with acute exacerbation ([address] J44.1    Acute kidney injury superimposed on CKD ([address] N17.9, N18.9    Aspiration pneumonia ([address] J69.0    CKD (chronic kidney disease) stage 4, GFR 15-29 ml/min ([address] N18.4    Left renal atrophy N26.1    Severe malnutrition ([address] E43    Acute cystitis without hematuria N30.00    Disorientation R41.0    Hypertensive emergency I16.1    Hypertension I10    Essential hypertension I10    TIMO (acute kidney injury) ([address] N17.9    Acute respiratory failure ([address] J96.00    Acute encephalopathy G93.40    Chronic kidney disease (CKD), stage III (moderate) ([address] N18.3    Seizure ([address] R56.9    Altered mental status R41.82    Encephalopathy G93.40       Pain: pt. denies    Cognitive Treatment    Treatment time:  0933-1000    Subjective: [x] Alert [x] Cooperative     [] Confused     [] Agitated    [] Lethargic    Objective/Assessment:  Attention: sustained throughout. Orientation: n/a    Recall:  Picture detail recall- 60%, 70% c cues. Problem Solving/Reasoning:  Word deductions- 33%, 100% c cues. Name 8 items in category- 50%, 100% c cues. Other:   Pt. Sometimes needed repetition of questions.    Pt. Lacks insight into her deficits as she frequent states she is ready to go home today and needs to \"clean my whole house before my company comes next week\", despite the fact she is a min A x1 to transfer from wheelchair to bed and is dependent for almost all ADLs. Plan:  [x] Continue ST services    [] Discharge from ST:      Discharge recommendations: [] Inpatient Rehab   [] [place]   [] Outpatient Therapy  [] Follow up at trauma clinic   [] Other:       Treatment completed by: Zahira Campos. WENDY/SLP

## 2020-07-24 LAB
GLUCOSE BLD-MCNC: 105 MG/DL (ref 65–105)
GLUCOSE BLD-MCNC: 165 MG/DL (ref 65–105)
GLUCOSE BLD-MCNC: 92 MG/DL (ref 65–105)

## 2020-07-24 PROCEDURE — 6370000000 HC RX 637 (ALT 250 FOR IP): Performed by: STUDENT IN AN ORGANIZED HEALTH CARE EDUCATION/TRAINING PROGRAM

## 2020-07-24 PROCEDURE — 6360000002 HC RX W HCPCS: Performed by: STUDENT IN AN ORGANIZED HEALTH CARE EDUCATION/TRAINING PROGRAM

## 2020-07-24 PROCEDURE — 97129 THER IVNTJ 1ST 15 MIN: CPT

## 2020-07-24 PROCEDURE — 97130 THER IVNTJ EA ADDL 15 MIN: CPT

## 2020-07-24 PROCEDURE — 97116 GAIT TRAINING THERAPY: CPT

## 2020-07-24 PROCEDURE — 1180000000 HC REHAB R&B

## 2020-07-24 PROCEDURE — 97530 THERAPEUTIC ACTIVITIES: CPT

## 2020-07-24 PROCEDURE — 97535 SELF CARE MNGMENT TRAINING: CPT

## 2020-07-24 PROCEDURE — 97110 THERAPEUTIC EXERCISES: CPT

## 2020-07-24 PROCEDURE — 82947 ASSAY GLUCOSE BLOOD QUANT: CPT

## 2020-07-24 PROCEDURE — 99232 SBSQ HOSP IP/OBS MODERATE 35: CPT | Performed by: PHYSICAL MEDICINE & REHABILITATION

## 2020-07-24 PROCEDURE — 97542 WHEELCHAIR MNGMENT TRAINING: CPT

## 2020-07-24 RX ADMIN — ALLOPURINOL 100 MG: 100 TABLET ORAL at 20:55

## 2020-07-24 RX ADMIN — GABAPENTIN 300 MG: 300 CAPSULE ORAL at 20:55

## 2020-07-24 RX ADMIN — Medication 1 TABLET: at 10:30

## 2020-07-24 RX ADMIN — EZETIMIBE 10 MG: 10 TABLET ORAL at 20:55

## 2020-07-24 RX ADMIN — ENOXAPARIN SODIUM 40 MG: 40 INJECTION SUBCUTANEOUS at 10:31

## 2020-07-24 RX ADMIN — Medication 1 CAPSULE: at 16:52

## 2020-07-24 RX ADMIN — SPIRONOLACTONE 25 MG: 25 TABLET, FILM COATED ORAL at 10:29

## 2020-07-24 RX ADMIN — Medication 1 TABLET: at 20:55

## 2020-07-24 RX ADMIN — CLOPIDOGREL BISULFATE 75 MG: 75 TABLET ORAL at 10:28

## 2020-07-24 RX ADMIN — Medication 1 CAPSULE: at 10:28

## 2020-07-24 RX ADMIN — HYDROCHLOROTHIAZIDE 25 MG: 25 TABLET ORAL at 10:29

## 2020-07-24 RX ADMIN — ALLOPURINOL 100 MG: 100 TABLET ORAL at 10:28

## 2020-07-24 RX ADMIN — METOPROLOL TARTRATE 50 MG: 50 TABLET, FILM COATED ORAL at 20:55

## 2020-07-24 RX ADMIN — AMLODIPINE BESYLATE 10 MG: 10 TABLET ORAL at 10:28

## 2020-07-24 RX ADMIN — METOPROLOL TARTRATE 50 MG: 50 TABLET, FILM COATED ORAL at 10:28

## 2020-07-24 RX ADMIN — BENZONATATE 200 MG: 100 CAPSULE ORAL at 10:30

## 2020-07-24 ASSESSMENT — PAIN DESCRIPTION - ORIENTATION: ORIENTATION: RIGHT;LEFT

## 2020-07-24 ASSESSMENT — PAIN DESCRIPTION - FREQUENCY: FREQUENCY: CONTINUOUS

## 2020-07-24 ASSESSMENT — PAIN SCALES - GENERAL
PAINLEVEL_OUTOF10: 7
PAINLEVEL_OUTOF10: 7

## 2020-07-24 ASSESSMENT — PAIN DESCRIPTION - PAIN TYPE: TYPE: CHRONIC PAIN

## 2020-07-24 ASSESSMENT — PAIN DESCRIPTION - DESCRIPTORS: DESCRIPTORS: ACHING

## 2020-07-24 ASSESSMENT — PAIN DESCRIPTION - LOCATION: LOCATION: HAND

## 2020-07-24 NOTE — PROGRESS NOTES
Comprehensive Nutrition Assessment    Type and Reason for Visit:  Reassess    Nutrition Recommendations/Plan: Continue current diet and oral nutrition supplements. Attempt to honor preferences. Nutrition Assessment:  Pt is happy that diet has been advanced. PO intake is gradually improving, although she did not eat a lot today. Prefers chocolate or strawberry Ensure Enlive. Malnutrition Assessment:  Malnutrition Status: At risk for malnutrition (Comment)    Context:  Acute Illness     Findings of the 6 clinical characteristics of malnutrition:  Energy Intake:  7 - 50% or less of estimated energy requirements for 5 or more days  Weight Loss:  No significant weight loss     Body Fat Loss:  Unable to assess     Muscle Mass Loss:  Unable to assess    Fluid Accumulation:  No significant fluid accumulation     Strength:  Not Performed    Estimated Daily Nutrient Needs:  Energy (kcal):  1475 kcal based on [place]-St. Jeor (1.2 stress fractor); Weight Used for Energy Requirements:  Current     Protein (g):  74-80 gm based on 1.3-1.4 gm/kg; Weight Used for Protein Requirements:  Ideal          Nutrition Related Findings:  Edema: trace RLE, LLE. Bedside Swallow Study 7/22- Regular diet consistency. Wounds:  None       Current Nutrition Therapies:    Dietary Nutrition Supplements: Standard High Calorie Oral Supplement  DIET GENERAL; Anthropometric Measures:  · Height: 5' 5\" (165.1 cm)  · Current Body Weight: 166 lb (75.3 kg)   · Ideal Body Weight: 125 lbs; % Ideal Body Weight 132.8 %   · BMI: 27.6  · BMI Categories: Overweight (BMI 25.0-29. 9)       Nutrition Diagnosis:   · Inadequate protein-energy intake related to inadequate protein-energy intake, cognitive or neurological impairment as evidenced by intake 51-75%, intake 26-50%(full liquid diet, encephalopathy)    Nutrition Interventions:   Food and/or Nutrient Delivery:  Continue Current Diet, Continue Oral Nutrition Supplement  Nutrition Education/Counseling:  Education not indicated   Coordination of Nutrition Care:  Continued Inpatient Monitoring    Goals:  PO intakes are greater than 75% at meals       Nutrition Monitoring and Evaluation:   Food/Nutrient Intake Outcomes:  Food and Nutrient Intake, Supplement Intake  Physical Signs/Symptoms Outcomes:  Chewing or Swallowing, Biochemical Data, Skin, Weight, GI Status, Nausea or Vomiting     Discharge Planning: Too soon to determine     Some areas of assessment may be incomplete due to standard COVID-19 Precautions. Shellie Musa R.D., L.D.   Phone: [phone]

## 2020-07-24 NOTE — PROGRESS NOTES
Physical Therapy  [address] [facility]   Acute Rehabilitation Physical Therapy Progress Note    Date: 20  Patient Name: Harriet Perez       Room: 7506/3011-94  MRN: [id number]   Account: [de-identified]   : 1940  (76 y.o.) Gender: female     Referring Practitioner: Dr. Braeden Evans  Diagnosis: Acute CVA  Past Medical History:  has a past medical history of Arthritis, Cancer ([facility] CKD (chronic kidney disease) stage 4, GFR 15-29 ml/min ([facility] [address] [facility] COPD (chronic obstructive pulmonary disease) ([facility] [address] CVA (cerebral vascular accident) ([facility] [address] Gout, Hyperlipidemia, Hypertension, Left renal atrophy, and Other abnormality of urination(788.69). Past Surgical History:   has a past surgical history that includes Tonsillectomy; Varicose vein surgery; Hammer toe surgery; Hysterectomy; Carotid endarterectomy; Appendectomy; joint replacement; bladder suspension; joint replacement; Breast surgery (Left); Colonoscopy; back surgery; Upper gastrointestinal endoscopy (N/A, 2018); and Colonoscopy (N/A, 2018). Additional Pertinent Hx: 71-year-old female with history of hypertension admitted with altered mental status. In ER ahe became unresponsive and required intubation. acute hypoxic respiratory failure secondary to hypertensive emergency, was eventually extubated, advancing diet, diagnosed with posterior reversible encephalopathy on MRI. [facility] Restrictions/Precautions  Restrictions/Precautions: Fall Risk;General Precautions  Required Braces or Orthoses?: No  Implants present? : Metal implants(R JAILENE, L TKA, neck and back surgery)  Position Activity Restriction  Other position/activity restrictions: up with assistance    Subjective: In AM, Pt,  state Pt will be leaving today, that  is able to care for her adequately at home. In PM, Pt states she will be staying until Monday. Comments: Pleasant and cooperative.  Directed Pt,  to speak with physician regarding early discharge request.     Vital Signs  BP Location: Right Arm  Level of Consciousness: Alert  Patient Currently in Pain: Yes  Pain Assessment: 0-10  Pain Level: 7  Response to Pain Intervention: None     Oxygen Therapy  O2 Device: None (Room air)  Patient Observation  Observations: AM & PM, Pt has strong urine smell eminating from her, appears more confused by simple instructions; nursing notified; Pt being assessed for UTI. Bed Mobility  Bridging: Stand by assistance  Rolling: Stand by assistance;Rolling Right;Rolling Left  Supine to Sit: Moderate assistance(Trunk assist, with cueing for UE self-assist/little return)  Sit to Supine: Contact guard assistance(Guarding LEs against gravity)  Scooting: Stand by assistance(hips to edge of mat)    Transfers:  Sit to Stand: Minimal Assistance(Demonstrates good hand placement today. )  Stand to sit: Minimal Assistance(Cues for hand placement, fair return)  Bed to Chair: Contact guard assistance(no device, directional cues)  Stand pivot transfers: Contact guard assistance(no device, directional cues)  Comment: Rollator, except where noted, as Pt,  state that's what she uses at home & prefers. Safety cues required for transfers using Rollator with immediate return but no recall. Ambulation 1  Surface: level tile  Device: Rollator  Assistance: Minimal assistance  Quality of Gait: Straighter path noted, increased celeste and less forward flexion of posture noted with Rollator than walker; forward head, flexed hips, narrow base of support continue. Slight but fleeting return to postural cues. Gait Deviations: Slow Celeste;Decreased step height;Decreased head and trunk rotation  Distance: 61' x2 AM with seated rest between; 25' + 10' + 15' PM with seated rest between each leg of ambulation. Comments: Safety cues for seated rest breaks using Rollator with no return; multiple attempts to & methods of education, Pt indicating \"I'll try to get it tomorrow. \" Pt also requires safety cues for turning to sit in wheelchair using Rollator. Afternoon ambulation, pt required cue to slow pace for safety, with Pt indicating she was hurrying to get back to wheelchair; safe alternative of seated rest using Rollator offered with Pt accepting. Stairs/Curb  Stairs?: Yes  Stairs  # Steps : 1  Stairs Height: 4\"  Rails: None  Device: (Rollator)  Assistance: Contact guard assistance  Comment: Educated pt on safety using Rollator and sequencing for curb step, with Pt demonstrating good return with sequential cueing during process. Wheelchair Activities  Wheelchair Type: Standard  Wheelchair Cushion: Pressure Relieving  Propulsion: Yes  Propulsion 1  Propulsion: Manual  Level: Level Tile(+ ramp)  Method: LUE;RUE  Level of Assistance: Stand by assistance  Description/ Details: Straight path, 90* turns in either direction, with good recall of opposing UE turn technique. Distance: 130'    BALANCE Posture: Poor(Standing)  Sitting - Static: Good(edge of mat, no UE or back support)  Sitting - Dynamic: Good;-(edge of mat, no UE or back support)  Standing - Static: Fair;-(Rollator)  Standing - Dynamic: Fair;-(Rollator)    EXERCISES    Other exercises?: Yes  Other exercises 2: Seated Bilat. LE exercises, 10-15x each, 1#/lime (minimal) resistance band  Other exercises 4: Supine Bilat.  LE exercises, 15x each, active ROM    Activity Tolerance: Patient limited by cognitive status, Patient limited by pain    Patient Education  New Education Provided:  Rollator safety  Learner:patient and significant other  Method: demonstration and explanation       Outcome: demonstrated understanding and needs reinforcement     Current Treatment Recommendations: Strengthening, Balance Training, Functional Mobility Training, Transfer Training, Endurance Training, Gait Training, Equipment Evaluation, Education, & procurement, Patient/Caregiver Education & Training, Safety Education & Training, Wheelchair Mobility Training, Home Exercise Program, Pain Management    Conditions Requiring Skilled Therapeutic Intervention  Body structures, Functions, Activity limitations: Decreased functional mobility ; Decreased ADL status; Decreased strength;Decreased safe awareness;Decreased cognition;Decreased balance;Decreased endurance;Decreased posture; Increased pain  Assessment: Rollator introduces per prior level of function; Pt requires safety cues for use of brakes PRN.  indicates he will always be with her. Barriers to Learning: cognition, safety awareness  REQUIRES PT FOLLOW UP: Yes  Discharge Recommendations: 24 hour supervision or assist;Patient would benefit from continued therapy after discharge    Goals  Short term goals  Time Frame for Short term goals: 5-7 days  Short term goal 1: Pt to demonstrate CGA/SBA for bed mobility. Short term goal 2: Pt to demonstrate min x1 transfers. Short term goal 3: Pt to amb 50' min x1 with improved gait mechanics and posture. Short term goal 4: pt to tolerate standing 1-2 minutes with 1 assist and device. Short term goal 5: Pt to propel w/c 50'-75' SBA. Short term goal 6: Pt to improve seated posture to GOOD. Short term goal 7: Pt to improve sitting balance to GOOD. Long term goals  Time Frame for Long term goals : by D/C  Long term goal 1: Pt to demonstrate bed mobility wtih supervision. Long term goal 2: Pt to demonstrate SBA for transfers from varied surfaces. Long term goal 3: Pt to amb 76' SBA/CGA with device with improve gait mechanics and posture, on varied terrain including ramp. Long term goal 4: Pt to propel w/c 100' including turns and varied terrain Mod I.  Long term goal 5: Pt to improve BLE strength by 1/2 MMG. Long term goal 6: Pt to tolerate standing 4-5 minutes with device, 1 assist if needed. Long term goal 7: Pt to perform 5xSTS in 60 seconds or less for improved strength/endurance.   Long term goal 8: Pt to improve standing balance to FAIR+ to reduce fall risk.  Long term goal 9: Pt to demo 1 platform step with RW min x1.      07/24/20 0837 07/24/20 1220   PT Individual Minutes   Time In [id number]   Time Out 7576 4088   Minutes 51 37   PT Concurrent Minutes   Time In 6591  --    Time Out 0935  --    Minutes 8  --      Electronically signed by Denise Alvarez, PTA on 7/24/20 at 2:20 PM EDT

## 2020-07-24 NOTE — PLAN OF CARE
Problem: Falls - Risk of:  Goal: Will remain free from falls  Description: Will remain free from falls  Outcome: Ongoing  Note: Pt educated on and verbalizes understanding of fall risks. Pt wearing nonskid stockings, uses assistive devices appropriately, call light within reach, spouse at bedside, encouraged to ask for assistance. Bed alarm for pt safety. Will continue to monitor and intervene as needed. Problem: Skin Integrity:  Goal: Will show no infection signs and symptoms  Description: Will show no infection signs and symptoms  Outcome: Ongoing  Note: Pt educated on risks for impaired skin integrity. Pt assisted in keeping skin clean and dry, assisted and prompted to reposition frequently. No s/s of infection or new breakdown noted this shift. Zinc paste applied as needed. Will continue to monitor and intervene as needed. Problem: Pain:  Goal: Control of acute pain  Description: Control of acute pain  Outcome: Ongoing  Note: Pt denies any acute pain, reports minor chronic pains. Pt denies requiring any medications for pain. Non-pharmacological interventions discussed, pt accepting. Will continue to monitor and assist as needed.

## 2020-07-24 NOTE — PLAN OF CARE
Nutrition Problem #1: Inadequate protein-energy intake  Intervention: Food and/or Nutrient Delivery: Continue Current Diet, Continue Oral Nutrition Supplement  Nutritional Goals: PO intakes are greater than 75% at meals

## 2020-07-24 NOTE — PROGRESS NOTES
Speech Language Pathology  Speech Language Pathology  [facility]    Cognitive Treatment Note    Date: 7/24/2020  Patients Name: Adela Anderson  MRN: [id number]  Diagnosis:   Patient Active Problem List   Diagnosis Code    Breast cancer [facility]) C50.919    Renal cyst N28.1    Lumbar degenerative disc disease M51.36    Arthropathy of lumbar facet joint M47.816    Failed back syndrome of lumbar spine M96.1    Lumbar spondylosis M47.816    Sacroiliitis ([address] M46.1    Status post lumbar spinal fusion Z98.1    TIA (transient ischemic attack) G45.9    Anemia D64.9    Dyspnea R06.00    COPD with acute exacerbation ([address] J44.1    Acute kidney injury superimposed on CKD ([address] N17.9, N18.9    Aspiration pneumonia ([address] J69.0    CKD (chronic kidney disease) stage 4, GFR 15-29 ml/min ([address] N18.4    Left renal atrophy N26.1    Severe malnutrition ([address] E43    Acute cystitis without hematuria N30.00    Disorientation R41.0    Hypertensive emergency I16.1    Hypertension I10    Essential hypertension I10    TIMO (acute kidney injury) ([address] N17.9    Acute respiratory failure ([address] J96.00    Acute encephalopathy G93.40    Chronic kidney disease (CKD), stage III (moderate) ([address] N18.3    Seizure ([address] R56.9    Altered mental status R41.82    Encephalopathy G93.40       Pain: 6/10 (hands)    Cognitive Treatment    Treatment time:  3122-2139    Subjective: [x] Alert [x] Cooperative     [] Confused     [] Agitated    [] Lethargic    Objective/Assessment:  Attention: sustained throughout. Recall:  Picture detail recall- 43%, 57% c cues. 4 word scrambled sentences- 63%, 100% c cues. Problem Solving/Reasoning:    Name 7 items in category- 36%, 79% c cues      Other:   Pt. Sometimes needed repetition of questions and long response latency demonstrated.        Plan:  [x] Continue ST services    [] Discharge from ST:      Discharge recommendations: [] Inpatient Rehab   [] [place]   [] Outpatient Therapy  [] Follow up at trauma clinic   [] Other:       Treatment completed by: Dot Richmond A.CCC/SLP

## 2020-07-24 NOTE — PROGRESS NOTES
Assistance(Cues for hand placement)  Stand to sit: Minimal Assistance(Cues for hand placement, good return)  Bed to Chair: Moderate assistance(no device, directional cues)  Stand Pivot Transfers: Moderate Assistance(no device, directional cues)  Comment: Rolling walker, except where noted  Ambulation  Ambulation?: Yes  Ambulation 1  Surface: level tile  Device: Rolling Walker  Other Apparatus: Wheelchair follow  Assistance: Minimal assistance  Quality of Gait: Forward head/trunk, flexed hips, narrow LUIS with some scissoring observed. Veers R.   Gait Deviations: Slow Renu, Decreased step height, Decreased step length, Deviated path  Distance: 25'x1, 36'x1 and 30'x1  Comments: Pt requiring continuous cueing to improve posture, increase reliance on UEs, move withing walker's base of support. Seated rest breaks between amb d/t fatigue. Surface: level tile  Ambulation 1  Surface: level tile  Device: Rolling Walker  Other Apparatus: Wheelchair follow  Assistance: Minimal assistance  Quality of Gait: Forward head/trunk, flexed hips, narrow LUIS with some scissoring observed. Veers R.   Gait Deviations: Slow Renu, Decreased step height, Decreased step length, Deviated path  Distance: 25'x1, 36'x1 and 30'x1  Comments: Pt requiring continuous cueing to improve posture, increase reliance on UEs, move withing walker's base of support. Seated rest breaks between amb d/t fatigue. OT:  ADL  Feeding: Setup, Increased time to complete(per report. )  Grooming: Moderate assistance, Setup, Increased time to complete(hair grooming A d/t increased tangles, SBA for other tasks. )  UE Bathing: Stand by assistance, Setup, Verbal cueing, Increased time to complete(seated sinkside. )  LE Bathing: None(pt declined )  UE Dressing: Minimal assistance, Setup, Verbal cueing, Increased time to complete  LE Dressing:  Moderate assistance, Setup, Increased time to complete  Toileting: None  Additional Comments: Pt required cueing for proper sequencing with self care tasks this date; initiated donning shirt before completing upper body bathing at sink. Balance  Sitting Balance: Contact guard assistance(seated at edge of bed. )  Standing Balance: Minimal assistance(contact guard-minimal assistance. )   Standing Balance  Time: AM: ~1 min x 2 PM: <1 min x2  Activity: AM: Lower body dressing PM: stand pivot transfers. Comment: Verbal and tactile cues for proper hand placement for safety. Functional Mobility  Functional - Mobility Device: Rolling Walker  Activity: To/from bathroom(from EOB to toilet)  Assist Level: Minimal assistance  Functional Mobility Comments: Min A for RW management and safety     Bed mobility  Bridging: Stand by assistance  Rolling to Left: Minimal assistance  Rolling to Right: Minimal assistance  Supine to Sit: Moderate assistance  Sit to Supine: Moderate assistance  Scooting: Stand by assistance(hips to edge of mat)  Comment: HOB flat. Cues for positioning in bed including bridging. Slowed movements. Waffle mattress on and inflated. Transfers  Stand Pivot Transfers: Moderate assistance(EOB>w/c towards R side. )  Sit to stand: Moderate assistance  Stand to sit: Minimal assistance  Transfer Comments: min/Mod VCs for hand placement and safety during transfers   Toilet Transfers  Toilet - Technique: Ambulating  Equipment Used: Grab bars  Toilet Transfer: Moderate assistance  Toilet Transfers Comments: w/RW     Shower Transfers  Shower - Transfer From: Jeni Hansen - Transfer Type: To and From  Shower - Transfer To: Transfer tub bench  Shower - Technique: Ambulating  Shower Transfers: Moderate assistance  Shower Transfers Comments: transfer to w/c from TTB due to fatigue       SPEECH:  Subjective: [x]? Alert     [x]? Cooperative     []? Confused     []? Agitated    []?  Lethargic     Objective/Assessment:  Attention: sustained throughout.     Recall:  Picture detail recall- 75%     Problem Solving/Reasoning:  Problem solving picture cards (ID problem in picture, identify solution)- 80%, 100% c cues. State word out of list of 4 that does not belong- 40%, 60% c cues. Name 7 items in category- 79%, 100% c cues       Other:   Pt. Sometimes needed repetition of questions and long response latency demonstrated. Pt. Utilizing circumlocution as word finding strategy. Pt.  present and reports he would like to take pt. Home today. He reports he feels her cognition is back to baseline and is able to provide 24 hour care. Objective:  BP (!) 141/66   Pulse 76   Temp 98.6 °F (37 °C) (Oral)   Resp 18   Ht 5' 5\" (1.651 m)   Wt 166 lb (75.3 kg)   SpO2 97%   BMI 27.62 kg/m²       GEN: Well developed, well nourished, in NAD  HEENT:  NCAT.  PERRL.  EOMI.  Mucous membranes pink and moist.   PULM:  Clear to ausculation. No rales or rhonchi. Respirations WNL and unlabored. CV:  Regular rate rhythm.  No murmurs or gallops. GI:  Abdomen soft. Nontender. Non-distended.  BS + and equal.    NEUROLOGICAL: A&O x3 with continued poor insight and poor carryover/memory.  Sensation intact to light touch. MSK:  Functional ROM all extremities. Motor testing 4+/5 key muscles all extremities.    SKIN: Warm dry and intact. Good turgor. EXTREMITIES:  No calf tenderness to palpation. No edema BLEs. PSYCH: Mood WNL. Appropriately interactive. Affect WNL.     Diagnostics:     CBC: No results for input(s): WBC, RBC, HGB, HCT, MCV, RDW, PLT in the last 72 hours. BMP: No results for input(s): NA, K, CL, CO2, PHOS, BUN, CREATININE, GLUCOSE in the last 72 hours. Invalid input(s): CA  BNP: No results for input(s): BNP in the last 72 hours. PT/INR: No results for input(s): PROTIME, INR in the last 72 hours. APTT: No results for input(s): APTT in the last 72 hours. CARDIAC ENZYMES: No results for input(s): CKMB, CKMBINDEX, TROPONINT in the last 72 hours.     Invalid input(s): CKTOTAL;3  FASTING LIPID PANEL:  Lab Results   Component Value Date CHOL 106 03/17/2019    HDL 45 03/17/2019    TRIG 55 03/17/2019     LIVER PROFILE: No results for input(s): AST, ALT, ALB, BILIDIR, BILITOT, ALKPHOS in the last 72 hours. Current Medications:   Current Facility-Administered Medications: cloNIDine (CATAPRES) 0.2 MG/24HR 1 patch, 1 patch, Transdermal, Weekly  enoxaparin (LOVENOX) injection 40 mg, 40 mg, Subcutaneous, Daily  allopurinol (ZYLOPRIM) tablet 100 mg, 100 mg, Oral, BID  amLODIPine (NORVASC) tablet 10 mg, 10 mg, Oral, Daily  benzonatate (TESSALON) capsule 200 mg, 200 mg, Oral, Daily  calcium carbonate-vitamin D (CALTRATE) 600-400 MG-UNIT per tab 1 tablet, 1 tablet, Oral, BID  gabapentin (NEURONTIN) capsule 300 mg, 300 mg, Oral, Nightly  lactobacillus (CULTURELLE) capsule 1 capsule, 1 capsule, Oral, TID WC  acetaminophen (TYLENOL) tablet 650 mg, 650 mg, Oral, Q4H PRN  clopidogrel (PLAVIX) tablet 75 mg, 75 mg, Oral, Daily  ezetimibe (ZETIA) tablet 10 mg, 10 mg, Oral, Nightly  metoprolol tartrate (LOPRESSOR) tablet 50 mg, 50 mg, Oral, BID  spironolactone (ALDACTONE) tablet 25 mg, 25 mg, Oral, Daily **AND** hydroCHLOROthiazide (HYDRODIURIL) tablet 25 mg, 25 mg, Oral, Daily  senna (SENOKOT) tablet 8.6 mg, 1 tablet, Oral, Daily PRN  bisacodyl (DULCOLAX) suppository 10 mg, 10 mg, Rectal, Daily PRN  polyethylene glycol (GLYCOLAX) packet 17 g, 17 g, Oral, Daily PRN      Impression/Plan:   Impaired ADLs, gait, and mobility due to:      1. PRES Encephalopathy:  PT/OT/SLP for gait, mobility, strengthening, endurance, ADLs, cognition, and self care. Tylenol prn headache. Cognition improving with speech therapy  2. Acute Respiratory Failure: Resolved  3. HTN/hyperlipidemia/CAD: on amlodipine, clonidine patch, HCTZ, metoprolol tartrate, spironolactone, ezetimibe, plavix. IM note from acute recommends workup for possible renal artery stenosis - renal US done 7/13. Increased clonidine dose 7/23 - BP improved. 4. CKD III: Creatinine stable.    5. Aspiration Pneumonia:

## 2020-07-24 NOTE — PROGRESS NOTES
[address] [facility]    ACUTE REHABILITATION OCCUPATIONAL THERAPY  DAILY NOTE    Date: 20  Patient Name: Vidal Chavarria      Room: 5937/0760-13    MRN: [id number]   : 1940  (79 y.o.)  Gender: female   Referring Practitioner: Dr. Charles   Diagnosis: Acute CVA       Restrictions  Restrictions/Precautions: Fall Risk, General Precautions  Implants present? : Metal implants(R JAILENE, L TKA, neck and back surgery )  Other position/activity restrictions: up with assistance  Required Braces or Orthoses?: No    Subjective  Subjective: Pt  states she would like to take pt home today, he feels she is at mostly baseline for all her care. He states he has been taking care of her for 6 months. Comments: Pt  Ryan Courser participates in pt ADL session to assess safety and tolerance with returning home as pt  caregiver. Education for safety and compensatory techniques throughout. Patient Currently in Pain: Yes  Pain Level: 7  Pain Location: Hand  Pain Orientation: Right;Left  Restrictions/Precautions: Fall Risk;General Precautions  Overall Orientation Status: Impaired  Orientation Level: Oriented to person;Oriented to place; Disoriented to situation;Oriented to time     Pain Assessment  Pain Assessment: 0-10  Pain Level: 7  Pain Type: Chronic pain  Pain Location: Hand  Pain Orientation: Right, Left  Pain Descriptors: Aching  Pain Frequency: Continuous    Objective  Cognition  Overall Cognitive Status: Impaired  Arousal/Alertness: Delayed responses to stimuli  Attention Span: Attends with cues to redirect  Memory: Decreased recall of recent events;Decreased short term memory  Following Commands:  Follows one step commands with increased time  Safety Judgement: Decreased awareness of need for safety  Awareness of Errors: Decreased awareness of errors  Insights: Decreased awareness of deficits  Sequencing and Organization: Assistance required to implement solutions;Assistance required to generate solutions;Assistance required to identify errors made  Perception  Overall Perceptual Status: Impaired  Initiation: Cues to initiate tasks  Motor Planning: Cues to use objects appropriately  Balance  Sitting Balance: Stand by assistance(at edge of bed, supervision if in wheelchair)  Standing Balance: Minimal assistance  Bed mobility  Supine to Sit: Moderate assistance  Sit to Supine: Moderate assistance  Comment: pt spouse assist with bed mobility in AM for supine>sit. Head of bed elevated. Transfers  Stand Pivot Transfers: Moderate assistance  Sit to stand: Moderate assistance  Stand to sit: Minimal assistance  Transfer Comments: Verbal cues for hand placement and safety during transfers  Standing Balance  Time: AM: <1 min x2, ~1 min x2, ~2 min PM: <1 min x 2  Activity: AM: ADL tasks and functional trasnfers PM: functional tranfers  Comment: Verbal and tactile cues for proper hand placement for safety. Toilet Transfers  Toilet - Technique: Stand pivot  Equipment Used: Grab bars  Toilet Transfer: Minimal assistance; Moderate assistance  Toilet Transfers Comments: cueing for safe technique     Type of ROM/Therapeutic Exercise  Type of ROM/Therapeutic Exercise: AROM  Comment: 1# wrist weights donned to B wrists during engagement in functional task of reaching for and folding towels at tabletop. Writer educ pt and spouse and safe ways to allow pt participation in housekeeping tasks at home while also improving strengthening. Pt spouse states they have wrist weights at home however he is unsure of weight, motivated to purchase weights to pt tolerance. Additional Activities: Other  Additional Activities: Pt engaged in simple meal prep task cards to address basic tolerance with meal prep understanding (making pb&J sandwich)-pt unable to place cards in proper sequential order despite decreasing just right challenge throughout.  Pt also engaged in bead threading task to address sequencing and B fine motor coordination skills-pt initial requires minimal-moderate cueing following visual card for first set of beads however pt became very confused with continuation of bead pattern following second set of beads with visual bead pattern, increasing verbal cues with eventually stopping activity. Pt also attempted to thread visual pattern handout instead of actual beads as noted by trying to find hole in cardboard. ADL  Grooming: Setup;Verbal cueing; Increased time to complete;Stand by assistance(face washing and oral care. Hair very knotted-pt chose not )  UE Bathing: Setup;Verbal cueing; Increased time to complete;Contact guard assistance;Minimal assistance(standing sinkside as reported thats how she completes at home and wants to trial)  LE Bathing: None(pt declined. )  UE Dressing: Minimal assistance;Setup;Verbal cueing; Increased time to complete(Assist to doff over head. )  LE Dressing: Moderate assistance;Setup; Increased time to complete(per report however questionable. )  Toileting: Minimal assistance(pt manages pants c partial assist, completes hygiene while seated)        Assessment  Performance deficits / Impairments: Decreased functional mobility ; Decreased ADL status; Decreased strength;Decreased safe awareness;Decreased cognition;Decreased ROM; Decreased endurance;Decreased sensation;Decreased balance;Decreased fine motor control;Decreased coordination;Decreased posture;Decreased high-level IADLs  Discharge Recommendations: 24 hour supervision or assist;Home with Home health OT  Activity Tolerance: Patient Tolerated treatment well  Safety Devices in place: Yes  Type of devices: Call light within reach;Gait belt;Patient at risk for falls;Nurse notified; Left in bed           Plan  Plan  Times per week: 5-7  Times per day: Twice a day  Current Treatment Recommendations: Self-Care / ADL, Home Management Training, Strengthening, Balance Training, Functional Mobility Training, Endurance Training, Wheelchair Mobility Training, Cognitive Reorientation, Safety Education & Training, Patient/Caregiver Education & Training, Equipment Evaluation, Education, & procurement, Cognitive/Perceptual Training  Patient Goals   Patient goals : \"To get so I can do my jobs. ..you know, take care of my home. 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 I don't want everything dumped on him (spouse) because he's too nice a man, and he does it, but it's hard for him. \"  Short term goals  Time Frame for Short term goals: One week  Short term goal 1: Pt will perform toileting tasks and LBD with Mod A. Short term goal 2: Pt and Pt's caregiver will V/D Good understanding of AE/DME/modified techniques for increased IND with self-care and mobility. Short term goal 3: Pt will stand for 4+ minutes with 0-1 UE support, CGA, and no LOB while engaging in functional activity of choice. Short term goal 4: Pt will perform functional transfers and mobility with Min A, least restrictive mobility device, and Fair safety. Short term goal 5: Pt will actively participate in 30+ minutes of therapeutic exercise/functional activities to promote increased IND with self-care and mobility. Long term goals  Time Frame for Long term goals : By discharge  Long term goal 1: Pt will perform BADLs with SUP and Fair safety. Long term goal 2: Pt will perform functional mobility and transfers during self-care with SUP, least restrictive mobility device, and Fair safety. Long term goal 3: Pt will perform simple meal prep/light housekeeping with SUP and Fair safety. Long term goal 4: Pt will stand for 8+ minutes with 0-1 UE support, SUP, and no LOB while engaging in functional activity of choice. Long term goal 5: Pt and Pt's caregiver will V/D Good understanding of Fall Prevention Strategies for increased IND with self-care and mobility.   Long term goals 6: Pt will perform self-care with improved bilateral FMC/GMC as supported by a 7 second improvement in [facility]) and 5 block improvement on box and block assessment ([facility]).         07/24/20 1614 07/24/20 1616   OT Individual Minutes   Time In 7699 1347   Time Out 1123 1418   Minutes 38 31     Electronically signed by Latrelle Moritz, COTA/L on 7/24/20 at 4:37 PM EDT

## 2020-07-24 NOTE — PROGRESS NOTES
Speech Language Pathology  Speech Language Pathology  [facility]    Cognitive Treatment Note    Date: 7/24/2020  Patients Name: Gianluca Cool  MRN: [id number]  Diagnosis:   Patient Active Problem List   Diagnosis Code    Breast cancer [facility]) C50.919    Renal cyst N28.1    Lumbar degenerative disc disease M51.36    Arthropathy of lumbar facet joint M47.816    Failed back syndrome of lumbar spine M96.1    Lumbar spondylosis M47.816    Sacroiliitis ([address] M46.1    Status post lumbar spinal fusion Z98.1    TIA (transient ischemic attack) G45.9    Anemia D64.9    Dyspnea R06.00    COPD with acute exacerbation ([address] J44.1    Acute kidney injury superimposed on CKD ([address] N17.9, N18.9    Aspiration pneumonia ([address] J69.0    CKD (chronic kidney disease) stage 4, GFR 15-29 ml/min ([address] N18.4    Left renal atrophy N26.1    Severe malnutrition ([address] E43    Acute cystitis without hematuria N30.00    Disorientation R41.0    Hypertensive emergency I16.1    Hypertension I10    Essential hypertension I10    TIMO (acute kidney injury) ([address] N17.9    Acute respiratory failure ([address] J96.00    Acute encephalopathy G93.40    Chronic kidney disease (CKD), stage III (moderate) ([address] N18.3    Seizure ([address] R56.9    Altered mental status R41.82    Encephalopathy G93.40       Pain: pt. denies    Cognitive Treatment    Treatment time:  0935-1005    Subjective: [x] Alert [x] Cooperative     [] Confused     [] Agitated    [] Lethargic    Objective/Assessment:  Attention: sustained throughout. Recall:  Picture detail recall- 75%    Problem Solving/Reasoning:  Problem solving picture cards (ID problem in picture, identify solution)- 80%, 100% c cues. State word out of list of 4 that does not belong- 40%, 60% c cues. Name 7 items in category- 79%, 100% c cues      Other:   Pt. Sometimes needed repetition of questions and long response latency demonstrated.    Pt. Utilizing circumlocution as word finding strategy. Pt.  present and reports he would like to take pt. Home today. He reports he feels her cognition is back to baseline and is able to provide 24 hour care. Plan:  [x] Continue ST services    [] Discharge from ST:      Discharge recommendations: [] Inpatient Rehab   [] [place]   [] Outpatient Therapy  [] Follow up at trauma clinic   [] Other:       Treatment completed by: Sheryn Lundborg. WENDY/SLP

## 2020-07-25 LAB
-: NORMAL
AMORPHOUS: NORMAL
BACTERIA: NORMAL
BILIRUBIN URINE: NEGATIVE
CASTS UA: NORMAL /LPF
COLOR: YELLOW
COMMENT UA: ABNORMAL
CRYSTALS, UA: NORMAL /HPF
EPITHELIAL CELLS UA: NORMAL /HPF
GLUCOSE BLD-MCNC: 128 MG/DL (ref 65–105)
GLUCOSE BLD-MCNC: 134 MG/DL (ref 65–105)
GLUCOSE BLD-MCNC: 173 MG/DL (ref 65–105)
GLUCOSE BLD-MCNC: 76 MG/DL (ref 65–105)
GLUCOSE URINE: NEGATIVE
KETONES, URINE: NEGATIVE
LEUKOCYTE ESTERASE, URINE: ABNORMAL
MUCUS: NORMAL
NITRITE, URINE: POSITIVE
OTHER OBSERVATIONS UA: NORMAL
PH UA: 7.5 (ref 5–8)
PROTEIN UA: ABNORMAL
RBC UA: NORMAL /HPF
RENAL EPITHELIAL, UA: NORMAL /HPF
SPECIFIC GRAVITY UA: 1.02 (ref 1–1.03)
TRICHOMONAS: NORMAL
TURBIDITY: ABNORMAL
URINE HGB: ABNORMAL
UROBILINOGEN, URINE: NORMAL
WBC UA: NORMAL /HPF
YEAST: NORMAL

## 2020-07-25 PROCEDURE — 6370000000 HC RX 637 (ALT 250 FOR IP): Performed by: STUDENT IN AN ORGANIZED HEALTH CARE EDUCATION/TRAINING PROGRAM

## 2020-07-25 PROCEDURE — 99232 SBSQ HOSP IP/OBS MODERATE 35: CPT | Performed by: PHYSICAL MEDICINE & REHABILITATION

## 2020-07-25 PROCEDURE — 97530 THERAPEUTIC ACTIVITIES: CPT

## 2020-07-25 PROCEDURE — 97116 GAIT TRAINING THERAPY: CPT

## 2020-07-25 PROCEDURE — 87186 SC STD MICRODIL/AGAR DIL: CPT

## 2020-07-25 PROCEDURE — 1180000000 HC REHAB R&B

## 2020-07-25 PROCEDURE — 97129 THER IVNTJ 1ST 15 MIN: CPT

## 2020-07-25 PROCEDURE — 97130 THER IVNTJ EA ADDL 15 MIN: CPT

## 2020-07-25 PROCEDURE — 97542 WHEELCHAIR MNGMENT TRAINING: CPT

## 2020-07-25 PROCEDURE — 97110 THERAPEUTIC EXERCISES: CPT

## 2020-07-25 PROCEDURE — 6360000002 HC RX W HCPCS: Performed by: STUDENT IN AN ORGANIZED HEALTH CARE EDUCATION/TRAINING PROGRAM

## 2020-07-25 PROCEDURE — 82947 ASSAY GLUCOSE BLOOD QUANT: CPT

## 2020-07-25 PROCEDURE — 87086 URINE CULTURE/COLONY COUNT: CPT

## 2020-07-25 PROCEDURE — 6370000000 HC RX 637 (ALT 250 FOR IP): Performed by: PHYSICAL MEDICINE & REHABILITATION

## 2020-07-25 PROCEDURE — 81001 URINALYSIS AUTO W/SCOPE: CPT

## 2020-07-25 PROCEDURE — 87077 CULTURE AEROBIC IDENTIFY: CPT

## 2020-07-25 RX ORDER — NITROFURANTOIN 25; 75 MG/1; MG/1
100 CAPSULE ORAL EVERY 12 HOURS SCHEDULED
Status: DISCONTINUED | OUTPATIENT
Start: 2020-07-25 | End: 2020-07-27

## 2020-07-25 RX ADMIN — Medication 1 TABLET: at 21:00

## 2020-07-25 RX ADMIN — ALLOPURINOL 100 MG: 100 TABLET ORAL at 21:00

## 2020-07-25 RX ADMIN — AMLODIPINE BESYLATE 10 MG: 10 TABLET ORAL at 09:04

## 2020-07-25 RX ADMIN — Medication 1 CAPSULE: at 17:13

## 2020-07-25 RX ADMIN — EZETIMIBE 10 MG: 10 TABLET ORAL at 21:00

## 2020-07-25 RX ADMIN — HYDROCHLOROTHIAZIDE 25 MG: 25 TABLET ORAL at 09:04

## 2020-07-25 RX ADMIN — SPIRONOLACTONE 25 MG: 25 TABLET, FILM COATED ORAL at 09:04

## 2020-07-25 RX ADMIN — ALLOPURINOL 100 MG: 100 TABLET ORAL at 09:04

## 2020-07-25 RX ADMIN — Medication 1 CAPSULE: at 11:56

## 2020-07-25 RX ADMIN — METOPROLOL TARTRATE 50 MG: 50 TABLET, FILM COATED ORAL at 09:04

## 2020-07-25 RX ADMIN — BENZONATATE 200 MG: 100 CAPSULE ORAL at 09:05

## 2020-07-25 RX ADMIN — GABAPENTIN 300 MG: 300 CAPSULE ORAL at 21:00

## 2020-07-25 RX ADMIN — CLOPIDOGREL BISULFATE 75 MG: 75 TABLET ORAL at 09:04

## 2020-07-25 RX ADMIN — Medication 1 TABLET: at 09:05

## 2020-07-25 RX ADMIN — METOPROLOL TARTRATE 50 MG: 50 TABLET, FILM COATED ORAL at 21:00

## 2020-07-25 RX ADMIN — Medication 1 CAPSULE: at 08:10

## 2020-07-25 RX ADMIN — ENOXAPARIN SODIUM 40 MG: 40 INJECTION SUBCUTANEOUS at 09:04

## 2020-07-25 RX ADMIN — NITROFURANTOIN MONOHYDRATE/MACROCRYSTALLINE 100 MG: 25; 75 CAPSULE ORAL at 21:00

## 2020-07-25 ASSESSMENT — PAIN SCALES - GENERAL
PAINLEVEL_OUTOF10: 0

## 2020-07-25 ASSESSMENT — PAIN SCALES - WONG BAKER: WONGBAKER_NUMERICALRESPONSE: 2

## 2020-07-25 ASSESSMENT — PAIN DESCRIPTION - DESCRIPTORS: DESCRIPTORS: ACHING;BURNING

## 2020-07-25 ASSESSMENT — PAIN DESCRIPTION - PAIN TYPE: TYPE: CHRONIC PAIN;ACUTE PAIN

## 2020-07-25 NOTE — PROGRESS NOTES
Speech Language Pathology  Speech Language Pathology  [facility]    Cognitive Treatment Note    Date: 7/25/2020  Patients Name: Dick Gonzalez  MRN: [id number]  Diagnosis:   Patient Active Problem List   Diagnosis Code    Breast cancer [facility]) C50.919    Renal cyst N28.1    Lumbar degenerative disc disease M51.36    Arthropathy of lumbar facet joint M47.816    Failed back syndrome of lumbar spine M96.1    Lumbar spondylosis M47.816    Sacroiliitis ([address] M46.1    Status post lumbar spinal fusion Z98.1    TIA (transient ischemic attack) G45.9    Anemia D64.9    Dyspnea R06.00    COPD with acute exacerbation ([address] J44.1    Acute kidney injury superimposed on CKD ([address] N17.9, N18.9    Aspiration pneumonia ([address] J69.0    CKD (chronic kidney disease) stage 4, GFR 15-29 ml/min ([address] N18.4    Left renal atrophy N26.1    Severe malnutrition ([address] E43    Acute cystitis without hematuria N30.00    Disorientation R41.0    Hypertensive emergency I16.1    Hypertension I10    Essential hypertension I10    TIMO (acute kidney injury) ([address] N17.9    Acute respiratory failure ([address] J96.00    Acute encephalopathy G93.40    Chronic kidney disease (CKD), stage III (moderate) ([address] N18.3    Seizure ([address] R56.9    Altered mental status R41.82    Encephalopathy G93.40       Pain: Denies    Cognitive Treatment    Treatment time:  6100-3397    Subjective: [x] Alert [x] Cooperative     [] Confused     [] Agitated    [] Lethargic    Objective/Assessment:  Attention: sustained throughout. Recall:    Image retention (5 units, 10 min delay)- 25%, 75% c max cues. Problem Solving/Reasoning:    Reasoning, identifying similar category items/odd one out- 50% philipp, increasing to 100% c cues. Reasoning, making inferences from image- 60% philipp, increasing to 100% c cues. Other:   Pt. Sometimes needed repetition of questions and long response latency demonstrated. Pt's  present toward end of session. Plan:  [x] Continue ST services    [] Discharge from :      Discharge recommendations: [] Inpatient Rehab   [] [place]   [] Outpatient Therapy  [] Follow up at trauma clinic   [] Other:       Treatment completed by:  Emilee HERNANDEZ-SLP

## 2020-07-25 NOTE — PROGRESS NOTES
Physical Therapy  [address]  Acute Rehabilitation Physical Therapy Progress Note    Date: 20  Patient Name: Santos Emigrant       Room: 3876/9360-42  MRN: [id number]   Account: [de-identified]   : 1940  (76 y.o.) Gender: female     Referring Practitioner: Dr. Josephine Lion  Diagnosis: Acute CVA  Past Medical History:  has a past medical history of Arthritis, Cancer ([facility] CKD (chronic kidney disease) stage 4, GFR 15-29 ml/min ([facility] COPD (chronic obstructive pulmonary disease) ([facility] CVA (cerebral vascular accident) ([facility] Gout, Hyperlipidemia, Hypertension, Left renal atrophy, and Other abnormality of urination(788.69). Past Surgical History:   has a past surgical history that includes Tonsillectomy; Varicose vein surgery; Hammer toe surgery; Hysterectomy; Carotid endarterectomy; Appendectomy; joint replacement; bladder suspension; joint replacement; Breast surgery (Left); Colonoscopy; back surgery; Upper gastrointestinal endoscopy (N/A, 2018); and Colonoscopy (N/A, 2018). Additional Pertinent Hx: 71-year-old female with history of hypertension admitted with altered mental status. In ER ahe became unresponsive and required intubation. acute hypoxic respiratory failure secondary to hypertensive emergency, was eventually extubated, advancing diet, diagnosed with posterior reversible encephalopathy on MRI. [facility] Restrictions/Precautions  Restrictions/Precautions: Fall Risk;General Precautions  Required Braces or Orthoses?: No  Implants present? : Metal implants(R JAILENE, L TKA, neck and back surgery)  Position Activity Restriction  Other position/activity restrictions: up with assistance    Subjective: Pt continues to c/o pain with urination, as well as hand pain. Pt, spouse asking about ability to get a motorized scooter for home use; Pt also states their \"wheelchair\" has a very \"hard\" seat. Comments: Pleasant and cooperative.     Vital Signs  Patient Rollator. Wheelchair Activities  Propulsion: Yes  Propulsion 1  Propulsion: Manual  Level: Level Tile  Method: LUE;RUE  Level of Assistance: Stand by assistance  Description/ Details: Straight path, 180* turn around obstacle. Pt fatigued quickly, requiring multiple brief rest breaks. Distance: 150'    BALANCE Posture: Poor(Standing, seated)  Sitting - Static: Good(edge of mat, no UE or back support)  Sitting - Dynamic: Good;-(edge of mat, no UE or back support)  Standing - Static: Fair;-(Rollator)  Standing - Dynamic: Fair;-(Rollator)    EXERCISES    Other exercises?: Yes  Other exercises 1: Sit to stand, 8x(Various surface heights)  Other exercises 3: Step-downs off 4\" step, 10x each LE, B UE support in parallel bars(Contact guard)  Other exercises 4: Supine and sidelying Bilat. LE exercises, 15x each, 1.5# L LE, 1# R LE(Required assistance for R LE clamshell technique, performance; weight removed as a result)  Other exercises 5: Standing Bilat. LE exercises, 10x each, active ROM(glute med weakness observed with L single LE stance; Pt relies heavily on UEs, requires frequent postural cues. Contact guard)  Other exercises 6: NuStep, Level 1, 10 min. Other exercises 7: Supine bridging & straight LE raise, 10x each, 1.5# L LE, 1# R LE  Other exercises 8: Home exercise program issued with education    Activity Tolerance: Patient limited by cognitive status, Patient limited by pain, Patient limited by endurance     Patient Education  New Education Provided: Home exercise program (see below)  Learner:patient and significant other  Method: demonstration, explanation and handout       Outcome: acknowledged understanding of and demonstrated understanding     Access Code: [email or web address]   URL: [email or web address]   Date: 07/25/2020   Prepared by: Erica Crouch     Program Notes   Do 1-2 sets (laying down, seated and/or standing) a day.      Exercises   · Supine Ankle Dorsiflexion and Plantarflexion AROM - 15-20 reps term goal 2: Pt to demonstrate min x1 transfers. Short term goal 3: Pt to amb 50' min x1 with improved gait mechanics and posture. Short term goal 4: pt to tolerate standing 1-2 minutes with 1 assist and device. Short term goal 5: Pt to propel w/c 50'-75' SBA. Short term goal 6: Pt to improve seated posture to GOOD. Short term goal 7: Pt to improve sitting balance to GOOD. Long term goals  Time Frame for Long term goals : by D/C  Long term goal 1: Pt to demonstrate bed mobility wtih supervision. Long term goal 2: Pt to demonstrate SBA for transfers from varied surfaces. Long term goal 3: Pt to amb 76' SBA/CGA with device with improve gait mechanics and posture, on varied terrain including ramp. Long term goal 4: Pt to propel w/c 100' including turns and varied terrain Mod I.  Long term goal 5: Pt to improve BLE strength by 1/2 MMG. Long term goal 6: Pt to tolerate standing 4-5 minutes with device, 1 assist if needed. Long term goal 7: Pt to perform 5xSTS in 60 seconds or less for improved strength/endurance. Long term goal 8: Pt to improve standing balance to FAIR+ to reduce fall risk.   Long term goal 9: Pt to demo 1 platform step with RW min x1.      07/25/20 0946 07/25/20 1358   PT Individual Minutes   Time In 3140 2213   Time Out 8309 6477   Minutes 55 45   PT Concurrent Minutes   Time In 0930  --    Time Out 0935  --    Minutes 5  --      Electronically signed by Lakia Posadas PTA on 7/25/20 at 6:08 PM EDT

## 2020-07-25 NOTE — PROGRESS NOTES
Physical Medicine & Rehabilitation  Progress Note      Subjective:      79year-old female with encephalopathy (PRES). Patient is doing well with therapies. She notes continued c/o dysuria despite improved PO fluid intake for past 24 hours. She has some associated urinary frequency as well. ROS:  Denies fevers, chills, sweats. No chest pain, palpitations, lightheadedness. Denies coughing, wheezing or shortness of breath. Denies abdominal pain, nausea, diarrhea or constipation. No new areas of joint pain. Denies new areas of numbness or weakness. Denies new anxiety or depression issues. No new skin problems. Rehabilitation:   Progressing in therapies. PT:  Restrictions/Precautions: Fall Risk, General Precautions  Implants present? : Metal implants(R JAILENE, L TKA, neck and back surgery)  Other position/activity restrictions: up with assistance   Transfers  Sit to Stand: Minimal Assistance(Demonstrates good hand placement today. )  Stand to sit: Minimal Assistance(Cues for hand placement, fair return)  Bed to Chair: Contact guard assistance(no device, directional cues)  Stand Pivot Transfers: Contact guard assistance(no device, directional cues)  Comment: Rollator, except where noted, as Pt,  state that's what she uses at home & prefers. Safety cues required for transfers using Rollator with immediate return but no recall. Ambulation 1  Surface: level tile  Device: Rollator  Other Apparatus: Wheelchair follow  Assistance: Minimal assistance  Quality of Gait: Straighter path noted, increased celeste and less forward flexion of posture noted with Rollator than walker; forward head, flexed hips, narrow base of support continue. Slight but fleeting return to postural cues. Gait Deviations: Slow Celeste, Decreased step height, Decreased head and trunk rotation  Distance: 61' x2 AM with seated rest between; 25' + 10' + 15' PM with seated rest between each leg of ambulation.   Comments: Safety cues accepting. Surface: level tile  Ambulation 1  Surface: level tile  Device: Rollator  Other Apparatus: Wheelchair follow  Assistance: Minimal assistance  Quality of Gait: Straighter path noted, increased celeste and less forward flexion of posture noted with Rollator than walker; forward head, flexed hips, narrow base of support continue. Slight but fleeting return to postural cues. Gait Deviations: Slow Celeste, Decreased step height, Decreased head and trunk rotation  Distance: 61' x2 AM with seated rest between; 25' + 10' + 15' PM with seated rest between each leg of ambulation. Comments: Safety cues for seated rest breaks using Rollator with no return; multiple attempts to & methods of education, Pt indicating \"I'll try to get it tomorrow. \" Pt also requires safety cues for turning to sit in wheelchair using Rollator. Afternoon ambulation, pt required cue to slow pace for safety, with Pt indicating she was hurrying to get back to wheelchair; safe alternative of seated rest using Rollator offered with Pt accepting. OT:  ADL  Feeding: Setup, Increased time to complete(per report. )  Grooming: Setup, Verbal cueing, Increased time to complete, Stand by assistance(face washing and oral care. Hair very knotted-pt chose not )  UE Bathing: Setup, Verbal cueing, Increased time to complete, Contact guard assistance, Minimal assistance(standing sinkside as reported thats how she completes at [facility])  LE Bathing: None(pt declined. )  UE Dressing: Minimal assistance, Setup, Verbal cueing, Increased time to complete(Assist to doff over head. )  LE Dressing: Moderate assistance, Setup, Increased time to complete(per report however questionable. )  Toileting: Minimal assistance(pt manages pants c partial assist, completes hygiene while s)  Additional Comments: Pt required cueing for proper sequencing with self care tasks this date; initiated donning shirt before completing upper body bathing at sink. Balance  Sitting Balance: Stand by assistance(at edge of bed, supervision if in wheelchair)  Standing Balance: Minimal assistance   Standing Balance  Time: AM: <1 min x2, ~1 min x2, ~2 min PM: <1 min x 2  Activity: AM: ADL tasks and functional trasnfers PM: functional tranfers  Comment: Verbal and tactile cues for proper hand placement for safety. Functional Mobility  Functional - Mobility Device: Rolling Walker  Activity: To/from bathroom(from EOB to toilet)  Assist Level: Minimal assistance  Functional Mobility Comments: Min A for RW management and safety     Bed mobility  Bridging: Stand by assistance  Rolling to Left: Minimal assistance  Rolling to Right: Minimal assistance  Supine to Sit: Moderate assistance  Sit to Supine: Moderate assistance  Scooting: Stand by assistance(hips to edge of mat)  Comment: pt spouse assist with bed mobility in AM for supine>sit. Head of bed elevated. Transfers  Stand Pivot Transfers: Moderate assistance  Sit to stand: Moderate assistance  Stand to sit: Minimal assistance  Transfer Comments: Verbal cues for hand placement and safety during transfers   Toilet Transfers  Toilet - Technique: Stand pivot  Equipment Used: Grab bars  Toilet Transfer: Minimal assistance, Moderate assistance  Toilet Transfers Comments: cueing for safe technique     Shower Transfers  Shower - Transfer From: Marybeth Camargo - Transfer Type: To and From  Shower - Transfer To: Transfer tub bench  Shower - Technique: Ambulating  Shower Transfers: Moderate assistance  Shower Transfers Comments: transfer to w/c from TTB due to fatigue       SPEECH:  Subjective: [x]? Alert     [x]? Cooperative     []? Confused     []? Agitated    []? Lethargic     Objective/Assessment:  Attention: sustained throughout.     Recall:  Picture detail recall- 43%, 57% c cues. 4 word scrambled sentences- 63%, 100% c cues.      Problem Solving/Reasoning:    Name 7 items in category- 36%, 79% c cues       Other:   Pt.  Sometimes needed repetition of questions and long response latency demonstrated. Objective:  /67   Pulse 59   Temp 97.9 °F (36.6 °C) (Oral)   Resp 16   Ht 5' 5\" (1.651 m)   Wt 159 lb (72.1 kg)   SpO2 93%   BMI 26.46 kg/m²       GEN: Well developed, well nourished, in NAD  HEENT:  NCAT.  PERRL.  EOMI.  Mucous membranes pink and moist.   PULM:  Clear to ausculation. No rales or rhonchi. Respirations WNL and unlabored. CV:  Bradycardic rate regular rhythm.  No murmurs or gallops. GI:  Abdomen soft. Nontender. Non-distended.  BS + and equal.    NEUROLOGICAL: A&O x3.  Sensation intact to light touch. MSK:  Functional ROM all extremities. Motor testing 4+/5 key muscles all extremities.    SKIN: Warm dry and intact. Good turgor. EXTREMITIES:  No calf tenderness to palpation. No edema BLEs. PSYCH: Mood WNL. Appropriately interactive. Affect WNL.     Diagnostics:     CBC: No results for input(s): WBC, RBC, HGB, HCT, MCV, RDW, PLT in the last 72 hours. BMP: No results for input(s): NA, K, CL, CO2, PHOS, BUN, CREATININE, GLUCOSE in the last 72 hours. Invalid input(s): CA  BNP: No results for input(s): BNP in the last 72 hours. PT/INR: No results for input(s): PROTIME, INR in the last 72 hours. APTT: No results for input(s): APTT in the last 72 hours. CARDIAC ENZYMES: No results for input(s): CKMB, CKMBINDEX, TROPONINT in the last 72 hours. Invalid input(s): CKTOTAL;3  FASTING LIPID PANEL:  Lab Results   Component Value Date    CHOL 106 03/17/2019    HDL 45 03/17/2019    TRIG 55 03/17/2019     LIVER PROFILE: No results for input(s): AST, ALT, ALB, BILIDIR, BILITOT, ALKPHOS in the last 72 hours.      Current Medications:   Current Facility-Administered Medications: cloNIDine (CATAPRES) 0.2 MG/24HR 1 patch, 1 patch, Transdermal, Weekly  enoxaparin (LOVENOX) injection 40 mg, 40 mg, Subcutaneous, Daily  allopurinol (ZYLOPRIM) tablet 100 mg, 100 mg, Oral, BID  amLODIPine (NORVASC) tablet 10 mg, 10 mg, Oral, Daily  benzonatate (TESSALON) capsule 200 mg, 200 mg, Oral, Daily  calcium carbonate-vitamin D (CALTRATE) 600-400 MG-UNIT per tab 1 tablet, 1 tablet, Oral, BID  gabapentin (NEURONTIN) capsule 300 mg, 300 mg, Oral, Nightly  lactobacillus (CULTURELLE) capsule 1 capsule, 1 capsule, Oral, TID WC  acetaminophen (TYLENOL) tablet 650 mg, 650 mg, Oral, Q4H PRN  clopidogrel (PLAVIX) tablet 75 mg, 75 mg, Oral, Daily  ezetimibe (ZETIA) tablet 10 mg, 10 mg, Oral, Nightly  metoprolol tartrate (LOPRESSOR) tablet 50 mg, 50 mg, Oral, BID  spironolactone (ALDACTONE) tablet 25 mg, 25 mg, Oral, Daily **AND** hydroCHLOROthiazide (HYDRODIURIL) tablet 25 mg, 25 mg, Oral, Daily  senna (SENOKOT) tablet 8.6 mg, 1 tablet, Oral, Daily PRN  bisacodyl (DULCOLAX) suppository 10 mg, 10 mg, Rectal, Daily PRN  polyethylene glycol (GLYCOLAX) packet 17 g, 17 g, Oral, Daily PRN      Impression/Plan:   Impaired ADLs, gait, and mobility due to:      1. PRES Encephalopathy:  PT/OT/SLP for gait, mobility, strengthening, endurance, ADLs, cognition, and self care. Tylenol prn headache. Cognition improving with speech therapy  2. Acute Respiratory Failure: Resolved  3. Dysuria: Check U/A with reflex culture. Continue to encourage fluids. 4. HTN/hyperlipidemia/CAD: on amlodipine, clonidine patch, HCTZ, metoprolol tartrate, spironolactone, ezetimibe, plavix. IM note from acute recommends workup for possible renal artery stenosis - renal US done 7/13. Increased clonidine dose 7/23 - BP WNL today. Will monitor. 5. CKD III: Creatinine stable. 6. Aspiration Pneumonia: Completed Unasyn. Completed 10 days of antibiotics with Augmentin on 7/24.  7. Gout history: on allopurinol  8. Neuropathy: on gabapentin  9. DVT Prophylaxis:  low molecular weight heparin, SCD's while in bed and JARED's  10. Dr. Arsenio Segura  11. Achieving therapy goals. Anticipate discharge home Monday.      Electronically signed by Champ Morgan MD on 7/25/2020 at 10:10 AM      This note is created with the assistance of a speech recognition program.  While intending to generate a document that actually reflects the content of the visit, the document can still have some errors including those of syntax and sound a like substitutions which may escape proof reading. In such instances, actual meaning can be extrapolated by contextual diversion.

## 2020-07-25 NOTE — PLAN OF CARE
Problem: Falls - Risk of:  Goal: Will remain free from falls  Outcome: Ongoing     Problem: Skin Integrity:  Goal: Will show no infection signs and symptoms  Outcome: Ongoing     Problem: Skin Integrity:  Goal: Absence of new skin breakdown  Outcome: Ongoing     Problem: Pain:  Goal: Control of acute pain  Outcome: Ongoing     Problem: Neurological  Goal: Maximum potential motor/sensory/cognitive function  Outcome: Ongoing

## 2020-07-26 LAB
CREAT SERPL-MCNC: 1.51 MG/DL (ref 0.5–0.9)
GFR AFRICAN AMERICAN: 40 ML/MIN
GFR NON-AFRICAN AMERICAN: 33 ML/MIN
GFR SERPL CREATININE-BSD FRML MDRD: ABNORMAL ML/MIN/{1.73_M2}
GFR SERPL CREATININE-BSD FRML MDRD: ABNORMAL ML/MIN/{1.73_M2}
GLUCOSE BLD-MCNC: 101 MG/DL (ref 65–105)
GLUCOSE BLD-MCNC: 126 MG/DL (ref 65–105)
GLUCOSE BLD-MCNC: 148 MG/DL (ref 65–105)
GLUCOSE BLD-MCNC: 97 MG/DL (ref 65–105)

## 2020-07-26 PROCEDURE — 6370000000 HC RX 637 (ALT 250 FOR IP): Performed by: STUDENT IN AN ORGANIZED HEALTH CARE EDUCATION/TRAINING PROGRAM

## 2020-07-26 PROCEDURE — 97116 GAIT TRAINING THERAPY: CPT

## 2020-07-26 PROCEDURE — 97530 THERAPEUTIC ACTIVITIES: CPT

## 2020-07-26 PROCEDURE — 97110 THERAPEUTIC EXERCISES: CPT

## 2020-07-26 PROCEDURE — 97535 SELF CARE MNGMENT TRAINING: CPT

## 2020-07-26 PROCEDURE — 36415 COLL VENOUS BLD VENIPUNCTURE: CPT

## 2020-07-26 PROCEDURE — 6370000000 HC RX 637 (ALT 250 FOR IP): Performed by: PHYSICAL MEDICINE & REHABILITATION

## 2020-07-26 PROCEDURE — 6360000002 HC RX W HCPCS: Performed by: STUDENT IN AN ORGANIZED HEALTH CARE EDUCATION/TRAINING PROGRAM

## 2020-07-26 PROCEDURE — 82947 ASSAY GLUCOSE BLOOD QUANT: CPT

## 2020-07-26 PROCEDURE — 1180000000 HC REHAB R&B

## 2020-07-26 PROCEDURE — 82565 ASSAY OF CREATININE: CPT

## 2020-07-26 PROCEDURE — 2500000003 HC RX 250 WO HCPCS: Performed by: PHYSICAL MEDICINE & REHABILITATION

## 2020-07-26 PROCEDURE — 97542 WHEELCHAIR MNGMENT TRAINING: CPT

## 2020-07-26 RX ADMIN — HYDROCHLOROTHIAZIDE 25 MG: 25 TABLET ORAL at 08:05

## 2020-07-26 RX ADMIN — Medication 1 CAPSULE: at 16:40

## 2020-07-26 RX ADMIN — Medication 1 CAPSULE: at 13:00

## 2020-07-26 RX ADMIN — AMLODIPINE BESYLATE 10 MG: 10 TABLET ORAL at 08:04

## 2020-07-26 RX ADMIN — GABAPENTIN 300 MG: 300 CAPSULE ORAL at 20:04

## 2020-07-26 RX ADMIN — NITROFURANTOIN MONOHYDRATE/MACROCRYSTALLINE 100 MG: 25; 75 CAPSULE ORAL at 08:06

## 2020-07-26 RX ADMIN — METOPROLOL TARTRATE 50 MG: 50 TABLET, FILM COATED ORAL at 20:04

## 2020-07-26 RX ADMIN — ANTI-FUNGAL POWDER MICONAZOLE NITRATE TALC FREE: 1.42 POWDER TOPICAL at 09:18

## 2020-07-26 RX ADMIN — ALLOPURINOL 100 MG: 100 TABLET ORAL at 08:04

## 2020-07-26 RX ADMIN — METOPROLOL TARTRATE 50 MG: 50 TABLET, FILM COATED ORAL at 08:04

## 2020-07-26 RX ADMIN — NITROFURANTOIN MONOHYDRATE/MACROCRYSTALLINE 100 MG: 25; 75 CAPSULE ORAL at 20:04

## 2020-07-26 RX ADMIN — ALLOPURINOL 100 MG: 100 TABLET ORAL at 20:04

## 2020-07-26 RX ADMIN — Medication 1 CAPSULE: at 08:04

## 2020-07-26 RX ADMIN — CLOPIDOGREL BISULFATE 75 MG: 75 TABLET ORAL at 08:05

## 2020-07-26 RX ADMIN — ACETAMINOPHEN 650 MG: 325 TABLET, FILM COATED ORAL at 16:40

## 2020-07-26 RX ADMIN — SPIRONOLACTONE 25 MG: 25 TABLET, FILM COATED ORAL at 08:05

## 2020-07-26 RX ADMIN — Medication 1 TABLET: at 08:07

## 2020-07-26 RX ADMIN — EZETIMIBE 10 MG: 10 TABLET ORAL at 20:04

## 2020-07-26 RX ADMIN — Medication 1 TABLET: at 20:04

## 2020-07-26 RX ADMIN — ENOXAPARIN SODIUM 40 MG: 40 INJECTION SUBCUTANEOUS at 08:05

## 2020-07-26 RX ADMIN — ANTI-FUNGAL POWDER MICONAZOLE NITRATE TALC FREE: 1.42 POWDER TOPICAL at 20:09

## 2020-07-26 RX ADMIN — BENZONATATE 200 MG: 100 CAPSULE ORAL at 08:06

## 2020-07-26 ASSESSMENT — PAIN SCALES - GENERAL
PAINLEVEL_OUTOF10: 0
PAINLEVEL_OUTOF10: 7
PAINLEVEL_OUTOF10: 0
PAINLEVEL_OUTOF10: 3
PAINLEVEL_OUTOF10: 0

## 2020-07-26 ASSESSMENT — PAIN DESCRIPTION - ONSET: ONSET: ON-GOING

## 2020-07-26 ASSESSMENT — PAIN DESCRIPTION - LOCATION: LOCATION: HAND

## 2020-07-26 ASSESSMENT — PAIN DESCRIPTION - PAIN TYPE: TYPE: CHRONIC PAIN

## 2020-07-26 ASSESSMENT — PAIN DESCRIPTION - DESCRIPTORS: DESCRIPTORS: SORE;ACHING

## 2020-07-26 ASSESSMENT — PAIN DESCRIPTION - ORIENTATION: ORIENTATION: RIGHT;LEFT

## 2020-07-26 NOTE — PROGRESS NOTES
Physical Therapy  [address] [facility]   Acute Rehabilitation Physical Therapy Progress Note    Date: 20  Patient Name: Adela Anderson       Room: 2699/3902-61  MRN: [id number]   Account: [de-identified]   : 1940  (76 y.o.) Gender: female     Referring Practitioner: Dr. Katy Pelletier  Diagnosis: encephalopathy, Hypoxic respiratory failure secondary to hypertensive emergency which required intubation, aspiration pneumonia  Past Medical History:  has a past medical history of Arthritis, Cancer ([facility] [address] CKD (chronic kidney disease) stage 4, GFR 15-29 ml/min ([facility] [address] COPD (chronic obstructive pulmonary disease) ([facility] [address] CVA (cerebral vascular accident) ([facility] [address] Gout, Hyperlipidemia, Hypertension, Left renal atrophy, and Other abnormality of urination(788.69). Past Surgical History:   has a past surgical history that includes Tonsillectomy; Varicose vein surgery; Hammer toe surgery; Hysterectomy; Carotid endarterectomy; Appendectomy; joint replacement; bladder suspension; joint replacement; Breast surgery (Left); Colonoscopy; back surgery; Upper gastrointestinal endoscopy (N/A, 2018); and Colonoscopy (N/A, 2018). Additional Pertinent Hx: 36-year-old female with history of hypertension admitted with altered mental status. In ER ahe became unresponsive and required intubation. acute hypoxic respiratory failure secondary to hypertensive emergency, was eventually extubated, advancing diet, diagnosed with posterior reversible encephalopathy on MRI. [facility] Restrictions/Precautions  Restrictions/Precautions: Fall Risk;General Precautions  Required Braces or Orthoses?: No  Implants present? : Metal implants(R JAILENE, L TKA, neck and back surgery)    Subjective: Pt continues to c/o hand pain; reports UTI seems to be improving but not gone. Pt, spouse requesting wheelchair, indicate they will be fine with home delivery if needed.    Comments: Pleasant and cooperative, voicing willingness Gait Deviations: Decreased step height;Decreased head and trunk rotation  Distance: 79' x2 with seated rest between. Comments: Safety cues for seated rest break technique using Rollator with no recall. Stairs/Curb  Stairs?: Yes  Stairs  # Steps : 1  Stairs Height: 4\"  Device: 4 wheeled walker  Assistance: 2 Person assistance;Contact guard assistance;Minimal assistance(CGA + Min A )  Comment: Spouse assisting for family training on curb ascent/descent & demonstrates good ability to assist Pt with safety PRN. Wheelchair Activities  Propulsion: Yes  Propulsion 1  Propulsion: Manual  Level: Level Tile  Method: LUE;RUE  Level of Assistance: Stand by assistance  Description/ Details: Meandering path, but self-correcting, 90* turns. Pt fatigued quickly, requiring multiple brief rest breaks. Distance: 150'    BALANCE Posture: Poor(Standing, seated)  Sitting - Static: Good(edge of mat, no UE or back support)  Sitting - Dynamic: Fair(edge of mat, no back support; requires cues for steadier position)  Standing - Static: Fair;-(Rollator)  Standing - Dynamic: Fair;-(Rollator)    EXERCISES    Other exercises?: Yes  Other exercises 1: Sit to stand, 11x(Varied heights; 5x sit to  45 sec. but must use UEs.)  Other exercises 2: Seated Bilat. LE exercises, 15x each, 1# R LE, 1.5# L LE, lime (minimal) resistance band  Other exercises 4: Supine and sidelying Bilat. LE exercises, 15x each, active ROM(No weights as Pt demonstrating fatigue, weakness. )  Other exercises 6: NuStep, Level 2, 10 min.  (ball between LEs to prevent R LE internal rotation)  Other exercises 7: Bridging, 10x     Activity Tolerance: Patient limited by cognitive status, Patient limited by pain, Patient limited by endurance    Current Treatment Recommendations: Strengthening, Balance Training, Functional Mobility Training, Transfer Training, Endurance Training, Gait Training, Equipment Evaluation, Education, & procurement, Patient/Caregiver Education & Training, Safety Education & Training, Wheelchair Mobility Training, Home Exercise Program, Pain Management    Conditions Requiring Skilled Therapeutic Intervention  Body structures, Functions, Activity limitations: Decreased functional mobility ; Decreased ADL status; Decreased strength;Decreased safe awareness;Decreased cognition;Decreased balance;Decreased endurance;Decreased posture; Increased pain  Assessment: Lacks safety awareness/no recall of previous instruction regarding transfers using Rollator. Failed 5x sit to stand: Pt denies ability to perform after 3 aborted attempts when she moved to use UEs; 45 sec. with use of UEs. Barriers to Learning: cognition, safety awareness  REQUIRES PT FOLLOW UP: Yes  Discharge Recommendations: 24 hour supervision or assist;Patient would benefit from continued therapy after discharge    Goals  Short term goals  Time Frame for Short term goals: 5-7 days  Short term goal 1: Pt to demonstrate CGA/SBA for bed mobility. Short term goal 2: Pt to demonstrate min x1 transfers. Short term goal 3: Pt to amb 50' min x1 with improved gait mechanics and posture. Short term goal 4: pt to tolerate standing 1-2 minutes with 1 assist and device. Short term goal 5: Pt to propel w/c 50'-75' SBA. Short term goal 6: Pt to improve seated posture to GOOD. Short term goal 7: Pt to improve sitting balance to GOOD. Long term goals  Time Frame for Long term goals : by D/C  Long term goal 1: Pt to demonstrate bed mobility wtih supervision. Long term goal 2: Pt to demonstrate SBA for transfers from varied surfaces. Long term goal 3: Pt to amb 76' SBA/CGA with device with improve gait mechanics and posture, on varied terrain including ramp. Long term goal 4: Pt to propel w/c 100' including turns and varied terrain Mod I.  Long term goal 5: Pt to improve BLE strength by 1/2 MMG. Long term goal 6: Pt to tolerate standing 4-5 minutes with device, 1 assist if needed.    Long term goal 7: Pt to perform 5xSTS in 60 seconds or less for improved strength/endurance. Long term goal 8: Pt to improve standing balance to FAIR+ to reduce fall risk.   Long term goal 9: Pt to demo 1 platform step with RW min x1.      07/26/20 0951 07/26/20 1349   PT Individual Minutes   Time In 0930 1349   Time Out [id number]   Minutes 64 33     Electronically signed by Souleymane Ross PTA on 7/26/20 at 4:50 PM EDT

## 2020-07-26 NOTE — PROGRESS NOTES
[facility]: SCOTT WOODARD   ACUTE REHABILITATION OCCUPATIONAL THERAPY  DAILY NOTE    Date: 20  Patient Name: Jaylyn Andrade      Room: 3641/1140-92    MRN: [id number]   : 1940  (79 y.o.)  Gender: female   Referring Practitioner: Dr. Kaylie Sexton  Diagnosis: encephalopathy, Hypoxic respiratory failure secondary to hypertensive emergency which required intubation, aspiration pneumonia       Restrictions  Restrictions/Precautions: Fall Risk, General Precautions  Implants present? : Metal implants(R JAILENE, L TKA, neck and back surgery)  Other position/activity restrictions: up with assistance  Required Braces or Orthoses?: No    Subjective  Subjective: Ashleyjesus Adelalivan checked it and there was infection. \"  (UTI)  Comments: Pt  Rashmi Nolan declines transfer training in pm noting he is comfortable in providing her assist at her current level. Plan is for discharge home tomorrow and he is the caregiver. Objective  Cognition  Arousal/Alertness: Appropriate responses to stimuli  Attention Span: Difficulty dividing attention  Memory: Decreased recall of recent events;Decreased short term memory  Following Commands: Follows one step commands without difficulty  Safety Judgement: Decreased awareness of need for safety  Awareness of Errors: Assistance required to correct errors made  Insights: Decreased awareness of deficits  Additional Comments: fair sequencing and inititation for self care  Balance  Sitting Balance: Supervision  Standing Balance: Moderate assistance(min assist in am,often contact guard.)  Bed mobility  Rolling to Right: Contact guard assistance  Supine to Sit: Contact guard assistance(cues for technique to roll to R and use LUE on grab bar)  Transfers  Stand Step Transfers: Moderate assistance(walker in pm, am min or contact guard assist)  Sit to stand:  Moderate assistance(in am min assist or contact guard)  Stand to sit: Minimal assistance  Transfer Comments: audible creak - pt notes R knee and this is why sit to stand is difficult  Standing Balance  Time: 3-5 min x 4 in am, 3-5 min x 2 in pm  Activity: AM: ADL tasks and functional transfers and walk PM: functional transfers and walk  Functional Mobility  Functional - Mobility Device: Rolling Walker  Activity: To/from bathroom  Assist Level: Moderate assistance  Functional Mobility Comments: amb 19 feet bed to toilet in am and again pm, in am was min assist  Toilet Transfers  Toilet - Technique: Ambulating(walker)  Equipment Used: Grab bars  Toilet Transfer: Moderate assistance  Toilet Transfers Comments: cueing for safe technique, completed am and pm- needed more assist in pm  Shower Transfers  Shower - Transfer From: Moshe Carolina - Transfer To: Transfer tub bench  Shower - Technique: Ambulating  Shower Transfers: Moderate assistance  Shower Transfers Comments: transfer to w/c after shower due to fatigue, safety                             ADL  Feeding: Setup; Increased time to complete  Grooming: Setup;Verbal cueing; Increased time to complete(pt able to comb hair that is not tangled, OT attempted to use conditioner and brush and detangle mass on back of head with small improvement, spouse was notified this will need to be cut)  UE Bathing: Setup; Increased time to complete  LE Bathing: Minimal assistance(stand by assist to cross to bathe B feet, min assist to stand and bathe bottom)  UE Dressing: Setup(pullover shirt)  LE Dressing: Moderate assistance;Setup; Increased time to complete(briefs, teds, pants, slipper socks, set up socks pants over feet, crosses BLE, assist teds, briefs, mod assist pants over hips)  Toileting: Maximum assistance(mod assist in am)  Additional Comments: intact sequencing for bathe in shower.   pt toileted, showered , washed and brushed hair and dressed in am, toileted, washed hands, brushed teeth in pm.      IRF-RICARDO Items   Eating   5  Setup or clean-up assistance     Oral Hygiene   5  Assistance Needed: Setup or clean-up assistance     Shower/Bathe Self   3  Assistance Needed: Partial/moderate assistance     Upper Body Dressing   5  Assistance Needed: Setup or clean-up assistance     Lower Body Dressing   3  Assistance Needed: Partial/moderate assistance     Putting On/Taking Off Footwear   5  Assistance Needed: Setup or clean-up assistance     Toilet Transfer   3  Assistance Needed: Partial/moderate assistance     Toileting Hygiene   2  Assistance Needed: Substantial/maximal assistance             Assessment     Activity Tolerance: Patient Tolerated treatment well;Patient limited by fatigue  Activity Tolerance: pt more fatigued and needing more assist with mobility in pm.  Type of devices: Call light within reach;Gait belt;Patient at risk for falls;Nurse notified; Chair alarm in place; Left in chair            07/26/20 1548 07/26/20 1551   OT Individual Minutes   Time In 0730 1230   Time Out 0830 1300   Minutes 60 30       Patient Education:  Patient Goals   Patient goals : \"To get so I can do my jobs. ..you know, take care of my home. 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 I don't want everything dumped on him (spouse) because he's too nice a man, and he does it, but it's hard for him. \"  Learner:patient and significant other  Method: demonstration and explanation       Outcome: acknowledged understanding  and demonstrated understanding   OT Education  OT Education: Transfer Training;ADL Adaptive Strategies  Patient Education: Pt  Rene Patel declines transfer training in pm noting he is comfortable in providing her assist at her current level. Plan is for discharge home tomorrow and he is the caregiver. ongoing education with patient during all adls and transfers for safety and hand placement.     Plan  Plan  Times per week: 5-7  Times per day: Twice a day  Current Treatment Recommendations: Self-Care / ADL, Home Management Training, Strengthening, Balance Training, Functional Mobility Training, Endurance Training, Wheelchair Mobility Training, Cognitive Reorientation, Safety Education & Training, Patient/Caregiver Education & Training, Equipment Evaluation, Education, & procurement, Cognitive/Perceptual Training  Patient Goals   Patient goals : \"To get so I can do my jobs. ..you know, take care of my home. 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 I don't want everything dumped on him (spouse) because he's too nice a man, and he does it, but it's hard for him. \"  Short term goals  Time Frame for Short term goals: One week  Short term goal 1: Pt will perform toileting tasks and LBD with Mod A. Short term goal 2: Pt and Pt's caregiver will V/D Good understanding of AE/DME/modified techniques for increased IND with self-care and mobility. Short term goal 3: Pt will stand for 4+ minutes with 0-1 UE support, CGA, and no LOB while engaging in functional activity of choice. Short term goal 4: Pt will perform functional transfers and mobility with Min A, least restrictive mobility device, and Fair safety. Short term goal 5: Pt will actively participate in 30+ minutes of therapeutic exercise/functional activities to promote increased IND with self-care and mobility. Long term goals  Time Frame for Long term goals : By discharge  Long term goal 1: Pt will perform BADLs with SUP and Fair safety. Long term goal 2: Pt will perform functional mobility and transfers during self-care with SUP, least restrictive mobility device, and Fair safety. Long term goal 3: Pt will perform simple meal prep/light housekeeping with SUP and Fair safety. Long term goal 4: Pt will stand for 8+ minutes with 0-1 UE support, SUP, and no LOB while engaging in functional activity of choice. Long term goal 5: Pt and Pt's caregiver will V/D Good understanding of Fall Prevention Strategies for increased IND with self-care and mobility.   Long term goals 6: Pt will perform self-care with improved bilateral FMC/GMC as supported by a 7 second improvement in 9 hole peg score [facility]) and 5 block improvement on box and block assessment ([facility]).        Electronically signed by Ji Melchor OT on 7/26/20 at 4:17 PM EDT

## 2020-07-27 VITALS
WEIGHT: 159 LBS | BODY MASS INDEX: 26.49 KG/M2 | HEART RATE: 60 BPM | DIASTOLIC BLOOD PRESSURE: 54 MMHG | RESPIRATION RATE: 16 BRPM | OXYGEN SATURATION: 91 % | SYSTOLIC BLOOD PRESSURE: 146 MMHG | HEIGHT: 65 IN | TEMPERATURE: 98.2 F

## 2020-07-27 LAB
CULTURE: ABNORMAL
GLUCOSE BLD-MCNC: 94 MG/DL (ref 65–105)
Lab: ABNORMAL
SPECIMEN DESCRIPTION: ABNORMAL

## 2020-07-27 PROCEDURE — 6360000002 HC RX W HCPCS: Performed by: PHYSICAL MEDICINE & REHABILITATION

## 2020-07-27 PROCEDURE — 97116 GAIT TRAINING THERAPY: CPT

## 2020-07-27 PROCEDURE — 99239 HOSP IP/OBS DSCHRG MGMT >30: CPT | Performed by: PHYSICAL MEDICINE & REHABILITATION

## 2020-07-27 PROCEDURE — 97530 THERAPEUTIC ACTIVITIES: CPT

## 2020-07-27 PROCEDURE — 97542 WHEELCHAIR MNGMENT TRAINING: CPT

## 2020-07-27 PROCEDURE — 97129 THER IVNTJ 1ST 15 MIN: CPT

## 2020-07-27 PROCEDURE — 6370000000 HC RX 637 (ALT 250 FOR IP): Performed by: STUDENT IN AN ORGANIZED HEALTH CARE EDUCATION/TRAINING PROGRAM

## 2020-07-27 PROCEDURE — 97535 SELF CARE MNGMENT TRAINING: CPT

## 2020-07-27 PROCEDURE — 82947 ASSAY GLUCOSE BLOOD QUANT: CPT

## 2020-07-27 PROCEDURE — 6370000000 HC RX 637 (ALT 250 FOR IP): Performed by: PHYSICAL MEDICINE & REHABILITATION

## 2020-07-27 PROCEDURE — 97130 THER IVNTJ EA ADDL 15 MIN: CPT

## 2020-07-27 RX ORDER — LACTOBACILLUS RHAMNOSUS GG 10B CELL
1 CAPSULE ORAL
COMMUNITY
Start: 2020-07-27

## 2020-07-27 RX ORDER — POLYETHYLENE GLYCOL 3350 17 G/17G
17 POWDER, FOR SOLUTION ORAL DAILY PRN
Qty: 527 G | Refills: 1 | COMMUNITY
Start: 2020-07-27 | End: 2020-08-26

## 2020-07-27 RX ORDER — AMLODIPINE BESYLATE 10 MG/1
10 TABLET ORAL DAILY
Qty: 30 TABLET | Refills: 0 | Status: SHIPPED | OUTPATIENT
Start: 2020-07-28

## 2020-07-27 RX ORDER — GABAPENTIN 300 MG/1
300 CAPSULE ORAL NIGHTLY
Qty: 30 CAPSULE | Refills: 0 | Status: SHIPPED | OUTPATIENT
Start: 2020-07-27 | End: 2020-08-26

## 2020-07-27 RX ORDER — OMEPRAZOLE 20 MG/1
CAPSULE, DELAYED RELEASE ORAL
Qty: 30 CAPSULE | Refills: 0 | Status: SHIPPED | OUTPATIENT
Start: 2020-07-27

## 2020-07-27 RX ORDER — HYDROCHLOROTHIAZIDE 25 MG/1
25 TABLET ORAL DAILY
Qty: 30 TABLET | Refills: 0 | Status: SHIPPED | OUTPATIENT
Start: 2020-07-28

## 2020-07-27 RX ORDER — CLONIDINE 0.2 MG/24H
1 PATCH, EXTENDED RELEASE TRANSDERMAL WEEKLY
Qty: 4 PATCH | Refills: 0 | Status: SHIPPED | OUTPATIENT
Start: 2020-07-30

## 2020-07-27 RX ORDER — CIPROFLOXACIN 500 MG/1
500 TABLET, FILM COATED ORAL
Status: DISCONTINUED | OUTPATIENT
Start: 2020-07-27 | End: 2020-07-27 | Stop reason: HOSPADM

## 2020-07-27 RX ORDER — CIPROFLOXACIN 500 MG/1
500 TABLET, FILM COATED ORAL
Qty: 10 TABLET | Refills: 0 | Status: SHIPPED | OUTPATIENT
Start: 2020-07-27 | End: 2020-08-06

## 2020-07-27 RX ORDER — CIPROFLOXACIN 500 MG/1
500 TABLET, FILM COATED ORAL EVERY 12 HOURS SCHEDULED
Status: DISCONTINUED | OUTPATIENT
Start: 2020-07-27 | End: 2020-07-27

## 2020-07-27 RX ORDER — CLOPIDOGREL BISULFATE 75 MG/1
75 TABLET ORAL DAILY
Qty: 30 TABLET | Refills: 0 | Status: SHIPPED | OUTPATIENT
Start: 2020-07-27

## 2020-07-27 RX ORDER — ALLOPURINOL 100 MG/1
100 TABLET ORAL 2 TIMES DAILY
Qty: 30 TABLET | Refills: 0 | Status: SHIPPED | OUTPATIENT
Start: 2020-07-27

## 2020-07-27 RX ORDER — EZETIMIBE 10 MG/1
10 TABLET ORAL NIGHTLY
Qty: 30 TABLET | Refills: 0 | Status: SHIPPED | OUTPATIENT
Start: 2020-07-27

## 2020-07-27 RX ORDER — BENZONATATE 200 MG/1
200 CAPSULE ORAL DAILY
Qty: 7 CAPSULE | Refills: 0 | Status: SHIPPED | OUTPATIENT
Start: 2020-07-28 | End: 2020-08-04

## 2020-07-27 RX ORDER — METOPROLOL TARTRATE 100 MG/1
50 TABLET ORAL 2 TIMES DAILY
Qty: 60 TABLET | Refills: 0 | Status: SHIPPED | OUTPATIENT
Start: 2020-07-27

## 2020-07-27 RX ORDER — SPIRONOLACTONE 25 MG/1
25 TABLET ORAL DAILY
Qty: 30 TABLET | Refills: 0 | Status: SHIPPED | OUTPATIENT
Start: 2020-07-28

## 2020-07-27 RX ADMIN — NITROFURANTOIN MONOHYDRATE/MACROCRYSTALLINE 100 MG: 25; 75 CAPSULE ORAL at 07:32

## 2020-07-27 RX ADMIN — AMLODIPINE BESYLATE 10 MG: 10 TABLET ORAL at 07:26

## 2020-07-27 RX ADMIN — ALLOPURINOL 100 MG: 100 TABLET ORAL at 07:27

## 2020-07-27 RX ADMIN — ANTI-FUNGAL POWDER MICONAZOLE NITRATE TALC FREE: 1.42 POWDER TOPICAL at 07:32

## 2020-07-27 RX ADMIN — METOPROLOL TARTRATE 50 MG: 50 TABLET, FILM COATED ORAL at 07:27

## 2020-07-27 RX ADMIN — CLOPIDOGREL BISULFATE 75 MG: 75 TABLET ORAL at 07:26

## 2020-07-27 RX ADMIN — SPIRONOLACTONE 25 MG: 25 TABLET, FILM COATED ORAL at 07:27

## 2020-07-27 RX ADMIN — HYDROCHLOROTHIAZIDE 25 MG: 25 TABLET ORAL at 07:26

## 2020-07-27 RX ADMIN — Medication 1 CAPSULE: at 11:22

## 2020-07-27 RX ADMIN — BENZONATATE 200 MG: 100 CAPSULE ORAL at 07:32

## 2020-07-27 RX ADMIN — ENOXAPARIN SODIUM 30 MG: 30 INJECTION SUBCUTANEOUS at 07:27

## 2020-07-27 RX ADMIN — Medication 1 TABLET: at 07:31

## 2020-07-27 RX ADMIN — CIPROFLOXACIN 500 MG: 500 TABLET, FILM COATED ORAL at 11:23

## 2020-07-27 RX ADMIN — Medication 1 CAPSULE: at 07:26

## 2020-07-27 ASSESSMENT — PAIN DESCRIPTION - ORIENTATION: ORIENTATION: LEFT;RIGHT

## 2020-07-27 ASSESSMENT — PAIN - FUNCTIONAL ASSESSMENT: PAIN_FUNCTIONAL_ASSESSMENT: PREVENTS OR INTERFERES SOME ACTIVE ACTIVITIES AND ADLS

## 2020-07-27 ASSESSMENT — PAIN SCALES - GENERAL
PAINLEVEL_OUTOF10: 2
PAINLEVEL_OUTOF10: 2

## 2020-07-27 ASSESSMENT — PAIN DESCRIPTION - LOCATION
LOCATION: KNEE
LOCATION: HAND

## 2020-07-27 ASSESSMENT — PAIN DESCRIPTION - FREQUENCY: FREQUENCY: INTERMITTENT

## 2020-07-27 ASSESSMENT — PAIN DESCRIPTION - PROGRESSION: CLINICAL_PROGRESSION: NOT CHANGED

## 2020-07-27 ASSESSMENT — PAIN DESCRIPTION - DESCRIPTORS: DESCRIPTORS: SORE

## 2020-07-27 ASSESSMENT — PAIN DESCRIPTION - PAIN TYPE: TYPE: CHRONIC PAIN

## 2020-07-27 ASSESSMENT — PAIN DESCRIPTION - ONSET: ONSET: ON-GOING

## 2020-07-27 NOTE — PROGRESS NOTES
Speech Language Pathology  Speech Language Pathology  [facility]    Cognitive Treatment Note    Date: 7/27/2020  Patients Name: Merline Birchwood  MRN: [id number]  Diagnosis:   Patient Active Problem List   Diagnosis Code    Breast cancer [facility]) C50.919    Renal cyst N28.1    Lumbar degenerative disc disease M51.36    Arthropathy of lumbar facet joint M47.816    Failed back syndrome of lumbar spine M96.1    Lumbar spondylosis M47.816    Sacroiliitis ([address] M46.1    Status post lumbar spinal fusion Z98.1    TIA (transient ischemic attack) G45.9    Anemia D64.9    Dyspnea R06.00    COPD with acute exacerbation ([address] J44.1    Acute kidney injury superimposed on CKD ([address] N17.9, N18.9    Aspiration pneumonia ([address] J69.0    CKD (chronic kidney disease) stage 4, GFR 15-29 ml/min ([address] N18.4    Left renal atrophy N26.1    Severe malnutrition ([address] E43    Acute cystitis without hematuria N30.00    Disorientation R41.0    Hypertensive emergency I16.1    Hypertension I10    Essential hypertension I10    TIMO (acute kidney injury) ([address] N17.9    Acute respiratory failure ([address] J96.00    Acute encephalopathy G93.40    Chronic kidney disease (CKD), stage III (moderate) ([address] N18.3    Seizure ([address] R56.9    Altered mental status R41.82    Encephalopathy G93.40       Pain: Denies    Cognitive Treatment    Treatment time:  7782-7425    Subjective: [x] Alert [x] Cooperative     [] Confused     [] Agitated    [] Lethargic    Objective/Assessment:  Attention: sustained throughout. Recall:    Pt. Recalled personal information this ST told her last week via conversation. Problem Solving/Reasoning:    Correct sentence inconsistencies- 60%, 80% c cues. Convergent categorization- 100%. Name 7 items in category- 86%, 100% c cues. Other:   Pt. Sometimes needed repetition of questions and long response latency demonstrated. Pt. To be d/c home today at request of her .   ST recommends 24 hour supervision d/t cognitive concerns. Plan:  [x] Continue ST services    [] Discharge from ST:      Discharge recommendations: [] Inpatient Rehab   [] [place]   [] Outpatient Therapy  [] Follow up at trauma clinic   [] Other:       Treatment completed by: Porfirio Arriola A.CCC/SLP

## 2020-07-27 NOTE — CARE COORDINATION
Per the husbands request writer made referral [facility] home health care. Writer faxed packet. Writer also ordered a lt wt wheelchair from [facility]. This will be delivered to her home.

## 2020-07-27 NOTE — PROGRESS NOTES
[address] [facility]    ACUTE REHABILITATION OCCUPATIONAL THERAPY  DAILY NOTE    Date: 20  Patient Name: Yandy Ball      Room: 2203/8209-79    MRN: [id number]   : 1940  (79 y.o.)  Gender: female   Referring Practitioner: Dr. Fernando Tan  Diagnosis: encephalopathy, Hypoxic respiratory failure secondary to hypertensive emergency which required intubation, aspiration pneumonia       Restrictions  Restrictions/Precautions: Fall Risk, General Precautions  Implants present? : Metal implants(R JAILENE, L TKA, neck and back surgery)  Other position/activity restrictions: up with assistance  Required Braces or Orthoses?: No    Subjective  Comments: Pt discharging home this date. Patient Currently in Pain: Yes  Pain Level: 2  Pain Location: Hand  Pain Orientation: Left;Right  Restrictions/Precautions: Fall Risk;General Precautions        Pain Assessment  Pain Assessment: 0-10  Pain Level: 2  Pain Type: Chronic pain  Pain Location: Hand  Pain Orientation: Left, Right  Pain Descriptors: Aching  Pain Frequency: Continuous    Objective  Perception  Overall Perceptual Status: Impaired  Initiation: Cues to initiate tasks  Motor Planning: Cues to use objects appropriately  Balance  Sitting Balance: Supervision  Standing Balance: Minimal assistance  Bed mobility  Supine to Sit: Minimal assistance;Contact guard assistance  Scooting: Contact guard assistance  Transfers  Stand Pivot Transfers: Minimal assistance  Sit to stand: Minimal assistance; Moderate assistance  Stand to sit: Minimal assistance  Standing Balance  Time: <1 min x 2, ~1 min x 2   Activity: functional transfer, lower body bathing and dressing task  Comment: improved hand placement. ADL  Grooming: Setup; Increased time to complete  UE Bathing: Setup; Increased time to complete  LE Bathing: Minimal assistance(A c buttocks.  no other areas addressed. )  UE Dressing: Minimal assistance(threading jacket type shirt. )  LE Dressing: Minimal assistance(A to manage over buttocks (tight fitting pants))  Toileting: None          Assessment  Performance deficits / Impairments: Decreased functional mobility ; Decreased ADL status; Decreased strength;Decreased safe awareness;Decreased cognition;Decreased ROM; Decreased endurance;Decreased sensation;Decreased balance;Decreased fine motor control;Decreased coordination;Decreased posture;Decreased high-level IADLs  Prognosis: Good  Discharge Recommendations: 24 hour supervision or assist;Home with Home health OT  Activity Tolerance: Patient Tolerated treatment well;Patient limited by fatigue  Safety Devices in place: Yes  Type of devices: Call light within reach;Gait belt;Patient at risk for falls;Nurse notified; Chair alarm in place; Left in chair           Plan  Plan  Times per week: 5-7  Times per day: Twice a day  Current Treatment Recommendations: Self-Care / ADL, Home Management Training, Strengthening, Balance Training, Functional Mobility Training, Endurance Training, Wheelchair Mobility Training, Cognitive Reorientation, Safety Education & Training, Patient/Caregiver Education & Training, Equipment Evaluation, Education, & procurement, Cognitive/Perceptual Training  Patient Goals   Patient goals : \"To get so I can do my jobs. ..you know, take care of my home. 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 I don't want everything dumped on him (spouse) because he's too nice a man, and he does it, but it's hard for him. \"  Short term goals  Time Frame for Short term goals: One week  Short term goal 1: Pt will perform toileting tasks and LBD with Mod A. Short term goal 2: Pt and Pt's caregiver will V/D Good understanding of AE/DME/modified techniques for increased IND with self-care and mobility. Short term goal 3: Pt will stand for 4+ minutes with 0-1 UE support, CGA, and no LOB while engaging in functional activity of choice.   Short term goal 4: Pt will perform functional transfers and mobility with Min A, least restrictive mobility device, and Fair safety. Short term goal 5: Pt will actively participate in 30+ minutes of therapeutic exercise/functional activities to promote increased IND with self-care and mobility. Long term goals  Time Frame for Long term goals : By discharge  Long term goal 1: Pt will perform BADLs with SUP and Fair safety. Long term goal 2: Pt will perform functional mobility and transfers during self-care with SUP, least restrictive mobility device, and Fair safety. Long term goal 3: Pt will perform simple meal prep/light housekeeping with SUP and Fair safety. Long term goal 4: Pt will stand for 8+ minutes with 0-1 UE support, SUP, and no LOB while engaging in functional activity of choice. Long term goal 5: Pt and Pt's caregiver will V/D Good understanding of Fall Prevention Strategies for increased IND with self-care and mobility. Long term goals 6: Pt will perform self-care with improved bilateral FMC/GMC as supported by a 7 second improvement in 9 hole peg score [facility]) and 5 block improvement on box and block assessment ([address] [place] [address]).         07/27/20 1125   OT Individual Minutes   Time In 5874   Time Out 1132   Minutes 49     Electronically signed by VIRGILIO Mascorro on 7/27/20 at 4:01 PM EDT

## 2020-07-27 NOTE — DISCHARGE SUMMARY
Occupational therapy:  , Equipment Recommendations  Equipment Needed: (TBD),      Speech therapy:  Comprehension: 5 - Patient understands basic needs (hungry/hot/pain)  Expression: 5 - Expresses basic ideas/needs only (hungry/hot/pain)  Social Interaction: 5 - Patient is appropriate with supervision/cues  Problem Solving: 3 - Patient solves simple/routine tasks 50%-74%  Memory: 3 - Patient remembers 50%-74% of the time      Inpatient Rehabilitation Course:   Laura Gtz is a 79 y.o. female admitted to inpatient rehabilitation on 7/20/2020 for rehab for Encephalopathy. INITIAL HPI:  She was originally admitted to [facility] on 7/12/2020 for AMS. Diagnostic studies confirmed PRES attributed to uncontrolled hypertension. She was mechanically ventilated for acute respiratory failure until her extubation 7/16/2020. Aspiration pneumonia was treated with IV Unasyn then transitioned to 10 days of Augmentin. Patient evaluated today:  GEN: Well developed, well nourished, in NAD  HEENT:  NCAT.  PERRL.  EOMI.  Mucous membranes pink and moist.   PULM:  Clear to ausculation. No rales or rhonchi. Respirations WNL and unlabored. CV:  Regular rate rhythm.  No murmurs or gallops. GI:  Abdomen soft. Nontender. Non-distended.  BS + and equal.    NEUROLOGICAL: A&O x3.  Sensation intact to light touch. MSK:  Functional ROM all extremities. Motor testing 4+/5 key muscles all extremities.    SKIN: Warm dry and intact. Good turgor. EXTREMITIES:  No calf tenderness to palpation. No edema BLEs. PSYCH: Mood WNL. Appropriately interactive. Affect WNL.      Rehab course:   Patient's BP medications were adjusted with improvement in her readings. She developed dysuria just prior to discharge. IM records from the acute hospital recommended a workup for renal artery stenosis which does not appear to have been completed. Urine culture showed pseudomonas, susceptible to Cipro. She was discharged home on a 10 day course. Chronic conditions were stable on home medications. The patient participated in an aggressive multidisciplinary inpatient rehabilitation program involving 3 hours per day, 5 days per week of rehabilitation. Patient benefited from inpatient rehab and was discharged in good stable condition. Consults:   Family medicine    Significant Diagnostics:   CBC:   Lab Results   Component Value Date    WBC 8.5 07/21/2020    RBC 3.91 07/21/2020    RBC 3.78 04/23/2012    HGB 10.5 07/21/2020    HCT 32.7 07/21/2020    MCV 83.7 07/21/2020    MCH 26.9 07/21/2020    MCHC 32.1 07/21/2020    RDW 16.6 07/21/2020     07/21/2020     04/23/2012    MPV 8.0 07/21/2020     BMP:    Lab Results   Component Value Date     07/21/2020    K 3.7 07/21/2020     07/21/2020    CO2 25 07/21/2020    BUN 9 07/21/2020    LABALBU 3.9 07/12/2020    LABALBU 4.1 04/23/2012    CREATININE 1.51 07/26/2020    CALCIUM 9.4 07/21/2020    GFRAA 40 07/26/2020    LABGLOM 33 07/26/2020    GLUCOSE 80 07/21/2020    GLUCOSE 118 04/23/2012         Specimen Description  07/25/2020  7:34 PM  [address] [facility]    . CLEAN CATCH URINE    Special Requests  07/25/2020  7:34 PM  [address] [facility]    NOT REPORTED    Culture Abnormal    07/25/2020  7:34 PM  [address] >[id number] CFU/ML    Testing Performed By     Lab - Abbreviation  Name  Director  Address  Valid Date Range    208-Bety Pagan MD  [address] [address]  08/30/17 0801-Present    [facility]-- [address] [facility]  Jerrica Herrera MD  [address]    [facility] [address]  11/17/17 1521-Present    Susceptibility     Pseudomonas aeruginosa (1)     Antibiotic  Interpretation  DAT  Status     amikacin    Final      NOT REPORTED    ceFAZolin    Final      NOT REPORTED    cefepime  Sensitive   Final      2   SUSCEPTIBLE    ciprofloxacin Sensitive   Final      <=0.25   SUSCEPTIBLE    gentamicin  Sensitive   Final      <=1   SUSCEPTIBLE    meropenem    Final      NOT REPORTED    tigecycline    Final      NOT REPORTED    tobramycin  Sensitive   Final      <=1   SUSCEPTIBLE    piperacillin-tazobactam  Sensitive   Final      8   SUSCEPTIBLE          Patient Instructions: Activity as tolerated. Medications, precautions and follow up reviewed with patient and family    Follow-up visits: See after visit summary from hospitalization - PCP 1-2 weeks. Acute hospital records indicate recommendation for renal artery stenosis workup for persistent hypertension. Disposition:  Home with home healthcare. DME Face to Face:  Sara Mchugh is seen in face-to-face evaluation and requires the assistance of a lightweight wheelchair to successfully complete daily living tasks such as: toileting, bathing, dressing, and grooming; or any other daily living task in the home. A lightweight wheelchair is necessary due to the patient's impaired ambulation and mobility restrictions. The patient would not be able to resolve these daily living tasks using a cane or walker. The patient can self-propel a lightweight wheelchair safely in their home and can maneuver within their home with adequate access. The patient cannot self-propel in a standard wheelchair. The patient is willing to use the wheelchair.         Discharge Medications:   Ramon, [facility] Medication Instructions JVT:[id number]    Printed on:07/27/20 0410   Medication Information                      allopurinol (ZYLOPRIM) 100 MG tablet  Take 1 tablet by mouth 2 times daily             amLODIPine (NORVASC) 10 MG tablet  Take 1 tablet by mouth daily             benzonatate (TESSALON) 200 MG capsule  Take 1 capsule by mouth daily for 7 days             calcium carbonate-vitamin D (CALTRATE) 600-400 MG-UNIT TABS per tab  Take 1 tablet by mouth 2 times daily             ciprofloxacin (CIPRO) 500 MG

## 2020-07-27 NOTE — DISCHARGE INSTR - COC
Continuity of Care Form    Patient Name: Adela Anderson   :  89  MRN:  [id number]    [facility] date:  2020  Discharge date:  2020    Code Status Order: Full Code   Advance Directives:     Admitting Physician:  Josh Rizvi MD  PCP: Tia Holloway DO    Discharging Nurse: [facility] Unit/Room#: 6743/5141-67  Discharging Unit Phone Number: [phone]    Emergency Contact:   Extended Emergency Contact Information  Primary Emergency Contact: Yenny Alonzo  Address: [address] [facility] Phone: [phone]  Work Phone: [phone]  Mobile Phone: [phone]  Relation: Spouse  Hearing or visual needs: None  Other needs: None  Preferred language: English   needed?  No  Secondary Emergency Contact: [facility] Phone: [phone]  Mobile Phone: [phone]  Relation: Child    Past Surgical History:  Past Surgical History:   Procedure Laterality Date    APPENDECTOMY      BACK SURGERY      fusion with rods and screws    BLADDER SUSPENSION      BREAST SURGERY Left     lumpectomy x2    CAROTID ENDARTERECTOMY      COLONOSCOPY      COLONOSCOPY N/A 2018    COLONOSCOPY POLYPECTOMY performed by Roberto Duron MD at [facility]      JOINT REPLACEMENT      knee    JOINT REPLACEMENT      right hip    TONSILLECTOMY      UPPER GASTROINTESTINAL ENDOSCOPY N/A 2018    EGD BIOPSY performed by Roberto Duron MD at [address] [place] [address]         Immunization History:   Immunization History   Administered Date(s) Administered    Pneumococcal Conjugate 13-valent ([id number]) 2018    Pneumococcal Polysaccharide ([id number]) 2014       Active Problems:  Patient Active Problem List   Diagnosis Code    Breast cancer ([facility] C50.919    Renal cyst N28.1    Lumbar degenerative disc disease M51.36    Arthropathy of lumbar facet joint M47.816    Failed back syndrome of lumbar spine M96.1    Lumbar spondylosis M47.816    Sacroiliitis (HCC) M46.1    Status post lumbar spinal fusion Z98.1    TIA (transient ischemic attack) G45.9    Anemia D64.9    Dyspnea R06.00    COPD with acute exacerbation ([facility]) J44.1    Acute kidney injury superimposed on CKD ([facility]) N17.9, N18.9    Aspiration pneumonia ([facility]) J69.0    CKD (chronic kidney disease) stage 4, GFR 15-29 ml/min ([facility]) N18.4    Left renal atrophy N26.1    Severe malnutrition ([facility]) E43    Acute cystitis without hematuria N30.00    Disorientation R41.0    Hypertensive emergency I16.1    Hypertension I10    Essential hypertension I10    TIMO (acute kidney injury) ([facility] N17.9    Acute respiratory failure ([facility]) J96.00    Acute encephalopathy G93.40    Chronic kidney disease (CKD), stage III (moderate) ([facility]) N18.3    Seizure ([facility] R56.9    Altered mental status R41.82    Encephalopathy G93.40       Isolation/Infection:   Isolation          Contact        Patient Infection Status     Infection Onset Added Last Indicated Last Indicated By Review Planned Expiration Resolved Resolved By    MDRO (multi-drug resistant organism)  12/23/19 12/23/19 Peyton Cabrera RN        12/2019 ecoli- urine    ESBL (Extended Spectrum Beta Lactamase)  12/12/16 12/19/19 Urine culture clean catch        ecoli esbl urine 12/2019    Resolved    COVID-19 Rule Out 07/12/20 07/12/20 07/12/20 COVID-19 (Ordered)   07/13/20 Rule-Out Test Resulted          Nurse Assessment:  Last Vital Signs: BP (!) 146/54   Pulse 60   Temp 98.2 °F (36.8 °C) (Oral)   Resp 16   Ht 5' 5\" (1.651 m)   Wt 159 lb (72.1 kg)   SpO2 91%   BMI 26.46 kg/m²     Last documented pain score (0-10 scale): Pain Level: 0  Last Weight:   Wt Readings from Last 1 Encounters:   07/24/20 159 lb (72.1 kg)     Mental Status:  oriented and able to concentrate and follow conversation    IV Access:  - None    Nursing Mobility/ADLs:  Walking   Assisted  Transfer Assisted  Bathing  Assisted  Dressing  Assisted  Toileting  Assisted  Feeding  Assisted  Med Admin  Dependent  Med Delivery   whole    Wound Care Documentation and Therapy:        Elimination:  Continence:   · Bowel: Yes  · Bladder: Yes ( occasional incontinence)  Urinary Catheter: None   Colostomy/Ileostomy/Ileal Conduit: No       Date of Last BM: 7/27/2020  No intake or output data in the 24 hours ending 07/27/20 0909  No intake/output data recorded. Safety Concerns: At Risk for Falls    Impairments/Disabilities:      Vision    Nutrition Therapy:  Current Nutrition Therapy:   - Oral Diet:  General    Routes of Feeding: Oral  Liquids: Thin Liquids  Daily Fluid Restriction: no  Last Modified Barium Swallow with Video (Video Swallowing Test): not done    Treatments at the Time of Hospital Discharge:   Respiratory Treatments: ***  Oxygen Therapy:  is not on home oxygen therapy.   Ventilator:    - No ventilator support    Rehab Therapies: Physical Therapy, Occupational Therapy and Speech/Language Therapy  Weight Bearing Status/Restrictions: No weight bearing restirctions  Other Medical Equipment (for information only, NOT a DME order):  wheelchair and walker  Other Treatments: ***    Patient's personal belongings (please select all that are sent with patient):  Glasses, Dentures upper and lower    RN SIGNATURE:  Electronically signed by Francia Yen RN on 7/27/20 at 9:40 AM EDT    CASE MANAGEMENT/SOCIAL WORK SECTION    Inpatient Status Date: ***    Readmission Risk Assessment Score:  Readmission Risk              Risk of Unplanned Readmission:        21           Discharging to Facility/ [facility]  [place], [address]  Phone [phone]  Fax [phone]  Light weight wheelchair  Dialysis Facility (if applicable)   · Name:  · Address:  · Dialysis Schedule:  · Phone:  · Fax:    / signature: Electronically signed by NEENA House, LSW on 7/27/20 at 9:12 AM EDT    PHYSICIAN SECTION    Prognosis: Fair    Condition at Discharge: Stable    Rehab Potential (if transferring to Rehab): Fair    Recommended Labs or Other Treatments After Discharge: PT/OT/SLP to eval and treat. Nursing care for medication management/education. Physician Certification: I certify the above information and transfer of Wil Lyons  is necessary for the continuing treatment of the diagnosis listed and that she requires Home Care for less 30 days.      Update Admission H&P: No change in H&P    PHYSICIAN SIGNATURE:  Electronically signed by Kiko Londono MD on 7/27/20 at 9:29 AM EDT

## 2020-07-27 NOTE — PROGRESS NOTES
Patient discharged with personal belongings, paperwork signed and explained. Follow up appointments discussed with patient and . No signs of distress. Patient transferred in wheelchair by Davin Kelley.

## 2020-07-27 NOTE — PROGRESS NOTES
surgery (Left); Colonoscopy; back surgery; Upper gastrointestinal endoscopy (N/A, 7/17/2018); and Colonoscopy (N/A, 7/17/2018). Restrictions  Restrictions/Precautions  Restrictions/Precautions: Fall Risk, General Precautions  Required Braces or Orthoses?: No  Implants present? : Metal implants(R JAILENE, L TKA, neck and back surgery)  Position Activity Restriction  Other position/activity restrictions: up with assistance  Subjective   General  Chart Reviewed: Yes  Additional Pertinent Hx: 73-year-old female with history of hypertension admitted with altered mental status. In ER ahe became unresponsive and required intubation. acute hypoxic respiratory failure secondary to hypertensive emergency, was eventually extubated, advancing diet, diagnosed with posterior reversible encephalopathy on MRI. [facility] Response To Previous Treatment: Patient with no complaints from previous session. Family / Caregiver Present: No  Subjective  Subjective: patient reporting home today \"within a couple of hours so , I don't want to get to tired\" pt agreeable to address goals for discharge. Pain Assessment  Pain Level: 2  Pain Location: Knee  Pain Descriptors: Sore  Pain Frequency: Intermittent  Pain Onset: On-going  Clinical Progression: Not changed  Functional Pain Assessment: Prevents or interferes some active activities and ADLs  Response to Pain Intervention: Patient Satisfied       Orientation     Cognition      Objective   Bed mobility  Bridging: Supervision  Rolling to Left: Minimal assistance  Rolling to Right: Contact guard assistance  Supine to Sit: Minimal assistance;Contact guard assistance  Sit to Supine: Minimal assistance;Contact guard assistance  Scooting: Stand by assistance  Comment: needs verbal and tactile cueing with extra time to complete task with least amount of physical assistance.   Transfers  Sit to Stand: Stand by assistance(needs extra time and 2-3 attempts to rise)  Stand to sit: Contact guard assistance  Bed to Chair: Contact guard assistance  Comment: placed brakes on rollator appropriately but did not safely line up to surface to sit. needed directional cues. transfer training included varied surfaces and heights 15- 21\" floor to seat height. Preformed 7 sit <> stands in one minute  Ambulation  Ambulation?: Yes  Ambulation 1  Surface: level tile;carpet  Device: Rollator  Assistance: Contact guard assistance  Quality of Gait: continues to demonstrate poor postural support with rollator. reports \"knee pain\" limits her. education on safe use of device and level of more supportive device from rolling or standard walker. Gait Deviations: Decreased step height;Decreased head and trunk rotation  Distance: 85 feet  Comments: Safety cues for seated rest break technique using Rollator with no recall. Ambulation 2  Surface - 2: ramp;level tile  Device 2: Rolling Walker  Assistance 2: Contact guard assistance  Quality of Gait 2: stooped posture flexed at hips, leans bilateral elbows on walker. Gait Deviations: Slow Renu;Decreased step length;Decreased step height;Shuffles  Distance: 55 feet  Comments: limited by knee pain and demonstrates increased poor safety awareness and fall risk. per chart spouse support 24/7 at discharge and educated prior to this treatment. Stairs/Curb  Stairs?: Yes  Stairs  # Steps : 2  Stairs Height: 4\"  Curbs: 8\"  Device: Rolling walker  Assistance: Minimal assistance  Comment: cueing verbal and tactile for safety and technique  Wheelchair Activities  Propulsion: Yes  Propulsion 1  Propulsion: Manual  Level: Level Tile  Method: LUE;RUE  Level of Assistance: Stand by assistance  Description/ Details: with  90* turns.  Pt fatigued quickly,  Distance: 100 feet     Balance  Posture: Poor(Standing, seated)  Sitting - Static: Good(edge of mat, no UE or back support)  Sitting - Dynamic: Fair(edge of mat, no back support; requires cues for steadier position)  Standing -

## 2020-07-27 NOTE — PLAN OF CARE
Problem: Falls - Risk of:  Goal: Will remain free from falls  Description: Will remain free from falls  7/27/2020 1210 by Olivier Roberto RN  Outcome: Ongoing  7/27/2020 0223 by Matteo Mc RN  Outcome: Ongoing  Goal: Absence of physical injury  Description: Absence of physical injury  7/27/2020 1210 by Olivier Roberto RN  Outcome: Ongoing  7/27/2020 0223 by Matteo Mc RN  Outcome: Ongoing     Problem: Skin Integrity:  Goal: Will show no infection signs and symptoms  Description: Will show no infection signs and symptoms  7/27/2020 1210 by Olivier Roberto RN  Outcome: Ongoing  7/27/2020 0223 by Matteo Mc RN  Outcome: Ongoing  Goal: Absence of new skin breakdown  Description: Absence of new skin breakdown  7/27/2020 1210 by Olivier Roberto RN  Outcome: Ongoing  7/27/2020 0223 by Matteo Mc RN  Outcome: Ongoing     Problem: Pain:  Goal: Pain level will decrease  Description: Pain level will decrease  7/27/2020 1210 by Olivier Roberto RN  Outcome: Ongoing  7/27/2020 0223 by Matteo Mc RN  Outcome: Ongoing  Goal: Control of acute pain  Description: Control of acute pain  7/27/2020 1210 by Olivier Roberto RN  Outcome: Ongoing  7/27/2020 0223 by Matteo Mc RN  Outcome: Ongoing  Goal: Control of chronic pain  Description: Control of chronic pain  7/27/2020 1210 by Olivier Roberto RN  Outcome: Ongoing  7/27/2020 0223 by Matteo Mc RN  Outcome: Ongoing     Problem: Neurological  Goal: Maximum potential motor/sensory/cognitive function  7/27/2020 1210 by Olivier Roberto RN  Outcome: Ongoing  7/27/2020 0223 by Matteo Mc RN  Outcome: Ongoing     Problem: Nutrition  Goal: Optimal nutrition therapy  7/27/2020 1210 by Olivier Roberto RN  Outcome: Ongoing  7/27/2020 0223 by Matteo Mc RN  Outcome: Ongoing

## 2020-08-08 ENCOUNTER — HOSPITAL ENCOUNTER (OUTPATIENT)
Age: 80
Setting detail: SPECIMEN
Discharge: HOME | End: 2020-08-08
Payer: MEDICARE

## 2020-08-08 ENCOUNTER — OFFICE VISIT (OUTPATIENT)
Dept: PRIMARY CARE CLINIC | Age: 80
End: 2020-08-08
Payer: MEDICARE

## 2020-08-08 VITALS — TEMPERATURE: 98.2 F | OXYGEN SATURATION: 94 % | HEART RATE: 62 BPM

## 2020-08-08 PROCEDURE — G8427 DOCREV CUR MEDS BY ELIG CLIN: HCPCS | Performed by: FAMILY MEDICINE

## 2020-08-08 PROCEDURE — 4040F PNEUMOC VAC/ADMIN/RCVD: CPT | Performed by: FAMILY MEDICINE

## 2020-08-08 PROCEDURE — G8399 PT W/DXA RESULTS DOCUMENT: HCPCS | Performed by: FAMILY MEDICINE

## 2020-08-08 PROCEDURE — G8417 CALC BMI ABV UP PARAM F/U: HCPCS | Performed by: FAMILY MEDICINE

## 2020-08-08 PROCEDURE — 1123F ACP DISCUSS/DSCN MKR DOCD: CPT | Performed by: FAMILY MEDICINE

## 2020-08-08 PROCEDURE — 1090F PRES/ABSN URINE INCON ASSESS: CPT | Performed by: FAMILY MEDICINE

## 2020-08-08 PROCEDURE — 1036F TOBACCO NON-USER: CPT | Performed by: FAMILY MEDICINE

## 2020-08-08 PROCEDURE — 99203 OFFICE O/P NEW LOW 30 MIN: CPT | Performed by: FAMILY MEDICINE

## 2020-08-08 PROCEDURE — 1111F DSCHRG MED/CURRENT MED MERGE: CPT | Performed by: FAMILY MEDICINE

## 2020-08-08 ASSESSMENT — ENCOUNTER SYMPTOMS
WHEEZING: 0
ABDOMINAL PAIN: 0
SORE THROAT: 0
DIARRHEA: 0
RHINORRHEA: 0
SHORTNESS OF BREATH: 0
VOMITING: 0
NAUSEA: 0
COUGH: 1

## 2020-08-08 NOTE — PROGRESS NOTES
[address] [place] [address] CLINIC  [address] [place] [address] [address] [address]  Dept: [phone]  Dept Fax: [phone]    Michelle Villa is a 79 y.o. female who presents today for her medical conditions/complaintsas noted below. Michelle Villa is c/o of Covid Testing (fever )        HPI:     Fever    This is a new problem. The current episode started today. The problem occurs constantly. The problem has been unchanged. Her temperature was unmeasured prior to arrival. Associated symptoms include coughing. Pertinent negatives include no abdominal pain, congestion, diarrhea, ear pain, muscle aches, nausea, rash, sore throat, vomiting or wheezing. She has tried nothing for the symptoms. The treatment provided no relief.    Risk factors: sick contacts ( has symptoms concerning for COVID)    Has been in/out of hospital recently  PMHx of breast cancer, CKD, COPD, CVA, HTN  Past Medical History:   Diagnosis Date    Arthritis     Cancer ([address]     breast cancer, left    CKD (chronic kidney disease) stage 4, GFR 15-29 ml/min (HCC)     COPD (chronic obstructive pulmonary disease) ([address]     CVA (cerebral vascular accident) ([address]     mini    Gout     Hyperlipidemia     Hypertension     Left renal atrophy     Other abnormality of urination(788.69)      blood disorder needs platelets prior to procedures    Past medical history reviewed and pertinent positives/negatives in the HPI    Past Surgical History:   Procedure Laterality Date    APPENDECTOMY      BACK SURGERY      fusion with rods and screws    BLADDER SUSPENSION      BREAST SURGERY Left     lumpectomy x2    CAROTID ENDARTERECTOMY      COLONOSCOPY      COLONOSCOPY N/A 7/17/2018    COLONOSCOPY POLYPECTOMY performed by Arlene Diaz MD at [facility]      knee    JOINT REPLACEMENT      right hip    TONSILLECTOMY      UPPER GASTROINTESTINAL ENDOSCOPY N/A 7/17/2018 EGD BIOPSY performed by Angely Zhu MD at [address] [facility]          Family History   Problem Relation Age of Onset    Heart Disease Brother     Other Mother         polycythemia    Heart Disease Father     Heart Disease Sister         enlarged heart    Cancer Son         prostate       Social History     Tobacco Use    Smoking status: Former Smoker     Packs/day: 1.00     Last attempt to quit: 2018     Years since quittin.8    Smokeless tobacco: Never Used   Substance Use Topics    Alcohol use: No      Current Outpatient Medications   Medication Sig Dispense Refill    gabapentin (NEURONTIN) 300 MG capsule Take 1 capsule by mouth nightly for 30 days. 30 capsule 0    lactobacillus (CULTURELLE) capsule Take 1 capsule by mouth 3 times daily (with meals)      ezetimibe (ZETIA) 10 MG tablet Take 1 tablet by mouth nightly 30 tablet 0    cloNIDine (CATAPRES) 0.2 MG/24HR PTWK Place 1 patch onto the skin once a week 4 patch 0    metoprolol (LOPRESSOR) 100 MG tablet Take 0.5 tablets by mouth 2 times daily 60 tablet 0    amLODIPine (NORVASC) 10 MG tablet Take 1 tablet by mouth daily 30 tablet 0    miconazole (MICOTIN) 2 % powder Apply topically 2 times daily. 45 g 1    spironolactone (ALDACTONE) 25 MG tablet Take 1 tablet by mouth daily 30 tablet 0    hydroCHLOROthiazide (HYDRODIURIL) 25 MG tablet Take 1 tablet by mouth daily 30 tablet 0    allopurinol (ZYLOPRIM) 100 MG tablet Take 1 tablet by mouth 2 times daily 30 tablet 0    polyethylene glycol (GLYCOLAX) 17 g packet Take 17 g by mouth daily as needed for Constipation 527 g 1    calcium carbonate-vitamin D (CALTRATE) 600-400 MG-UNIT TABS per tab Take 1 tablet by mouth 2 times daily  0    clopidogrel (PLAVIX) 75 MG tablet Take 1 tablet by mouth daily 30 tablet 0    omeprazole (PRILOSEC) 20 MG delayed release capsule TAKE 1 CAPSULE DAILY 30 capsule 0     No current facility-administered medications for this visit. Allergies   Allergen Reactions    Diclofenac-Misoprostol Other (See Comments)     Per Dr Brad Martínez - patient has GI intolerance to oral formulations       Health Maintenance   Topic Date Due    DTaP/Tdap/Td vaccine (1 - Tdap) 05/17/1959    Flu vaccine (1) 09/01/2020    Potassium monitoring  07/21/2021    Creatinine monitoring  07/26/2021    DEXA (modify frequency per FRAX score)  Completed    Shingles Vaccine  Completed    Pneumococcal 65+ yrs at Risk Vaccine  Completed    Hepatitis A vaccine  Aged Out    Hepatitis B vaccine  Aged Out    Hib vaccine  Aged Out    Meningococcal (ACWY) vaccine  Aged Out       :      Review of Systems   Constitutional: Positive for fever. Negative for chills. HENT: Negative for congestion, ear pain, rhinorrhea and sore throat. Respiratory: Positive for cough. Negative for shortness of breath and wheezing. Gastrointestinal: Negative for abdominal pain, diarrhea, nausea and vomiting. Musculoskeletal: Negative for myalgias. Skin: Negative for pallor and rash. Hematological: Negative for adenopathy. Objective:   Patient was evaluated [place] today during this pandemic covid 19 situation. Physical Exam  Vitals signs and nursing note reviewed. Constitutional:       Appearance: Normal appearance. HENT:      Head: Normocephalic and atraumatic. Right Ear: Hearing normal.      Left Ear: Hearing normal.      Nose: Nose normal.      Mouth/Throat:      Lips: Pink. Mouth: Mucous membranes are moist.      Pharynx: Oropharynx is clear. Eyes:      Extraocular Movements: Extraocular movements intact. Conjunctiva/sclera: Conjunctivae normal.   Neck:      Musculoskeletal: Normal range of motion. Cardiovascular:      Rate and Rhythm: Normal rate and regular rhythm. Heart sounds: Normal heart sounds. Pulmonary:      Effort: Pulmonary effort is normal.      Breath sounds: Normal breath sounds. Skin:     General: Skin is warm and dry. Neurological:      Mental Status: She is alert and oriented to person, place, and time. Mental status is at baseline. Psychiatric:         Mood and Affect: Mood normal.         Behavior: Behavior normal.         Thought Content: Thought content normal.         Judgment: Judgment normal.       Pulse 62   Temp 98.2 °F (36.8 °C) (Temporal)   SpO2 94%     Assessment:       Diagnosis Orders   1. Suspected COVID-19 virus infection  COVID-19 Ambulatory   2. Fever, unspecified fever cause         Plan: You were tested for COVID today. We will call you with results when they are available. If you have not heard from us in 7 days, please call our office. Advance Care Planning  People with COVID-19 may have no symptoms, mild symptoms, such as fever, cough, and shortness of breath or they may have more severe illness, developing severe and fatal pneumonia. As a result, Advance Care Planning with attention to naming a health care decision maker (someone you trust to make healthcare decisions for you if you could not speak for yourself) and sharing other health care preferences is important BEFORE a possible health crisis. Please contact your Primary Care Provider to discuss Advance Care Planning. Preventing the Spread of Coronavirus Disease 2019 in Homes and Residential Communities  For the most recent information go to [email or web address]    Prevention steps for People with confirmed or suspected COVID-19 (including persons under investigation) who do not need to be hospitalized  and   People with confirmed COVID-19 who were hospitalized and determined to be medically stable to go home    Your healthcare provider and public health staff will evaluate whether you can be cared for at home. If it is determined that you do not need to be hospitalized and can be isolated at home, you will be monitored by staff from your local or state health department.  You should follow the prevention steps below until a healthcare provider or local or state health department says you can return to your normal activities. Stay home except to get medical care  People who are mildly ill with COVID-19 are able to isolate at home during their illness. You should restrict activities outside your home, except for getting medical care. Do not go to work, school, or public areas. Avoid using public transportation, ride-sharing, or taxis. Separate yourself from other people and animals in your home  People: As much as possible, you should stay in a specific room and away from other people in your home. Also, you should use a separate bathroom, if available. Animals: You should restrict contact with pets and other animals while you are sick with COVID-19, just like you would around other people. Although there have not been reports of pets or other animals becoming sick with COVID-19, it is still recommended that people sick with COVID-19 limit contact with animals until more information is known about the virus. When possible, have another member of your household care for your animals while you are sick. If you are sick with COVID-19, avoid contact with your pet, including petting, snuggling, being kissed or licked, and sharing food. If you must care for your pet or be around animals while you are sick, wash your hands before and after you interact with pets and wear a facemask. Call ahead before visiting your doctor  If you have a medical appointment, call the healthcare provider and tell them that you have or may have COVID-19. This will help the healthcare providers office take steps to keep other people from getting infected or exposed. Wear a facemask  You should wear a facemask when you are around other people (e.g., sharing a room or vehicle) or pets and before you enter a healthcare providers office.  If you are not able to wear a facemask (for example, because it causes trouble seeking care, call your healthcare provider and tell them that you have, or are being evaluated for, COVID-19. Put on a facemask before you enter the facility. These steps will help the healthcare providers office to keep other people in the office or waiting room from getting infected or exposed. Ask your healthcare provider to call the local or state health department. Persons who are placed under active monitoring or facilitated self-monitoring should follow instructions provided by their local health department or occupational health professionals, as appropriate. When working with your local health department check their available hours. If you have a medical emergency and need to call 911, notify the dispatch personnel that you have, or are being evaluated for COVID-19. If possible, put on a facemask before emergency medical services arrive. Discontinuing home isolation  Patients with confirmed COVID-19 should remain under home isolation precautions until the risk of secondary transmission to others is thought to be low. The decision to discontinue home isolation precautions should be made on a case-by-case basis, in consultation with healthcare providers and state and local health departments. Orders Placed This Encounter   Procedures    COVID-19 Ambulatory     Oropharyngeal     Standing Status:   Future     Standing Expiration Date:   8/8/2021     Scheduling Instructions:      Saline media preferred given current shortage of viral transport media but both acceptable     Order Specific Question:   Is this test for diagnosis or screening? Answer:   Diagnosis of ill patient     Order Specific Question:   Symptomatic for COVID-19 as defined by CDC? Answer:   Yes     Order Specific Question:   Date of Symptom Onset     Answer:   8/8/2020     Order Specific Question:   Hospitalized for COVID-19? Answer:   No     Order Specific Question:   Admitted to ICU for COVID-19? Answer:    No Order Specific Question:   Employed in healthcare setting? Answer:   Unknown     Order Specific Question:   Resident in a congregate (group) care setting? Answer:   Unknown     Order Specific Question:   Pregnant? Answer:   No     Order Specific Question:   Previously tested for COVID-19? Answer:   Yes     No orders of the defined types were placed in this encounter. Patient given educational materials - see patient instructions. Discussed use, benefit, and side effects of prescribed medications. All patient questions answered. Pt voiced understanding. Patient agreed with treatment plan. Follow up as directed.      Electronicallysigned by Jeffry Campos MD on 8/8/2020 at 2:19 PM

## 2020-08-08 NOTE — PATIENT INSTRUCTIONS
animals in your home  People: As much as possible, you should stay in a specific room and away from other people in your home. Also, you should use a separate bathroom, if available. Animals: You should restrict contact with pets and other animals while you are sick with COVID-19, just like you would around other people. Although there have not been reports of pets or other animals becoming sick with COVID-19, it is still recommended that people sick with COVID-19 limit contact with animals until more information is known about the virus. When possible, have another member of your household care for your animals while you are sick. If you are sick with COVID-19, avoid contact with your pet, including petting, snuggling, being kissed or licked, and sharing food. If you must care for your pet or be around animals while you are sick, wash your hands before and after you interact with pets and wear a facemask. Call ahead before visiting your doctor  If you have a medical appointment, call the healthcare provider and tell them that you have or may have COVID-19. This will help the healthcare providers office take steps to keep other people from getting infected or exposed. Wear a facemask  You should wear a facemask when you are around other people (e.g., sharing a room or vehicle) or pets and before you enter a healthcare providers office. If you are not able to wear a facemask (for example, because it causes trouble breathing), then people who live with you should not stay in the same room with you, or they should wear a facemask if they enter your room. Cover your coughs and sneezes  Cover your mouth and nose with a tissue when you cough or sneeze. Throw used tissues in a lined trash can. Immediately wash your hands with soap and water for at least 20 seconds or, if soap and water are not available, clean your hands with an alcohol-based hand  that contains at least 60% alcohol.   Clean your hands often  Wash your hands often with soap and water for at least 20 seconds, especially after blowing your nose, coughing, or sneezing; going to the bathroom; and before eating or preparing food. If soap and water are not readily available, use an alcohol-based hand  with at least 60% alcohol, covering all surfaces of your hands and rubbing them together until they feel dry. Soap and water are the best option if hands are visibly dirty. Avoid touching your eyes, nose, and mouth with unwashed hands. Avoid sharing personal household items  You should not share dishes, drinking glasses, cups, eating utensils, towels, or bedding with other people or pets in your home. After using these items, they should be washed thoroughly with soap and water. Clean all high-touch surfaces everyday  High touch surfaces include counters, tabletops, doorknobs, bathroom fixtures, toilets, phones, keyboards, tablets, and bedside tables. Also, clean any surfaces that may have blood, stool, or body fluids on them. Use a household cleaning spray or wipe, according to the label instructions. Labels contain instructions for safe and effective use of the cleaning product including precautions you should take when applying the product, such as wearing gloves and making sure you have good ventilation during use of the product. Monitor your symptoms  Seek prompt medical attention if your illness is worsening (e.g., difficulty breathing). Before seeking care, call your healthcare provider and tell them that you have, or are being evaluated for, COVID-19. Put on a facemask before you enter the facility. These steps will help the healthcare providers office to keep other people in the office or waiting room from getting infected or exposed. Ask your healthcare provider to call the local or state health department.  Persons who are placed under active monitoring or facilitated self-monitoring should follow instructions provided by their local health department or occupational health professionals, as appropriate. When working with your local health department check their available hours. If you have a medical emergency and need to call 911, notify the dispatch personnel that you have, or are being evaluated for COVID-19. If possible, put on a facemask before emergency medical services arrive. Discontinuing home isolation  Patients with confirmed COVID-19 should remain under home isolation precautions until the risk of secondary transmission to others is thought to be low. The decision to discontinue home isolation precautions should be made on a case-by-case basis, in consultation with healthcare providers and state and local health departments.

## 2020-08-10 LAB — SARS-COV-2, NAA: NOT DETECTED

## 2020-11-03 PROBLEM — I10 HYPERTENSION: Status: RESOLVED | Noted: 2019-12-19 | Resolved: 2020-11-03

## 2020-11-03 PROBLEM — M47.816 LUMBAR SPONDYLOSIS: Status: RESOLVED | Noted: 2020-11-03 | Resolved: 2020-11-03

## 2021-05-13 ENCOUNTER — HOSPITAL ENCOUNTER (OUTPATIENT)
Dept: WOMENS IMAGING | Age: 81
Discharge: HOME | End: 2021-05-15
Payer: MEDICARE

## 2021-05-13 DIAGNOSIS — Z12.31 ENCOUNTER FOR SCREENING MAMMOGRAM FOR BREAST CANCER: ICD-10-CM

## 2021-05-13 PROCEDURE — 77063 BREAST TOMOSYNTHESIS BI: CPT

## 2021-06-28 ENCOUNTER — HOSPITAL ENCOUNTER (OUTPATIENT)
Dept: NUCLEAR MEDICINE | Age: 81
Discharge: HOME | End: 2021-06-30
Payer: MEDICARE

## 2021-06-28 ENCOUNTER — HOSPITAL ENCOUNTER (OUTPATIENT)
Dept: NON INVASIVE DIAGNOSTICS | Age: 81
Discharge: HOME | End: 2021-06-28
Payer: MEDICARE

## 2021-06-28 VITALS — WEIGHT: 173 LBS | HEIGHT: 66 IN | BODY MASS INDEX: 27.8 KG/M2

## 2021-06-28 DIAGNOSIS — I51.7 LVH (LEFT VENTRICULAR HYPERTROPHY): ICD-10-CM

## 2021-06-28 DIAGNOSIS — Z01.818 PRE-OP TESTING: ICD-10-CM

## 2021-06-28 DIAGNOSIS — I42.1 HOCM (HYPERTROPHIC OBSTRUCTIVE CARDIOMYOPATHY) (HCC): ICD-10-CM

## 2021-06-28 DIAGNOSIS — R00.1 BRADYCARDIA: ICD-10-CM

## 2021-06-28 DIAGNOSIS — Z01.810 ENCOUNTER FOR PREPROCEDURAL CARDIOVASCULAR EXAMINATION: ICD-10-CM

## 2021-06-28 DIAGNOSIS — Z01.810 PREOP CARDIOVASCULAR EXAM: ICD-10-CM

## 2021-06-28 DIAGNOSIS — R94.31 ABNORMAL EKG: ICD-10-CM

## 2021-06-28 DIAGNOSIS — Z01.818 PREOP EXAMINATION: ICD-10-CM

## 2021-06-28 LAB
LV EF: 70 %
LV EF: 78 %
LVEF MODALITY: NORMAL
LVEF MODALITY: NORMAL

## 2021-06-28 PROCEDURE — 3430000000 HC RX DIAGNOSTIC RADIOPHARMACEUTICAL: Performed by: INTERNAL MEDICINE

## 2021-06-28 PROCEDURE — A9500 TC99M SESTAMIBI: HCPCS | Performed by: INTERNAL MEDICINE

## 2021-06-28 PROCEDURE — 78452 HT MUSCLE IMAGE SPECT MULT: CPT

## 2021-06-28 PROCEDURE — C8929 TTE W OR WO FOL WCON,DOPPLER: HCPCS

## 2021-06-28 PROCEDURE — 6360000002 HC RX W HCPCS: Performed by: INTERNAL MEDICINE

## 2021-06-28 PROCEDURE — 2580000003 HC RX 258: Performed by: INTERNAL MEDICINE

## 2021-06-28 PROCEDURE — 93017 CV STRESS TEST TRACING ONLY: CPT

## 2021-06-28 PROCEDURE — 6360000004 HC RX CONTRAST MEDICATION: Performed by: INTERNAL MEDICINE

## 2021-06-28 RX ORDER — ALBUTEROL SULFATE 90 UG/1
2 AEROSOL, METERED RESPIRATORY (INHALATION) PRN
Status: ACTIVE | OUTPATIENT
Start: 2021-06-28 | End: 2021-06-28

## 2021-06-28 RX ORDER — SODIUM CHLORIDE 0.9 % (FLUSH) 0.9 %
10 SYRINGE (ML) INJECTION PRN
Status: DISCONTINUED | OUTPATIENT
Start: 2021-06-28 | End: 2021-07-01 | Stop reason: HOSPADM

## 2021-06-28 RX ORDER — SODIUM CHLORIDE 9 MG/ML
500 INJECTION, SOLUTION INTRAVENOUS CONTINUOUS PRN
Status: ACTIVE | OUTPATIENT
Start: 2021-06-28 | End: 2021-06-28

## 2021-06-28 RX ORDER — METOPROLOL TARTRATE 5 MG/5ML
5 INJECTION INTRAVENOUS EVERY 5 MIN PRN
Status: ACTIVE | OUTPATIENT
Start: 2021-06-28 | End: 2021-06-28

## 2021-06-28 RX ORDER — SODIUM CHLORIDE 0.9 % (FLUSH) 0.9 %
5-40 SYRINGE (ML) INJECTION PRN
Status: ACTIVE | OUTPATIENT
Start: 2021-06-28 | End: 2021-06-28

## 2021-06-28 RX ORDER — AMINOPHYLLINE DIHYDRATE 25 MG/ML
50 INJECTION, SOLUTION INTRAVENOUS PRN
Status: ACTIVE | OUTPATIENT
Start: 2021-06-28 | End: 2021-06-28

## 2021-06-28 RX ORDER — NITROGLYCERIN 0.4 MG/1
0.4 TABLET SUBLINGUAL EVERY 5 MIN PRN
Status: ACTIVE | OUTPATIENT
Start: 2021-06-28 | End: 2021-06-28

## 2021-06-28 RX ORDER — ATROPINE SULFATE 0.1 MG/ML
0.5 INJECTION INTRAVENOUS EVERY 5 MIN PRN
Status: ACTIVE | OUTPATIENT
Start: 2021-06-28 | End: 2021-06-28

## 2021-06-28 RX ADMIN — PERFLUTREN 2.2 MG: 6.52 INJECTION, SUSPENSION INTRAVENOUS at 09:49

## 2021-06-28 RX ADMIN — REGADENOSON 0.4 MG: 0.08 INJECTION, SOLUTION INTRAVENOUS at 09:01

## 2021-06-28 RX ADMIN — SODIUM CHLORIDE, PRESERVATIVE FREE 10 ML: 5 INJECTION INTRAVENOUS at 09:04

## 2021-06-28 RX ADMIN — TETRAKIS(2-METHOXYISOBUTYLISOCYANIDE)COPPER(I) TETRAFLUOROBORATE 11 MILLICURIE: 1 INJECTION, POWDER, LYOPHILIZED, FOR SOLUTION INTRAVENOUS at 07:31

## 2021-06-28 RX ADMIN — TETRAKIS(2-METHOXYISOBUTYLISOCYANIDE)COPPER(I) TETRAFLUOROBORATE 34.7 MILLICURIE: 1 INJECTION, POWDER, LYOPHILIZED, FOR SOLUTION INTRAVENOUS at 09:04

## 2021-06-28 RX ADMIN — SODIUM CHLORIDE, PRESERVATIVE FREE 10 ML: 5 INJECTION INTRAVENOUS at 08:32

## 2021-06-28 RX ADMIN — SODIUM CHLORIDE, PRESERVATIVE FREE 10 ML: 5 INJECTION INTRAVENOUS at 07:31

## 2021-06-28 NOTE — PROCEDURES
[address] [place], [place] [address]                              CARDIAC STRESS TEST    PATIENT NAME: Sumi Carrillo                       :        1940  MED REC NO:   [id number]                              ROOM:  ACCOUNT NO:   [de-identified]                           ADMIT DATE: 2021  PROVIDER:     Len Virk    DATE OF STUDY:  2021    TEST TYPE: LEXISCAN CARDIOLYTE STRESS TEST  INDICATION: ABNORMAL EKG  REFERRING PHYSICIAN: DR. Chucky Hull    RESTING HEART RATE: 52 BEATS PER MINUTE  RESTING BLOOD PRESSURE: 142/59    MEDICATION(S) GIVEN: 0.4MG IV LEXISCAN  REASON FOR TERMINATION: MEDICATION INFUSION COMPLETE    RESTING EKG: ABNORMAL  COMMENTS: NORMAL SINUS RHYTHM, CANNOT RULE OUT ANTERIOR INFARCT, T-WAVE  ABNORMALITIES IN LATERAL LEADS  STRESS HEART RESPONSE: NORMAL RESPONSE  BLOOD PRESSURE RESPONSE: APPROPRIATE  STRESS EKGs: NO CHANGES SEEN  ISCHEMIC EKG CHANGES: NONE    EKG IMPRESSION: ELECTROCARDIOGRAPHICALLY NEGATIVE LEXISCAN STRESS TEST. RADIOISOTOPE RESULTS TO FOLLOW FROM THE DEPARTMENT OF NUCLEAR MEDICINE.             HEMINDERMEE [address] [place] [address]    D: 2021 10:47:38       T: 2021 10:49:01     /[id number]  Job#: [id number]     Doc#: Unknown    CC:    (Retain this field even if not dictated or not decipherable)

## 2021-06-28 NOTE — PROGRESS NOTES
CST Lexiscan. Stress Tech performs patient preparation of physical comfort, review test procedures, pre-stress EKG. Lung Sounds clear t/o. Consent verified by pt. .  Educated patient on test procedure and possible side effects of Lexiscan as well as s/s to report. Cardiologist reviewed pre-test EKG and is present for test.  Patient tolerated test well with minor slight nausea which resolved to baseline after test with caffeine. Start 114/59, 52  Stop 172/79, 65  EKG portion of testing is completed and negative, nuc. med. portion is still pending.

## 2022-04-29 NOTE — FLOWSHEET NOTE
07/13/20 1834   Encounter Summary   Services provided to: Patient   Referral/Consult From: Rounding   Complexity of Encounter Low   Length of Encounter 15 minutes   Spiritual/Baptism   Type Spiritual support   Assessment Unable to respond   Intervention Prayer Ivermectin Counseling:  Patient instructed to take medication on an empty stomach with a full glass of water.  Patient informed of potential adverse effects including but not limited to nausea, diarrhea, dizziness, itching, and swelling of the extremities or lymph nodes.  The patient verbalized understanding of the proper use and possible adverse effects of ivermectin.  All of the patient's questions and concerns were addressed.

## (undated) DEVICE — GLOVE ORANGE PI 7 1/2   MSG9075

## (undated) DEVICE — BITEBLOCK 54FR W/ DENT RIM BLOX

## (undated) DEVICE — JELLY,LUBE,STERILE,FLIP TOP,TUBE,2-OZ: Brand: MEDLINE

## (undated) DEVICE — FORCEPS BX L240CM WRK CHN 2.8MM STD CAP W/ NDL MIC MESH

## (undated) DEVICE — DEFENDO AIR WATER SUCTION AND BIOPSY VALVE KIT FOR  OLYMPUS: Brand: DEFENDO AIR/WATER/SUCTION AND BIOPSY VALVE

## (undated) DEVICE — GOWN,POLY REINFORCED,LG: Brand: MEDLINE

## (undated) DEVICE — SYRINGE MED 50ML LUERSLIP TIP